# Patient Record
Sex: FEMALE | Race: WHITE | Employment: OTHER | URBAN - METROPOLITAN AREA
[De-identification: names, ages, dates, MRNs, and addresses within clinical notes are randomized per-mention and may not be internally consistent; named-entity substitution may affect disease eponyms.]

---

## 2017-09-18 ENCOUNTER — HOSPITAL ENCOUNTER (EMERGENCY)
Age: 82
Discharge: HOME | End: 2017-09-18
Attending: EMERGENCY MEDICINE
Payer: MEDICARE

## 2017-09-18 ENCOUNTER — APPOINTMENT (OUTPATIENT)
Dept: GENERAL RADIOLOGY | Age: 82
End: 2017-09-18
Payer: MEDICARE

## 2017-09-18 VITALS
HEART RATE: 51 BPM | DIASTOLIC BLOOD PRESSURE: 72 MMHG | SYSTOLIC BLOOD PRESSURE: 126 MMHG | HEIGHT: 61 IN | TEMPERATURE: 97.5 F | RESPIRATION RATE: 14 BRPM | BODY MASS INDEX: 33.79 KG/M2 | WEIGHT: 179 LBS

## 2017-09-18 DIAGNOSIS — S90.30XA CONTUSION OF FOOT, UNSPECIFIED LATERALITY, INITIAL ENCOUNTER: Primary | ICD-10-CM

## 2017-09-18 PROCEDURE — 99283 EMERGENCY DEPT VISIT LOW MDM: CPT

## 2017-09-18 PROCEDURE — 73630 X-RAY EXAM OF FOOT: CPT

## 2017-09-18 ASSESSMENT — PAIN DESCRIPTION - PAIN TYPE: TYPE: ACUTE PAIN

## 2017-09-18 ASSESSMENT — ENCOUNTER SYMPTOMS
EYE PAIN: 0
NAUSEA: 0
VOMITING: 0
SORE THROAT: 0
CHEST TIGHTNESS: 0
ABDOMINAL PAIN: 0
SHORTNESS OF BREATH: 0

## 2017-09-18 ASSESSMENT — PAIN DESCRIPTION - LOCATION: LOCATION: FOOT

## 2017-09-18 ASSESSMENT — PAIN DESCRIPTION - ORIENTATION: ORIENTATION: RIGHT

## 2017-09-18 ASSESSMENT — PAIN SCALES - GENERAL: PAINLEVEL_OUTOF10: 7

## 2018-01-09 ENCOUNTER — OFFICE VISIT (OUTPATIENT)
Dept: FAMILY MEDICINE CLINIC | Age: 83
End: 2018-01-09

## 2018-01-09 VITALS
HEIGHT: 61 IN | SYSTOLIC BLOOD PRESSURE: 112 MMHG | OXYGEN SATURATION: 97 % | HEART RATE: 47 BPM | BODY MASS INDEX: 31.93 KG/M2 | WEIGHT: 169.1 LBS | RESPIRATION RATE: 13 BRPM | TEMPERATURE: 98.2 F | DIASTOLIC BLOOD PRESSURE: 60 MMHG

## 2018-01-09 DIAGNOSIS — E78.00 PURE HYPERCHOLESTEROLEMIA: Primary | ICD-10-CM

## 2018-01-09 DIAGNOSIS — Z76.0 MEDICATION REFILL: ICD-10-CM

## 2018-01-09 DIAGNOSIS — F32.A ANXIETY AND DEPRESSION: ICD-10-CM

## 2018-01-09 DIAGNOSIS — E11.00 TYPE 2 DIABETES MELLITUS WITH HYPEROSMOLARITY WITHOUT COMA, WITHOUT LONG-TERM CURRENT USE OF INSULIN (HCC): ICD-10-CM

## 2018-01-09 DIAGNOSIS — I10 ESSENTIAL HYPERTENSION: ICD-10-CM

## 2018-01-09 DIAGNOSIS — F41.9 ANXIETY AND DEPRESSION: ICD-10-CM

## 2018-01-09 PROBLEM — M47.816 LUMBAR SPONDYLOSIS: Status: ACTIVE | Noted: 2017-10-30

## 2018-01-09 PROCEDURE — G8427 DOCREV CUR MEDS BY ELIG CLIN: HCPCS | Performed by: FAMILY MEDICINE

## 2018-01-09 PROCEDURE — 99202 OFFICE O/P NEW SF 15 MIN: CPT | Performed by: FAMILY MEDICINE

## 2018-01-09 PROCEDURE — G8484 FLU IMMUNIZE NO ADMIN: HCPCS | Performed by: FAMILY MEDICINE

## 2018-01-09 PROCEDURE — 1123F ACP DISCUSS/DSCN MKR DOCD: CPT | Performed by: FAMILY MEDICINE

## 2018-01-09 PROCEDURE — 1090F PRES/ABSN URINE INCON ASSESS: CPT | Performed by: FAMILY MEDICINE

## 2018-01-09 PROCEDURE — G8417 CALC BMI ABV UP PARAM F/U: HCPCS | Performed by: FAMILY MEDICINE

## 2018-01-09 PROCEDURE — G8400 PT W/DXA NO RESULTS DOC: HCPCS | Performed by: FAMILY MEDICINE

## 2018-01-09 PROCEDURE — 1036F TOBACCO NON-USER: CPT | Performed by: FAMILY MEDICINE

## 2018-01-09 PROCEDURE — 4040F PNEUMOC VAC/ADMIN/RCVD: CPT | Performed by: FAMILY MEDICINE

## 2018-01-09 PROCEDURE — G8599 NO ASA/ANTIPLAT THER USE RNG: HCPCS | Performed by: FAMILY MEDICINE

## 2018-01-09 RX ORDER — PREGABALIN 75 MG/1
75 CAPSULE ORAL 3 TIMES DAILY
COMMUNITY
Start: 2017-10-13 | End: 2020-01-01 | Stop reason: SDUPTHER

## 2018-01-09 RX ORDER — TRAZODONE HYDROCHLORIDE 100 MG/1
200 TABLET ORAL
COMMUNITY
Start: 2017-05-04 | End: 2018-05-15 | Stop reason: SDUPTHER

## 2018-01-09 RX ORDER — CARVEDILOL 6.25 MG/1
6.25 TABLET ORAL
COMMUNITY
Start: 2017-05-04 | End: 2018-05-15 | Stop reason: SDUPTHER

## 2018-01-09 RX ORDER — LATANOPROST 50 UG/ML
SOLUTION/ DROPS OPHTHALMIC
COMMUNITY
Start: 2017-09-28

## 2018-01-09 RX ORDER — TIZANIDINE 2 MG/1
2 TABLET ORAL
Status: ON HOLD | COMMUNITY
Start: 2017-05-18 | End: 2020-01-01 | Stop reason: HOSPADM

## 2018-01-09 RX ORDER — OMEPRAZOLE 20 MG/1
20 CAPSULE, DELAYED RELEASE ORAL
COMMUNITY
Start: 2017-11-15 | End: 2018-01-09 | Stop reason: SDUPTHER

## 2018-01-09 RX ORDER — TOPIRAMATE 25 MG/1
TABLET ORAL
COMMUNITY
Start: 2017-10-10 | End: 2018-10-11 | Stop reason: SDUPTHER

## 2018-01-09 RX ORDER — ALPRAZOLAM 0.5 MG/1
0.5 TABLET ORAL
COMMUNITY
Start: 2017-05-18 | End: 2020-01-01 | Stop reason: SDUPTHER

## 2018-01-09 RX ORDER — OMEPRAZOLE 20 MG/1
20 CAPSULE, DELAYED RELEASE ORAL DAILY
Qty: 30 CAPSULE | Refills: 1 | Status: SHIPPED | OUTPATIENT
Start: 2018-01-09 | End: 2018-01-20 | Stop reason: SDUPTHER

## 2018-01-09 RX ORDER — GLIMEPIRIDE 1 MG/1
TABLET ORAL
COMMUNITY
Start: 2017-08-07 | End: 2018-03-27

## 2018-01-09 RX ORDER — CLOTRIMAZOLE AND BETAMETHASONE DIPROPIONATE 10; .64 MG/G; MG/G
CREAM TOPICAL
COMMUNITY
Start: 2017-11-03 | End: 2019-04-16 | Stop reason: SDUPTHER

## 2018-01-09 RX ORDER — LOVASTATIN 10 MG/1
10 TABLET ORAL
COMMUNITY
Start: 2017-10-10 | End: 2018-10-22 | Stop reason: SDUPTHER

## 2018-01-09 ASSESSMENT — ENCOUNTER SYMPTOMS: ABDOMINAL PAIN: 0

## 2018-01-09 NOTE — PROGRESS NOTES
Subjective:      Patient ID: Elizabeth Echeverria is a [de-identified] y.o. female    Hypertension   This is a chronic problem. The problem is controlled. Pertinent negatives include no chest pain. Risk factors for coronary artery disease include obesity, diabetes mellitus, dyslipidemia, sedentary lifestyle, stress and post-menopausal state. Past treatments include beta blockers. The current treatment provides moderate improvement. Compliance problems include exercise and diet. Hyperlipidemia   Pertinent negatives include no chest pain. Here to establish. Does see neurology and pain management. Does live at home by herself with help by daughter whom is here with here today. Review of Systems   Constitutional: Negative for activity change. Cardiovascular: Negative for chest pain and leg swelling. Gastrointestinal: Negative for abdominal pain. Skin: Negative for rash. Neurological: Negative for dizziness. Reviewed allergy, medical, social, surgical, family and med list changes and updated   Files  Social History     Social History    Marital status:      Spouse name: N/A    Number of children: N/A    Years of education: N/A     Social History Main Topics    Smoking status: Never Smoker    Smokeless tobacco: Never Used    Alcohol use No    Drug use: No    Sexual activity: Not Asked     Other Topics Concern    None     Social History Narrative    None     Current Outpatient Prescriptions   Medication Sig Dispense Refill    pregabalin (LYRICA) 75 MG capsule Take 75 mg by mouth Take 75 mg by mouth. Alphonse Lassiter lovastatin (MEVACOR) 10 MG tablet Take 10 mg by mouth 1 po qd      traZODone (DESYREL) 100 MG tablet Take 200 mg by mouth 2 po qhs      sertraline (ZOLOFT) 50 MG tablet Take 50 mg by mouth 1 po qd      topiramate (TOPAMAX) 25 MG tablet 2 po qhs      glimepiride (AMARYL) 1 MG tablet Take 1 tablet by mouth only if over 120      carvedilol (COREG) 6.25 MG tablet Take 6.25 mg by mouth 1 po bid      carbidopa-levodopa (SINEMET)  MG per tablet Take 1 tablet by mouth 1 po bid per dr robb goldstein neurologist      metFORMIN (GLUCOPHAGE) 500 MG tablet Take 500 mg by mouth 1 po bid      latanoprost (XALATAN) 0.005 % ophthalmic solution Use 1 Drop in both eyes daily at bedtime.  omeprazole (PRILOSEC) 20 MG delayed release capsule Take 20 mg by mouth 1 po qd      clotrimazole-betamethasone (LOTRISONE) 1-0.05 % cream Use two times daily to affected area for 14 days then stop      glucose blood VI test strips (EXACTECH TEST) strip accu check mirtha plus test strips, test qd      ALPRAZolam (XANAX) 0.5 MG tablet Take 0.5 mg by mouth Take 0.5 mg by mouth qd prn .  tiZANidine (ZANAFLEX) 2 MG tablet Take 2 mg by mouth Take 2 mg by mouth bid prn       No current facility-administered medications for this visit. Family History   Problem Relation Age of Onset    High Blood Pressure Neg Hx      Past Medical History:   Diagnosis Date    Fibromyalgia     Hyperlipidemia     Parkinson's disease ([facility] [address]     Type II or unspecified type diabetes mellitus without mention of complication, not stated as uncontrolled    Controlled Substances Monitoring:        Objective:   /60 (Site: Right Arm, Position: Sitting, Cuff Size: Large Adult)   Pulse (!) 47   Temp 98.2 °F (36.8 °C) (Tympanic)   Resp 13   Ht 5' 1\" (1.549 m)   Wt 169 lb 1.6 oz (76.7 kg)   SpO2 97%   BMI 31.95 kg/m²     Physical Exam  Neck:no carotid bruits. No masses. No adenopathy. No thyroid asymmetry. Lungs:clear and equal breath sounds. No wheezes or rales. Heart:rate reg.--around 50. No murmur. No gallops   Pulses:Radials 2+ equal               Poster tib 1+ equal  Extremities:no edema in either leg  Gen: In no acute distress  Abdomen; B.S present. Soft  Non tender. No hepatosplenomegaly. No masses     Patient with appropriate affect.   Alert  Thought content appropriate  Good eye contact      Dorsalis pedis and

## 2018-01-20 RX ORDER — OMEPRAZOLE 20 MG/1
20 CAPSULE, DELAYED RELEASE ORAL DAILY
Qty: 30 CAPSULE | Refills: 1 | Status: SHIPPED | OUTPATIENT
Start: 2018-01-20 | End: 2018-11-12 | Stop reason: DRUGHIGH

## 2018-02-02 ENCOUNTER — OFFICE VISIT (OUTPATIENT)
Dept: FAMILY MEDICINE CLINIC | Age: 83
End: 2018-02-02
Payer: COMMERCIAL

## 2018-02-02 VITALS
OXYGEN SATURATION: 98 % | WEIGHT: 174.4 LBS | DIASTOLIC BLOOD PRESSURE: 64 MMHG | BODY MASS INDEX: 32.93 KG/M2 | TEMPERATURE: 98.2 F | SYSTOLIC BLOOD PRESSURE: 118 MMHG | RESPIRATION RATE: 20 BRPM | HEART RATE: 50 BPM | HEIGHT: 61 IN

## 2018-02-02 DIAGNOSIS — J01.40 ACUTE NON-RECURRENT PANSINUSITIS: Primary | ICD-10-CM

## 2018-02-02 DIAGNOSIS — R05.9 COUGH: ICD-10-CM

## 2018-02-02 PROCEDURE — 99213 OFFICE O/P EST LOW 20 MIN: CPT | Performed by: NURSE PRACTITIONER

## 2018-02-02 RX ORDER — DOXYCYCLINE 100 MG/1
100 CAPSULE ORAL 2 TIMES DAILY
Qty: 20 CAPSULE | Refills: 0 | Status: SHIPPED | OUTPATIENT
Start: 2018-02-02 | End: 2018-02-12

## 2018-02-02 RX ORDER — BENZONATATE 100 MG/1
100 CAPSULE ORAL 3 TIMES DAILY PRN
Qty: 21 CAPSULE | Refills: 0 | Status: SHIPPED | OUTPATIENT
Start: 2018-02-02 | End: 2018-02-09

## 2018-02-02 ASSESSMENT — ENCOUNTER SYMPTOMS
CHEST TIGHTNESS: 0
COUGH: 1
RHINORRHEA: 1
ABDOMINAL PAIN: 0
DIARRHEA: 0
SINUS COMPLAINT: 1
SWOLLEN GLANDS: 1
ABDOMINAL DISTENTION: 0
SHORTNESS OF BREATH: 0
TROUBLE SWALLOWING: 0
CONSTIPATION: 0
VOMITING: 0
HOARSE VOICE: 0
SINUS PRESSURE: 1
SORE THROAT: 1
NAUSEA: 0
SINUS PAIN: 1

## 2018-02-23 NOTE — TELEPHONE ENCOUNTER
Pt needs refills on glucose testing supplies of AccuChek Plus test strips (Pkg of 100 she thinks) and lancets. States she gets them free from [employer].   The script needs sent to the following fax #:  [phone]

## 2018-03-16 NOTE — TELEPHONE ENCOUNTER
Patient called to check on status. Informed M.A. To process to patients pharmacy of choice Med. Service Comp. NOT   Note to Pharmacy:   [address] [place] [address] [phone]     As noted on Rx. Marisa Carlo Marisa Carlo Marisa Carlo Marisa Carlo

## 2018-03-20 DIAGNOSIS — E11.00 TYPE 2 DIABETES MELLITUS WITH HYPEROSMOLARITY WITHOUT COMA, WITHOUT LONG-TERM CURRENT USE OF INSULIN (HCC): ICD-10-CM

## 2018-03-20 DIAGNOSIS — I10 ESSENTIAL HYPERTENSION: ICD-10-CM

## 2018-03-20 DIAGNOSIS — E78.00 PURE HYPERCHOLESTEROLEMIA: ICD-10-CM

## 2018-03-20 LAB
ALBUMIN SERPL-MCNC: 4.1 G/DL (ref 3.9–4.9)
ALP BLD-CCNC: 88 U/L (ref 40–130)
ALT SERPL-CCNC: 18 U/L (ref 0–33)
ANION GAP SERPL CALCULATED.3IONS-SCNC: 18 MEQ/L (ref 7–13)
AST SERPL-CCNC: 31 U/L (ref 0–35)
BILIRUB SERPL-MCNC: 0.6 MG/DL (ref 0–1.2)
BUN BLDV-MCNC: 7 MG/DL (ref 8–23)
CALCIUM SERPL-MCNC: 9.3 MG/DL (ref 8.6–10.2)
CHLORIDE BLD-SCNC: 104 MEQ/L (ref 98–107)
CHOLESTEROL, TOTAL: 150 MG/DL (ref 0–199)
CO2: 23 MEQ/L (ref 22–29)
CREAT SERPL-MCNC: 0.75 MG/DL (ref 0.5–0.9)
GFR AFRICAN AMERICAN: >60
GFR NON-AFRICAN AMERICAN: >60
GLOBULIN: 3 G/DL (ref 2.3–3.5)
GLUCOSE BLD-MCNC: 106 MG/DL (ref 74–109)
HBA1C MFR BLD: 5.6 % (ref 4.8–5.9)
HDLC SERPL-MCNC: 71 MG/DL (ref 40–59)
LDL CHOLESTEROL CALCULATED: 54 MG/DL (ref 0–129)
POTASSIUM SERPL-SCNC: 3.7 MEQ/L (ref 3.5–5.1)
SODIUM BLD-SCNC: 145 MEQ/L (ref 132–144)
TOTAL PROTEIN: 7.1 G/DL (ref 6.4–8.1)
TRIGL SERPL-MCNC: 127 MG/DL (ref 0–200)

## 2018-03-21 LAB
BASOPHILS ABSOLUTE: 0 K/UL (ref 0–0.2)
BASOPHILS RELATIVE PERCENT: 0.7 %
EOSINOPHILS ABSOLUTE: 0.1 K/UL (ref 0–0.7)
EOSINOPHILS RELATIVE PERCENT: 2.7 %
HCT VFR BLD CALC: 36.9 % (ref 37–47)
HEMOGLOBIN: 12.1 G/DL (ref 12–16)
LYMPHOCYTES ABSOLUTE: 1.2 K/UL (ref 1–4.8)
LYMPHOCYTES RELATIVE PERCENT: 26.4 %
MCH RBC QN AUTO: 30.7 PG (ref 27–31.3)
MCHC RBC AUTO-ENTMCNC: 32.8 % (ref 33–37)
MCV RBC AUTO: 93.5 FL (ref 82–100)
MONOCYTES ABSOLUTE: 0.3 K/UL (ref 0.2–0.8)
MONOCYTES RELATIVE PERCENT: 6.5 %
NEUTROPHILS ABSOLUTE: 3 K/UL (ref 1.4–6.5)
NEUTROPHILS RELATIVE PERCENT: 63.7 %
PDW BLD-RTO: 17.9 % (ref 11.5–14.5)
PLATELET # BLD: 69 K/UL (ref 130–400)
PLATELET SLIDE REVIEW: ABNORMAL
RBC # BLD: 3.95 M/UL (ref 4.2–5.4)
WBC # BLD: 4.7 K/UL (ref 4.8–10.8)

## 2018-03-23 ENCOUNTER — TELEPHONE (OUTPATIENT)
Dept: FAMILY MEDICINE CLINIC | Age: 83
End: 2018-03-23

## 2018-03-27 ENCOUNTER — OFFICE VISIT (OUTPATIENT)
Dept: FAMILY MEDICINE CLINIC | Age: 83
End: 2018-03-27
Payer: COMMERCIAL

## 2018-03-27 VITALS
SYSTOLIC BLOOD PRESSURE: 136 MMHG | HEART RATE: 57 BPM | TEMPERATURE: 98.3 F | DIASTOLIC BLOOD PRESSURE: 60 MMHG | BODY MASS INDEX: 30.21 KG/M2 | RESPIRATION RATE: 14 BRPM | WEIGHT: 160 LBS | OXYGEN SATURATION: 97 % | HEIGHT: 61 IN

## 2018-03-27 DIAGNOSIS — E78.00 PURE HYPERCHOLESTEROLEMIA: Primary | ICD-10-CM

## 2018-03-27 DIAGNOSIS — E11.00 TYPE 2 DIABETES MELLITUS WITH HYPEROSMOLARITY WITHOUT COMA, WITHOUT LONG-TERM CURRENT USE OF INSULIN (HCC): ICD-10-CM

## 2018-03-27 DIAGNOSIS — F32.A ANXIETY AND DEPRESSION: ICD-10-CM

## 2018-03-27 DIAGNOSIS — R05.3 CHRONIC COUGH: ICD-10-CM

## 2018-03-27 DIAGNOSIS — D69.6 THROMBOCYTOPENIA (HCC): ICD-10-CM

## 2018-03-27 DIAGNOSIS — R35.0 FREQUENT URINATION: ICD-10-CM

## 2018-03-27 DIAGNOSIS — I10 ESSENTIAL HYPERTENSION: ICD-10-CM

## 2018-03-27 DIAGNOSIS — F41.9 ANXIETY AND DEPRESSION: ICD-10-CM

## 2018-03-27 LAB
BILIRUBIN, POC: NORMAL
BLOOD URINE, POC: NORMAL
CLARITY, POC: CLEAR
COLOR, POC: YELLOW
CREATININE URINE: 133.6 MG/DL
GLUCOSE URINE, POC: NORMAL
KETONES, POC: NORMAL
LEUKOCYTE EST, POC: NORMAL
MICROALBUMIN UR-MCNC: <1.2 MG/DL
MICROALBUMIN/CREAT UR-RTO: NORMAL MG/G (ref 0–30)
NITRITE, POC: NORMAL
PH, POC: 8.5
PROTEIN, POC: 0.15
SPECIFIC GRAVITY, POC: 1.01
UROBILINOGEN, POC: 17

## 2018-03-27 PROCEDURE — 1123F ACP DISCUSS/DSCN MKR DOCD: CPT | Performed by: FAMILY MEDICINE

## 2018-03-27 PROCEDURE — 4040F PNEUMOC VAC/ADMIN/RCVD: CPT | Performed by: FAMILY MEDICINE

## 2018-03-27 PROCEDURE — G8417 CALC BMI ABV UP PARAM F/U: HCPCS | Performed by: FAMILY MEDICINE

## 2018-03-27 PROCEDURE — 1036F TOBACCO NON-USER: CPT | Performed by: FAMILY MEDICINE

## 2018-03-27 PROCEDURE — G8427 DOCREV CUR MEDS BY ELIG CLIN: HCPCS | Performed by: FAMILY MEDICINE

## 2018-03-27 PROCEDURE — G8400 PT W/DXA NO RESULTS DOC: HCPCS | Performed by: FAMILY MEDICINE

## 2018-03-27 PROCEDURE — 99214 OFFICE O/P EST MOD 30 MIN: CPT | Performed by: FAMILY MEDICINE

## 2018-03-27 PROCEDURE — G8599 NO ASA/ANTIPLAT THER USE RNG: HCPCS | Performed by: FAMILY MEDICINE

## 2018-03-27 PROCEDURE — 1090F PRES/ABSN URINE INCON ASSESS: CPT | Performed by: FAMILY MEDICINE

## 2018-03-27 PROCEDURE — G8482 FLU IMMUNIZE ORDER/ADMIN: HCPCS | Performed by: FAMILY MEDICINE

## 2018-03-27 PROCEDURE — 81003 URINALYSIS AUTO W/O SCOPE: CPT | Performed by: FAMILY MEDICINE

## 2018-03-27 RX ORDER — BENZONATATE 100 MG/1
100 CAPSULE ORAL 3 TIMES DAILY PRN
Qty: 21 CAPSULE | Refills: 0 | Status: SHIPPED | OUTPATIENT
Start: 2018-03-27 | End: 2018-04-11 | Stop reason: SDUPTHER

## 2018-03-27 RX ORDER — ALBUTEROL SULFATE 90 UG/1
2 AEROSOL, METERED RESPIRATORY (INHALATION) EVERY 6 HOURS PRN
Qty: 1 INHALER | Refills: 0 | Status: SHIPPED | OUTPATIENT
Start: 2018-03-27 | End: 2019-01-26

## 2018-03-27 RX ORDER — METFORMIN HYDROCHLORIDE 500 MG/1
500 TABLET, EXTENDED RELEASE ORAL
Qty: 30 TABLET | Refills: 3 | Status: SHIPPED | OUTPATIENT
Start: 2018-03-27 | End: 2018-07-10 | Stop reason: SDUPTHER

## 2018-03-27 RX ORDER — BENZONATATE 100 MG/1
100 CAPSULE ORAL 3 TIMES DAILY PRN
Qty: 21 CAPSULE | Refills: 0 | Status: SHIPPED | OUTPATIENT
Start: 2018-03-27 | End: 2018-03-27 | Stop reason: SDUPTHER

## 2018-03-27 RX ORDER — CEFUROXIME AXETIL 250 MG/1
250 TABLET ORAL 2 TIMES DAILY
Qty: 20 TABLET | Refills: 0 | Status: SHIPPED | OUTPATIENT
Start: 2018-03-27 | End: 2018-03-27 | Stop reason: SDUPTHER

## 2018-03-27 RX ORDER — ALBUTEROL SULFATE 90 UG/1
2 AEROSOL, METERED RESPIRATORY (INHALATION) EVERY 6 HOURS PRN
Qty: 1 INHALER | Refills: 0 | Status: SHIPPED | OUTPATIENT
Start: 2018-03-27 | End: 2018-03-27 | Stop reason: SDUPTHER

## 2018-03-27 RX ORDER — CEFUROXIME AXETIL 250 MG/1
250 TABLET ORAL 2 TIMES DAILY
Qty: 20 TABLET | Refills: 0 | Status: SHIPPED | OUTPATIENT
Start: 2018-03-27 | End: 2019-01-01 | Stop reason: SDUPTHER

## 2018-03-27 ASSESSMENT — PATIENT HEALTH QUESTIONNAIRE - PHQ9
1. LITTLE INTEREST OR PLEASURE IN DOING THINGS: 0
2. FEELING DOWN, DEPRESSED OR HOPELESS: 0
SUM OF ALL RESPONSES TO PHQ9 QUESTIONS 1 & 2: 0
SUM OF ALL RESPONSES TO PHQ QUESTIONS 1-9: 0

## 2018-03-27 ASSESSMENT — ENCOUNTER SYMPTOMS
ABDOMINAL PAIN: 0
SHORTNESS OF BREATH: 0

## 2018-03-27 NOTE — PROGRESS NOTES
capsule 1    pregabalin (LYRICA) 75 MG capsule Take 75 mg by mouth Take 75 mg by mouth. [place] lovastatin (MEVACOR) 10 MG tablet Take 10 mg by mouth 1 po qd      traZODone (DESYREL) 100 MG tablet Take 200 mg by mouth 2 po qhs      sertraline (ZOLOFT) 50 MG tablet Take 50 mg by mouth 1 po qd      topiramate (TOPAMAX) 25 MG tablet 2 po qhs      carvedilol (COREG) 6.25 MG tablet Take 6.25 mg by mouth 1 po bid      carbidopa-levodopa (SINEMET)  MG per tablet Take 1 tablet by mouth 1 po bid per dr robb goldstein neurologist      latanoprost (XALATAN) 0.005 % ophthalmic solution Use 1 Drop in both eyes daily at bedtime.  clotrimazole-betamethasone (LOTRISONE) 1-0.05 % cream Use two times daily to affected area for 14 days then stop      ALPRAZolam (XANAX) 0.5 MG tablet Take 0.5 mg by mouth Take 0.5 mg by mouth qd prn .  tiZANidine (ZANAFLEX) 2 MG tablet Take 2 mg by mouth Take 2 mg by mouth bid prn       No current facility-administered medications for this visit. Family History   Problem Relation Age of Onset    High Blood Pressure Neg Hx      Past Medical History:   Diagnosis Date    Fibromyalgia     Hyperlipidemia     Parkinson's disease ([facility] [address]     Type II or unspecified type diabetes mellitus without mention of complication, not stated as uncontrolled        Objective:   Physical Exam   Neck:no carotid bruits. No masses. No adenopathy. No thyroid asymmetry. Lungs:clear and equal breath sounds. No wheezes or rales. Heart:rate reg. 1-2 syst murmur across precordium . No gallops   Pulses:Radials 2+ equal               Poster tib 1+ equal  Extremities:no edema in either leg  Gen: In no acute distress  Abdomen; B.S present. Soft  Non tender. No hepatosplenomegaly. No masses     Patient with appropriate affect. Alert    Thought content appropriate  Good eye contact      Dorsalis pedis and posterior tibial pulses are symmetric. No fissures between the toes.   No open sores on the

## 2018-04-02 RX ORDER — LANCETS
EACH MISCELLANEOUS
Qty: 100 EACH | Refills: 0 | Status: SHIPPED | OUTPATIENT
Start: 2018-04-02 | End: 2018-10-02

## 2018-04-04 DIAGNOSIS — E11.8 TYPE 2 DIABETES MELLITUS WITH COMPLICATION, UNSPECIFIED LONG TERM INSULIN USE STATUS: Primary | ICD-10-CM

## 2018-04-04 RX ORDER — LANCETS
1 EACH MISCELLANEOUS DAILY
Qty: 100 EACH | Refills: 3 | Status: SHIPPED | OUTPATIENT
Start: 2018-04-04 | End: 2019-01-01 | Stop reason: SDUPTHER

## 2018-04-12 RX ORDER — BENZONATATE 100 MG/1
CAPSULE ORAL
Qty: 21 CAPSULE | Refills: 0 | Status: SHIPPED | OUTPATIENT
Start: 2018-04-12 | End: 2018-06-25

## 2018-05-15 ENCOUNTER — TELEPHONE (OUTPATIENT)
Dept: FAMILY MEDICINE CLINIC | Age: 83
End: 2018-05-15

## 2018-05-15 RX ORDER — TRAZODONE HYDROCHLORIDE 100 MG/1
200 TABLET ORAL NIGHTLY
Qty: 60 TABLET | Refills: 1 | Status: SHIPPED | OUTPATIENT
Start: 2018-05-15 | End: 2018-07-17 | Stop reason: SDUPTHER

## 2018-05-15 RX ORDER — TRAZODONE HYDROCHLORIDE 100 MG/1
200 TABLET ORAL NIGHTLY
Qty: 60 TABLET | Refills: 1 | Status: SHIPPED | OUTPATIENT
Start: 2018-05-15 | End: 2018-05-15 | Stop reason: SDUPTHER

## 2018-05-15 RX ORDER — CARVEDILOL 6.25 MG/1
6.25 TABLET ORAL 2 TIMES DAILY WITH MEALS
Qty: 60 TABLET | Refills: 1 | Status: SHIPPED | OUTPATIENT
Start: 2018-05-15 | End: 2018-07-10 | Stop reason: SDUPTHER

## 2018-05-15 RX ORDER — CARVEDILOL 6.25 MG/1
6.25 TABLET ORAL 2 TIMES DAILY WITH MEALS
Qty: 60 TABLET | Refills: 1 | Status: SHIPPED | OUTPATIENT
Start: 2018-05-15 | End: 2018-05-15 | Stop reason: SDUPTHER

## 2018-06-05 ENCOUNTER — TELEPHONE (OUTPATIENT)
Dept: FAMILY MEDICINE CLINIC | Age: 83
End: 2018-06-05

## 2018-06-11 DIAGNOSIS — M79.7 FIBROMYALGIA: Primary | ICD-10-CM

## 2018-06-13 DIAGNOSIS — E11.00 TYPE 2 DIABETES MELLITUS WITH HYPEROSMOLARITY WITHOUT COMA, WITHOUT LONG-TERM CURRENT USE OF INSULIN (HCC): ICD-10-CM

## 2018-06-13 LAB — HBA1C MFR BLD: 6.2 % (ref 4.8–5.9)

## 2018-06-25 ENCOUNTER — OFFICE VISIT (OUTPATIENT)
Dept: FAMILY MEDICINE CLINIC | Age: 83
End: 2018-06-25
Payer: COMMERCIAL

## 2018-06-25 VITALS
HEART RATE: 50 BPM | BODY MASS INDEX: 31.42 KG/M2 | TEMPERATURE: 98.3 F | WEIGHT: 166.4 LBS | HEIGHT: 61 IN | RESPIRATION RATE: 14 BRPM | OXYGEN SATURATION: 98 % | SYSTOLIC BLOOD PRESSURE: 144 MMHG | DIASTOLIC BLOOD PRESSURE: 88 MMHG

## 2018-06-25 DIAGNOSIS — I10 ESSENTIAL HYPERTENSION: ICD-10-CM

## 2018-06-25 DIAGNOSIS — E11.8 TYPE 2 DIABETES MELLITUS WITH COMPLICATION, UNSPECIFIED LONG TERM INSULIN USE STATUS: Primary | ICD-10-CM

## 2018-06-25 DIAGNOSIS — E78.00 PURE HYPERCHOLESTEROLEMIA: ICD-10-CM

## 2018-06-25 DIAGNOSIS — R05.3 CHRONIC COUGH: ICD-10-CM

## 2018-06-25 PROCEDURE — G8599 NO ASA/ANTIPLAT THER USE RNG: HCPCS | Performed by: FAMILY MEDICINE

## 2018-06-25 PROCEDURE — G8417 CALC BMI ABV UP PARAM F/U: HCPCS | Performed by: FAMILY MEDICINE

## 2018-06-25 PROCEDURE — 4040F PNEUMOC VAC/ADMIN/RCVD: CPT | Performed by: FAMILY MEDICINE

## 2018-06-25 PROCEDURE — 1123F ACP DISCUSS/DSCN MKR DOCD: CPT | Performed by: FAMILY MEDICINE

## 2018-06-25 PROCEDURE — 99214 OFFICE O/P EST MOD 30 MIN: CPT | Performed by: FAMILY MEDICINE

## 2018-06-25 PROCEDURE — G8400 PT W/DXA NO RESULTS DOC: HCPCS | Performed by: FAMILY MEDICINE

## 2018-06-25 PROCEDURE — G8427 DOCREV CUR MEDS BY ELIG CLIN: HCPCS | Performed by: FAMILY MEDICINE

## 2018-06-25 PROCEDURE — 1036F TOBACCO NON-USER: CPT | Performed by: FAMILY MEDICINE

## 2018-06-25 PROCEDURE — 1090F PRES/ABSN URINE INCON ASSESS: CPT | Performed by: FAMILY MEDICINE

## 2018-06-25 RX ORDER — VALSARTAN 80 MG/1
80 TABLET ORAL DAILY
Qty: 30 TABLET | Refills: 2 | Status: SHIPPED | OUTPATIENT
Start: 2018-06-25 | End: 2018-10-02

## 2018-06-25 ASSESSMENT — ENCOUNTER SYMPTOMS
ABDOMINAL PAIN: 0
SHORTNESS OF BREATH: 0

## 2018-07-06 ENCOUNTER — TELEPHONE (OUTPATIENT)
Dept: FAMILY MEDICINE CLINIC | Age: 83
End: 2018-07-06

## 2018-07-06 DIAGNOSIS — G20 PARKINSON DISEASE (HCC): Primary | ICD-10-CM

## 2018-07-06 NOTE — TELEPHONE ENCOUNTER
Pt wants referral created to Neurology, Dr. Rodrigue Balderrama, for Parkinson's. Has appt 7/10/18.     Ph:  [phone]  Fax: [phone]    Notify with questions

## 2018-07-10 RX ORDER — METFORMIN HYDROCHLORIDE 500 MG/1
500 TABLET, EXTENDED RELEASE ORAL
Qty: 90 TABLET | Refills: 1 | Status: SHIPPED | OUTPATIENT
Start: 2018-07-10 | End: 2018-07-17 | Stop reason: SDUPTHER

## 2018-07-10 RX ORDER — CARVEDILOL 6.25 MG/1
6.25 TABLET ORAL 2 TIMES DAILY WITH MEALS
Qty: 180 TABLET | Refills: 1 | Status: SHIPPED | OUTPATIENT
Start: 2018-07-10 | End: 2018-12-12

## 2018-07-17 RX ORDER — METFORMIN HYDROCHLORIDE 500 MG/1
500 TABLET, EXTENDED RELEASE ORAL
Qty: 90 TABLET | Refills: 1 | Status: SHIPPED | OUTPATIENT
Start: 2018-07-17 | End: 2019-01-14 | Stop reason: SDUPTHER

## 2018-07-17 RX ORDER — TRAZODONE HYDROCHLORIDE 100 MG/1
200 TABLET ORAL NIGHTLY
Qty: 60 TABLET | Refills: 1 | Status: SHIPPED | OUTPATIENT
Start: 2018-07-17 | End: 2018-09-19 | Stop reason: SDUPTHER

## 2018-07-25 ENCOUNTER — TELEPHONE (OUTPATIENT)
Dept: FAMILY MEDICINE CLINIC | Age: 83
End: 2018-07-25

## 2018-07-25 NOTE — TELEPHONE ENCOUNTER
Pharmacy called Valsartan is among the ones on recall (brand), needs switched to something else please. Pharmacy [facility] in [facility].

## 2018-07-26 RX ORDER — IRBESARTAN 75 MG/1
75 TABLET ORAL DAILY
Qty: 30 TABLET | Refills: 3 | Status: SHIPPED | OUTPATIENT
Start: 2018-07-26 | End: 2018-11-16 | Stop reason: SDUPTHER

## 2018-09-19 RX ORDER — TRAZODONE HYDROCHLORIDE 100 MG/1
TABLET ORAL
Qty: 90 TABLET | Refills: 1 | Status: SHIPPED | OUTPATIENT
Start: 2018-09-19 | End: 2018-12-26 | Stop reason: SDUPTHER

## 2018-09-28 DIAGNOSIS — E11.8 TYPE 2 DIABETES MELLITUS WITH COMPLICATION (HCC): ICD-10-CM

## 2018-09-28 LAB — HBA1C MFR BLD: 6.6 % (ref 4.8–5.9)

## 2018-10-02 ENCOUNTER — OFFICE VISIT (OUTPATIENT)
Dept: FAMILY MEDICINE CLINIC | Age: 83
End: 2018-10-02
Payer: COMMERCIAL

## 2018-10-02 VITALS
HEART RATE: 53 BPM | TEMPERATURE: 99.3 F | BODY MASS INDEX: 32.81 KG/M2 | DIASTOLIC BLOOD PRESSURE: 70 MMHG | RESPIRATION RATE: 14 BRPM | SYSTOLIC BLOOD PRESSURE: 124 MMHG | OXYGEN SATURATION: 98 % | WEIGHT: 173.8 LBS | HEIGHT: 61 IN

## 2018-10-02 DIAGNOSIS — G20 PARKINSON DISEASE (HCC): ICD-10-CM

## 2018-10-02 DIAGNOSIS — I10 ESSENTIAL HYPERTENSION: ICD-10-CM

## 2018-10-02 DIAGNOSIS — E78.00 PURE HYPERCHOLESTEROLEMIA: ICD-10-CM

## 2018-10-02 DIAGNOSIS — R07.9 CHEST PAIN, UNSPECIFIED TYPE: ICD-10-CM

## 2018-10-02 DIAGNOSIS — R05.3 CHRONIC COUGH: ICD-10-CM

## 2018-10-02 DIAGNOSIS — E11.8 TYPE 2 DIABETES MELLITUS WITH COMPLICATION, WITHOUT LONG-TERM CURRENT USE OF INSULIN (HCC): Primary | ICD-10-CM

## 2018-10-02 PROCEDURE — 1090F PRES/ABSN URINE INCON ASSESS: CPT | Performed by: FAMILY MEDICINE

## 2018-10-02 PROCEDURE — G8599 NO ASA/ANTIPLAT THER USE RNG: HCPCS | Performed by: FAMILY MEDICINE

## 2018-10-02 PROCEDURE — 99214 OFFICE O/P EST MOD 30 MIN: CPT | Performed by: FAMILY MEDICINE

## 2018-10-02 PROCEDURE — 4040F PNEUMOC VAC/ADMIN/RCVD: CPT | Performed by: FAMILY MEDICINE

## 2018-10-02 PROCEDURE — 93000 ELECTROCARDIOGRAM COMPLETE: CPT | Performed by: FAMILY MEDICINE

## 2018-10-02 PROCEDURE — G8484 FLU IMMUNIZE NO ADMIN: HCPCS | Performed by: FAMILY MEDICINE

## 2018-10-02 PROCEDURE — 1123F ACP DISCUSS/DSCN MKR DOCD: CPT | Performed by: FAMILY MEDICINE

## 2018-10-02 PROCEDURE — G8417 CALC BMI ABV UP PARAM F/U: HCPCS | Performed by: FAMILY MEDICINE

## 2018-10-02 PROCEDURE — 1036F TOBACCO NON-USER: CPT | Performed by: FAMILY MEDICINE

## 2018-10-02 PROCEDURE — G8400 PT W/DXA NO RESULTS DOC: HCPCS | Performed by: FAMILY MEDICINE

## 2018-10-02 PROCEDURE — 1101F PT FALLS ASSESS-DOCD LE1/YR: CPT | Performed by: FAMILY MEDICINE

## 2018-10-02 PROCEDURE — G8427 DOCREV CUR MEDS BY ELIG CLIN: HCPCS | Performed by: FAMILY MEDICINE

## 2018-10-02 ASSESSMENT — ENCOUNTER SYMPTOMS
SHORTNESS OF BREATH: 0
ABDOMINAL PAIN: 0

## 2018-10-02 NOTE — PROGRESS NOTES
Subjective:      Patient ID: Tabitha Fuentes is a 82 y.o. female    Hypertension   The current episode started more than 1 year ago. The problem is controlled. Pertinent negatives include no chest pain or shortness of breath. Risk factors for coronary artery disease include sedentary lifestyle, obesity, dyslipidemia, diabetes mellitus, post-menopausal state and stress. Past treatments include angiotensin blockers. Diabetes   Pertinent negatives for hypoglycemia include no dizziness. Pertinent negatives for diabetes include no chest pain and no weakness. Hyperlipidemia   Pertinent negatives include no chest pain or shortness of breath. Here with family. Has had some intermittent sob episodes recently and some chest pain episodes recently as well. Does have chronic cough which has not changed recently. Family would also like letter stating that family would like to get access to her place of living [place] in case of emergency        Review of Systems   Constitutional: Negative for activity change, chills and unexpected weight change. Respiratory: Negative for shortness of breath. Cardiovascular: Negative for chest pain. Gastrointestinal: Negative for abdominal pain. Skin: Negative for rash. Neurological: Negative for dizziness and weakness. Reviewed allergy, medical, social, surgical, family and med list changes and updated   Files--reviewed blood work which was acceptable   Social History     Social History    Marital status:       Spouse name: N/A    Number of children: N/A    Years of education: N/A     Social History Main Topics    Smoking status: Never Smoker    Smokeless tobacco: Never Used    Alcohol use No    Drug use: No    Sexual activity: Not Asked     Other Topics Concern    None     Social History Narrative    None     Current Outpatient Prescriptions   Medication Sig Dispense Refill    traZODone (DESYREL) 100 MG tablet TAKE 2 TABLETS BY

## 2018-10-11 RX ORDER — TOPIRAMATE 25 MG/1
TABLET ORAL
Qty: 60 TABLET | Refills: 1 | Status: SHIPPED | OUTPATIENT
Start: 2018-10-11 | End: 2018-12-14 | Stop reason: SDUPTHER

## 2018-10-22 RX ORDER — LOVASTATIN 10 MG/1
10 TABLET ORAL NIGHTLY
Qty: 30 TABLET | Refills: 0 | Status: SHIPPED | OUTPATIENT
Start: 2018-10-22 | End: 2018-10-24 | Stop reason: SDUPTHER

## 2018-10-24 ENCOUNTER — TELEPHONE (OUTPATIENT)
Dept: FAMILY MEDICINE CLINIC | Age: 83
End: 2018-10-24

## 2018-10-24 RX ORDER — LOVASTATIN 10 MG/1
10 TABLET ORAL NIGHTLY
Qty: 30 TABLET | Refills: 0 | Status: SHIPPED | OUTPATIENT
Start: 2018-10-24 | End: 2018-11-16 | Stop reason: SDUPTHER

## 2018-11-08 ENCOUNTER — NURSE ONLY (OUTPATIENT)
Dept: FAMILY MEDICINE CLINIC | Age: 83
End: 2018-11-08
Payer: MEDICARE

## 2018-11-08 DIAGNOSIS — Z23 NEED FOR VACCINATION: Primary | ICD-10-CM

## 2018-11-08 PROCEDURE — 90688 IIV4 VACCINE SPLT 0.5 ML IM: CPT | Performed by: FAMILY MEDICINE

## 2018-11-08 PROCEDURE — G0008 ADMIN INFLUENZA VIRUS VAC: HCPCS | Performed by: FAMILY MEDICINE

## 2018-11-08 NOTE — PROGRESS NOTES
Vaccine Information Sheet, \"Influenza - Inactivated\"  given to [place], or parent/legal guardian of  [place] and verbalized understanding. Patient responses:    Have you ever had a reaction to a flu vaccine? No  Are you able to eat eggs without adverse effects? Yes  Do you have any current illness? No  Have you ever had Guillian Packwood Syndrome? No    Flu vaccine given per order. Please see immunization tab.

## 2018-11-12 ENCOUNTER — OFFICE VISIT (OUTPATIENT)
Dept: PULMONOLOGY | Age: 83
End: 2018-11-12
Payer: MEDICARE

## 2018-11-12 VITALS
TEMPERATURE: 99 F | HEIGHT: 61 IN | BODY MASS INDEX: 32.21 KG/M2 | HEART RATE: 49 BPM | OXYGEN SATURATION: 99 % | SYSTOLIC BLOOD PRESSURE: 110 MMHG | WEIGHT: 170.6 LBS | DIASTOLIC BLOOD PRESSURE: 62 MMHG

## 2018-11-12 DIAGNOSIS — K21.9 GASTROESOPHAGEAL REFLUX DISEASE WITHOUT ESOPHAGITIS: ICD-10-CM

## 2018-11-12 DIAGNOSIS — R09.81 NASAL CONGESTION: ICD-10-CM

## 2018-11-12 DIAGNOSIS — R05.3 CHRONIC COUGH: Primary | ICD-10-CM

## 2018-11-12 DIAGNOSIS — E66.09 CLASS 1 OBESITY DUE TO EXCESS CALORIES WITHOUT SERIOUS COMORBIDITY WITH BODY MASS INDEX (BMI) OF 32.0 TO 32.9 IN ADULT: ICD-10-CM

## 2018-11-12 PROCEDURE — G8427 DOCREV CUR MEDS BY ELIG CLIN: HCPCS | Performed by: INTERNAL MEDICINE

## 2018-11-12 PROCEDURE — G8400 PT W/DXA NO RESULTS DOC: HCPCS | Performed by: INTERNAL MEDICINE

## 2018-11-12 PROCEDURE — G8482 FLU IMMUNIZE ORDER/ADMIN: HCPCS | Performed by: INTERNAL MEDICINE

## 2018-11-12 PROCEDURE — 1123F ACP DISCUSS/DSCN MKR DOCD: CPT | Performed by: INTERNAL MEDICINE

## 2018-11-12 PROCEDURE — G8599 NO ASA/ANTIPLAT THER USE RNG: HCPCS | Performed by: INTERNAL MEDICINE

## 2018-11-12 PROCEDURE — 4040F PNEUMOC VAC/ADMIN/RCVD: CPT | Performed by: INTERNAL MEDICINE

## 2018-11-12 PROCEDURE — G8417 CALC BMI ABV UP PARAM F/U: HCPCS | Performed by: INTERNAL MEDICINE

## 2018-11-12 PROCEDURE — 1101F PT FALLS ASSESS-DOCD LE1/YR: CPT | Performed by: INTERNAL MEDICINE

## 2018-11-12 PROCEDURE — 1090F PRES/ABSN URINE INCON ASSESS: CPT | Performed by: INTERNAL MEDICINE

## 2018-11-12 PROCEDURE — 99204 OFFICE O/P NEW MOD 45 MIN: CPT | Performed by: INTERNAL MEDICINE

## 2018-11-12 PROCEDURE — 1036F TOBACCO NON-USER: CPT | Performed by: INTERNAL MEDICINE

## 2018-11-12 RX ORDER — OMEPRAZOLE 40 MG/1
40 CAPSULE, DELAYED RELEASE ORAL DAILY
Qty: 30 CAPSULE | Refills: 3 | Status: SHIPPED | OUTPATIENT
Start: 2018-11-12 | End: 2018-11-12 | Stop reason: SDUPTHER

## 2018-11-12 RX ORDER — BUDESONIDE AND FORMOTEROL FUMARATE DIHYDRATE 160; 4.5 UG/1; UG/1
2 AEROSOL RESPIRATORY (INHALATION) 2 TIMES DAILY
Qty: 1 INHALER | Refills: 3 | Status: SHIPPED | OUTPATIENT
Start: 2018-11-12 | End: 2019-04-01

## 2018-11-12 RX ORDER — BUDESONIDE AND FORMOTEROL FUMARATE DIHYDRATE 160; 4.5 UG/1; UG/1
2 AEROSOL RESPIRATORY (INHALATION) 2 TIMES DAILY
Qty: 1 INHALER | Refills: 3 | Status: SHIPPED | OUTPATIENT
Start: 2018-11-12 | End: 2018-11-12 | Stop reason: SDUPTHER

## 2018-11-12 RX ORDER — OMEPRAZOLE 40 MG/1
40 CAPSULE, DELAYED RELEASE ORAL DAILY
Qty: 30 CAPSULE | Refills: 3 | Status: SHIPPED | OUTPATIENT
Start: 2018-11-12 | End: 2019-02-25 | Stop reason: SDUPTHER

## 2018-11-12 NOTE — PROGRESS NOTES
Subjective:     Karis Israel is a 82 y.o. female whocomplains today of:     Chief Complaint   Patient presents with    New Patient     Reffered by Dr. Daniel Barrios for chronic cough       HPI  Patient presents for chronic cough for almost 1 year, worse before going to bed, occasionally she wakes up coughing at night (once a month), sometimes she hears wheezing, no clear trigger,   Heart burns once in a while , she is not taking Prilosec (did not take it for a while)   + nasal congestion and post nasal drip     No history of asthma     GM had asthma     Never smoked      Did office work mainly     Allergies:  Adhesive tape; Amoxicillin-pot clavulanate; Bactrim [sulfamethoxazole-trimethoprim]; Clarithromycin; and Sulfa antibiotics  Past Medical History:   Diagnosis Date    Fibromyalgia     Hyperlipidemia     Parkinson's disease ([facility] [address]     Type II or unspecified type diabetes mellitus without mention of complication, not stated as uncontrolled      Past Surgical History:   Procedure Laterality Date    CHOLECYSTECTOMY      EYE SURGERY      HERNIA REPAIR  2/20/2002    ventral hernia    HERNIA REPAIR      LIH repair    HYSTERECTOMY      JOINT REPLACEMENT Right     partial left, full right    LAMINECTOMY       Family History   Problem Relation Age of Onset    High Blood Pressure Neg Hx      Social History     Social History    Marital status:      Spouse name: N/A    Number of children: N/A    Years of education: N/A     Occupational History    Not on file.      Social History Main Topics    Smoking status: Never Smoker    Smokeless tobacco: Never Used    Alcohol use No    Drug use: No    Sexual activity: Not on file     Other Topics Concern    Not on file     Social History Narrative    No narrative on file         Review of Systems      ROS: 10 organs review of system is done including general, psychological, ENT, hematological, endocrine, respiratory, cardiovascular,

## 2018-11-16 RX ORDER — IRBESARTAN 75 MG/1
75 TABLET ORAL DAILY
Qty: 30 TABLET | Refills: 0 | Status: SHIPPED | OUTPATIENT
Start: 2018-11-16 | End: 2018-12-14 | Stop reason: SDUPTHER

## 2018-11-16 RX ORDER — LOVASTATIN 10 MG/1
10 TABLET ORAL NIGHTLY
Qty: 30 TABLET | Refills: 0 | Status: SHIPPED | OUTPATIENT
Start: 2018-11-16 | End: 2018-12-14 | Stop reason: SDUPTHER

## 2018-11-20 ENCOUNTER — OFFICE VISIT (OUTPATIENT)
Dept: FAMILY MEDICINE CLINIC | Age: 83
End: 2018-11-20
Payer: MEDICARE

## 2018-11-20 VITALS
BODY MASS INDEX: 32.23 KG/M2 | WEIGHT: 170.7 LBS | SYSTOLIC BLOOD PRESSURE: 138 MMHG | HEART RATE: 51 BPM | RESPIRATION RATE: 14 BRPM | TEMPERATURE: 98.6 F | OXYGEN SATURATION: 96 % | HEIGHT: 61 IN | DIASTOLIC BLOOD PRESSURE: 72 MMHG

## 2018-11-20 DIAGNOSIS — J02.9 ACUTE PHARYNGITIS, UNSPECIFIED ETIOLOGY: ICD-10-CM

## 2018-11-20 DIAGNOSIS — E11.8 TYPE 2 DIABETES MELLITUS WITH COMPLICATION, WITHOUT LONG-TERM CURRENT USE OF INSULIN (HCC): ICD-10-CM

## 2018-11-20 DIAGNOSIS — J01.90 ACUTE SINUSITIS, RECURRENCE NOT SPECIFIED, UNSPECIFIED LOCATION: Primary | ICD-10-CM

## 2018-11-20 DIAGNOSIS — I10 ESSENTIAL HYPERTENSION: ICD-10-CM

## 2018-11-20 PROCEDURE — 99213 OFFICE O/P EST LOW 20 MIN: CPT | Performed by: FAMILY MEDICINE

## 2018-11-20 RX ORDER — CEFUROXIME AXETIL 250 MG/1
250 TABLET ORAL 2 TIMES DAILY
Qty: 20 TABLET | Refills: 0 | Status: SHIPPED | OUTPATIENT
Start: 2018-11-20 | End: 2018-11-30

## 2018-11-20 ASSESSMENT — ENCOUNTER SYMPTOMS
ABDOMINAL PAIN: 0
SHORTNESS OF BREATH: 0

## 2018-11-20 NOTE — PROGRESS NOTES
Subjective:      Patient ID: Antonia Zeng is a 82 y.o. female    HPI  Here in follow up for chronic cough. Doing better since seen by pulmonary recently and started on symbicort   Has had sore throat develop few days ago which has lead to post nasal drip and discolored nasal drainage. Cough is dry. No fever. Review of Systems   Constitutional: Negative for chills and unexpected weight change. HENT: Negative for ear pain. Respiratory: Negative for shortness of breath. Cardiovascular: Negative for chest pain. Gastrointestinal: Negative for abdominal pain. Skin: Negative for rash. Neurological: Negative for dizziness. Reviewed allergy, medical, social, surgical, family and med list changes and updated   Files  Social History     Social History    Marital status:       Spouse name: N/A    Number of children: N/A    Years of education: N/A     Social History Main Topics    Smoking status: Never Smoker    Smokeless tobacco: Never Used    Alcohol use No    Drug use: No    Sexual activity: Not Asked     Other Topics Concern    None     Social History Narrative    None     Current Outpatient Prescriptions   Medication Sig Dispense Refill    irbesartan (AVAPRO) 75 MG tablet Take 1 tablet by mouth daily 30 tablet 0    sertraline (ZOLOFT) 50 MG tablet Take 1 tablet by mouth daily 1 po qd 30 tablet 0    lovastatin (MEVACOR) 10 MG tablet Take 1 tablet by mouth nightly 1 po qd 30 tablet 0    budesonide-formoterol (SYMBICORT) 160-4.5 MCG/ACT AERO Inhale 2 puffs into the lungs 2 times daily 1 Inhaler 3    omeprazole (PRILOSEC) 40 MG delayed release capsule Take 1 capsule by mouth daily 30 capsule 3    topiramate (TOPAMAX) 25 MG tablet 2 po qhs 60 tablet 1    traZODone (DESYREL) 100 MG tablet TAKE 2 TABLETS BY MOUTH EVERY DAY AT BEDTIME 90 tablet 1    metFORMIN (GLUCOPHAGE XR) 500 MG extended release tablet Take 1 tablet by mouth daily (with breakfast) 90 tablet 1    carvedilol

## 2018-12-03 ENCOUNTER — OFFICE VISIT (OUTPATIENT)
Dept: FAMILY MEDICINE CLINIC | Age: 83
End: 2018-12-03
Payer: MEDICARE

## 2018-12-03 VITALS
TEMPERATURE: 98.2 F | WEIGHT: 171.4 LBS | OXYGEN SATURATION: 98 % | HEART RATE: 65 BPM | SYSTOLIC BLOOD PRESSURE: 124 MMHG | DIASTOLIC BLOOD PRESSURE: 60 MMHG | HEIGHT: 62 IN | BODY MASS INDEX: 31.54 KG/M2

## 2018-12-03 DIAGNOSIS — J40 BRONCHITIS: ICD-10-CM

## 2018-12-03 DIAGNOSIS — J02.9 SORE THROAT: ICD-10-CM

## 2018-12-03 DIAGNOSIS — J01.90 ACUTE BACTERIAL SINUSITIS: Primary | ICD-10-CM

## 2018-12-03 DIAGNOSIS — B96.89 ACUTE BACTERIAL SINUSITIS: Primary | ICD-10-CM

## 2018-12-03 LAB — S PYO AG THROAT QL: NORMAL

## 2018-12-03 PROCEDURE — 99213 OFFICE O/P EST LOW 20 MIN: CPT | Performed by: NURSE PRACTITIONER

## 2018-12-03 PROCEDURE — 87880 STREP A ASSAY W/OPTIC: CPT | Performed by: NURSE PRACTITIONER

## 2018-12-03 RX ORDER — LEVOFLOXACIN 500 MG/1
500 TABLET, FILM COATED ORAL DAILY
Qty: 7 TABLET | Refills: 0 | Status: SHIPPED | OUTPATIENT
Start: 2018-12-03 | End: 2018-12-10

## 2018-12-03 RX ORDER — PREDNISONE 20 MG/1
40 TABLET ORAL DAILY
Qty: 8 TABLET | Refills: 0 | Status: SHIPPED | OUTPATIENT
Start: 2018-12-03 | End: 2018-12-07

## 2018-12-03 ASSESSMENT — ENCOUNTER SYMPTOMS
HOARSE VOICE: 1
COUGH: 1
SHORTNESS OF BREATH: 0
SORE THROAT: 1
SINUS PRESSURE: 1

## 2018-12-03 NOTE — PROGRESS NOTES
of Systems   HENT: Positive for ear pain, hoarse voice, sinus pressure and sore throat. Respiratory: Positive for cough. Negative for shortness of breath. Neurological: Positive for headaches. Objective:   /60   Pulse 65   Temp 98.2 °F (36.8 °C) (Tympanic)   Ht 5' 2\" (1.575 m)   Wt 171 lb 6.4 oz (77.7 kg)   SpO2 98%   BMI 31.35 kg/m²   Physical Exam   Constitutional: She appears well-developed and well-nourished. She appears ill. No distress. HENT:   Right Ear: A middle ear effusion is present. Left Ear: A middle ear effusion is present. Nose: Mucosal edema and rhinorrhea present. Right sinus exhibits maxillary sinus tenderness and frontal sinus tenderness. Left sinus exhibits maxillary sinus tenderness and frontal sinus tenderness. Mouth/Throat: Oropharynx is clear and moist.   Cardiovascular: Normal rate, regular rhythm and normal heart sounds. Pulmonary/Chest: Effort normal. She has wheezes. Lymphadenopathy:     She has cervical adenopathy. Skin: She is not diaphoretic. Results for POC orders placed in visit on 12/03/18   POCT rapid strep A   Result Value Ref Range    Strep A Ag None Detected None Detected         Assessment:       Diagnosis Orders   1. Acute bacterial sinusitis  levofloxacin (LEVAQUIN) 500 MG tablet   2. Sore throat  POCT rapid strep A    Throat culture   3.  Bronchitis  predniSONE (DELTASONE) 20 MG tablet           Plan:      Orders Placed This Encounter   Procedures    Throat culture     Standing Status:   Future     Standing Expiration Date:   12/3/2019    POCT rapid strep A     Orders Placed This Encounter   Medications    levofloxacin (LEVAQUIN) 500 MG tablet     Sig: Take 1 tablet by mouth daily for 7 days     Dispense:  7 tablet     Refill:  0    predniSONE (DELTASONE) 20 MG tablet     Sig: Take 2 tablets by mouth daily for 4 days     Dispense:  8 tablet     Refill:  0     Treated for bacterial sinusitis based on clinical symptoms and history (length of illness, \"double sickening\", fever, purulent discharge, tenderness on palpation). . Advised to take full course of antibiotics. Since patient is allergic to most first line treatments, treated with levaquin. Advised to return to office if any leg pain. Treated for bronchitis. Sent steroid course and advised to use her albuterol inhaler. If patient notes marked improvement with albuterol can take as often as every 4-6 hours. If no improvement should follow up with PCP. If worsening SOB, fever or respiratory distress, should go to ER. Patient verbalized understanding. Return if symptoms worsen or fail to improve.     Edward Quick, APRN - CNP

## 2018-12-03 NOTE — PATIENT INSTRUCTIONS

## 2018-12-05 LAB — THROAT CULTURE: NORMAL

## 2018-12-12 ENCOUNTER — OFFICE VISIT (OUTPATIENT)
Dept: CARDIOLOGY CLINIC | Age: 83
End: 2018-12-12
Payer: MEDICARE

## 2018-12-12 VITALS
DIASTOLIC BLOOD PRESSURE: 54 MMHG | HEART RATE: 54 BPM | RESPIRATION RATE: 14 BRPM | OXYGEN SATURATION: 96 % | SYSTOLIC BLOOD PRESSURE: 98 MMHG | HEIGHT: 61 IN | WEIGHT: 171.6 LBS | BODY MASS INDEX: 32.4 KG/M2

## 2018-12-12 DIAGNOSIS — R07.9 CHEST PAIN, UNSPECIFIED TYPE: ICD-10-CM

## 2018-12-12 DIAGNOSIS — R06.09 DOE (DYSPNEA ON EXERTION): ICD-10-CM

## 2018-12-12 DIAGNOSIS — I65.29 STENOSIS OF CAROTID ARTERY, UNSPECIFIED LATERALITY: ICD-10-CM

## 2018-12-12 DIAGNOSIS — I10 HYPERTENSION, UNSPECIFIED TYPE: Primary | ICD-10-CM

## 2018-12-12 PROCEDURE — 99204 OFFICE O/P NEW MOD 45 MIN: CPT | Performed by: INTERNAL MEDICINE

## 2018-12-12 ASSESSMENT — ENCOUNTER SYMPTOMS
SHORTNESS OF BREATH: 1
CHEST TIGHTNESS: 0
BLOOD IN STOOL: 0
STRIDOR: 0
NAUSEA: 0
GASTROINTESTINAL NEGATIVE: 1
COUGH: 0
EYES NEGATIVE: 1
WHEEZING: 0

## 2018-12-14 RX ORDER — LOVASTATIN 10 MG/1
10 TABLET ORAL NIGHTLY
Qty: 30 TABLET | Refills: 0 | Status: SHIPPED | OUTPATIENT
Start: 2018-12-14 | End: 2019-01-14 | Stop reason: SDUPTHER

## 2018-12-14 RX ORDER — TOPIRAMATE 25 MG/1
TABLET ORAL
Qty: 60 TABLET | Refills: 1 | Status: SHIPPED | OUTPATIENT
Start: 2018-12-14 | End: 2018-12-17 | Stop reason: SDUPTHER

## 2018-12-14 RX ORDER — IRBESARTAN 75 MG/1
75 TABLET ORAL DAILY
Qty: 30 TABLET | Refills: 0 | Status: SHIPPED | OUTPATIENT
Start: 2018-12-14 | End: 2019-01-28 | Stop reason: SDUPTHER

## 2018-12-17 RX ORDER — TOPIRAMATE 25 MG/1
TABLET ORAL
Qty: 60 TABLET | Refills: 1 | OUTPATIENT
Start: 2018-12-17

## 2018-12-17 RX ORDER — TOPIRAMATE 50 MG/1
TABLET, FILM COATED ORAL
Qty: 90 TABLET | Refills: 0 | Status: SHIPPED | OUTPATIENT
Start: 2018-12-17 | End: 2019-03-17 | Stop reason: SDUPTHER

## 2018-12-26 RX ORDER — TRAZODONE HYDROCHLORIDE 100 MG/1
TABLET ORAL
Qty: 90 TABLET | Refills: 1 | Status: SHIPPED | OUTPATIENT
Start: 2018-12-26 | End: 2019-03-26 | Stop reason: SDUPTHER

## 2019-01-01 ENCOUNTER — OFFICE VISIT (OUTPATIENT)
Dept: FAMILY MEDICINE CLINIC | Age: 84
End: 2019-01-01
Payer: MEDICARE

## 2019-01-01 ENCOUNTER — OFFICE VISIT (OUTPATIENT)
Dept: PULMONOLOGY | Age: 84
End: 2019-01-01
Payer: MEDICARE

## 2019-01-01 ENCOUNTER — HOSPITAL ENCOUNTER (OUTPATIENT)
Dept: CT IMAGING | Age: 84
Discharge: HOME | End: 2020-01-01
Payer: MEDICARE

## 2019-01-01 ENCOUNTER — TELEPHONE (OUTPATIENT)
Dept: FAMILY MEDICINE CLINIC | Age: 84
End: 2019-01-01

## 2019-01-01 ENCOUNTER — TELEPHONE (OUTPATIENT)
Dept: PULMONOLOGY | Age: 84
End: 2019-01-01

## 2019-01-01 VITALS
HEIGHT: 61 IN | TEMPERATURE: 98.6 F | DIASTOLIC BLOOD PRESSURE: 60 MMHG | OXYGEN SATURATION: 98 % | SYSTOLIC BLOOD PRESSURE: 110 MMHG | HEART RATE: 79 BPM | BODY MASS INDEX: 33.64 KG/M2 | WEIGHT: 178.2 LBS | RESPIRATION RATE: 15 BRPM

## 2019-01-01 VITALS
OXYGEN SATURATION: 97 % | HEART RATE: 96 BPM | RESPIRATION RATE: 20 BRPM | HEIGHT: 61 IN | BODY MASS INDEX: 32.93 KG/M2 | WEIGHT: 174.4 LBS | TEMPERATURE: 98.2 F | DIASTOLIC BLOOD PRESSURE: 68 MMHG | SYSTOLIC BLOOD PRESSURE: 126 MMHG

## 2019-01-01 VITALS
HEART RATE: 81 BPM | SYSTOLIC BLOOD PRESSURE: 126 MMHG | DIASTOLIC BLOOD PRESSURE: 60 MMHG | BODY MASS INDEX: 33.53 KG/M2 | TEMPERATURE: 98.9 F | WEIGHT: 177.6 LBS | OXYGEN SATURATION: 97 % | HEIGHT: 61 IN | RESPIRATION RATE: 15 BRPM

## 2019-01-01 VITALS
OXYGEN SATURATION: 96 % | TEMPERATURE: 97.7 F | SYSTOLIC BLOOD PRESSURE: 118 MMHG | DIASTOLIC BLOOD PRESSURE: 60 MMHG | BODY MASS INDEX: 32.2 KG/M2 | WEIGHT: 170.4 LBS | HEART RATE: 80 BPM

## 2019-01-01 DIAGNOSIS — E66.09 CLASS 1 OBESITY DUE TO EXCESS CALORIES WITHOUT SERIOUS COMORBIDITY WITH BODY MASS INDEX (BMI) OF 32.0 TO 32.9 IN ADULT: ICD-10-CM

## 2019-01-01 DIAGNOSIS — R05.3 CHRONIC COUGH: ICD-10-CM

## 2019-01-01 DIAGNOSIS — J45.991 COUGH VARIANT ASTHMA: ICD-10-CM

## 2019-01-01 DIAGNOSIS — J45.909 ACUTE BRONCHITIS WITH ASTHMA: Primary | ICD-10-CM

## 2019-01-01 DIAGNOSIS — I51.89 DIASTOLIC DYSFUNCTION: ICD-10-CM

## 2019-01-01 DIAGNOSIS — R05.9 COUGH: Primary | ICD-10-CM

## 2019-01-01 DIAGNOSIS — J20.9 ACUTE BRONCHITIS WITH ASTHMA: Primary | ICD-10-CM

## 2019-01-01 DIAGNOSIS — H61.21 IMPACTED CERUMEN OF RIGHT EAR: ICD-10-CM

## 2019-01-01 DIAGNOSIS — E11.9 TYPE 2 DIABETES MELLITUS WITHOUT COMPLICATION, WITHOUT LONG-TERM CURRENT USE OF INSULIN (HCC): ICD-10-CM

## 2019-01-01 DIAGNOSIS — J20.9 ACUTE BRONCHITIS, UNSPECIFIED ORGANISM: Primary | ICD-10-CM

## 2019-01-01 DIAGNOSIS — K21.9 GASTROESOPHAGEAL REFLUX DISEASE WITHOUT ESOPHAGITIS: ICD-10-CM

## 2019-01-01 DIAGNOSIS — J45.41 MODERATE PERSISTENT ASTHMA WITH EXACERBATION: ICD-10-CM

## 2019-01-01 LAB
BASOPHILS ABSOLUTE: 0 K/UL (ref 0–0.2)
BASOPHILS RELATIVE PERCENT: 0.7 %
EOSINOPHILS ABSOLUTE: 0.4 K/UL (ref 0–0.7)
EOSINOPHILS RELATIVE PERCENT: 5.5 %
HCT VFR BLD CALC: 32.2 % (ref 37–47)
HEMOGLOBIN: 10.2 G/DL (ref 12–16)
LYMPHOCYTES ABSOLUTE: 0.9 K/UL (ref 1–4.8)
LYMPHOCYTES RELATIVE PERCENT: 12.5 %
MCH RBC QN AUTO: 30.7 PG (ref 27–31.3)
MCHC RBC AUTO-ENTMCNC: 31.8 % (ref 33–37)
MCV RBC AUTO: 96.6 FL (ref 82–100)
MONOCYTES ABSOLUTE: 0.3 K/UL (ref 0.2–0.8)
MONOCYTES RELATIVE PERCENT: 4.2 %
NEUTROPHILS ABSOLUTE: 5.3 K/UL (ref 1.4–6.5)
NEUTROPHILS RELATIVE PERCENT: 77.1 %
PDW BLD-RTO: 15.7 % (ref 11.5–14.5)
PLATELET # BLD: 99 K/UL (ref 130–400)
PLATELET SLIDE REVIEW: ABNORMAL
RBC # BLD: 3.33 M/UL (ref 4.2–5.4)
WBC # BLD: 6.9 K/UL (ref 4.8–10.8)

## 2019-01-01 PROCEDURE — 71250 CT THORAX DX C-: CPT

## 2019-01-01 PROCEDURE — 99215 OFFICE O/P EST HI 40 MIN: CPT | Performed by: INTERNAL MEDICINE

## 2019-01-01 PROCEDURE — 99214 OFFICE O/P EST MOD 30 MIN: CPT | Performed by: INTERNAL MEDICINE

## 2019-01-01 PROCEDURE — 99213 OFFICE O/P EST LOW 20 MIN: CPT | Performed by: PHYSICIAN ASSISTANT

## 2019-01-01 RX ORDER — LANCETS
EACH MISCELLANEOUS
Qty: 100 EACH | Refills: 3 | Status: ON HOLD | OUTPATIENT
Start: 2019-01-01 | End: 2020-01-01 | Stop reason: HOSPADM

## 2019-01-01 RX ORDER — OMEPRAZOLE 40 MG/1
CAPSULE, DELAYED RELEASE ORAL
Qty: 30 CAPSULE | Refills: 3 | Status: SHIPPED | OUTPATIENT
Start: 2019-01-01 | End: 2020-01-01

## 2019-01-01 RX ORDER — PREDNISONE 20 MG/1
40 TABLET ORAL DAILY
Qty: 10 TABLET | Refills: 0 | Status: SHIPPED | OUTPATIENT
Start: 2019-01-01 | End: 2019-01-01

## 2019-01-01 RX ORDER — LOVASTATIN 10 MG/1
10 TABLET ORAL NIGHTLY
Qty: 90 TABLET | Refills: 0 | Status: SHIPPED | OUTPATIENT
Start: 2019-01-01 | End: 2019-01-01 | Stop reason: SDUPTHER

## 2019-01-01 RX ORDER — LOVASTATIN 10 MG/1
TABLET ORAL
Qty: 30 TABLET | Refills: 0 | Status: SHIPPED | OUTPATIENT
Start: 2019-01-01 | End: 2020-01-01 | Stop reason: SDUPTHER

## 2019-01-01 RX ORDER — IRBESARTAN 75 MG/1
TABLET ORAL
Qty: 30 TABLET | Refills: 1 | Status: SHIPPED | OUTPATIENT
Start: 2019-01-01 | End: 2019-01-01 | Stop reason: SDUPTHER

## 2019-01-01 RX ORDER — BUDESONIDE AND FORMOTEROL FUMARATE DIHYDRATE 160; 4.5 UG/1; UG/1
2 AEROSOL RESPIRATORY (INHALATION) 2 TIMES DAILY
Qty: 1 INHALER | Refills: 5 | Status: SHIPPED | OUTPATIENT
Start: 2019-01-01 | End: 2020-01-01

## 2019-01-01 RX ORDER — PROMETHAZINE HYDROCHLORIDE AND CODEINE PHOSPHATE 6.25; 1 MG/5ML; MG/5ML
5 SYRUP ORAL 4 TIMES DAILY PRN
Qty: 100 ML | Refills: 0 | Status: SHIPPED | OUTPATIENT
Start: 2019-01-01 | End: 2019-01-01

## 2019-01-01 RX ORDER — TOPIRAMATE 50 MG/1
TABLET, FILM COATED ORAL
Qty: 90 TABLET | Refills: 0 | Status: SHIPPED | OUTPATIENT
Start: 2019-01-01 | End: 2019-01-01 | Stop reason: SDUPTHER

## 2019-01-01 RX ORDER — ALBUTEROL SULFATE 90 UG/1
2 AEROSOL, METERED RESPIRATORY (INHALATION) EVERY 6 HOURS PRN
Qty: 1 INHALER | Refills: 0 | Status: SHIPPED | OUTPATIENT
Start: 2019-01-01 | End: 2020-01-01

## 2019-01-01 RX ORDER — LATANOPROST 50 UG/ML
SOLUTION/ DROPS OPHTHALMIC
Status: ON HOLD | COMMUNITY
Start: 2014-06-25 | End: 2020-01-01

## 2019-01-01 RX ORDER — IRBESARTAN 75 MG/1
TABLET ORAL
Qty: 30 TABLET | Refills: 0 | Status: SHIPPED | OUTPATIENT
Start: 2019-01-01 | End: 2019-01-01 | Stop reason: SDUPTHER

## 2019-01-01 RX ORDER — CEFUROXIME AXETIL 250 MG/1
250 TABLET ORAL 2 TIMES DAILY
Qty: 20 TABLET | Refills: 0 | Status: SHIPPED | OUTPATIENT
Start: 2019-01-01 | End: 2019-01-01

## 2019-01-01 RX ORDER — DEXTROMETHORPHAN HYDROBROMIDE AND PROMETHAZINE HYDROCHLORIDE 15; 6.25 MG/5ML; MG/5ML
5 SYRUP ORAL 4 TIMES DAILY PRN
Qty: 140 ML | Refills: 0 | Status: SHIPPED | OUTPATIENT
Start: 2019-01-01 | End: 2019-01-01

## 2019-01-01 RX ORDER — BENZONATATE 100 MG/1
100 CAPSULE ORAL 3 TIMES DAILY PRN
Qty: 21 CAPSULE | Refills: 0 | Status: CANCELLED | OUTPATIENT
Start: 2019-01-01 | End: 2019-01-01

## 2019-01-01 ASSESSMENT — ENCOUNTER SYMPTOMS
EYE ITCHING: 0
RECTAL PAIN: 0
EYE DISCHARGE: 0
SHORTNESS OF BREATH: 0
RHINORRHEA: 0
SORE THROAT: 0
BACK PAIN: 0
VOICE CHANGE: 0
SHORTNESS OF BREATH: 0
COLOR CHANGE: 0
VOMITING: 0
SINUS PAIN: 0
PHOTOPHOBIA: 0
VOMITING: 0
BACK PAIN: 1
CONSTIPATION: 0
ABDOMINAL DISTENTION: 0
CHEST TIGHTNESS: 0
TROUBLE SWALLOWING: 0
NAUSEA: 0
SORE THROAT: 0
ABDOMINAL PAIN: 0
DIARRHEA: 0
CONSTIPATION: 0
WHEEZING: 0
APNEA: 0
WHEEZING: 1
COUGH: 0
EYE REDNESS: 0
BLOOD IN STOOL: 0
FACIAL SWELLING: 0
EYE PAIN: 0
COUGH: 1
DIARRHEA: 0
NAUSEA: 0
SINUS PRESSURE: 0

## 2019-01-07 ENCOUNTER — HOSPITAL ENCOUNTER (OUTPATIENT)
Dept: NUCLEAR MEDICINE | Age: 84
Discharge: HOME | End: 2019-01-09
Payer: MEDICARE

## 2019-01-07 ENCOUNTER — HOSPITAL ENCOUNTER (OUTPATIENT)
Dept: ULTRASOUND IMAGING | Age: 84
Discharge: HOME | End: 2019-01-09
Payer: MEDICARE

## 2019-01-07 DIAGNOSIS — R06.09 DOE (DYSPNEA ON EXERTION): ICD-10-CM

## 2019-01-07 DIAGNOSIS — R07.9 CHEST PAIN, UNSPECIFIED TYPE: ICD-10-CM

## 2019-01-07 PROCEDURE — A9502 TC99M TETROFOSMIN: HCPCS | Performed by: INTERNAL MEDICINE

## 2019-01-07 PROCEDURE — 3430000000 HC RX DIAGNOSTIC RADIOPHARMACEUTICAL: Performed by: INTERNAL MEDICINE

## 2019-01-07 PROCEDURE — 93880 EXTRACRANIAL BILAT STUDY: CPT | Performed by: INTERNAL MEDICINE

## 2019-01-07 PROCEDURE — 93017 CV STRESS TEST TRACING ONLY: CPT

## 2019-01-07 PROCEDURE — 93880 EXTRACRANIAL BILAT STUDY: CPT

## 2019-01-07 PROCEDURE — 78452 HT MUSCLE IMAGE SPECT MULT: CPT

## 2019-01-07 PROCEDURE — 2580000003 HC RX 258: Performed by: INTERNAL MEDICINE

## 2019-01-07 PROCEDURE — 6360000002 HC RX W HCPCS: Performed by: INTERNAL MEDICINE

## 2019-01-07 RX ORDER — SODIUM CHLORIDE 0.9 % (FLUSH) 0.9 %
10 SYRINGE (ML) INJECTION PRN
Status: COMPLETED | OUTPATIENT
Start: 2019-01-07 | End: 2019-01-07

## 2019-01-07 RX ADMIN — REGADENOSON 0.4 MG: 0.08 INJECTION, SOLUTION INTRAVENOUS at 10:41

## 2019-01-07 RX ADMIN — TETROFOSMIN 28.8 MILLICURIE: 0.23 INJECTION, POWDER, LYOPHILIZED, FOR SOLUTION INTRAVENOUS at 10:41

## 2019-01-07 RX ADMIN — Medication 10 ML: at 10:41

## 2019-01-07 RX ADMIN — Medication 10 ML: at 10:42

## 2019-01-07 RX ADMIN — TETROFOSMIN 11.9 MILLICURIE: 0.23 INJECTION, POWDER, LYOPHILIZED, FOR SOLUTION INTRAVENOUS at 09:10

## 2019-01-07 RX ADMIN — Medication 10 ML: at 09:10

## 2019-01-08 PROCEDURE — 93018 CV STRESS TEST I&R ONLY: CPT | Performed by: INTERNAL MEDICINE

## 2019-01-09 ENCOUNTER — OFFICE VISIT (OUTPATIENT)
Dept: CARDIOLOGY CLINIC | Age: 84
End: 2019-01-09
Payer: MEDICARE

## 2019-01-09 VITALS
BODY MASS INDEX: 32.62 KG/M2 | RESPIRATION RATE: 20 BRPM | DIASTOLIC BLOOD PRESSURE: 76 MMHG | HEIGHT: 61 IN | HEART RATE: 69 BPM | OXYGEN SATURATION: 99 % | WEIGHT: 172.8 LBS | SYSTOLIC BLOOD PRESSURE: 114 MMHG

## 2019-01-09 DIAGNOSIS — I10 HYPERTENSION, UNSPECIFIED TYPE: Primary | ICD-10-CM

## 2019-01-09 DIAGNOSIS — R01.1 MURMUR, HEART: ICD-10-CM

## 2019-01-09 DIAGNOSIS — R07.89 OTHER CHEST PAIN: ICD-10-CM

## 2019-01-09 DIAGNOSIS — R07.9 CHEST PAIN, UNSPECIFIED TYPE: ICD-10-CM

## 2019-01-09 DIAGNOSIS — I65.23 BILATERAL CAROTID ARTERY STENOSIS: ICD-10-CM

## 2019-01-09 DIAGNOSIS — I51.7 LVH (LEFT VENTRICULAR HYPERTROPHY): ICD-10-CM

## 2019-01-09 DIAGNOSIS — R06.09 DOE (DYSPNEA ON EXERTION): ICD-10-CM

## 2019-01-09 DIAGNOSIS — R00.1 BRADYCARDIA: ICD-10-CM

## 2019-01-09 DIAGNOSIS — R94.31 ABNORMAL EKG: Primary | ICD-10-CM

## 2019-01-09 DIAGNOSIS — I10 ESSENTIAL HYPERTENSION: ICD-10-CM

## 2019-01-09 PROCEDURE — 99214 OFFICE O/P EST MOD 30 MIN: CPT | Performed by: INTERNAL MEDICINE

## 2019-01-09 ASSESSMENT — ENCOUNTER SYMPTOMS
EYES NEGATIVE: 1
GASTROINTESTINAL NEGATIVE: 1
CHEST TIGHTNESS: 1
SHORTNESS OF BREATH: 1
COUGH: 0
WHEEZING: 0
BLOOD IN STOOL: 0
STRIDOR: 0
NAUSEA: 0

## 2019-01-14 RX ORDER — LOVASTATIN 10 MG/1
10 TABLET ORAL NIGHTLY
Qty: 30 TABLET | Refills: 0 | Status: SHIPPED | OUTPATIENT
Start: 2019-01-14 | End: 2019-02-18 | Stop reason: SDUPTHER

## 2019-01-14 RX ORDER — METFORMIN HYDROCHLORIDE 500 MG/1
500 TABLET, EXTENDED RELEASE ORAL
Qty: 90 TABLET | Refills: 1 | Status: SHIPPED | OUTPATIENT
Start: 2019-01-14 | End: 2019-07-22 | Stop reason: SDUPTHER

## 2019-01-16 ENCOUNTER — HOSPITAL ENCOUNTER (OUTPATIENT)
Dept: PULMONOLOGY | Age: 84
Discharge: HOME | End: 2019-01-16
Payer: MEDICARE

## 2019-01-16 DIAGNOSIS — R05.3 CHRONIC COUGH: ICD-10-CM

## 2019-01-16 PROCEDURE — 94010 BREATHING CAPACITY TEST: CPT | Performed by: INTERNAL MEDICINE

## 2019-01-16 PROCEDURE — 94010 BREATHING CAPACITY TEST: CPT

## 2019-01-18 ENCOUNTER — HOSPITAL ENCOUNTER (OUTPATIENT)
Dept: NON INVASIVE DIAGNOSTICS | Age: 84
Discharge: HOME | End: 2019-01-18
Payer: MEDICARE

## 2019-01-18 DIAGNOSIS — I51.7 LVH (LEFT VENTRICULAR HYPERTROPHY): ICD-10-CM

## 2019-01-18 DIAGNOSIS — R06.09 DOE (DYSPNEA ON EXERTION): ICD-10-CM

## 2019-01-18 DIAGNOSIS — R01.1 MURMUR, HEART: ICD-10-CM

## 2019-01-18 DIAGNOSIS — R00.1 BRADYCARDIA: ICD-10-CM

## 2019-01-18 DIAGNOSIS — I10 ESSENTIAL HYPERTENSION: ICD-10-CM

## 2019-01-18 DIAGNOSIS — R94.31 ABNORMAL EKG: ICD-10-CM

## 2019-01-18 DIAGNOSIS — R07.89 OTHER CHEST PAIN: ICD-10-CM

## 2019-01-18 LAB
LV EF: 65 %
LVEF MODALITY: NORMAL

## 2019-01-18 PROCEDURE — 93306 TTE W/DOPPLER COMPLETE: CPT

## 2019-01-26 ENCOUNTER — APPOINTMENT (OUTPATIENT)
Dept: GENERAL RADIOLOGY | Age: 84
End: 2019-01-26
Payer: MEDICARE

## 2019-01-26 ENCOUNTER — HOSPITAL ENCOUNTER (EMERGENCY)
Age: 84
Discharge: HOME | End: 2019-01-27
Payer: MEDICARE

## 2019-01-26 DIAGNOSIS — K64.8 BLEEDING INTERNAL HEMORRHOIDS: Primary | ICD-10-CM

## 2019-01-26 DIAGNOSIS — J20.9 ACUTE BRONCHITIS, UNSPECIFIED ORGANISM: ICD-10-CM

## 2019-01-26 LAB
ALBUMIN SERPL-MCNC: 3.8 G/DL (ref 3.9–4.9)
ALP BLD-CCNC: 111 U/L (ref 40–130)
ALT SERPL-CCNC: 13 U/L (ref 0–33)
ANION GAP SERPL CALCULATED.3IONS-SCNC: 11 MEQ/L (ref 7–13)
APTT: 25.6 SEC (ref 21.6–35.4)
AST SERPL-CCNC: 24 U/L (ref 0–35)
BACTERIA: NEGATIVE /HPF
BILIRUB SERPL-MCNC: 0.4 MG/DL (ref 0–1.2)
BILIRUBIN URINE: NEGATIVE
BLOOD, URINE: ABNORMAL
BUN BLDV-MCNC: 9 MG/DL (ref 8–23)
CALCIUM SERPL-MCNC: 8.9 MG/DL (ref 8.6–10.2)
CHLORIDE BLD-SCNC: 105 MEQ/L (ref 98–107)
CLARITY: CLEAR
CO2: 25 MEQ/L (ref 22–29)
COLOR: YELLOW
CREAT SERPL-MCNC: 0.74 MG/DL (ref 0.5–0.9)
EPITHELIAL CELLS, UA: ABNORMAL /HPF (ref 0–5)
GFR AFRICAN AMERICAN: >60
GFR NON-AFRICAN AMERICAN: >60
GLOBULIN: 3.4 G/DL (ref 2.3–3.5)
GLUCOSE BLD-MCNC: 137 MG/DL (ref 74–109)
GLUCOSE URINE: 250 MG/DL
HYALINE CASTS: ABNORMAL /HPF (ref 0–5)
INR BLD: 1.2
KETONES, URINE: ABNORMAL MG/DL
LEUKOCYTE ESTERASE, URINE: NEGATIVE
NITRITE, URINE: NEGATIVE
PH UA: 6.5 (ref 5–9)
POTASSIUM SERPL-SCNC: 3.8 MEQ/L (ref 3.5–5.1)
PROTEIN UA: NEGATIVE MG/DL
PROTHROMBIN TIME: 11.7 SEC (ref 9–11.5)
RBC UA: ABNORMAL /HPF (ref 0–5)
SODIUM BLD-SCNC: 141 MEQ/L (ref 132–144)
SPECIFIC GRAVITY UA: 1.02 (ref 1–1.03)
TOTAL PROTEIN: 7.2 G/DL (ref 6.4–8.1)
URINE REFLEX TO CULTURE: YES
UROBILINOGEN, URINE: 0.2 E.U./DL
WBC UA: ABNORMAL /HPF (ref 0–5)

## 2019-01-26 PROCEDURE — 71046 X-RAY EXAM CHEST 2 VIEWS: CPT

## 2019-01-26 PROCEDURE — 85610 PROTHROMBIN TIME: CPT

## 2019-01-26 PROCEDURE — 99283 EMERGENCY DEPT VISIT LOW MDM: CPT

## 2019-01-26 PROCEDURE — 85730 THROMBOPLASTIN TIME PARTIAL: CPT

## 2019-01-26 PROCEDURE — 85025 COMPLETE CBC W/AUTO DIFF WBC: CPT

## 2019-01-26 PROCEDURE — 87086 URINE CULTURE/COLONY COUNT: CPT

## 2019-01-26 PROCEDURE — 80053 COMPREHEN METABOLIC PANEL: CPT

## 2019-01-26 PROCEDURE — 36415 COLL VENOUS BLD VENIPUNCTURE: CPT

## 2019-01-26 PROCEDURE — 81001 URINALYSIS AUTO W/SCOPE: CPT

## 2019-01-26 ASSESSMENT — ENCOUNTER SYMPTOMS
ANAL BLEEDING: 1
COUGH: 1
ALLERGIC/IMMUNOLOGIC NEGATIVE: 1
TROUBLE SWALLOWING: 0
ABDOMINAL PAIN: 0
EYE PAIN: 0
ABDOMINAL DISTENTION: 0
COLOR CHANGE: 0
APNEA: 0
SHORTNESS OF BREATH: 0

## 2019-01-27 VITALS
OXYGEN SATURATION: 98 % | SYSTOLIC BLOOD PRESSURE: 151 MMHG | WEIGHT: 170 LBS | BODY MASS INDEX: 32.1 KG/M2 | DIASTOLIC BLOOD PRESSURE: 121 MMHG | TEMPERATURE: 98.2 F | RESPIRATION RATE: 17 BRPM | HEART RATE: 62 BPM | HEIGHT: 61 IN

## 2019-01-27 LAB
BASOPHILS ABSOLUTE: 0 K/UL (ref 0–0.2)
BASOPHILS RELATIVE PERCENT: 0.7 %
EOSINOPHILS ABSOLUTE: 0.2 K/UL (ref 0–0.7)
EOSINOPHILS RELATIVE PERCENT: 3.9 %
HCT VFR BLD CALC: 30.5 % (ref 37–47)
HEMOGLOBIN: 10.4 G/DL (ref 12–16)
LYMPHOCYTES ABSOLUTE: 1 K/UL (ref 1–4.8)
LYMPHOCYTES RELATIVE PERCENT: 20.5 %
MCH RBC QN AUTO: 28.8 PG (ref 27–31.3)
MCHC RBC AUTO-ENTMCNC: 34 % (ref 33–37)
MCV RBC AUTO: 84.7 FL (ref 82–100)
MONOCYTES ABSOLUTE: 0.3 K/UL (ref 0.2–0.8)
MONOCYTES RELATIVE PERCENT: 6.3 %
NEUTROPHILS ABSOLUTE: 3.4 K/UL (ref 1.4–6.5)
NEUTROPHILS RELATIVE PERCENT: 68.6 %
PDW BLD-RTO: 18.5 % (ref 11.5–14.5)
PLATELET # BLD: 71 K/UL (ref 130–400)
PLATELET SLIDE REVIEW: ABNORMAL
RBC # BLD: 3.6 M/UL (ref 4.2–5.4)
SLIDE REVIEW: ABNORMAL
WBC # BLD: 5 K/UL (ref 4.8–10.8)

## 2019-01-27 PROCEDURE — 6370000000 HC RX 637 (ALT 250 FOR IP): Performed by: PHYSICIAN ASSISTANT

## 2019-01-27 RX ORDER — DOXYCYCLINE HYCLATE 100 MG/1
100 CAPSULE ORAL ONCE
Status: COMPLETED | OUTPATIENT
Start: 2019-01-27 | End: 2019-01-27

## 2019-01-27 RX ORDER — DOCUSATE SODIUM 100 MG/1
100 CAPSULE, LIQUID FILLED ORAL DAILY
Qty: 30 CAPSULE | Refills: 0 | Status: SHIPPED | OUTPATIENT
Start: 2019-01-27 | End: 2019-02-26

## 2019-01-27 RX ORDER — DEXTROMETHORPHAN HYDROBROMIDE AND PROMETHAZINE HYDROCHLORIDE 15; 6.25 MG/5ML; MG/5ML
5 SYRUP ORAL EVERY 4 HOURS PRN
Status: DISCONTINUED | OUTPATIENT
Start: 2019-01-27 | End: 2019-01-27 | Stop reason: HOSPADM

## 2019-01-27 RX ORDER — DOXYCYCLINE 100 MG/1
100 TABLET ORAL 2 TIMES DAILY
Qty: 20 TABLET | Refills: 0 | Status: SHIPPED | OUTPATIENT
Start: 2019-01-27 | End: 2019-02-06

## 2019-01-27 RX ORDER — DEXTROMETHORPHAN HYDROBROMIDE AND PROMETHAZINE HYDROCHLORIDE 15; 6.25 MG/5ML; MG/5ML
5 SYRUP ORAL 4 TIMES DAILY PRN
Qty: 140 ML | Refills: 0 | Status: SHIPPED | OUTPATIENT
Start: 2019-01-27 | End: 2019-01-01 | Stop reason: SDUPTHER

## 2019-01-27 RX ORDER — ALBUTEROL SULFATE 90 UG/1
2 AEROSOL, METERED RESPIRATORY (INHALATION) EVERY 6 HOURS PRN
Status: DISCONTINUED | OUTPATIENT
Start: 2019-01-27 | End: 2019-01-27 | Stop reason: HOSPADM

## 2019-01-27 RX ADMIN — DOXYCYCLINE HYCLATE 100 MG: 100 CAPSULE ORAL at 00:46

## 2019-01-28 ENCOUNTER — TELEPHONE (OUTPATIENT)
Dept: FAMILY MEDICINE CLINIC | Age: 84
End: 2019-01-28

## 2019-01-28 LAB — URINE CULTURE, ROUTINE: NORMAL

## 2019-01-28 RX ORDER — IRBESARTAN 75 MG/1
75 TABLET ORAL DAILY
Qty: 30 TABLET | Refills: 1 | Status: SHIPPED | OUTPATIENT
Start: 2019-01-28 | End: 2019-03-26 | Stop reason: SDUPTHER

## 2019-02-05 DIAGNOSIS — K64.9 HEMORRHOIDS, UNSPECIFIED HEMORRHOID TYPE: Primary | ICD-10-CM

## 2019-02-18 RX ORDER — LOVASTATIN 10 MG/1
10 TABLET ORAL NIGHTLY
Qty: 30 TABLET | Refills: 0 | Status: SHIPPED | OUTPATIENT
Start: 2019-02-18 | End: 2019-04-16 | Stop reason: SDUPTHER

## 2019-02-25 DIAGNOSIS — K21.9 GASTROESOPHAGEAL REFLUX DISEASE WITHOUT ESOPHAGITIS: ICD-10-CM

## 2019-02-25 DIAGNOSIS — R05.3 CHRONIC COUGH: ICD-10-CM

## 2019-02-26 DIAGNOSIS — E55.9 VITAMIN D DEFICIENCY: ICD-10-CM

## 2019-02-26 DIAGNOSIS — D69.6 THROMBOCYTOPENIA (HCC): ICD-10-CM

## 2019-02-26 DIAGNOSIS — M79.7 FIBROMYALGIA: ICD-10-CM

## 2019-02-26 DIAGNOSIS — G20 PARKINSON DISEASE (HCC): ICD-10-CM

## 2019-02-26 PROBLEM — D50.0 IRON DEFICIENCY ANEMIA DUE TO CHRONIC BLOOD LOSS: Status: ACTIVE | Noted: 2019-02-26

## 2019-02-26 LAB
ALBUMIN SERPL-MCNC: 3.9 G/DL (ref 3.5–4.6)
ALP BLD-CCNC: 80 U/L (ref 40–130)
ALT SERPL-CCNC: 15 U/L (ref 0–33)
ANION GAP SERPL CALCULATED.3IONS-SCNC: 15 MEQ/L (ref 9–15)
AST SERPL-CCNC: 28 U/L (ref 0–35)
BILIRUB SERPL-MCNC: 0.6 MG/DL (ref 0.2–0.7)
BUN BLDV-MCNC: 9 MG/DL (ref 8–23)
CALCIUM SERPL-MCNC: 8.7 MG/DL (ref 8.5–9.9)
CHLORIDE BLD-SCNC: 102 MEQ/L (ref 95–107)
CO2: 22 MEQ/L (ref 20–31)
CREAT SERPL-MCNC: 0.89 MG/DL (ref 0.5–0.9)
FERRITIN: 22.6 NG/ML (ref 13–150)
GFR AFRICAN AMERICAN: >60
GFR NON-AFRICAN AMERICAN: >60
GLOBULIN: 3.3 G/DL (ref 2.3–3.5)
GLUCOSE BLD-MCNC: 219 MG/DL (ref 70–99)
IRON SATURATION: 16 % (ref 11–46)
IRON: 65 UG/DL (ref 37–145)
POTASSIUM SERPL-SCNC: 3.9 MEQ/L (ref 3.4–4.9)
SODIUM BLD-SCNC: 139 MEQ/L (ref 135–144)
TOTAL IRON BINDING CAPACITY: 412 UG/DL (ref 178–450)
TOTAL PROTEIN: 7.2 G/DL (ref 6.3–8)

## 2019-03-01 RX ORDER — OMEPRAZOLE 40 MG/1
CAPSULE, DELAYED RELEASE ORAL
Qty: 30 CAPSULE | Refills: 3 | Status: SHIPPED | OUTPATIENT
Start: 2019-03-01 | End: 2019-07-14 | Stop reason: SDUPTHER

## 2019-03-05 ENCOUNTER — HOSPITAL ENCOUNTER (OUTPATIENT)
Dept: INFUSION THERAPY | Age: 84
Setting detail: INFUSION SERIES
Discharge: HOME | End: 2019-03-05
Payer: MEDICARE

## 2019-03-05 VITALS
RESPIRATION RATE: 18 BRPM | SYSTOLIC BLOOD PRESSURE: 139 MMHG | TEMPERATURE: 97.9 F | DIASTOLIC BLOOD PRESSURE: 57 MMHG | HEART RATE: 72 BPM

## 2019-03-05 DIAGNOSIS — D50.0 IRON DEFICIENCY ANEMIA DUE TO CHRONIC BLOOD LOSS: Primary | ICD-10-CM

## 2019-03-05 PROCEDURE — 2580000003 HC RX 258: Performed by: INTERNAL MEDICINE

## 2019-03-05 PROCEDURE — 96365 THER/PROPH/DIAG IV INF INIT: CPT

## 2019-03-05 PROCEDURE — 6360000002 HC RX W HCPCS: Performed by: INTERNAL MEDICINE

## 2019-03-05 RX ORDER — SODIUM CHLORIDE 0.9 % (FLUSH) 0.9 %
10 SYRINGE (ML) INJECTION PRN
Status: CANCELLED | OUTPATIENT
Start: 2019-03-05

## 2019-03-05 RX ORDER — 0.9 % SODIUM CHLORIDE 0.9 %
10 VIAL (ML) INJECTION ONCE
Status: CANCELLED | OUTPATIENT
Start: 2019-03-05 | End: 2019-03-05

## 2019-03-05 RX ORDER — SODIUM CHLORIDE 9 MG/ML
INJECTION, SOLUTION INTRAVENOUS ONCE
Status: CANCELLED | OUTPATIENT
Start: 2019-03-05 | End: 2019-03-05

## 2019-03-05 RX ORDER — METHYLPREDNISOLONE SODIUM SUCCINATE 125 MG/2ML
125 INJECTION, POWDER, LYOPHILIZED, FOR SOLUTION INTRAMUSCULAR; INTRAVENOUS ONCE
Status: CANCELLED | OUTPATIENT
Start: 2019-03-05 | End: 2019-03-05

## 2019-03-05 RX ORDER — EPINEPHRINE 1 MG/ML
0.3 INJECTION, SOLUTION, CONCENTRATE INTRAVENOUS PRN
Status: CANCELLED | OUTPATIENT
Start: 2019-03-05

## 2019-03-05 RX ORDER — DIPHENHYDRAMINE HYDROCHLORIDE 50 MG/ML
50 INJECTION INTRAMUSCULAR; INTRAVENOUS ONCE
Status: CANCELLED | OUTPATIENT
Start: 2019-03-05 | End: 2019-03-05

## 2019-03-05 RX ORDER — HEPARIN SODIUM (PORCINE) LOCK FLUSH IV SOLN 100 UNIT/ML 100 UNIT/ML
500 SOLUTION INTRAVENOUS PRN
Status: CANCELLED | OUTPATIENT
Start: 2019-03-05

## 2019-03-05 RX ORDER — SODIUM CHLORIDE 9 MG/ML
INJECTION, SOLUTION INTRAVENOUS CONTINUOUS
Status: CANCELLED | OUTPATIENT
Start: 2019-03-05

## 2019-03-05 RX ORDER — SODIUM CHLORIDE 0.9 % (FLUSH) 0.9 %
5 SYRINGE (ML) INJECTION PRN
Status: CANCELLED | OUTPATIENT
Start: 2019-03-05

## 2019-03-05 RX ADMIN — IRON SUCROSE 200 MG: 20 INJECTION, SOLUTION INTRAVENOUS at 13:18

## 2019-03-05 NOTE — PROGRESS NOTES
Infusion complete and tolerated well  Left unit with daughter via wheelchair  All equipment used in the care for this patient has been cleaned.

## 2019-03-18 RX ORDER — TOPIRAMATE 50 MG/1
TABLET, FILM COATED ORAL
Qty: 90 TABLET | Refills: 0 | Status: SHIPPED | OUTPATIENT
Start: 2019-03-18 | End: 2019-06-11 | Stop reason: SDUPTHER

## 2019-03-26 RX ORDER — TRAZODONE HYDROCHLORIDE 100 MG/1
TABLET ORAL
Qty: 180 TABLET | Refills: 0 | Status: SHIPPED | OUTPATIENT
Start: 2019-03-26 | End: 2019-08-28 | Stop reason: SDUPTHER

## 2019-03-26 RX ORDER — IRBESARTAN 75 MG/1
TABLET ORAL
Qty: 30 TABLET | Refills: 1 | Status: SHIPPED | OUTPATIENT
Start: 2019-03-26 | End: 2019-06-03 | Stop reason: SDUPTHER

## 2019-04-01 ENCOUNTER — OFFICE VISIT (OUTPATIENT)
Dept: PULMONOLOGY | Age: 84
End: 2019-04-01
Payer: MEDICARE

## 2019-04-01 VITALS
SYSTOLIC BLOOD PRESSURE: 120 MMHG | WEIGHT: 172.8 LBS | BODY MASS INDEX: 32.62 KG/M2 | HEART RATE: 67 BPM | OXYGEN SATURATION: 98 % | TEMPERATURE: 99.4 F | HEIGHT: 61 IN | DIASTOLIC BLOOD PRESSURE: 62 MMHG

## 2019-04-01 DIAGNOSIS — J45.991 COUGH VARIANT ASTHMA: Primary | ICD-10-CM

## 2019-04-01 DIAGNOSIS — R05.3 CHRONIC COUGH: ICD-10-CM

## 2019-04-01 DIAGNOSIS — E66.09 CLASS 1 OBESITY DUE TO EXCESS CALORIES WITHOUT SERIOUS COMORBIDITY WITH BODY MASS INDEX (BMI) OF 32.0 TO 32.9 IN ADULT: ICD-10-CM

## 2019-04-01 PROCEDURE — 99213 OFFICE O/P EST LOW 20 MIN: CPT | Performed by: INTERNAL MEDICINE

## 2019-04-01 RX ORDER — BUDESONIDE AND FORMOTEROL FUMARATE DIHYDRATE 160; 4.5 UG/1; UG/1
2 AEROSOL RESPIRATORY (INHALATION) 2 TIMES DAILY
Qty: 1 INHALER | Refills: 5 | Status: SHIPPED | OUTPATIENT
Start: 2019-04-01 | End: 2019-01-01 | Stop reason: SDUPTHER

## 2019-04-01 RX ORDER — TRAMADOL HYDROCHLORIDE 50 MG/1
50 TABLET ORAL EVERY 6 HOURS PRN
COMMUNITY
End: 2020-01-01 | Stop reason: CLARIF

## 2019-04-01 NOTE — PROGRESS NOTES
week: Not on file     Minutes per session: Not on file    Stress: Not on file   Relationships    Social connections:     Talks on phone: Not on file     Gets together: Not on file     Attends Confucianism service: Not on file     Active member of club or organization: Not on file     Attends meetings of clubs or organizations: Not on file     Relationship status: Not on file    Intimate partner violence:     Fear of current or ex partner: Not on file     Emotionally abused: Not on file     Physically abused: Not on file     Forced sexual activity: Not on file   Other Topics Concern    Not on file   Social History Narrative    Not on file         Review of Systems      ROS: 10 organs review of system is done including general, psychological, ENT, hematological, endocrine, respiratory, cardiovascular, gastrointestinal,musculoskeletal, neurological,  allergy and Immunology is done and is otherwise negative. Current Outpatient Medications   Medication Sig Dispense Refill    traMADol (ULTRAM) 50 MG tablet Take 50 mg by mouth every 6 hours as needed for Pain.  irbesartan (AVAPRO) 75 MG tablet TAKE 1 TABLET BY MOUTH ONCE DAILY 30 tablet 1    traZODone (DESYREL) 100 MG tablet TAKE 2 TABLETS BY MOUTH ONCE DAILY AT BEDTIME 180 tablet 0    topiramate (TOPAMAX) 50 MG tablet TAKE 1 TABLET BY MOUTH ONCE DAILY 90 tablet 0    sertraline (ZOLOFT) 50 MG tablet TAKE 1 TABLET BY MOUTH ONCE DAILY 90 tablet 0    omeprazole (PRILOSEC) 40 MG delayed release capsule TAKE 1 CAPSULE BY MOUTH ONCE DAILY 30 capsule 3    lovastatin (MEVACOR) 10 MG tablet Take 1 tablet by mouth nightly 1 po qd 30 tablet 0    metFORMIN (GLUCOPHAGE XR) 500 MG extended release tablet Take 1 tablet by mouth daily (with breakfast) 90 tablet 1    aspirin 81 MG tablet Take 1 tablet by mouth daily With Food 30 tablet 3    blood glucose test strips (ACCU-CHEK ACTIVE STRIPS) strip Checks 1 time daily.  NIDDM--ICD-10 E11.9 100 each 0    budesonide-formoterol (SYMBICORT) 160-4.5 MCG/ACT AERO Inhale 2 puffs into the lungs 2 times daily 1 Inhaler 3    Cinnamon 500 MG TABS Take by mouth 3 times daily      ACCU-CHEK SOFTCLIX LANCETS MISC 1 each by Does not apply route daily 100 each 3    SOFT TOUCH LANCETS MISC Checks 1 time daily. NIDDM--ICD-10 E11.9 100 each 0    pregabalin (LYRICA) 75 MG capsule Take 75 mg by mouth 3 times daily. Take 75 mg by mouth. Maddy Rai carbidopa-levodopa (SINEMET)  MG per tablet Take 1 tablet by mouth 1 po bid per dr robb goldstein neurologist      latanoprost (XALATAN) 0.005 % ophthalmic solution Use 1 Drop in both eyes daily at bedtime.  clotrimazole-betamethasone (LOTRISONE) 1-0.05 % cream Use two times daily to affected area for 14 days then stop      ALPRAZolam (XANAX) 0.5 MG tablet Take 0.5 mg by mouth Take 0.5 mg by mouth qd prn .  tiZANidine (ZANAFLEX) 2 MG tablet Take 2 mg by mouth Take 2 mg by mouth bid prn       No current facility-administered medications for this visit. Objective:     Vitals:    04/01/19 1447   BP: 120/62   Site: Left Upper Arm   Pulse: 67   Temp: 99.4 °F (37.4 °C)   TempSrc: Tympanic   SpO2: 98%   Weight: 172 lb 12.8 oz (78.4 kg)   Height: 5' 1\" (1.549 m)         Physical Exam   Constitutional: She is oriented to person, place, and time. She appears well-developed and well-nourished. No distress. HENT:   Head: Normocephalic and atraumatic. Eyes: Pupils are equal, round, and reactive to light. Conjunctivae are normal.   Neck: Normal range of motion. Neck supple. Cardiovascular: Normal rate and regular rhythm. Exam reveals no gallop and no friction rub. No murmur heard. Pulmonary/Chest: Effort normal and breath sounds normal. No respiratory distress. She has no wheezes. She has no rales. She exhibits no tenderness. Abdominal: Soft. She exhibits no distension. There is no tenderness. There is no rebound. Musculoskeletal: She exhibits no edema or tenderness. Lymphadenopathy:     She has no cervical adenopathy. Neurological: She is alert and oriented to person, place, and time. Skin: Skin is warm and dry. She is not diaphoretic. No erythema. Psychiatric: She has a normal mood and affect. Judgment normal.     Imaging studies reviewed by me   CXR negative   Lab results reviewed in chart  PFT  Spirometry is normal   ECHO: Diastolic dysfunction     Assessment and Plan       Diagnosis Orders   1. Cough variant asthma  budesonide-formoterol (SYMBICORT) 160-4.5 MCG/ACT AERO   2. Chronic cough  budesonide-formoterol (SYMBICORT) 160-4.5 MCG/ACT AERO   3. Class 1 obesity due to excess calories without serious comorbidity with body mass index (BMI) of 32.0 to 32.9 in adult       · Cough variant asthma, resolved with ICS/LABA cont same   · Lose weight       No orders of the defined types were placed in this encounter. Orders Placed This Encounter   Medications    budesonide-formoterol (SYMBICORT) 160-4.5 MCG/ACT AERO     Sig: Inhale 2 puffs into the lungs 2 times daily     Dispense:  1 Inhaler     Refill:  5          Return in about 1 year (around 4/1/2020).       Rajan Robles MD

## 2019-04-16 RX ORDER — LOVASTATIN 10 MG/1
10 TABLET ORAL NIGHTLY
Qty: 30 TABLET | Refills: 0 | Status: SHIPPED | OUTPATIENT
Start: 2019-04-16 | End: 2019-05-14 | Stop reason: SDUPTHER

## 2019-04-16 RX ORDER — CLOTRIMAZOLE AND BETAMETHASONE DIPROPIONATE 10; .64 MG/G; MG/G
CREAM TOPICAL
Qty: 30 G | Refills: 0 | Status: SHIPPED | OUTPATIENT
Start: 2019-04-16 | End: 2020-01-01 | Stop reason: SDUPTHER

## 2019-04-17 DIAGNOSIS — E11.8 TYPE 2 DIABETES MELLITUS WITH COMPLICATION, WITHOUT LONG-TERM CURRENT USE OF INSULIN (HCC): ICD-10-CM

## 2019-04-17 DIAGNOSIS — I10 ESSENTIAL HYPERTENSION: ICD-10-CM

## 2019-04-17 LAB
ALBUMIN SERPL-MCNC: 3.8 G/DL (ref 3.5–4.6)
ALP BLD-CCNC: 98 U/L (ref 40–130)
ALT SERPL-CCNC: 8 U/L (ref 0–33)
ANION GAP SERPL CALCULATED.3IONS-SCNC: 17 MEQ/L (ref 9–15)
AST SERPL-CCNC: 27 U/L (ref 0–35)
BASOPHILS ABSOLUTE: 0 K/UL (ref 0–0.2)
BASOPHILS RELATIVE PERCENT: 0.8 %
BILIRUB SERPL-MCNC: 0.6 MG/DL (ref 0.2–0.7)
BUN BLDV-MCNC: 9 MG/DL (ref 8–23)
CALCIUM SERPL-MCNC: 9.2 MG/DL (ref 8.5–9.9)
CHLORIDE BLD-SCNC: 107 MEQ/L (ref 95–107)
CHOLESTEROL, TOTAL: 118 MG/DL (ref 0–199)
CO2: 21 MEQ/L (ref 20–31)
CREAT SERPL-MCNC: 0.75 MG/DL (ref 0.5–0.9)
EOSINOPHILS ABSOLUTE: 0.1 K/UL (ref 0–0.7)
EOSINOPHILS RELATIVE PERCENT: 2.6 %
GFR AFRICAN AMERICAN: >60
GFR NON-AFRICAN AMERICAN: >60
GLOBULIN: 3.3 G/DL (ref 2.3–3.5)
GLUCOSE BLD-MCNC: 155 MG/DL (ref 70–99)
HBA1C MFR BLD: 6.7 % (ref 4.8–5.9)
HCT VFR BLD CALC: 28.8 % (ref 37–47)
HDLC SERPL-MCNC: 66 MG/DL (ref 40–59)
HEMOGLOBIN: 9 G/DL (ref 12–16)
LDL CHOLESTEROL CALCULATED: 39 MG/DL (ref 0–129)
LYMPHOCYTES ABSOLUTE: 0.9 K/UL (ref 1–4.8)
LYMPHOCYTES RELATIVE PERCENT: 22.3 %
MCH RBC QN AUTO: 25.9 PG (ref 27–31.3)
MCHC RBC AUTO-ENTMCNC: 31.1 % (ref 33–37)
MCV RBC AUTO: 83.3 FL (ref 82–100)
MONOCYTES ABSOLUTE: 0.2 K/UL (ref 0.2–0.8)
MONOCYTES RELATIVE PERCENT: 5.3 %
NEUTROPHILS ABSOLUTE: 2.9 K/UL (ref 1.4–6.5)
NEUTROPHILS RELATIVE PERCENT: 69 %
PDW BLD-RTO: 20 % (ref 11.5–14.5)
PLATELET # BLD: 77 K/UL (ref 130–400)
PLATELET SLIDE REVIEW: ABNORMAL
POTASSIUM SERPL-SCNC: 3.8 MEQ/L (ref 3.4–4.9)
RBC # BLD: 3.45 M/UL (ref 4.2–5.4)
SLIDE REVIEW: ABNORMAL
SODIUM BLD-SCNC: 145 MEQ/L (ref 135–144)
TOTAL PROTEIN: 7.1 G/DL (ref 6.3–8)
TRIGL SERPL-MCNC: 64 MG/DL (ref 0–150)
WBC # BLD: 4.3 K/UL (ref 4.8–10.8)

## 2019-04-23 ENCOUNTER — OFFICE VISIT (OUTPATIENT)
Dept: FAMILY MEDICINE CLINIC | Age: 84
End: 2019-04-23
Payer: MEDICARE

## 2019-04-23 VITALS
WEIGHT: 173.8 LBS | BODY MASS INDEX: 32.81 KG/M2 | OXYGEN SATURATION: 98 % | RESPIRATION RATE: 12 BRPM | SYSTOLIC BLOOD PRESSURE: 124 MMHG | HEIGHT: 61 IN | HEART RATE: 66 BPM | DIASTOLIC BLOOD PRESSURE: 58 MMHG | TEMPERATURE: 98.8 F

## 2019-04-23 DIAGNOSIS — F32.A ANXIETY AND DEPRESSION: ICD-10-CM

## 2019-04-23 DIAGNOSIS — F41.9 ANXIETY AND DEPRESSION: ICD-10-CM

## 2019-04-23 DIAGNOSIS — E11.8 TYPE 2 DIABETES MELLITUS WITH COMPLICATION, WITHOUT LONG-TERM CURRENT USE OF INSULIN (HCC): Primary | ICD-10-CM

## 2019-04-23 DIAGNOSIS — E78.00 PURE HYPERCHOLESTEROLEMIA: ICD-10-CM

## 2019-04-23 DIAGNOSIS — I10 ESSENTIAL HYPERTENSION: ICD-10-CM

## 2019-04-23 DIAGNOSIS — D50.9 IRON DEFICIENCY ANEMIA, UNSPECIFIED IRON DEFICIENCY ANEMIA TYPE: ICD-10-CM

## 2019-04-23 PROCEDURE — 99214 OFFICE O/P EST MOD 30 MIN: CPT | Performed by: FAMILY MEDICINE

## 2019-04-23 RX ORDER — ASPIRIN 81 MG/1
81 TABLET ORAL
COMMUNITY
End: 2019-04-23

## 2019-04-23 ASSESSMENT — PATIENT HEALTH QUESTIONNAIRE - PHQ9
SUM OF ALL RESPONSES TO PHQ9 QUESTIONS 1 & 2: 0
1. LITTLE INTEREST OR PLEASURE IN DOING THINGS: 0
SUM OF ALL RESPONSES TO PHQ QUESTIONS 1-9: 0
2. FEELING DOWN, DEPRESSED OR HOPELESS: 0
SUM OF ALL RESPONSES TO PHQ QUESTIONS 1-9: 0

## 2019-04-23 ASSESSMENT — ENCOUNTER SYMPTOMS
SHORTNESS OF BREATH: 0
ABDOMINAL PAIN: 0

## 2019-04-23 NOTE — PROGRESS NOTES
None    Transportation needs:     Medical: None     Non-medical: None   Tobacco Use    Smoking status: Never Smoker    Smokeless tobacco: Never Used   Substance and Sexual Activity    Alcohol use: No    Drug use: No    Sexual activity: None   Lifestyle    Physical activity:     Days per week: None     Minutes per session: None    Stress: None   Relationships    Social connections:     Talks on phone: None     Gets together: None     Attends Temple service: None     Active member of club or organization: None     Attends meetings of clubs or organizations: None     Relationship status: None    Intimate partner violence:     Fear of current or ex partner: None     Emotionally abused: None     Physically abused: None     Forced sexual activity: None   Other Topics Concern    None   Social History Narrative    None     Current Outpatient Medications   Medication Sig Dispense Refill    clotrimazole-betamethasone (LOTRISONE) 1-0.05 % cream Use two times daily to affected area for 14 days then stop 30 g 0    lovastatin (MEVACOR) 10 MG tablet Take 1 tablet by mouth nightly 1 po qd 30 tablet 0    traMADol (ULTRAM) 50 MG tablet Take 50 mg by mouth every 6 hours as needed for Pain.       budesonide-formoterol (SYMBICORT) 160-4.5 MCG/ACT AERO Inhale 2 puffs into the lungs 2 times daily 1 Inhaler 5    irbesartan (AVAPRO) 75 MG tablet TAKE 1 TABLET BY MOUTH ONCE DAILY 30 tablet 1    traZODone (DESYREL) 100 MG tablet TAKE 2 TABLETS BY MOUTH ONCE DAILY AT BEDTIME 180 tablet 0    topiramate (TOPAMAX) 50 MG tablet TAKE 1 TABLET BY MOUTH ONCE DAILY 90 tablet 0    sertraline (ZOLOFT) 50 MG tablet TAKE 1 TABLET BY MOUTH ONCE DAILY 90 tablet 0    omeprazole (PRILOSEC) 40 MG delayed release capsule TAKE 1 CAPSULE BY MOUTH ONCE DAILY 30 capsule 3    metFORMIN (GLUCOPHAGE XR) 500 MG extended release tablet Take 1 tablet by mouth daily (with breakfast) 90 tablet 1    aspirin 81 MG tablet Take 1 tablet by mouth daily With Food 30 tablet 3    blood glucose test strips (ACCU-CHEK ACTIVE STRIPS) strip Checks 1 time daily. NIDDM--ICD-10 E11.9 100 each 0    Cinnamon 500 MG TABS Take by mouth 3 times daily      ACCU-CHEK SOFTCLIX LANCETS MISC 1 each by Does not apply route daily 100 each 3    SOFT TOUCH LANCETS MISC Checks 1 time daily. NIDDM--ICD-10 E11.9 100 each 0    pregabalin (LYRICA) 75 MG capsule Take 75 mg by mouth 3 times daily. Take 75 mg by mouth. [facility] carbidopa-levodopa (SINEMET)  MG per tablet Take 1 tablet by mouth 1 po bid per dr robb goldstein neurologist      latanoprost (XALATAN) 0.005 % ophthalmic solution Use 1 Drop in both eyes daily at bedtime.  ALPRAZolam (XANAX) 0.5 MG tablet Take 0.5 mg by mouth Take 0.5 mg by mouth qd prn .  tiZANidine (ZANAFLEX) 2 MG tablet Take 2 mg by mouth Take 2 mg by mouth bid prn       No current facility-administered medications for this visit. Family History   Problem Relation Age of Onset    High Blood Pressure Neg Hx      Past Medical History:   Diagnosis Date    Asthma     Fibromyalgia     Hemorrhoid     Hyperlipidemia     Hypertension     Parkinson's disease ([facility] [address]     Type II or unspecified type diabetes mellitus without mention of complication, not stated as uncontrolled      Objective:   BP (!) 124/58   Pulse 66   Temp 98.8 °F (37.1 °C)   Resp 12   Ht 5' 1\" (1.549 m)   Wt 173 lb 12.8 oz (78.8 kg)   SpO2 98%   BMI 32.84 kg/m²     Physical Exam  Neck:no carotid bruits. No masses. No adenopathy. No thyroid asymmetry. Lungs:clear and equal breath sounds. No wheezes or rales. Heart:rate reg. 1/6 syst murmur across precordium   No gallops   Pulses:Radials 2+ equal               Poster tib 1+ equal  Extremities:no edema in either leg  Gen: In no acute distress  Abdomen; B.S present. Soft  Non tender. No hepatosplenomegaly. No masses     Patient with appropriate affect.   Alert   Thought content appropriate  Good eye contact    Dorsalis pedis and posterior tibial pulses are symmetric. No fissures between the toes. No open sores on the feet. Tactile sensation intact   Assessment:       Diagnosis Orders   1. Type 2 diabetes mellitus with complication, without long-term current use of insulin ([address]     2. Essential hypertension     3. Pure hypercholesterolemia     4. Anxiety and depression     5. Iron deficiency anemia, unspecified iron deficiency anemia type           Plan:      Orders Placed This Encounter   Procedures    [facility] ([facility]) - Marco Martinez MD, Maida Galeazzi     Referral Priority:   Routine     Referral Type:   Eval and Treat     Referral Reason:   Specialty Services Required     Referred to Provider:   Fazal Gonzalez MD     Requested Specialty:   Hematology and Oncology     Number of Visits Requested:   1     Decrease trazadone to 100 mg nightly as she has been sleeping well.     Continue current medications   Non fasting blood work in 3 months and f/u after done

## 2019-04-26 ENCOUNTER — TELEPHONE (OUTPATIENT)
Dept: FAMILY MEDICINE CLINIC | Age: 84
End: 2019-04-26

## 2019-04-26 DIAGNOSIS — D50.9 IRON DEFICIENCY ANEMIA, UNSPECIFIED IRON DEFICIENCY ANEMIA TYPE: Primary | ICD-10-CM

## 2019-05-14 RX ORDER — LOVASTATIN 10 MG/1
10 TABLET ORAL NIGHTLY
Qty: 30 TABLET | Refills: 0 | Status: SHIPPED | OUTPATIENT
Start: 2019-05-14 | End: 2019-06-11 | Stop reason: SDUPTHER

## 2019-06-03 NOTE — TELEPHONE ENCOUNTER
[place] is requesting medication refill.      Rx requested:  Requested Prescriptions     Pending Prescriptions Disp Refills    irbesartan (AVAPRO) 75 MG tablet 30 tablet 3     Sig: TAKE 1 TABLET BY MOUTH ONCE DAILY       Last Office Visit:   4/23/2019        Next Visit Date:  Future Appointments   Date Time Provider Arsh Bourne   7/16/2019  1:30 PM SCHEDULE, LAB [facility]   7/23/2019  2:30 PM Joanie Bone MD [address] [place] [address] 7

## 2019-06-04 RX ORDER — IRBESARTAN 75 MG/1
TABLET ORAL
Qty: 30 TABLET | Refills: 0 | Status: SHIPPED | OUTPATIENT
Start: 2019-06-04 | End: 2019-07-08 | Stop reason: SDUPTHER

## 2019-06-07 ENCOUNTER — TELEPHONE (OUTPATIENT)
Dept: FAMILY MEDICINE CLINIC | Age: 84
End: 2019-06-07

## 2019-06-10 DIAGNOSIS — D50.0 IRON DEFICIENCY ANEMIA DUE TO CHRONIC BLOOD LOSS: Primary | ICD-10-CM

## 2019-06-11 RX ORDER — TOPIRAMATE 50 MG/1
TABLET, FILM COATED ORAL
Qty: 90 TABLET | Refills: 0 | Status: SHIPPED | OUTPATIENT
Start: 2019-06-11 | End: 2019-01-01 | Stop reason: SDUPTHER

## 2019-06-11 RX ORDER — LOVASTATIN 10 MG/1
10 TABLET ORAL NIGHTLY
Qty: 30 TABLET | Refills: 0 | Status: SHIPPED | OUTPATIENT
Start: 2019-06-11 | End: 2019-07-22 | Stop reason: SDUPTHER

## 2019-06-11 NOTE — TELEPHONE ENCOUNTER
PT requesting medication refill.      Rx requested:  Requested Prescriptions     Pending Prescriptions Disp Refills    topiramate (TOPAMAX) 50 MG tablet 90 tablet 0     Sig: TAKE 1 TABLET BY MOUTH ONCE DAILY    sertraline (ZOLOFT) 50 MG tablet 90 tablet 0    lovastatin (MEVACOR) 10 MG tablet 30 tablet 0     Sig: Take 1 tablet by mouth nightly 1 po qd       Last Office Visit:   4/23/2019      Next Visit Date:  Future Appointments   Date Time Provider Arsh Bourne   7/16/2019  1:30 PM SCHEDULE, LAB [facility]   7/23/2019  2:30 PM [place]  [facility] 7

## 2019-06-19 ENCOUNTER — TELEPHONE (OUTPATIENT)
Dept: FAMILY MEDICINE CLINIC | Age: 84
End: 2019-06-19

## 2019-06-19 DIAGNOSIS — H40.9 GLAUCOMA, UNSPECIFIED GLAUCOMA TYPE, UNSPECIFIED LATERALITY: Primary | ICD-10-CM

## 2019-06-19 NOTE — TELEPHONE ENCOUNTER
Patient needs a referral to go to the [facility] at [facility] for her glaucoma. They won't be able to schedule an appointment with her until they get this referral. Please advise, thank you. Their fax number is [phone]    *If possible, patient would like to receive a call when the order has been faxed so she can schedule the appointment as soon as possible, instead of waiting the three days out.  Thank you*

## 2019-07-08 ENCOUNTER — TELEPHONE (OUTPATIENT)
Dept: PULMONOLOGY | Age: 84
End: 2019-07-08

## 2019-07-08 RX ORDER — IRBESARTAN 75 MG/1
TABLET ORAL
Qty: 30 TABLET | Refills: 1 | Status: SHIPPED | OUTPATIENT
Start: 2019-07-08 | End: 2019-01-01 | Stop reason: SDUPTHER

## 2019-07-12 DIAGNOSIS — K21.9 GASTROESOPHAGEAL REFLUX DISEASE WITHOUT ESOPHAGITIS: ICD-10-CM

## 2019-07-12 DIAGNOSIS — R05.3 CHRONIC COUGH: ICD-10-CM

## 2019-07-14 DIAGNOSIS — R05.3 CHRONIC COUGH: ICD-10-CM

## 2019-07-14 DIAGNOSIS — K21.9 GASTROESOPHAGEAL REFLUX DISEASE WITHOUT ESOPHAGITIS: ICD-10-CM

## 2019-07-16 DIAGNOSIS — D50.9 IRON DEFICIENCY ANEMIA, UNSPECIFIED IRON DEFICIENCY ANEMIA TYPE: ICD-10-CM

## 2019-07-16 DIAGNOSIS — E11.8 TYPE 2 DIABETES MELLITUS WITH COMPLICATION, WITHOUT LONG-TERM CURRENT USE OF INSULIN (HCC): ICD-10-CM

## 2019-07-16 DIAGNOSIS — E11.9 CONTROLLED TYPE 2 DIABETES MELLITUS WITHOUT COMPLICATION, WITHOUT LONG-TERM CURRENT USE OF INSULIN (HCC): Primary | ICD-10-CM

## 2019-07-16 DIAGNOSIS — E11.9 CONTROLLED TYPE 2 DIABETES MELLITUS WITHOUT COMPLICATION, WITHOUT LONG-TERM CURRENT USE OF INSULIN (HCC): ICD-10-CM

## 2019-07-16 RX ORDER — OMEPRAZOLE 40 MG/1
CAPSULE, DELAYED RELEASE ORAL
Qty: 30 CAPSULE | Refills: 3 | Status: SHIPPED | OUTPATIENT
Start: 2019-07-16 | End: 2019-08-05 | Stop reason: SDUPTHER

## 2019-07-17 LAB
CREATININE URINE: 129.6 MG/DL
MICROALBUMIN UR-MCNC: <1.2 MG/DL
MICROALBUMIN/CREAT UR-RTO: NORMAL MG/G (ref 0–30)

## 2019-07-22 RX ORDER — LOVASTATIN 10 MG/1
10 TABLET ORAL NIGHTLY
Qty: 30 TABLET | Refills: 0 | Status: SHIPPED | OUTPATIENT
Start: 2019-07-22 | End: 2019-08-18 | Stop reason: SDUPTHER

## 2019-07-22 RX ORDER — METFORMIN HYDROCHLORIDE 500 MG/1
500 TABLET, EXTENDED RELEASE ORAL
Qty: 90 TABLET | Refills: 0 | Status: SHIPPED | OUTPATIENT
Start: 2019-07-22 | End: 2019-01-01 | Stop reason: SDUPTHER

## 2019-08-05 RX ORDER — OMEPRAZOLE 40 MG/1
CAPSULE, DELAYED RELEASE ORAL
Qty: 30 CAPSULE | Refills: 3 | Status: SHIPPED | OUTPATIENT
Start: 2019-08-05 | End: 2019-01-01 | Stop reason: SDUPTHER

## 2019-08-18 RX ORDER — LOVASTATIN 10 MG/1
10 TABLET ORAL NIGHTLY
Qty: 30 TABLET | Refills: 0 | Status: SHIPPED | OUTPATIENT
Start: 2019-08-18 | End: 2019-01-01 | Stop reason: SDUPTHER

## 2019-08-28 RX ORDER — TRAZODONE HYDROCHLORIDE 100 MG/1
TABLET ORAL
Qty: 60 TABLET | Refills: 0 | Status: SHIPPED | OUTPATIENT
Start: 2019-08-28 | End: 2019-01-01 | Stop reason: SDUPTHER

## 2020-01-01 ENCOUNTER — TELEPHONE (OUTPATIENT)
Dept: GASTROENTEROLOGY | Age: 85
End: 2020-01-01

## 2020-01-01 ENCOUNTER — HOSPITAL ENCOUNTER (INPATIENT)
Age: 85
LOS: 2 days | Discharge: NURSING, REHAB OR LONG-TERM CARE | DRG: 683 | End: 2020-07-25
Attending: INTERNAL MEDICINE | Admitting: INTERNAL MEDICINE
Payer: MEDICARE

## 2020-01-01 ENCOUNTER — PATIENT MESSAGE (OUTPATIENT)
Dept: PULMONOLOGY | Age: 85
End: 2020-01-01

## 2020-01-01 ENCOUNTER — OFFICE VISIT (OUTPATIENT)
Dept: GASTROENTEROLOGY | Age: 85
End: 2020-01-01
Payer: MEDICARE

## 2020-01-01 ENCOUNTER — APPOINTMENT (OUTPATIENT)
Dept: GENERAL RADIOLOGY | Age: 85
DRG: 092 | End: 2020-01-01
Attending: PHYSICAL MEDICINE & REHABILITATION
Payer: MEDICARE

## 2020-01-01 ENCOUNTER — TELEPHONE (OUTPATIENT)
Dept: INTERVENTIONAL RADIOLOGY/VASCULAR | Age: 85
End: 2020-01-01

## 2020-01-01 ENCOUNTER — APPOINTMENT (OUTPATIENT)
Dept: CT IMAGING | Age: 85
DRG: 433 | End: 2020-01-01
Payer: MEDICARE

## 2020-01-01 ENCOUNTER — HOSPITAL ENCOUNTER (OUTPATIENT)
Dept: ULTRASOUND IMAGING | Age: 85
Discharge: HOME | End: 2020-06-12
Payer: MEDICARE

## 2020-01-01 ENCOUNTER — PATIENT MESSAGE (OUTPATIENT)
Dept: FAMILY MEDICINE CLINIC | Age: 85
End: 2020-01-01

## 2020-01-01 ENCOUNTER — OFFICE VISIT (OUTPATIENT)
Dept: GERIATRIC MEDICINE | Age: 85
End: 2020-01-01
Payer: MEDICARE

## 2020-01-01 ENCOUNTER — OFFICE VISIT (OUTPATIENT)
Dept: FAMILY MEDICINE CLINIC | Age: 85
End: 2020-01-01
Payer: MEDICARE

## 2020-01-01 ENCOUNTER — VIRTUAL VISIT (OUTPATIENT)
Dept: FAMILY MEDICINE CLINIC | Age: 85
End: 2020-01-01
Payer: MEDICARE

## 2020-01-01 ENCOUNTER — HOSPITAL ENCOUNTER (INPATIENT)
Age: 85
LOS: 14 days | Discharge: HOME HEALTH | DRG: 092 | End: 2020-08-08
Attending: PHYSICAL MEDICINE & REHABILITATION | Admitting: PHYSICAL MEDICINE & REHABILITATION
Payer: MEDICARE

## 2020-01-01 ENCOUNTER — HOSPITAL ENCOUNTER (INPATIENT)
Age: 85
LOS: 3 days | Discharge: NURSING, REHAB OR LONG-TERM CARE | DRG: 433 | End: 2020-03-13
Attending: INTERNAL MEDICINE | Admitting: INTERNAL MEDICINE
Payer: MEDICARE

## 2020-01-01 ENCOUNTER — TELEPHONE (OUTPATIENT)
Dept: FAMILY MEDICINE CLINIC | Age: 85
End: 2020-01-01

## 2020-01-01 ENCOUNTER — APPOINTMENT (OUTPATIENT)
Dept: ULTRASOUND IMAGING | Age: 85
DRG: 683 | End: 2020-01-01
Payer: MEDICARE

## 2020-01-01 ENCOUNTER — APPOINTMENT (OUTPATIENT)
Dept: INTERVENTIONAL RADIOLOGY/VASCULAR | Age: 85
DRG: 433 | End: 2020-01-01
Payer: MEDICARE

## 2020-01-01 ENCOUNTER — HOSPITAL ENCOUNTER (INPATIENT)
Age: 85
LOS: 3 days | Discharge: HOSPICE | DRG: 433 | End: 2020-08-26
Attending: EMERGENCY MEDICINE | Admitting: INTERNAL MEDICINE
Payer: MEDICARE

## 2020-01-01 ENCOUNTER — VIRTUAL VISIT (OUTPATIENT)
Dept: GASTROENTEROLOGY | Age: 85
End: 2020-01-01
Payer: MEDICARE

## 2020-01-01 ENCOUNTER — APPOINTMENT (OUTPATIENT)
Dept: ULTRASOUND IMAGING | Age: 85
DRG: 433 | End: 2020-01-01
Payer: MEDICARE

## 2020-01-01 ENCOUNTER — HOSPITAL ENCOUNTER (OUTPATIENT)
Dept: ULTRASOUND IMAGING | Age: 85
Discharge: HOME | End: 2020-08-21
Payer: MEDICARE

## 2020-01-01 ENCOUNTER — APPOINTMENT (OUTPATIENT)
Dept: GENERAL RADIOLOGY | Age: 85
DRG: 433 | End: 2020-01-01
Payer: MEDICARE

## 2020-01-01 ENCOUNTER — HOSPITAL ENCOUNTER (OUTPATIENT)
Age: 85
Setting detail: OUTPATIENT SURGERY
Discharge: HOME | End: 2020-02-21
Attending: SPECIALIST | Admitting: SPECIALIST
Payer: MEDICARE

## 2020-01-01 ENCOUNTER — OFFICE VISIT (OUTPATIENT)
Dept: PULMONOLOGY | Age: 85
End: 2020-01-01
Payer: MEDICARE

## 2020-01-01 ENCOUNTER — HOSPITAL ENCOUNTER (INPATIENT)
Dept: INTERVENTIONAL RADIOLOGY/VASCULAR | Age: 85
Discharge: HOME | DRG: 092 | End: 2020-07-29
Attending: PHYSICAL MEDICINE & REHABILITATION
Payer: MEDICARE

## 2020-01-01 ENCOUNTER — APPOINTMENT (OUTPATIENT)
Dept: MRI IMAGING | Age: 85
DRG: 092 | End: 2020-01-01
Attending: PHYSICAL MEDICINE & REHABILITATION
Payer: MEDICARE

## 2020-01-01 ENCOUNTER — ANESTHESIA (OUTPATIENT)
Dept: OPERATING ROOM | Age: 85
End: 2020-01-01
Payer: MEDICARE

## 2020-01-01 ENCOUNTER — APPOINTMENT (OUTPATIENT)
Dept: CT IMAGING | Age: 85
DRG: 683 | End: 2020-01-01
Payer: MEDICARE

## 2020-01-01 ENCOUNTER — APPOINTMENT (OUTPATIENT)
Dept: GENERAL RADIOLOGY | Age: 85
DRG: 683 | End: 2020-01-01
Payer: MEDICARE

## 2020-01-01 ENCOUNTER — HOSPITAL ENCOUNTER (OUTPATIENT)
Dept: ULTRASOUND IMAGING | Age: 85
Discharge: HOME | End: 2020-03-01
Payer: MEDICARE

## 2020-01-01 ENCOUNTER — HOSPITAL ENCOUNTER (OUTPATIENT)
Dept: ULTRASOUND IMAGING | Age: 85
Discharge: HOME | End: 2020-07-10
Payer: MEDICARE

## 2020-01-01 ENCOUNTER — ANESTHESIA EVENT (OUTPATIENT)
Dept: OPERATING ROOM | Age: 85
End: 2020-01-01
Payer: MEDICARE

## 2020-01-01 ENCOUNTER — APPOINTMENT (OUTPATIENT)
Dept: INTERVENTIONAL RADIOLOGY/VASCULAR | Age: 85
DRG: 092 | End: 2020-01-01
Attending: PHYSICAL MEDICINE & REHABILITATION
Payer: MEDICARE

## 2020-01-01 VITALS
OXYGEN SATURATION: 95 % | DIASTOLIC BLOOD PRESSURE: 61 MMHG | HEART RATE: 60 BPM | RESPIRATION RATE: 18 BRPM | WEIGHT: 174.4 LBS | BODY MASS INDEX: 32.95 KG/M2 | TEMPERATURE: 97.8 F | SYSTOLIC BLOOD PRESSURE: 141 MMHG

## 2020-01-01 VITALS
RESPIRATION RATE: 14 BRPM | DIASTOLIC BLOOD PRESSURE: 61 MMHG | SYSTOLIC BLOOD PRESSURE: 127 MMHG | OXYGEN SATURATION: 95 % | HEART RATE: 60 BPM

## 2020-01-01 VITALS
OXYGEN SATURATION: 97 % | WEIGHT: 165 LBS | HEIGHT: 61 IN | BODY MASS INDEX: 31.15 KG/M2 | HEART RATE: 74 BPM | RESPIRATION RATE: 22 BRPM

## 2020-01-01 VITALS
SYSTOLIC BLOOD PRESSURE: 110 MMHG | RESPIRATION RATE: 14 BRPM | TEMPERATURE: 98.2 F | BODY MASS INDEX: 28.13 KG/M2 | OXYGEN SATURATION: 96 % | HEART RATE: 64 BPM | WEIGHT: 149 LBS | HEIGHT: 61 IN | DIASTOLIC BLOOD PRESSURE: 70 MMHG

## 2020-01-01 VITALS
RESPIRATION RATE: 16 BRPM | BODY MASS INDEX: 35.87 KG/M2 | HEIGHT: 61 IN | DIASTOLIC BLOOD PRESSURE: 80 MMHG | TEMPERATURE: 98.3 F | SYSTOLIC BLOOD PRESSURE: 130 MMHG | OXYGEN SATURATION: 97 % | HEART RATE: 76 BPM | WEIGHT: 190 LBS

## 2020-01-01 VITALS
RESPIRATION RATE: 16 BRPM | SYSTOLIC BLOOD PRESSURE: 158 MMHG | DIASTOLIC BLOOD PRESSURE: 63 MMHG | TEMPERATURE: 97.9 F | BODY MASS INDEX: 34.36 KG/M2 | HEART RATE: 72 BPM | WEIGHT: 182 LBS | HEIGHT: 61 IN | OXYGEN SATURATION: 100 %

## 2020-01-01 VITALS
DIASTOLIC BLOOD PRESSURE: 60 MMHG | SYSTOLIC BLOOD PRESSURE: 120 MMHG | HEART RATE: 66 BPM | OXYGEN SATURATION: 100 % | RESPIRATION RATE: 16 BRPM

## 2020-01-01 VITALS
TEMPERATURE: 98.1 F | OXYGEN SATURATION: 100 % | RESPIRATION RATE: 18 BRPM | BODY MASS INDEX: 25.49 KG/M2 | SYSTOLIC BLOOD PRESSURE: 124 MMHG | HEIGHT: 61 IN | WEIGHT: 135 LBS | HEART RATE: 77 BPM | DIASTOLIC BLOOD PRESSURE: 71 MMHG

## 2020-01-01 VITALS
RESPIRATION RATE: 16 BRPM | WEIGHT: 190 LBS | BODY MASS INDEX: 35.87 KG/M2 | SYSTOLIC BLOOD PRESSURE: 120 MMHG | TEMPERATURE: 98.4 F | DIASTOLIC BLOOD PRESSURE: 70 MMHG | HEIGHT: 61 IN | HEART RATE: 65 BPM | OXYGEN SATURATION: 97 %

## 2020-01-01 VITALS
SYSTOLIC BLOOD PRESSURE: 120 MMHG | RESPIRATION RATE: 14 BRPM | OXYGEN SATURATION: 96 % | DIASTOLIC BLOOD PRESSURE: 58 MMHG | HEART RATE: 71 BPM

## 2020-01-01 VITALS
OXYGEN SATURATION: 98 % | HEIGHT: 61 IN | DIASTOLIC BLOOD PRESSURE: 58 MMHG | HEART RATE: 91 BPM | SYSTOLIC BLOOD PRESSURE: 116 MMHG | BODY MASS INDEX: 34.85 KG/M2 | WEIGHT: 184.6 LBS | TEMPERATURE: 97.7 F

## 2020-01-01 VITALS
HEART RATE: 77 BPM | SYSTOLIC BLOOD PRESSURE: 92 MMHG | RESPIRATION RATE: 16 BRPM | WEIGHT: 145.94 LBS | OXYGEN SATURATION: 98 % | DIASTOLIC BLOOD PRESSURE: 52 MMHG | BODY MASS INDEX: 27.55 KG/M2 | TEMPERATURE: 98 F | HEIGHT: 61 IN

## 2020-01-01 VITALS
HEART RATE: 69 BPM | OXYGEN SATURATION: 97 % | HEIGHT: 61 IN | RESPIRATION RATE: 12 BRPM | WEIGHT: 179 LBS | BODY MASS INDEX: 33.79 KG/M2

## 2020-01-01 VITALS
BODY MASS INDEX: 36.44 KG/M2 | HEART RATE: 65 BPM | TEMPERATURE: 99 F | OXYGEN SATURATION: 97 % | RESPIRATION RATE: 16 BRPM | SYSTOLIC BLOOD PRESSURE: 142 MMHG | HEIGHT: 61 IN | DIASTOLIC BLOOD PRESSURE: 50 MMHG | WEIGHT: 193 LBS

## 2020-01-01 VITALS — DIASTOLIC BLOOD PRESSURE: 70 MMHG | OXYGEN SATURATION: 91 % | SYSTOLIC BLOOD PRESSURE: 157 MMHG

## 2020-01-01 VITALS
BODY MASS INDEX: 34.66 KG/M2 | DIASTOLIC BLOOD PRESSURE: 82 MMHG | HEIGHT: 61 IN | HEART RATE: 63 BPM | OXYGEN SATURATION: 95 % | RESPIRATION RATE: 15 BRPM | SYSTOLIC BLOOD PRESSURE: 136 MMHG | TEMPERATURE: 98.5 F | WEIGHT: 183.6 LBS

## 2020-01-01 VITALS
BODY MASS INDEX: 31.53 KG/M2 | TEMPERATURE: 98.1 F | SYSTOLIC BLOOD PRESSURE: 119 MMHG | DIASTOLIC BLOOD PRESSURE: 56 MMHG | RESPIRATION RATE: 18 BRPM | HEIGHT: 61 IN | HEART RATE: 66 BPM | OXYGEN SATURATION: 100 % | WEIGHT: 167 LBS

## 2020-01-01 DIAGNOSIS — K74.60 CIRRHOSIS OF LIVER WITH ASCITES, UNSPECIFIED HEPATIC CIRRHOSIS TYPE (HCC): ICD-10-CM

## 2020-01-01 DIAGNOSIS — E78.5 DYSLIPIDEMIA: ICD-10-CM

## 2020-01-01 DIAGNOSIS — R60.0 EDEMA, LOWER EXTREMITY: ICD-10-CM

## 2020-01-01 DIAGNOSIS — E11.9 TYPE 2 DIABETES MELLITUS WITHOUT COMPLICATION, WITHOUT LONG-TERM CURRENT USE OF INSULIN (HCC): ICD-10-CM

## 2020-01-01 DIAGNOSIS — D64.9 ANEMIA, UNSPECIFIED TYPE: ICD-10-CM

## 2020-01-01 DIAGNOSIS — N39.0 URINARY TRACT INFECTION WITHOUT HEMATURIA, SITE UNSPECIFIED: ICD-10-CM

## 2020-01-01 DIAGNOSIS — K74.60 CIRRHOSIS OF LIVER WITHOUT ASCITES, UNSPECIFIED HEPATIC CIRRHOSIS TYPE (HCC): ICD-10-CM

## 2020-01-01 DIAGNOSIS — R18.8 CIRRHOSIS OF LIVER WITH ASCITES, UNSPECIFIED HEPATIC CIRRHOSIS TYPE (HCC): ICD-10-CM

## 2020-01-01 DIAGNOSIS — D50.9 IRON DEFICIENCY ANEMIA, UNSPECIFIED IRON DEFICIENCY ANEMIA TYPE: ICD-10-CM

## 2020-01-01 LAB
ABO/RH: NORMAL
ABO/RH: NORMAL
ACANTHOCYTES: ABNORMAL
ALBUMIN FLUID: 0.4 G/DL
ALBUMIN FLUID: 0.6 G/DL
ALBUMIN SERPL-MCNC: 2.6 G/DL (ref 3.5–4.6)
ALBUMIN SERPL-MCNC: 2.6 G/DL (ref 3.5–4.6)
ALBUMIN SERPL-MCNC: 2.7 G/DL (ref 3.5–4.6)
ALBUMIN SERPL-MCNC: 2.8 G/DL (ref 3.5–4.6)
ALBUMIN SERPL-MCNC: 2.9 G/DL (ref 3.5–4.6)
ALBUMIN SERPL-MCNC: 3 G/DL (ref 3.5–4.6)
ALBUMIN SERPL-MCNC: 3.2 G/DL (ref 3.5–4.6)
ALBUMIN SERPL-MCNC: 3.4 G/DL (ref 3.5–4.6)
ALBUMIN SERPL-MCNC: 3.4 G/DL (ref 3.5–4.6)
ALP BLD-CCNC: 53 U/L (ref 40–130)
ALP BLD-CCNC: 54 U/L (ref 40–130)
ALP BLD-CCNC: 54 U/L (ref 40–130)
ALP BLD-CCNC: 56 U/L (ref 40–130)
ALP BLD-CCNC: 61 U/L (ref 40–130)
ALP BLD-CCNC: 62 U/L (ref 40–130)
ALP BLD-CCNC: 63 U/L (ref 40–130)
ALP BLD-CCNC: 64 U/L (ref 40–130)
ALP BLD-CCNC: 65 U/L (ref 40–130)
ALP BLD-CCNC: 66 U/L (ref 40–130)
ALP BLD-CCNC: 66 U/L (ref 40–130)
ALP BLD-CCNC: 71 U/L (ref 40–130)
ALP BLD-CCNC: 77 U/L (ref 40–130)
ALP BLD-CCNC: 82 U/L (ref 40–130)
ALT SERPL-CCNC: 13 U/L (ref 0–33)
ALT SERPL-CCNC: 14 U/L (ref 0–33)
ALT SERPL-CCNC: 6 U/L (ref 0–33)
ALT SERPL-CCNC: 6 U/L (ref 0–33)
ALT SERPL-CCNC: 8 U/L (ref 0–33)
ALT SERPL-CCNC: 8 U/L (ref 0–33)
ALT SERPL-CCNC: <5 U/L (ref 0–33)
AMMONIA: 47 UMOL/L (ref 11–51)
AMMONIA: 64 UMOL/L (ref 11–51)
AMMONIA: 75 UMOL/L (ref 11–51)
AMMONIA: 75 UMOL/L (ref 11–51)
AMPHETAMINE SCREEN, URINE: NORMAL
AMYLASE FLUID: 10 U/L
ANION GAP SERPL CALCULATED.3IONS-SCNC: 10 MEQ/L (ref 9–15)
ANION GAP SERPL CALCULATED.3IONS-SCNC: 11 MEQ/L (ref 9–15)
ANION GAP SERPL CALCULATED.3IONS-SCNC: 12 MEQ/L (ref 9–15)
ANION GAP SERPL CALCULATED.3IONS-SCNC: 13 MEQ/L (ref 9–15)
ANION GAP SERPL CALCULATED.3IONS-SCNC: 14 MEQ/L (ref 9–15)
ANION GAP SERPL CALCULATED.3IONS-SCNC: 14 MEQ/L (ref 9–15)
ANION GAP SERPL CALCULATED.3IONS-SCNC: 16 MEQ/L (ref 9–15)
ANION GAP SERPL CALCULATED.3IONS-SCNC: 5 MEQ/L (ref 9–15)
ANION GAP SERPL CALCULATED.3IONS-SCNC: 7 MEQ/L (ref 9–15)
ANION GAP SERPL CALCULATED.3IONS-SCNC: 9 MEQ/L (ref 9–15)
ANISOCYTOSIS: ABNORMAL
ANTIBODY IDENTIFICATION: NORMAL
ANTIBODY SCREEN: NORMAL
ANTIBODY SCREEN: NORMAL
APPEARANCE FLUID: CLEAR
APPEARANCE FLUID: NORMAL
APTT: 36.2 SEC (ref 24.4–36.8)
APTT: 38.5 SEC (ref 24.4–36.8)
AST SERPL-CCNC: 19 U/L (ref 0–35)
AST SERPL-CCNC: 19 U/L (ref 0–35)
AST SERPL-CCNC: 20 U/L (ref 0–35)
AST SERPL-CCNC: 21 U/L (ref 0–35)
AST SERPL-CCNC: 22 U/L (ref 0–35)
AST SERPL-CCNC: 26 U/L (ref 0–35)
AST SERPL-CCNC: 26 U/L (ref 0–35)
AST SERPL-CCNC: 27 U/L (ref 0–35)
AST SERPL-CCNC: 28 U/L (ref 0–35)
AST SERPL-CCNC: 28 U/L (ref 0–35)
AST SERPL-CCNC: 31 U/L (ref 0–35)
AST SERPL-CCNC: 36 U/L (ref 0–35)
BACTERIA: NEGATIVE /HPF
BARBITURATE SCREEN URINE: NORMAL
BASE EXCESS ARTERIAL: -3 (ref -3–3)
BASE EXCESS ARTERIAL: -4 (ref -3–3)
BASOPHILS ABSOLUTE: 0 K/UL (ref 0–0.2)
BASOPHILS ABSOLUTE: 0.1 K/UL (ref 0–0.2)
BASOPHILS RELATIVE PERCENT: 0.3 %
BASOPHILS RELATIVE PERCENT: 0.5 %
BASOPHILS RELATIVE PERCENT: 0.5 %
BASOPHILS RELATIVE PERCENT: 0.6 %
BASOPHILS RELATIVE PERCENT: 0.6 %
BASOPHILS RELATIVE PERCENT: 0.7 %
BASOPHILS RELATIVE PERCENT: 0.8 %
BASOPHILS RELATIVE PERCENT: 0.9 %
BASOPHILS RELATIVE PERCENT: 1 %
BASOPHILS RELATIVE PERCENT: 2 %
BENZODIAZEPINE SCREEN, URINE: NORMAL
BILIRUB SERPL-MCNC: 0.5 MG/DL (ref 0.2–0.7)
BILIRUB SERPL-MCNC: 0.6 MG/DL (ref 0.2–0.7)
BILIRUB SERPL-MCNC: 0.6 MG/DL (ref 0.2–0.7)
BILIRUB SERPL-MCNC: 0.7 MG/DL (ref 0.2–0.7)
BILIRUB SERPL-MCNC: 0.8 MG/DL (ref 0.2–0.7)
BILIRUB SERPL-MCNC: 1 MG/DL (ref 0.2–0.7)
BILIRUBIN DIRECT: 0.4 MG/DL (ref 0–0.4)
BILIRUBIN URINE: NEGATIVE
BILIRUBIN, INDIRECT: 0.6 MG/DL (ref 0–0.6)
BLOOD BANK DISPENSE STATUS: NORMAL
BLOOD BANK PRODUCT CODE: NORMAL
BLOOD CULTURE, ROUTINE: NORMAL
BLOOD CULTURE, ROUTINE: NORMAL
BLOOD, URINE: NEGATIVE
BODY FLUID CULTURE, STERILE: NORMAL
BODY FLUID CULTURE, STERILE: NORMAL
BPU ID: NORMAL
BUN BLDV-MCNC: 10 MG/DL (ref 8–23)
BUN BLDV-MCNC: 11 MG/DL (ref 8–23)
BUN BLDV-MCNC: 13 MG/DL (ref 8–23)
BUN BLDV-MCNC: 14 MG/DL (ref 8–23)
BUN BLDV-MCNC: 14 MG/DL (ref 8–23)
BUN BLDV-MCNC: 20 MG/DL (ref 8–23)
BUN BLDV-MCNC: 20 MG/DL (ref 8–23)
BUN BLDV-MCNC: 21 MG/DL (ref 8–23)
BUN BLDV-MCNC: 22 MG/DL (ref 8–23)
BUN BLDV-MCNC: 30 MG/DL (ref 8–23)
BUN BLDV-MCNC: 30 MG/DL (ref 8–23)
BUN BLDV-MCNC: 32 MG/DL (ref 8–23)
BUN BLDV-MCNC: 32 MG/DL (ref 8–23)
BUN BLDV-MCNC: 34 MG/DL (ref 8–23)
BUN BLDV-MCNC: 35 MG/DL (ref 8–23)
BUN BLDV-MCNC: 37 MG/DL (ref 8–23)
BUN BLDV-MCNC: 37 MG/DL (ref 8–23)
BUN BLDV-MCNC: 38 MG/DL (ref 8–23)
BUN BLDV-MCNC: 8 MG/DL (ref 8–23)
BUN BLDV-MCNC: 8 MG/DL (ref 8–23)
BUN BLDV-MCNC: 9 MG/DL (ref 8–23)
C-REACTIVE PROTEIN: 21.7 MG/L (ref 0–5)
CALCIUM IONIZED: 1.08 MMOL/L (ref 1.12–1.32)
CALCIUM IONIZED: 1.17 MMOL/L (ref 1.12–1.32)
CALCIUM SERPL-MCNC: 7.5 MG/DL (ref 8.5–9.9)
CALCIUM SERPL-MCNC: 7.7 MG/DL (ref 8.5–9.9)
CALCIUM SERPL-MCNC: 7.8 MG/DL (ref 8.5–9.9)
CALCIUM SERPL-MCNC: 7.9 MG/DL (ref 8.5–9.9)
CALCIUM SERPL-MCNC: 8 MG/DL (ref 8.5–9.9)
CALCIUM SERPL-MCNC: 8.1 MG/DL (ref 8.5–9.9)
CALCIUM SERPL-MCNC: 8.2 MG/DL (ref 8.5–9.9)
CALCIUM SERPL-MCNC: 8.3 MG/DL (ref 8.5–9.9)
CALCIUM SERPL-MCNC: 8.4 MG/DL (ref 8.5–9.9)
CALCIUM SERPL-MCNC: 8.4 MG/DL (ref 8.5–9.9)
CALCIUM SERPL-MCNC: 8.6 MG/DL (ref 8.5–9.9)
CALCIUM SERPL-MCNC: 8.6 MG/DL (ref 8.5–9.9)
CALCIUM SERPL-MCNC: 8.8 MG/DL (ref 8.5–9.9)
CALCIUM SERPL-MCNC: 8.8 MG/DL (ref 8.5–9.9)
CANNABINOID SCREEN URINE: NORMAL
CELL COUNT FLUID TYPE: NORMAL
CELL COUNT FLUID TYPE: NORMAL
CHLORIDE BLD-SCNC: 100 MEQ/L (ref 95–107)
CHLORIDE BLD-SCNC: 101 MEQ/L (ref 95–107)
CHLORIDE BLD-SCNC: 103 MEQ/L (ref 95–107)
CHLORIDE BLD-SCNC: 105 MEQ/L (ref 95–107)
CHLORIDE BLD-SCNC: 106 MEQ/L (ref 95–107)
CHLORIDE BLD-SCNC: 107 MEQ/L (ref 95–107)
CHLORIDE BLD-SCNC: 107 MEQ/L (ref 95–107)
CHLORIDE BLD-SCNC: 108 MEQ/L (ref 95–107)
CHLORIDE BLD-SCNC: 109 MEQ/L (ref 95–107)
CHLORIDE BLD-SCNC: 110 MEQ/L (ref 95–107)
CHLORIDE BLD-SCNC: 111 MEQ/L (ref 95–107)
CHLORIDE BLD-SCNC: 94 MEQ/L (ref 95–107)
CHLORIDE BLD-SCNC: 95 MEQ/L (ref 95–107)
CHLORIDE BLD-SCNC: 96 MEQ/L (ref 95–107)
CHLORIDE BLD-SCNC: 96 MEQ/L (ref 95–107)
CHLORIDE BLD-SCNC: 97 MEQ/L (ref 95–107)
CHLORIDE BLD-SCNC: 99 MEQ/L (ref 95–107)
CHLORIDE BLD-SCNC: 99 MEQ/L (ref 95–107)
CHOLESTEROL, TOTAL: 97 MG/DL (ref 0–199)
CLARITY: CLEAR
CLOT EVALUATION: NORMAL
CLOT EVALUATION: NORMAL
CO2: 15 MEQ/L (ref 20–31)
CO2: 17 MEQ/L (ref 20–31)
CO2: 17 MEQ/L (ref 20–31)
CO2: 19 MEQ/L (ref 20–31)
CO2: 20 MEQ/L (ref 20–31)
CO2: 21 MEQ/L (ref 20–31)
CO2: 23 MEQ/L (ref 20–31)
CO2: 24 MEQ/L (ref 20–31)
CO2: 24 MEQ/L (ref 20–31)
CO2: 25 MEQ/L (ref 20–31)
CO2: 25 MEQ/L (ref 20–31)
CO2: 26 MEQ/L (ref 20–31)
COCAINE METABOLITE SCREEN URINE: NORMAL
COLOR FLUID: NORMAL
COLOR FLUID: NORMAL
COLOR: ABNORMAL
COLOR: ABNORMAL
COLOR: YELLOW
COLOR: YELLOW
CREAT SERPL-MCNC: 0.71 MG/DL (ref 0.5–0.9)
CREAT SERPL-MCNC: 0.72 MG/DL (ref 0.5–0.9)
CREAT SERPL-MCNC: 0.78 MG/DL (ref 0.5–0.9)
CREAT SERPL-MCNC: 0.8 MG/DL (ref 0.5–0.9)
CREAT SERPL-MCNC: 0.8 MG/DL (ref 0.5–0.9)
CREAT SERPL-MCNC: 0.81 MG/DL (ref 0.5–0.9)
CREAT SERPL-MCNC: 0.81 MG/DL (ref 0.5–0.9)
CREAT SERPL-MCNC: 0.83 MG/DL (ref 0.5–0.9)
CREAT SERPL-MCNC: 0.83 MG/DL (ref 0.5–0.9)
CREAT SERPL-MCNC: 0.87 MG/DL (ref 0.5–0.9)
CREAT SERPL-MCNC: 0.87 MG/DL (ref 0.5–0.9)
CREAT SERPL-MCNC: 1.01 MG/DL (ref 0.5–0.9)
CREAT SERPL-MCNC: 1.02 MG/DL (ref 0.5–0.9)
CREAT SERPL-MCNC: 1.15 MG/DL (ref 0.5–0.9)
CREAT SERPL-MCNC: 1.16 MG/DL (ref 0.5–0.9)
CREAT SERPL-MCNC: 1.18 MG/DL (ref 0.5–0.9)
CREAT SERPL-MCNC: 1.4 MG/DL (ref 0.5–0.9)
CREAT SERPL-MCNC: 1.51 MG/DL (ref 0.5–0.9)
CREAT SERPL-MCNC: 1.56 MG/DL (ref 0.5–0.9)
CREAT SERPL-MCNC: 1.57 MG/DL (ref 0.5–0.9)
CREAT SERPL-MCNC: 1.61 MG/DL (ref 0.5–0.9)
CREAT SERPL-MCNC: 1.62 MG/DL (ref 0.5–0.9)
CREAT SERPL-MCNC: 1.66 MG/DL (ref 0.5–0.9)
CREAT SERPL-MCNC: 1.7 MG/DL (ref 0.5–0.9)
CREAT SERPL-MCNC: 1.9 MG/DL (ref 0.5–0.9)
CREATININE URINE: 211 MG/DL
CREATININE URINE: 76.4 MG/DL
CULTURE, BLOOD 2: NORMAL
CULTURE, BLOOD 2: NORMAL
DESCRIPTION BLOOD BANK: NORMAL
EKG ATRIAL RATE: 60 BPM
EKG ATRIAL RATE: 64 BPM
EKG ATRIAL RATE: 78 BPM
EKG P AXIS: 26 DEGREES
EKG P AXIS: 50 DEGREES
EKG P AXIS: 53 DEGREES
EKG P-R INTERVAL: 170 MS
EKG P-R INTERVAL: 182 MS
EKG P-R INTERVAL: 192 MS
EKG Q-T INTERVAL: 408 MS
EKG Q-T INTERVAL: 454 MS
EKG Q-T INTERVAL: 500 MS
EKG QRS DURATION: 84 MS
EKG QRS DURATION: 88 MS
EKG QRS DURATION: 88 MS
EKG QTC CALCULATION (BAZETT): 465 MS
EKG QTC CALCULATION (BAZETT): 468 MS
EKG QTC CALCULATION (BAZETT): 500 MS
EKG R AXIS: 27 DEGREES
EKG R AXIS: 36 DEGREES
EKG R AXIS: 45 DEGREES
EKG T AXIS: -13 DEGREES
EKG T AXIS: -14 DEGREES
EKG T AXIS: -41 DEGREES
EKG VENTRICULAR RATE: 60 BPM
EKG VENTRICULAR RATE: 64 BPM
EKG VENTRICULAR RATE: 78 BPM
EOSINOPHILS ABSOLUTE: 0 K/UL (ref 0–0.7)
EOSINOPHILS ABSOLUTE: 0.1 K/UL (ref 0–0.7)
EOSINOPHILS ABSOLUTE: 0.2 K/UL (ref 0–0.7)
EOSINOPHILS ABSOLUTE: 0.3 K/UL (ref 0–0.7)
EOSINOPHILS ABSOLUTE: 0.3 K/UL (ref 0–0.7)
EOSINOPHILS ABSOLUTE: 0.4 K/UL (ref 0–0.7)
EOSINOPHILS ABSOLUTE: 0.4 K/UL (ref 0–0.7)
EOSINOPHILS RELATIVE PERCENT: 0.6 %
EOSINOPHILS RELATIVE PERCENT: 1.6 %
EOSINOPHILS RELATIVE PERCENT: 2.2 %
EOSINOPHILS RELATIVE PERCENT: 2.5 %
EOSINOPHILS RELATIVE PERCENT: 2.7 %
EOSINOPHILS RELATIVE PERCENT: 3 %
EOSINOPHILS RELATIVE PERCENT: 3.3 %
EOSINOPHILS RELATIVE PERCENT: 3.4 %
EOSINOPHILS RELATIVE PERCENT: 3.5 %
EOSINOPHILS RELATIVE PERCENT: 3.9 %
EOSINOPHILS RELATIVE PERCENT: 4 %
EOSINOPHILS RELATIVE PERCENT: 4.2 %
EOSINOPHILS RELATIVE PERCENT: 5 %
EOSINOPHILS RELATIVE PERCENT: 9 %
EPITHELIAL CELLS, UA: ABNORMAL /HPF (ref 0–5)
ETHANOL PERCENT: NORMAL G/DL
ETHANOL PERCENT: NORMAL G/DL
ETHANOL: <10 MG/DL (ref 0–0.08)
ETHANOL: <10 MG/DL (ref 0–0.08)
FERRITIN: 104.7 NG/ML (ref 13–150)
FERRITIN: 227.2 NG/ML (ref 13–150)
FLUID PATH CONSULT: YES
FLUID TYPE: NORMAL
FLUID TYPE: NORMAL
FOLATE: 10.2 NG/ML (ref 7.3–26.1)
FOLATE: 4.9 NG/ML (ref 7.3–26.1)
GFR AFRICAN AMERICAN: 30.5
GFR AFRICAN AMERICAN: 34.7
GFR AFRICAN AMERICAN: 35.6
GFR AFRICAN AMERICAN: 36.7
GFR AFRICAN AMERICAN: 36.9
GFR AFRICAN AMERICAN: 38
GFR AFRICAN AMERICAN: 38.3
GFR AFRICAN AMERICAN: 39.8
GFR AFRICAN AMERICAN: 40
GFR AFRICAN AMERICAN: 43.4
GFR AFRICAN AMERICAN: 52.9
GFR AFRICAN AMERICAN: 53.9
GFR AFRICAN AMERICAN: 54.4
GFR AFRICAN AMERICAN: >60
GFR NON-AFRICAN AMERICAN: 25.2
GFR NON-AFRICAN AMERICAN: 28.7
GFR NON-AFRICAN AMERICAN: 29.5
GFR NON-AFRICAN AMERICAN: 30.3
GFR NON-AFRICAN AMERICAN: 30.5
GFR NON-AFRICAN AMERICAN: 31.4
GFR NON-AFRICAN AMERICAN: 31.7
GFR NON-AFRICAN AMERICAN: 32.9
GFR NON-AFRICAN AMERICAN: 33
GFR NON-AFRICAN AMERICAN: 35.9
GFR NON-AFRICAN AMERICAN: 43.7
GFR NON-AFRICAN AMERICAN: 44.6
GFR NON-AFRICAN AMERICAN: 45
GFR NON-AFRICAN AMERICAN: 51.7
GFR NON-AFRICAN AMERICAN: 52.3
GFR NON-AFRICAN AMERICAN: >60
GLOBULIN: 2.1 G/DL (ref 2.3–3.5)
GLOBULIN: 2.3 G/DL (ref 2.3–3.5)
GLOBULIN: 2.4 G/DL (ref 2.3–3.5)
GLOBULIN: 2.4 G/DL (ref 2.3–3.5)
GLOBULIN: 2.5 G/DL (ref 2.3–3.5)
GLOBULIN: 2.6 G/DL (ref 2.3–3.5)
GLOBULIN: 2.6 G/DL (ref 2.3–3.5)
GLOBULIN: 2.7 G/DL (ref 2.3–3.5)
GLOBULIN: 2.8 G/DL (ref 2.3–3.5)
GLOBULIN: 2.9 G/DL (ref 2.3–3.5)
GLOBULIN: 3 G/DL (ref 2.3–3.5)
GLUCOSE BLD-MCNC: 103 MG/DL (ref 70–99)
GLUCOSE BLD-MCNC: 104 MG/DL (ref 70–99)
GLUCOSE BLD-MCNC: 106 MG/DL (ref 70–99)
GLUCOSE BLD-MCNC: 107 MG/DL (ref 70–99)
GLUCOSE BLD-MCNC: 114 MG/DL (ref 60–115)
GLUCOSE BLD-MCNC: 124 MG/DL (ref 70–99)
GLUCOSE BLD-MCNC: 125 MG/DL (ref 70–99)
GLUCOSE BLD-MCNC: 137 MG/DL (ref 70–99)
GLUCOSE BLD-MCNC: 138 MG/DL (ref 70–99)
GLUCOSE BLD-MCNC: 139 MG/DL (ref 70–99)
GLUCOSE BLD-MCNC: 140 MG/DL (ref 70–99)
GLUCOSE BLD-MCNC: 144 MG/DL (ref 70–99)
GLUCOSE BLD-MCNC: 145 MG/DL (ref 60–115)
GLUCOSE BLD-MCNC: 146 MG/DL (ref 60–115)
GLUCOSE BLD-MCNC: 148 MG/DL (ref 70–99)
GLUCOSE BLD-MCNC: 149 MG/DL (ref 60–115)
GLUCOSE BLD-MCNC: 150 MG/DL (ref 70–99)
GLUCOSE BLD-MCNC: 151 MG/DL (ref 70–99)
GLUCOSE BLD-MCNC: 157 MG/DL (ref 70–99)
GLUCOSE BLD-MCNC: 162 MG/DL (ref 60–115)
GLUCOSE BLD-MCNC: 165 MG/DL (ref 60–115)
GLUCOSE BLD-MCNC: 168 MG/DL (ref 70–99)
GLUCOSE BLD-MCNC: 177 MG/DL (ref 60–115)
GLUCOSE BLD-MCNC: 179 MG/DL (ref 60–115)
GLUCOSE BLD-MCNC: 185 MG/DL (ref 60–115)
GLUCOSE BLD-MCNC: 187 MG/DL (ref 60–115)
GLUCOSE BLD-MCNC: 199 MG/DL (ref 60–115)
GLUCOSE BLD-MCNC: 81 MG/DL (ref 70–99)
GLUCOSE BLD-MCNC: 89 MG/DL (ref 70–99)
GLUCOSE BLD-MCNC: 90 MG/DL (ref 70–99)
GLUCOSE BLD-MCNC: 91 MG/DL (ref 70–99)
GLUCOSE BLD-MCNC: 92 MG/DL (ref 70–99)
GLUCOSE BLD-MCNC: 92 MG/DL (ref 70–99)
GLUCOSE BLD-MCNC: 95 MG/DL (ref 70–99)
GLUCOSE BLD-MCNC: 96 MG/DL (ref 70–99)
GLUCOSE BLD-MCNC: 97 MG/DL (ref 70–99)
GLUCOSE URINE: NEGATIVE MG/DL
GLUCOSE, FLUID: 119 MG/DL
GRAM STAIN RESULT: NORMAL
GRAM STAIN RESULT: NORMAL
HBA1C MFR BLD: 4.5 % (ref 4.8–5.9)
HBA1C MFR BLD: 4.9 % (ref 4.8–5.9)
HCO3 ARTERIAL: 19.9 MMOL/L (ref 21–29)
HCO3 ARTERIAL: 20.6 MMOL/L (ref 21–29)
HCT VFR BLD CALC: 21.9 % (ref 37–47)
HCT VFR BLD CALC: 22 % (ref 37–47)
HCT VFR BLD CALC: 22 % (ref 37–47)
HCT VFR BLD CALC: 22.8 % (ref 37–47)
HCT VFR BLD CALC: 23 % (ref 37–47)
HCT VFR BLD CALC: 23.7 % (ref 37–47)
HCT VFR BLD CALC: 24.2 % (ref 37–47)
HCT VFR BLD CALC: 24.4 % (ref 37–47)
HCT VFR BLD CALC: 25.1 % (ref 37–47)
HCT VFR BLD CALC: 25.2 % (ref 37–47)
HCT VFR BLD CALC: 25.4 % (ref 37–47)
HCT VFR BLD CALC: 25.4 % (ref 37–47)
HCT VFR BLD CALC: 25.8 % (ref 37–47)
HCT VFR BLD CALC: 26 % (ref 37–47)
HCT VFR BLD CALC: 26.2 % (ref 37–47)
HCT VFR BLD CALC: 27.2 % (ref 37–47)
HCT VFR BLD CALC: 27.4 % (ref 37–47)
HCT VFR BLD CALC: 27.8 % (ref 37–47)
HCT VFR BLD CALC: 28.5 % (ref 37–47)
HDLC SERPL-MCNC: 57 MG/DL (ref 40–59)
HEMOGLOBIN: 6.9 G/DL (ref 12–16)
HEMOGLOBIN: 6.9 G/DL (ref 12–16)
HEMOGLOBIN: 7.1 G/DL (ref 12–16)
HEMOGLOBIN: 7.3 G/DL (ref 12–16)
HEMOGLOBIN: 7.5 G/DL (ref 12–16)
HEMOGLOBIN: 7.5 GM/DL (ref 12–16)
HEMOGLOBIN: 7.7 G/DL (ref 12–16)
HEMOGLOBIN: 7.9 G/DL (ref 12–16)
HEMOGLOBIN: 8 G/DL (ref 12–16)
HEMOGLOBIN: 8 G/DL (ref 12–16)
HEMOGLOBIN: 8.1 G/DL (ref 12–16)
HEMOGLOBIN: 8.2 G/DL (ref 12–16)
HEMOGLOBIN: 8.4 G/DL (ref 12–16)
HEMOGLOBIN: 8.4 G/DL (ref 12–16)
HEMOGLOBIN: 8.5 G/DL (ref 12–16)
HEMOGLOBIN: 8.6 G/DL (ref 12–16)
HEMOGLOBIN: 8.7 G/DL (ref 12–16)
HEMOGLOBIN: 8.7 G/DL (ref 12–16)
HEMOGLOBIN: 8.8 G/DL (ref 12–16)
HEMOGLOBIN: 9.5 G/DL (ref 12–16)
HEPATITIS B SURFACE ANTIGEN INTERPRETATION: NORMAL
HEPATITIS C ANTIBODY INTERPRETATION: NORMAL
HYALINE CASTS: ABNORMAL /HPF (ref 0–5)
HYPOCHROMIA: ABNORMAL
INR BLD: 1.3
INR BLD: 1.3
INR BLD: 1.4
INR BLD: 1.5
INR BLD: 1.5
IRON SATURATION: 15 % (ref 11–46)
IRON SATURATION: 27 % (ref 11–46)
IRON: 36 UG/DL (ref 37–145)
IRON: 54 UG/DL (ref 37–145)
KETONES, URINE: ABNORMAL MG/DL
KETONES, URINE: NEGATIVE MG/DL
LACTATE: 0.89 MMOL/L (ref 0.4–2)
LACTATE: 1.22 MMOL/L (ref 0.4–2)
LACTIC ACID: 1.3 MMOL/L (ref 0.5–2.2)
LACTIC ACID: 1.9 MMOL/L (ref 0.5–2.2)
LD, FLUID: 60 U/L
LD, FLUID: 77 U/L
LDL CHOLESTEROL CALCULATED: 22 MG/DL (ref 0–129)
LEUKOCYTE ESTERASE, URINE: ABNORMAL
LEUKOCYTE ESTERASE, URINE: NEGATIVE
LIPASE: 22 U/L (ref 12–95)
LIPASE: 60 U/L (ref 12–95)
LV EF: 65 %
LVEF MODALITY: NORMAL
LYMPHOCYTES ABSOLUTE: 0.4 K/UL (ref 1–4.8)
LYMPHOCYTES ABSOLUTE: 0.5 K/UL (ref 1–4.8)
LYMPHOCYTES ABSOLUTE: 0.5 K/UL (ref 1–4.8)
LYMPHOCYTES ABSOLUTE: 0.6 K/UL (ref 1–4.8)
LYMPHOCYTES ABSOLUTE: 0.7 K/UL (ref 1–4.8)
LYMPHOCYTES ABSOLUTE: 0.8 K/UL (ref 1–4.8)
LYMPHOCYTES ABSOLUTE: 0.8 K/UL (ref 1–4.8)
LYMPHOCYTES ABSOLUTE: 0.9 K/UL (ref 1–4.8)
LYMPHOCYTES ABSOLUTE: 1 K/UL (ref 1–4.8)
LYMPHOCYTES ABSOLUTE: 1.2 K/UL (ref 1–4.8)
LYMPHOCYTES ABSOLUTE: 1.4 K/UL (ref 1–4.8)
LYMPHOCYTES ABSOLUTE: 1.9 K/UL (ref 1–4.8)
LYMPHOCYTES RELATIVE PERCENT: 11.4 %
LYMPHOCYTES RELATIVE PERCENT: 13.3 %
LYMPHOCYTES RELATIVE PERCENT: 14.3 %
LYMPHOCYTES RELATIVE PERCENT: 14.6 %
LYMPHOCYTES RELATIVE PERCENT: 14.9 %
LYMPHOCYTES RELATIVE PERCENT: 15 %
LYMPHOCYTES RELATIVE PERCENT: 16.6 %
LYMPHOCYTES RELATIVE PERCENT: 17.2 %
LYMPHOCYTES RELATIVE PERCENT: 18 %
LYMPHOCYTES RELATIVE PERCENT: 19 %
LYMPHOCYTES RELATIVE PERCENT: 24 %
LYMPHOCYTES RELATIVE PERCENT: 25.9 %
LYMPHOCYTES RELATIVE PERCENT: 9 %
LYMPHOCYTES RELATIVE PERCENT: 9.9 %
LYMPHOCYTES, BODY FLUID: 34 %
LYMPHOCYTES, BODY FLUID: 50 %
Lab: NORMAL
MACROCYTES: ABNORMAL
MACROCYTES: ABNORMAL
MAGNESIUM: 1.7 MG/DL (ref 1.7–2.4)
MAGNESIUM: 1.9 MG/DL (ref 1.7–2.4)
MAGNESIUM: 2 MG/DL (ref 1.7–2.4)
MAGNESIUM: 2.2 MG/DL (ref 1.7–2.4)
MAGNESIUM: 2.3 MG/DL (ref 1.7–2.4)
MAGNESIUM: 2.3 MG/DL (ref 1.7–2.4)
MAGNESIUM: 2.4 MG/DL (ref 1.7–2.4)
MAGNESIUM: 2.4 MG/DL (ref 1.7–2.4)
MCH RBC QN AUTO: 31.3 PG (ref 27–31.3)
MCH RBC QN AUTO: 31.7 PG (ref 27–31.3)
MCH RBC QN AUTO: 31.8 PG (ref 27–31.3)
MCH RBC QN AUTO: 31.9 PG (ref 27–31.3)
MCH RBC QN AUTO: 32 PG (ref 27–31.3)
MCH RBC QN AUTO: 32.1 PG (ref 27–31.3)
MCH RBC QN AUTO: 32.1 PG (ref 27–31.3)
MCH RBC QN AUTO: 32.2 PG (ref 27–31.3)
MCH RBC QN AUTO: 32.5 PG (ref 27–31.3)
MCH RBC QN AUTO: 32.8 PG (ref 27–31.3)
MCH RBC QN AUTO: 33.1 PG (ref 27–31.3)
MCH RBC QN AUTO: 33.2 PG (ref 27–31.3)
MCH RBC QN AUTO: 33.3 PG (ref 27–31.3)
MCH RBC QN AUTO: 33.4 PG (ref 27–31.3)
MCH RBC QN AUTO: 33.5 PG (ref 27–31.3)
MCHC RBC AUTO-ENTMCNC: 30.1 % (ref 33–37)
MCHC RBC AUTO-ENTMCNC: 30.1 % (ref 33–37)
MCHC RBC AUTO-ENTMCNC: 31.6 % (ref 33–37)
MCHC RBC AUTO-ENTMCNC: 31.6 % (ref 33–37)
MCHC RBC AUTO-ENTMCNC: 31.7 % (ref 33–37)
MCHC RBC AUTO-ENTMCNC: 31.8 % (ref 33–37)
MCHC RBC AUTO-ENTMCNC: 31.9 % (ref 33–37)
MCHC RBC AUTO-ENTMCNC: 31.9 % (ref 33–37)
MCHC RBC AUTO-ENTMCNC: 32.1 % (ref 33–37)
MCHC RBC AUTO-ENTMCNC: 32.2 % (ref 33–37)
MCHC RBC AUTO-ENTMCNC: 32.4 % (ref 33–37)
MCHC RBC AUTO-ENTMCNC: 32.6 % (ref 33–37)
MCHC RBC AUTO-ENTMCNC: 33.2 % (ref 33–37)
MCHC RBC AUTO-ENTMCNC: 33.4 % (ref 33–37)
MCHC RBC AUTO-ENTMCNC: 33.5 % (ref 33–37)
MCV RBC AUTO: 100.6 FL (ref 82–100)
MCV RBC AUTO: 101.1 FL (ref 82–100)
MCV RBC AUTO: 101.2 FL (ref 82–100)
MCV RBC AUTO: 101.6 FL (ref 82–100)
MCV RBC AUTO: 102.2 FL (ref 82–100)
MCV RBC AUTO: 102.9 FL (ref 82–100)
MCV RBC AUTO: 104.2 FL (ref 82–100)
MCV RBC AUTO: 104.3 FL (ref 82–100)
MCV RBC AUTO: 104.7 FL (ref 82–100)
MCV RBC AUTO: 105.3 FL (ref 82–100)
MCV RBC AUTO: 105.4 FL (ref 82–100)
MCV RBC AUTO: 108.9 FL (ref 82–100)
MCV RBC AUTO: 95.3 FL (ref 82–100)
MCV RBC AUTO: 95.8 FL (ref 82–100)
MCV RBC AUTO: 95.9 FL (ref 82–100)
MCV RBC AUTO: 97.4 FL (ref 82–100)
MCV RBC AUTO: 98.9 FL (ref 82–100)
MESOTHELIAL FLUID: PRESENT %
METHADONE SCREEN, URINE: NORMAL
MONOCYTE, FLUID: 14 %
MONOCYTE, FLUID: 29 %
MONOCYTES ABSOLUTE: 0.1 K/UL (ref 0.2–0.8)
MONOCYTES ABSOLUTE: 0.2 K/UL (ref 0.2–0.8)
MONOCYTES ABSOLUTE: 0.3 K/UL (ref 0.2–0.8)
MONOCYTES ABSOLUTE: 0.4 K/UL (ref 0.2–0.8)
MONOCYTES ABSOLUTE: 0.4 K/UL (ref 0.2–0.8)
MONOCYTES ABSOLUTE: 0.5 K/UL (ref 0.2–0.8)
MONOCYTES ABSOLUTE: 0.5 K/UL (ref 0.2–0.8)
MONOCYTES ABSOLUTE: 0.6 K/UL (ref 0.2–0.8)
MONOCYTES RELATIVE PERCENT: 1.9 %
MONOCYTES RELATIVE PERCENT: 2.9 %
MONOCYTES RELATIVE PERCENT: 3.8 %
MONOCYTES RELATIVE PERCENT: 3.9 %
MONOCYTES RELATIVE PERCENT: 4.4 %
MONOCYTES RELATIVE PERCENT: 4.8 %
MONOCYTES RELATIVE PERCENT: 5.1 %
MONOCYTES RELATIVE PERCENT: 5.5 %
MONOCYTES RELATIVE PERCENT: 5.5 %
MONOCYTES RELATIVE PERCENT: 5.7 %
MONOCYTES RELATIVE PERCENT: 5.8 %
MONOCYTES RELATIVE PERCENT: 5.8 %
MONOCYTES RELATIVE PERCENT: 6.4 %
MONOCYTES RELATIVE PERCENT: 6.8 %
NEUTROPHIL, FLUID: 21 %
NEUTROPHIL, FLUID: 52 %
NEUTROPHILS ABSOLUTE: 1.9 K/UL (ref 1.4–6.5)
NEUTROPHILS ABSOLUTE: 2 K/UL (ref 1.4–6.5)
NEUTROPHILS ABSOLUTE: 2.4 K/UL (ref 1.4–6.5)
NEUTROPHILS ABSOLUTE: 2.5 K/UL (ref 1.4–6.5)
NEUTROPHILS ABSOLUTE: 2.6 K/UL (ref 1.4–6.5)
NEUTROPHILS ABSOLUTE: 2.8 K/UL (ref 1.4–6.5)
NEUTROPHILS ABSOLUTE: 3.2 K/UL (ref 1.4–6.5)
NEUTROPHILS ABSOLUTE: 3.5 K/UL (ref 1.4–6.5)
NEUTROPHILS ABSOLUTE: 4.2 K/UL (ref 1.4–6.5)
NEUTROPHILS ABSOLUTE: 5.9 K/UL (ref 1.4–6.5)
NEUTROPHILS ABSOLUTE: 6 K/UL (ref 1.4–6.5)
NEUTROPHILS ABSOLUTE: 7.1 K/UL (ref 1.4–6.5)
NEUTROPHILS ABSOLUTE: 8.3 K/UL (ref 1.4–6.5)
NEUTROPHILS ABSOLUTE: 8.4 K/UL (ref 1.4–6.5)
NEUTROPHILS RELATIVE PERCENT: 63.8 %
NEUTROPHILS RELATIVE PERCENT: 71 %
NEUTROPHILS RELATIVE PERCENT: 73 %
NEUTROPHILS RELATIVE PERCENT: 73 %
NEUTROPHILS RELATIVE PERCENT: 74 %
NEUTROPHILS RELATIVE PERCENT: 74.2 %
NEUTROPHILS RELATIVE PERCENT: 74.5 %
NEUTROPHILS RELATIVE PERCENT: 75.1 %
NEUTROPHILS RELATIVE PERCENT: 75.3 %
NEUTROPHILS RELATIVE PERCENT: 75.4 %
NEUTROPHILS RELATIVE PERCENT: 77.2 %
NEUTROPHILS RELATIVE PERCENT: 77.2 %
NEUTROPHILS RELATIVE PERCENT: 80.8 %
NEUTROPHILS RELATIVE PERCENT: 85 %
NITRITE, URINE: NEGATIVE
NUCLEATED CELLS FLUID: 65 /CUMM
NUCLEATED CELLS FLUID: 80 /CUMM
NUMBER OF CELLS COUNTED FLUID: 100
NUMBER OF CELLS COUNTED FLUID: 100
O2 SAT, ARTERIAL: 98 % (ref 93–100)
O2 SAT, ARTERIAL: 98 % (ref 93–100)
OPIATE SCREEN URINE: NORMAL
OSMOLALITY URINE: 406 MOSM/KG (ref 300–900)
OVALOCYTES: ABNORMAL
OVALOCYTES: ABNORMAL
OXYCODONE URINE: NORMAL
PATH CONSULT FLUID: NORMAL
PCO2 ARTERIAL: 30 MM HG (ref 35–45)
PCO2 ARTERIAL: 30 MM HG (ref 35–45)
PDW BLD-RTO: 16.4 % (ref 11.5–14.5)
PDW BLD-RTO: 16.4 % (ref 11.5–14.5)
PDW BLD-RTO: 16.8 % (ref 11.5–14.5)
PDW BLD-RTO: 17 % (ref 11.5–14.5)
PDW BLD-RTO: 17.2 % (ref 11.5–14.5)
PDW BLD-RTO: 17.2 % (ref 11.5–14.5)
PDW BLD-RTO: 18.2 % (ref 11.5–14.5)
PDW BLD-RTO: 18.3 % (ref 11.5–14.5)
PDW BLD-RTO: 18.5 % (ref 11.5–14.5)
PDW BLD-RTO: 18.6 % (ref 11.5–14.5)
PDW BLD-RTO: 19 % (ref 11.5–14.5)
PDW BLD-RTO: 19.8 % (ref 11.5–14.5)
PDW BLD-RTO: 19.9 % (ref 11.5–14.5)
PDW BLD-RTO: 20.1 % (ref 11.5–14.5)
PDW BLD-RTO: 20.2 % (ref 11.5–14.5)
PDW BLD-RTO: 25 % (ref 11.5–14.5)
PDW BLD-RTO: 26.4 % (ref 11.5–14.5)
PERFORMED ON: ABNORMAL
PERFORMED ON: NORMAL
PERFORMED ON: NORMAL
PH ARTERIAL: 7.44 (ref 7.35–7.45)
PH ARTERIAL: 7.45 (ref 7.35–7.45)
PH UA: 5 (ref 5–9)
PH UA: 5.5 (ref 5–9)
PH UA: 6.5 (ref 5–9)
PH UA: 7 (ref 5–9)
PHENCYCLIDINE SCREEN URINE: NORMAL
PLATELET # BLD: 107 K/UL (ref 130–400)
PLATELET # BLD: 109 K/UL (ref 130–400)
PLATELET # BLD: 120 K/UL (ref 130–400)
PLATELET # BLD: 122 K/UL (ref 130–400)
PLATELET # BLD: 135 K/UL (ref 130–400)
PLATELET # BLD: 64 K/UL (ref 130–400)
PLATELET # BLD: 65 K/UL (ref 130–400)
PLATELET # BLD: 71 K/UL (ref 130–400)
PLATELET # BLD: 72 K/UL (ref 130–400)
PLATELET # BLD: 72 K/UL (ref 130–400)
PLATELET # BLD: 73 K/UL (ref 130–400)
PLATELET # BLD: 74 K/UL (ref 130–400)
PLATELET # BLD: 78 K/UL (ref 130–400)
PLATELET # BLD: 79 K/UL (ref 130–400)
PLATELET # BLD: 85 K/UL (ref 130–400)
PLATELET # BLD: 85 K/UL (ref 130–400)
PLATELET # BLD: 90 K/UL (ref 130–400)
PLATELET SLIDE REVIEW: ABNORMAL
PO2 ARTERIAL: 105 MM HG (ref 75–108)
PO2 ARTERIAL: 91 MM HG (ref 75–108)
POC CHLORIDE: 98 MEQ/L (ref 99–110)
POC CREATININE WHOLE BLOOD: 0.8
POC CREATININE: 0.8 MG/DL (ref 0.6–1.2)
POC CREATININE: 1.5 MG/DL (ref 0.6–1.2)
POC FIO2: 21
POC HEMATOCRIT: 22 % (ref 36–48)
POC POTASSIUM: 4.1 MEQ/L (ref 3.5–5.1)
POC SAMPLE TYPE: ABNORMAL
POC SAMPLE TYPE: ABNORMAL
POC SAMPLE TYPE: NORMAL
POC SODIUM: 127 MEQ/L (ref 136–145)
POIKILOCYTES: ABNORMAL
POLYCHROMASIA: ABNORMAL
POTASSIUM REFLEX MAGNESIUM: 3.2 MEQ/L (ref 3.4–4.9)
POTASSIUM REFLEX MAGNESIUM: 3.5 MEQ/L (ref 3.4–4.9)
POTASSIUM REFLEX MAGNESIUM: 3.7 MEQ/L (ref 3.4–4.9)
POTASSIUM REFLEX MAGNESIUM: 4 MEQ/L (ref 3.4–4.9)
POTASSIUM REFLEX MAGNESIUM: 4.2 MEQ/L (ref 3.4–4.9)
POTASSIUM REFLEX MAGNESIUM: 4.5 MEQ/L (ref 3.4–4.9)
POTASSIUM SERPL-SCNC: 2.9 MEQ/L (ref 3.4–4.9)
POTASSIUM SERPL-SCNC: 3.1 MEQ/L (ref 3.4–4.9)
POTASSIUM SERPL-SCNC: 3.2 MEQ/L (ref 3.4–4.9)
POTASSIUM SERPL-SCNC: 3.3 MEQ/L (ref 3.4–4.9)
POTASSIUM SERPL-SCNC: 3.4 MEQ/L (ref 3.4–4.9)
POTASSIUM SERPL-SCNC: 3.5 MEQ/L (ref 3.4–4.9)
POTASSIUM SERPL-SCNC: 3.6 MEQ/L (ref 3.4–4.9)
POTASSIUM SERPL-SCNC: 3.9 MEQ/L (ref 3.4–4.9)
POTASSIUM SERPL-SCNC: 4.1 MEQ/L (ref 3.4–4.9)
POTASSIUM SERPL-SCNC: 4.3 MEQ/L (ref 3.4–4.9)
POTASSIUM SERPL-SCNC: 4.3 MEQ/L (ref 3.4–4.9)
POTASSIUM SERPL-SCNC: 4.4 MEQ/L (ref 3.4–4.9)
POTASSIUM SERPL-SCNC: 4.5 MEQ/L (ref 3.4–4.9)
POTASSIUM SERPL-SCNC: 4.6 MEQ/L (ref 3.4–4.9)
POTASSIUM SERPL-SCNC: 4.6 MEQ/L (ref 3.4–4.9)
POTASSIUM SERPL-SCNC: 4.7 MEQ/L (ref 3.4–4.9)
POTASSIUM SERPL-SCNC: 4.9 MEQ/L (ref 3.4–4.9)
PRO-BNP: 370 PG/ML
PROPOXYPHENE SCREEN: NORMAL
PROTEIN FLUID: 0.9 G/DL
PROTEIN FLUID: 1.4 G/DL
PROTEIN UA: NEGATIVE MG/DL
PROTHROMBIN TIME: 16.1 SEC (ref 12.3–14.9)
PROTHROMBIN TIME: 16.8 SEC (ref 12.3–14.9)
PROTHROMBIN TIME: 17.3 SEC (ref 12.3–14.9)
PROTHROMBIN TIME: 17.8 SEC (ref 12.3–14.9)
PROTHROMBIN TIME: 18.2 SEC (ref 12.3–14.9)
RBC # BLD: 2.14 M/UL (ref 4.2–5.4)
RBC # BLD: 2.18 M/UL (ref 4.2–5.4)
RBC # BLD: 2.19 M/UL (ref 4.2–5.4)
RBC # BLD: 2.27 M/UL (ref 4.2–5.4)
RBC # BLD: 2.31 M/UL (ref 4.2–5.4)
RBC # BLD: 2.34 M/UL (ref 4.2–5.4)
RBC # BLD: 2.39 M/UL (ref 4.2–5.4)
RBC # BLD: 2.41 M/UL (ref 4.2–5.4)
RBC # BLD: 2.48 M/UL (ref 4.2–5.4)
RBC # BLD: 2.49 M/UL (ref 4.2–5.4)
RBC # BLD: 2.64 M/UL (ref 4.2–5.4)
RBC # BLD: 2.65 M/UL (ref 4.2–5.4)
RBC # BLD: 2.71 M/UL (ref 4.2–5.4)
RBC # BLD: 2.72 M/UL (ref 4.2–5.4)
RBC # BLD: 2.74 M/UL (ref 4.2–5.4)
RBC # BLD: 2.79 M/UL (ref 4.2–5.4)
RBC # BLD: 2.97 M/UL (ref 4.2–5.4)
RBC FLUID: 774 /CUMM
RBC FLUID: 99 /CUMM
RBC UA: ABNORMAL /HPF (ref 0–5)
SEDIMENTATION RATE, ERYTHROCYTE: 35 MM (ref 0–30)
SLIDE REVIEW: ABNORMAL
SODIUM BLD-SCNC: 122 MEQ/L (ref 135–144)
SODIUM BLD-SCNC: 123 MEQ/L (ref 135–144)
SODIUM BLD-SCNC: 123 MEQ/L (ref 135–144)
SODIUM BLD-SCNC: 125 MEQ/L (ref 135–144)
SODIUM BLD-SCNC: 125 MEQ/L (ref 135–144)
SODIUM BLD-SCNC: 126 MEQ/L (ref 135–144)
SODIUM BLD-SCNC: 128 MEQ/L (ref 135–144)
SODIUM BLD-SCNC: 129 MEQ/L (ref 135–144)
SODIUM BLD-SCNC: 129 MEQ/L (ref 135–144)
SODIUM BLD-SCNC: 132 MEQ/L (ref 135–144)
SODIUM BLD-SCNC: 134 MEQ/L (ref 135–144)
SODIUM BLD-SCNC: 135 MEQ/L (ref 135–144)
SODIUM BLD-SCNC: 138 MEQ/L (ref 135–144)
SODIUM BLD-SCNC: 139 MEQ/L (ref 135–144)
SODIUM BLD-SCNC: 140 MEQ/L (ref 135–144)
SODIUM BLD-SCNC: 141 MEQ/L (ref 135–144)
SODIUM BLD-SCNC: 141 MEQ/L (ref 135–144)
SODIUM BLD-SCNC: 142 MEQ/L (ref 135–144)
SODIUM BLD-SCNC: 142 MEQ/L (ref 135–144)
SODIUM BLD-SCNC: 144 MEQ/L (ref 135–144)
SODIUM BLD-SCNC: 145 MEQ/L (ref 135–144)
SODIUM URINE: 50 MEQ/L
SODIUM URINE: <20 MEQ/L
SPECIFIC GRAVITY UA: 1.01 (ref 1–1.03)
SPECIFIC GRAVITY UA: 1.01 (ref 1–1.03)
SPECIFIC GRAVITY UA: 1.02 (ref 1–1.03)
SPECIFIC GRAVITY UA: 1.02 (ref 1–1.03)
SPECIMEN STATUS: NORMAL
T4 FREE: 0.91 NG/DL (ref 0.84–1.68)
TCO2 ARTERIAL: 21 (ref 22–29)
TCO2 ARTERIAL: 22 (ref 22–29)
TEAR DROP CELLS: ABNORMAL
THYROID PEROXIDASE (TPO) ABS: <10 IU/ML (ref 0–35)
TOTAL IRON BINDING CAPACITY: 197 UG/DL (ref 178–450)
TOTAL IRON BINDING CAPACITY: 239 UG/DL (ref 178–450)
TOTAL PROTEIN: 5 G/DL (ref 6.3–8)
TOTAL PROTEIN: 5.1 G/DL (ref 6.3–8)
TOTAL PROTEIN: 5.1 G/DL (ref 6.3–8)
TOTAL PROTEIN: 5.2 G/DL (ref 6.3–8)
TOTAL PROTEIN: 5.3 G/DL (ref 6.3–8)
TOTAL PROTEIN: 5.3 G/DL (ref 6.3–8)
TOTAL PROTEIN: 5.4 G/DL (ref 6.3–8)
TOTAL PROTEIN: 5.5 G/DL (ref 6.3–8)
TOTAL PROTEIN: 5.6 G/DL (ref 6.3–8)
TOTAL PROTEIN: 5.7 G/DL (ref 6.3–8)
TOTAL PROTEIN: 5.8 G/DL (ref 6.3–8)
TOTAL PROTEIN: 5.8 G/DL (ref 6.3–8)
TOTAL PROTEIN: 5.9 G/DL (ref 6.3–8)
TOTAL PROTEIN: 6.6 G/DL (ref 6.3–8)
TOXIC GRANULATION: ABNORMAL
TOXIC GRANULATION: ABNORMAL
TRIGL SERPL-MCNC: 88 MG/DL (ref 0–150)
TROPONIN: 0.02 NG/ML (ref 0–0.01)
TROPONIN: 0.03 NG/ML (ref 0–0.01)
TROPONIN: 0.03 NG/ML (ref 0–0.01)
TROPONIN: 0.04 NG/ML (ref 0–0.01)
TSH SERPL DL<=0.05 MIU/L-ACNC: 4.77 UIU/ML (ref 0.44–3.86)
TSH SERPL DL<=0.05 MIU/L-ACNC: 9.18 UIU/ML (ref 0.44–3.86)
UREA NITROGEN, UR: 437 MG/DL
URINE CULTURE, ROUTINE: NORMAL
URINE REFLEX TO CULTURE: NORMAL
URINE REFLEX TO CULTURE: NORMAL
URINE REFLEX TO CULTURE: YES
UROBILINOGEN, URINE: 0.2 E.U./DL
UROBILINOGEN, URINE: 1 E.U./DL
VACUOLATED NEUTROPHILS: PRESENT
VITAMIN B-12: 225 PG/ML (ref 232–1245)
VITAMIN B-12: 352 PG/ML (ref 232–1245)
VITAMIN B1, PLASMA: 4 NMOL/L (ref 4–15)
WBC # BLD: 10.3 K/UL (ref 4.8–10.8)
WBC # BLD: 11.2 K/UL (ref 4.8–10.8)
WBC # BLD: 2.8 K/UL (ref 4.8–10.8)
WBC # BLD: 3 K/UL (ref 4.8–10.8)
WBC # BLD: 3.1 K/UL (ref 4.8–10.8)
WBC # BLD: 3.3 K/UL (ref 4.8–10.8)
WBC # BLD: 3.4 K/UL (ref 4.8–10.8)
WBC # BLD: 3.7 K/UL (ref 4.8–10.8)
WBC # BLD: 3.7 K/UL (ref 4.8–10.8)
WBC # BLD: 4.1 K/UL (ref 4.8–10.8)
WBC # BLD: 4.5 K/UL (ref 4.8–10.8)
WBC # BLD: 4.6 K/UL (ref 4.8–10.8)
WBC # BLD: 4.7 K/UL (ref 4.8–10.8)
WBC # BLD: 5.5 K/UL (ref 4.8–10.8)
WBC # BLD: 7 K/UL (ref 4.8–10.8)
WBC # BLD: 7.7 K/UL (ref 4.8–10.8)
WBC # BLD: 9.4 K/UL (ref 4.8–10.8)
WBC UA: ABNORMAL /HPF (ref 0–5)

## 2020-01-01 PROCEDURE — 1123F ACP DISCUSS/DSCN MKR DOCD: CPT | Performed by: NURSE PRACTITIONER

## 2020-01-01 PROCEDURE — 6370000000 HC RX 637 (ALT 250 FOR IP): Performed by: INTERNAL MEDICINE

## 2020-01-01 PROCEDURE — 99442 PR PHYS/QHP TELEPHONE EVALUATION 11-20 MIN: CPT | Performed by: SPECIALIST

## 2020-01-01 PROCEDURE — 94761 N-INVAS EAR/PLS OXIMETRY MLT: CPT

## 2020-01-01 PROCEDURE — 84484 ASSAY OF TROPONIN QUANT: CPT

## 2020-01-01 PROCEDURE — 97112 NEUROMUSCULAR REEDUCATION: CPT

## 2020-01-01 PROCEDURE — 97116 GAIT TRAINING THERAPY: CPT

## 2020-01-01 PROCEDURE — 99232 SBSQ HOSP IP/OBS MODERATE 35: CPT | Performed by: RADIOLOGY

## 2020-01-01 PROCEDURE — 2580000003 HC RX 258: Performed by: INTERNAL MEDICINE

## 2020-01-01 PROCEDURE — 83735 ASSAY OF MAGNESIUM: CPT

## 2020-01-01 PROCEDURE — 97129 THER IVNTJ 1ST 15 MIN: CPT

## 2020-01-01 PROCEDURE — 99285 EMERGENCY DEPT VISIT HI MDM: CPT

## 2020-01-01 PROCEDURE — 36415 COLL VENOUS BLD VENIPUNCTURE: CPT

## 2020-01-01 PROCEDURE — 93005 ELECTROCARDIOGRAM TRACING: CPT | Performed by: INTERNAL MEDICINE

## 2020-01-01 PROCEDURE — P9047 ALBUMIN (HUMAN), 25%, 50ML: HCPCS | Performed by: NURSE PRACTITIONER

## 2020-01-01 PROCEDURE — 85025 COMPLETE CBC W/AUTO DIFF WBC: CPT

## 2020-01-01 PROCEDURE — 1090F PRES/ABSN URINE INCON ASSESS: CPT | Performed by: FAMILY MEDICINE

## 2020-01-01 PROCEDURE — 82803 BLOOD GASES ANY COMBINATION: CPT

## 2020-01-01 PROCEDURE — 6370000000 HC RX 637 (ALT 250 FOR IP): Performed by: ANESTHESIOLOGY

## 2020-01-01 PROCEDURE — 6370000000 HC RX 637 (ALT 250 FOR IP): Performed by: PSYCHIATRY & NEUROLOGY

## 2020-01-01 PROCEDURE — 1036F TOBACCO NON-USER: CPT | Performed by: INTERNAL MEDICINE

## 2020-01-01 PROCEDURE — 6370000000 HC RX 637 (ALT 250 FOR IP): Performed by: PHYSICAL MEDICINE & REHABILITATION

## 2020-01-01 PROCEDURE — G8400 PT W/DXA NO RESULTS DOC: HCPCS | Performed by: FAMILY MEDICINE

## 2020-01-01 PROCEDURE — 2500000003 HC RX 250 WO HCPCS: Performed by: INTERNAL MEDICINE

## 2020-01-01 PROCEDURE — 80053 COMPREHEN METABOLIC PANEL: CPT

## 2020-01-01 PROCEDURE — 85610 PROTHROMBIN TIME: CPT

## 2020-01-01 PROCEDURE — 97535 SELF CARE MNGMENT TRAINING: CPT

## 2020-01-01 PROCEDURE — 2580000003 HC RX 258: Performed by: PSYCHIATRY & NEUROLOGY

## 2020-01-01 PROCEDURE — 6360000002 HC RX W HCPCS: Performed by: INTERNAL MEDICINE

## 2020-01-01 PROCEDURE — 93010 ELECTROCARDIOGRAM REPORT: CPT | Performed by: INTERNAL MEDICINE

## 2020-01-01 PROCEDURE — 6360000002 HC RX W HCPCS: Performed by: PSYCHIATRY & NEUROLOGY

## 2020-01-01 PROCEDURE — 94640 AIRWAY INHALATION TREATMENT: CPT

## 2020-01-01 PROCEDURE — 99232 SBSQ HOSP IP/OBS MODERATE 35: CPT | Performed by: PHYSICAL MEDICINE & REHABILITATION

## 2020-01-01 PROCEDURE — P9047 ALBUMIN (HUMAN), 25%, 50ML: HCPCS | Performed by: INTERNAL MEDICINE

## 2020-01-01 PROCEDURE — G8417 CALC BMI ABV UP PARAM F/U: HCPCS | Performed by: SPECIALIST

## 2020-01-01 PROCEDURE — 0W9G3ZZ DRAINAGE OF PERITONEAL CAVITY, PERCUTANEOUS APPROACH: ICD-10-PCS | Performed by: RADIOLOGY

## 2020-01-01 PROCEDURE — 99239 HOSP IP/OBS DSCHRG MGMT >30: CPT | Performed by: PHYSICAL MEDICINE & REHABILITATION

## 2020-01-01 PROCEDURE — 0HBRXZZ EXCISION OF TOE NAIL, EXTERNAL APPROACH: ICD-10-PCS | Performed by: PODIATRIST

## 2020-01-01 PROCEDURE — 82570 ASSAY OF URINE CREATININE: CPT

## 2020-01-01 PROCEDURE — 99222 1ST HOSP IP/OBS MODERATE 55: CPT | Performed by: RADIOLOGY

## 2020-01-01 PROCEDURE — 71250 CT THORAX DX C-: CPT

## 2020-01-01 PROCEDURE — 97110 THERAPEUTIC EXERCISES: CPT

## 2020-01-01 PROCEDURE — 93005 ELECTROCARDIOGRAM TRACING: CPT | Performed by: STUDENT IN AN ORGANIZED HEALTH CARE EDUCATION/TRAINING PROGRAM

## 2020-01-01 PROCEDURE — G8427 DOCREV CUR MEDS BY ELIG CLIN: HCPCS | Performed by: NURSE PRACTITIONER

## 2020-01-01 PROCEDURE — 99213 OFFICE O/P EST LOW 20 MIN: CPT | Performed by: SPECIALIST

## 2020-01-01 PROCEDURE — 74177 CT ABD & PELVIS W/CONTRAST: CPT

## 2020-01-01 PROCEDURE — 99223 1ST HOSP IP/OBS HIGH 75: CPT | Performed by: PHYSICAL MEDICINE & REHABILITATION

## 2020-01-01 PROCEDURE — 82330 ASSAY OF CALCIUM: CPT

## 2020-01-01 PROCEDURE — 97530 THERAPEUTIC ACTIVITIES: CPT

## 2020-01-01 PROCEDURE — 97130 THER IVNTJ EA ADDL 15 MIN: CPT

## 2020-01-01 PROCEDURE — 72148 MRI LUMBAR SPINE W/O DYE: CPT

## 2020-01-01 PROCEDURE — 81003 URINALYSIS AUTO W/O SCOPE: CPT

## 2020-01-01 PROCEDURE — 4040F PNEUMOC VAC/ADMIN/RCVD: CPT | Performed by: NURSE PRACTITIONER

## 2020-01-01 PROCEDURE — 82140 ASSAY OF AMMONIA: CPT

## 2020-01-01 PROCEDURE — P9047 ALBUMIN (HUMAN), 25%, 50ML: HCPCS | Performed by: RADIOLOGY

## 2020-01-01 PROCEDURE — 84157 ASSAY OF PROTEIN OTHER: CPT

## 2020-01-01 PROCEDURE — 2500000003 HC RX 250 WO HCPCS: Performed by: NURSE ANESTHETIST, CERTIFIED REGISTERED

## 2020-01-01 PROCEDURE — 85018 HEMOGLOBIN: CPT

## 2020-01-01 PROCEDURE — 99232 SBSQ HOSP IP/OBS MODERATE 35: CPT | Performed by: INTERNAL MEDICINE

## 2020-01-01 PROCEDURE — 96361 HYDRATE IV INFUSION ADD-ON: CPT

## 2020-01-01 PROCEDURE — G0480 DRUG TEST DEF 1-7 CLASSES: HCPCS

## 2020-01-01 PROCEDURE — 86900 BLOOD TYPING SEROLOGIC ABO: CPT

## 2020-01-01 PROCEDURE — G8427 DOCREV CUR MEDS BY ELIG CLIN: HCPCS | Performed by: FAMILY MEDICINE

## 2020-01-01 PROCEDURE — 99222 1ST HOSP IP/OBS MODERATE 55: CPT | Performed by: INTERNAL MEDICINE

## 2020-01-01 PROCEDURE — 2500000003 HC RX 250 WO HCPCS: Performed by: RADIOLOGY

## 2020-01-01 PROCEDURE — 49083 ABD PARACENTESIS W/IMAGING: CPT | Performed by: RADIOLOGY

## 2020-01-01 PROCEDURE — 96374 THER/PROPH/DIAG INJ IV PUSH: CPT

## 2020-01-01 PROCEDURE — 1036F TOBACCO NON-USER: CPT | Performed by: SPECIALIST

## 2020-01-01 PROCEDURE — G8417 CALC BMI ABV UP PARAM F/U: HCPCS | Performed by: INTERNAL MEDICINE

## 2020-01-01 PROCEDURE — 85652 RBC SED RATE AUTOMATED: CPT

## 2020-01-01 PROCEDURE — 89051 BODY FLUID CELL COUNT: CPT

## 2020-01-01 PROCEDURE — 84300 ASSAY OF URINE SODIUM: CPT

## 2020-01-01 PROCEDURE — 76705 ECHO EXAM OF ABDOMEN: CPT

## 2020-01-01 PROCEDURE — 82746 ASSAY OF FOLIC ACID SERUM: CPT

## 2020-01-01 PROCEDURE — 83605 ASSAY OF LACTIC ACID: CPT

## 2020-01-01 PROCEDURE — 4040F PNEUMOC VAC/ADMIN/RCVD: CPT | Performed by: FAMILY MEDICINE

## 2020-01-01 PROCEDURE — 85014 HEMATOCRIT: CPT

## 2020-01-01 PROCEDURE — C9488 CONIVAPTAN HCL: HCPCS | Performed by: INTERNAL MEDICINE

## 2020-01-01 PROCEDURE — 82607 VITAMIN B-12: CPT

## 2020-01-01 PROCEDURE — 83540 ASSAY OF IRON: CPT

## 2020-01-01 PROCEDURE — 2500000003 HC RX 250 WO HCPCS: Performed by: PHYSICAL MEDICINE & REHABILITATION

## 2020-01-01 PROCEDURE — 1180000000 HC REHAB R&B

## 2020-01-01 PROCEDURE — 88341 IMHCHEM/IMCYTCHM EA ADD ANTB: CPT

## 2020-01-01 PROCEDURE — 7100000010 HC PHASE II RECOVERY - FIRST 15 MIN: Performed by: SPECIALIST

## 2020-01-01 PROCEDURE — G8482 FLU IMMUNIZE ORDER/ADMIN: HCPCS | Performed by: SPECIALIST

## 2020-01-01 PROCEDURE — G8482 FLU IMMUNIZE ORDER/ADMIN: HCPCS | Performed by: NURSE PRACTITIONER

## 2020-01-01 PROCEDURE — G8400 PT W/DXA NO RESULTS DOC: HCPCS | Performed by: INTERNAL MEDICINE

## 2020-01-01 PROCEDURE — G8427 DOCREV CUR MEDS BY ELIG CLIN: HCPCS | Performed by: SPECIALIST

## 2020-01-01 PROCEDURE — 1036F TOBACCO NON-USER: CPT | Performed by: FAMILY MEDICINE

## 2020-01-01 PROCEDURE — 1123F ACP DISCUSS/DSCN MKR DOCD: CPT | Performed by: SPECIALIST

## 2020-01-01 PROCEDURE — 72125 CT NECK SPINE W/O DYE: CPT

## 2020-01-01 PROCEDURE — 1123F ACP DISCUSS/DSCN MKR DOCD: CPT | Performed by: FAMILY MEDICINE

## 2020-01-01 PROCEDURE — 1210000000 HC MED SURG R&B

## 2020-01-01 PROCEDURE — 6360000002 HC RX W HCPCS: Performed by: RADIOLOGY

## 2020-01-01 PROCEDURE — 97166 OT EVAL MOD COMPLEX 45 MIN: CPT

## 2020-01-01 PROCEDURE — 99233 SBSQ HOSP IP/OBS HIGH 50: CPT | Performed by: PHYSICAL MEDICINE & REHABILITATION

## 2020-01-01 PROCEDURE — 82565 ASSAY OF CREATININE: CPT

## 2020-01-01 PROCEDURE — 49083 ABD PARACENTESIS W/IMAGING: CPT

## 2020-01-01 PROCEDURE — 80307 DRUG TEST PRSMV CHEM ANLYZR: CPT

## 2020-01-01 PROCEDURE — 72128 CT CHEST SPINE W/O DYE: CPT

## 2020-01-01 PROCEDURE — 51702 INSERT TEMP BLADDER CATH: CPT

## 2020-01-01 PROCEDURE — 88112 CYTOPATH CELL ENHANCE TECH: CPT

## 2020-01-01 PROCEDURE — G8417 CALC BMI ABV UP PARAM F/U: HCPCS | Performed by: NURSE PRACTITIONER

## 2020-01-01 PROCEDURE — 36430 TRANSFUSION BLD/BLD COMPNT: CPT

## 2020-01-01 PROCEDURE — 87205 SMEAR GRAM STAIN: CPT

## 2020-01-01 PROCEDURE — 84132 ASSAY OF SERUM POTASSIUM: CPT

## 2020-01-01 PROCEDURE — 99231 SBSQ HOSP IP/OBS SF/LOW 25: CPT | Performed by: NURSE PRACTITIONER

## 2020-01-01 PROCEDURE — 49418 INSERT TUN IP CATH PERC: CPT | Performed by: RADIOLOGY

## 2020-01-01 PROCEDURE — 1111F DSCHRG MED/CURRENT MED MERGE: CPT | Performed by: FAMILY MEDICINE

## 2020-01-01 PROCEDURE — 3700000001 HC ADD 15 MINUTES (ANESTHESIA): Performed by: SPECIALIST

## 2020-01-01 PROCEDURE — 6370000000 HC RX 637 (ALT 250 FOR IP): Performed by: STUDENT IN AN ORGANIZED HEALTH CARE EDUCATION/TRAINING PROGRAM

## 2020-01-01 PROCEDURE — 32557 INSERT CATH PLEURA W/ IMAGE: CPT | Performed by: RADIOLOGY

## 2020-01-01 PROCEDURE — 90653 IIV ADJUVANT VACCINE IM: CPT | Performed by: NURSE PRACTITIONER

## 2020-01-01 PROCEDURE — G8417 CALC BMI ABV UP PARAM F/U: HCPCS | Performed by: FAMILY MEDICINE

## 2020-01-01 PROCEDURE — 2709999900 IR US GUIDED PARACENTESIS

## 2020-01-01 PROCEDURE — 99232 SBSQ HOSP IP/OBS MODERATE 35: CPT | Performed by: NURSE PRACTITIONER

## 2020-01-01 PROCEDURE — 87040 BLOOD CULTURE FOR BACTERIA: CPT

## 2020-01-01 PROCEDURE — 85027 COMPLETE CBC AUTOMATED: CPT

## 2020-01-01 PROCEDURE — 99232 SBSQ HOSP IP/OBS MODERATE 35: CPT | Performed by: PHYSICIAN ASSISTANT

## 2020-01-01 PROCEDURE — 83935 ASSAY OF URINE OSMOLALITY: CPT

## 2020-01-01 PROCEDURE — 92523 SPEECH SOUND LANG COMPREHEN: CPT

## 2020-01-01 PROCEDURE — 97162 PT EVAL MOD COMPLEX 30 MIN: CPT

## 2020-01-01 PROCEDURE — 80048 BASIC METABOLIC PNL TOTAL CA: CPT

## 2020-01-01 PROCEDURE — 88305 TISSUE EXAM BY PATHOLOGIST: CPT

## 2020-01-01 PROCEDURE — 2580000003 HC RX 258: Performed by: PHYSICIAN ASSISTANT

## 2020-01-01 PROCEDURE — 88342 IMHCHEM/IMCYTCHM 1ST ANTB: CPT

## 2020-01-01 PROCEDURE — 36600 WITHDRAWAL OF ARTERIAL BLOOD: CPT

## 2020-01-01 PROCEDURE — C1729 CATH, DRAINAGE: HCPCS

## 2020-01-01 PROCEDURE — 99214 OFFICE O/P EST MOD 30 MIN: CPT | Performed by: FAMILY MEDICINE

## 2020-01-01 PROCEDURE — 1090F PRES/ABSN URINE INCON ASSESS: CPT | Performed by: INTERNAL MEDICINE

## 2020-01-01 PROCEDURE — 6370000000 HC RX 637 (ALT 250 FOR IP): Performed by: NURSE PRACTITIONER

## 2020-01-01 PROCEDURE — 70450 CT HEAD/BRAIN W/O DYE: CPT

## 2020-01-01 PROCEDURE — 93306 TTE W/DOPPLER COMPLETE: CPT

## 2020-01-01 PROCEDURE — 93975 VASCULAR STUDY: CPT

## 2020-01-01 PROCEDURE — 83615 LACTATE (LD) (LDH) ENZYME: CPT

## 2020-01-01 PROCEDURE — 73630 X-RAY EXAM OF FOOT: CPT

## 2020-01-01 PROCEDURE — 82042 OTHER SOURCE ALBUMIN QUAN EA: CPT

## 2020-01-01 PROCEDURE — 2060000000 HC ICU INTERMEDIATE R&B

## 2020-01-01 PROCEDURE — 6360000002 HC RX W HCPCS: Performed by: NURSE PRACTITIONER

## 2020-01-01 PROCEDURE — 87070 CULTURE OTHR SPECIMN AEROBIC: CPT

## 2020-01-01 PROCEDURE — 6370000000 HC RX 637 (ALT 250 FOR IP): Performed by: SPECIALIST

## 2020-01-01 PROCEDURE — 99214 OFFICE O/P EST MOD 30 MIN: CPT | Performed by: INTERNAL MEDICINE

## 2020-01-01 PROCEDURE — 6360000004 HC RX CONTRAST MEDICATION: Performed by: STUDENT IN AN ORGANIZED HEALTH CARE EDUCATION/TRAINING PROGRAM

## 2020-01-01 PROCEDURE — 86376 MICROSOMAL ANTIBODY EACH: CPT

## 2020-01-01 PROCEDURE — 94664 DEMO&/EVAL PT USE INHALER: CPT

## 2020-01-01 PROCEDURE — 4040F PNEUMOC VAC/ADMIN/RCVD: CPT | Performed by: SPECIALIST

## 2020-01-01 PROCEDURE — 93005 ELECTROCARDIOGRAM TRACING: CPT | Performed by: EMERGENCY MEDICINE

## 2020-01-01 PROCEDURE — 84443 ASSAY THYROID STIM HORMONE: CPT

## 2020-01-01 PROCEDURE — 71045 X-RAY EXAM CHEST 1 VIEW: CPT

## 2020-01-01 PROCEDURE — 84295 ASSAY OF SERUM SODIUM: CPT

## 2020-01-01 PROCEDURE — 6370000000 HC RX 637 (ALT 250 FOR IP): Performed by: RADIOLOGY

## 2020-01-01 PROCEDURE — 84540 ASSAY OF URINE/UREA-N: CPT

## 2020-01-01 PROCEDURE — 2580000003 HC RX 258: Performed by: NURSE PRACTITIONER

## 2020-01-01 PROCEDURE — 85730 THROMBOPLASTIN TIME PARTIAL: CPT

## 2020-01-01 PROCEDURE — 72131 CT LUMBAR SPINE W/O DYE: CPT

## 2020-01-01 PROCEDURE — 83880 ASSAY OF NATRIURETIC PEPTIDE: CPT

## 2020-01-01 PROCEDURE — G8482 FLU IMMUNIZE ORDER/ADMIN: HCPCS | Performed by: FAMILY MEDICINE

## 2020-01-01 PROCEDURE — P9016 RBC LEUKOCYTES REDUCED: HCPCS

## 2020-01-01 PROCEDURE — 2709999900 HC NON-CHARGEABLE SUPPLY: Performed by: SPECIALIST

## 2020-01-01 PROCEDURE — P9612 CATHETERIZE FOR URINE SPEC: HCPCS

## 2020-01-01 PROCEDURE — 99203 OFFICE O/P NEW LOW 30 MIN: CPT | Performed by: SPECIALIST

## 2020-01-01 PROCEDURE — APPNB60 APP NON BILLABLE TIME 46-60 MINS: Performed by: NURSE PRACTITIONER

## 2020-01-01 PROCEDURE — 86850 RBC ANTIBODY SCREEN: CPT

## 2020-01-01 PROCEDURE — 86922 COMPATIBILITY TEST ANTIGLOB: CPT

## 2020-01-01 PROCEDURE — 73502 X-RAY EXAM HIP UNI 2-3 VIEWS: CPT

## 2020-01-01 PROCEDURE — 2720000010 HC SURG SUPPLY STERILE: Performed by: SPECIALIST

## 2020-01-01 PROCEDURE — 99213 OFFICE O/P EST LOW 20 MIN: CPT | Performed by: FAMILY MEDICINE

## 2020-01-01 PROCEDURE — 2500000003 HC RX 250 WO HCPCS: Performed by: STUDENT IN AN ORGANIZED HEALTH CARE EDUCATION/TRAINING PROGRAM

## 2020-01-01 PROCEDURE — 99231 SBSQ HOSP IP/OBS SF/LOW 25: CPT | Performed by: SPECIALIST

## 2020-01-01 PROCEDURE — 96125 COGNITIVE TEST BY HC PRO: CPT

## 2020-01-01 PROCEDURE — 74176 CT ABD & PELVIS W/O CONTRAST: CPT

## 2020-01-01 PROCEDURE — G0008 ADMIN INFLUENZA VIRUS VAC: HCPCS | Performed by: NURSE PRACTITIONER

## 2020-01-01 PROCEDURE — 92610 EVALUATE SWALLOWING FUNCTION: CPT

## 2020-01-01 PROCEDURE — 99222 1ST HOSP IP/OBS MODERATE 55: CPT | Performed by: PHYSICAL MEDICINE & REHABILITATION

## 2020-01-01 PROCEDURE — 82435 ASSAY OF BLOOD CHLORIDE: CPT

## 2020-01-01 PROCEDURE — 86901 BLOOD TYPING SEROLOGIC RH(D): CPT

## 2020-01-01 PROCEDURE — G8484 FLU IMMUNIZE NO ADMIN: HCPCS | Performed by: INTERNAL MEDICINE

## 2020-01-01 PROCEDURE — 99222 1ST HOSP IP/OBS MODERATE 55: CPT | Performed by: PSYCHIATRY & NEUROLOGY

## 2020-01-01 PROCEDURE — 3609017100 HC EGD: Performed by: SPECIALIST

## 2020-01-01 PROCEDURE — 83690 ASSAY OF LIPASE: CPT

## 2020-01-01 PROCEDURE — 93970 EXTREMITY STUDY: CPT

## 2020-01-01 PROCEDURE — 99222 1ST HOSP IP/OBS MODERATE 55: CPT | Performed by: SPECIALIST

## 2020-01-01 PROCEDURE — 99214 OFFICE O/P EST MOD 30 MIN: CPT | Performed by: NURSE PRACTITIONER

## 2020-01-01 PROCEDURE — 82945 GLUCOSE OTHER FLUID: CPT

## 2020-01-01 PROCEDURE — G0438 PPPS, INITIAL VISIT: HCPCS | Performed by: NURSE PRACTITIONER

## 2020-01-01 PROCEDURE — 96360 HYDRATION IV INFUSION INIT: CPT

## 2020-01-01 PROCEDURE — G8400 PT W/DXA NO RESULTS DOC: HCPCS | Performed by: NURSE PRACTITIONER

## 2020-01-01 PROCEDURE — 2580000003 HC RX 258: Performed by: STUDENT IN AN ORGANIZED HEALTH CARE EDUCATION/TRAINING PROGRAM

## 2020-01-01 PROCEDURE — 97165 OT EVAL LOW COMPLEX 30 MIN: CPT

## 2020-01-01 PROCEDURE — 4040F PNEUMOC VAC/ADMIN/RCVD: CPT | Performed by: INTERNAL MEDICINE

## 2020-01-01 PROCEDURE — 1090F PRES/ABSN URINE INCON ASSESS: CPT | Performed by: SPECIALIST

## 2020-01-01 PROCEDURE — 96375 TX/PRO/DX INJ NEW DRUG ADDON: CPT

## 2020-01-01 PROCEDURE — 6360000002 HC RX W HCPCS: Performed by: STUDENT IN AN ORGANIZED HEALTH CARE EDUCATION/TRAINING PROGRAM

## 2020-01-01 PROCEDURE — 6360000002 HC RX W HCPCS: Performed by: PHYSICAL MEDICINE & REHABILITATION

## 2020-01-01 PROCEDURE — 96365 THER/PROPH/DIAG IV INF INIT: CPT

## 2020-01-01 PROCEDURE — 6830039000 HC L3 TRAUMA ALERT

## 2020-01-01 PROCEDURE — 84439 ASSAY OF FREE THYROXINE: CPT

## 2020-01-01 PROCEDURE — 1123F ACP DISCUSS/DSCN MKR DOCD: CPT | Performed by: INTERNAL MEDICINE

## 2020-01-01 PROCEDURE — 76775 US EXAM ABDO BACK WALL LIM: CPT

## 2020-01-01 PROCEDURE — 86140 C-REACTIVE PROTEIN: CPT

## 2020-01-01 PROCEDURE — 99223 1ST HOSP IP/OBS HIGH 75: CPT | Performed by: INTERNAL MEDICINE

## 2020-01-01 PROCEDURE — G8400 PT W/DXA NO RESULTS DOC: HCPCS | Performed by: SPECIALIST

## 2020-01-01 PROCEDURE — 1036F TOBACCO NON-USER: CPT | Performed by: NURSE PRACTITIONER

## 2020-01-01 PROCEDURE — 2580000003 HC RX 258

## 2020-01-01 PROCEDURE — 6360000002 HC RX W HCPCS: Performed by: PHYSICIAN ASSISTANT

## 2020-01-01 PROCEDURE — 82728 ASSAY OF FERRITIN: CPT

## 2020-01-01 PROCEDURE — 0W9G30Z DRAINAGE OF PERITONEAL CAVITY WITH DRAINAGE DEVICE, PERCUTANEOUS APPROACH: ICD-10-PCS | Performed by: RADIOLOGY

## 2020-01-01 PROCEDURE — 7100000011 HC PHASE II RECOVERY - ADDTL 15 MIN: Performed by: SPECIALIST

## 2020-01-01 PROCEDURE — 86870 RBC ANTIBODY IDENTIFICATION: CPT

## 2020-01-01 PROCEDURE — 82150 ASSAY OF AMYLASE: CPT

## 2020-01-01 PROCEDURE — 83550 IRON BINDING TEST: CPT

## 2020-01-01 PROCEDURE — 99231 SBSQ HOSP IP/OBS SF/LOW 25: CPT | Performed by: PHYSICAL MEDICINE & REHABILITATION

## 2020-01-01 PROCEDURE — 99306 1ST NF CARE HIGH MDM 50: CPT | Performed by: FAMILY MEDICINE

## 2020-01-01 PROCEDURE — 43244 EGD VARICES LIGATION: CPT | Performed by: SPECIALIST

## 2020-01-01 PROCEDURE — 2580000003 HC RX 258: Performed by: EMERGENCY MEDICINE

## 2020-01-01 PROCEDURE — 3700000000 HC ANESTHESIA ATTENDED CARE: Performed by: SPECIALIST

## 2020-01-01 PROCEDURE — G8427 DOCREV CUR MEDS BY ELIG CLIN: HCPCS | Performed by: INTERNAL MEDICINE

## 2020-01-01 PROCEDURE — 86905 BLOOD TYPING RBC ANTIGENS: CPT

## 2020-01-01 PROCEDURE — 1090F PRES/ABSN URINE INCON ASSESS: CPT | Performed by: NURSE PRACTITIONER

## 2020-01-01 PROCEDURE — 0W9G3ZZ DRAINAGE OF PERITONEAL CAVITY, PERCUTANEOUS APPROACH: ICD-10-PCS | Performed by: INTERNAL MEDICINE

## 2020-01-01 PROCEDURE — 6360000002 HC RX W HCPCS: Performed by: NURSE ANESTHETIST, CERTIFIED REGISTERED

## 2020-01-01 RX ORDER — LACTULOSE 10 G/15ML
10 SOLUTION ORAL 3 TIMES DAILY
Qty: 946 ML | Refills: 3 | Status: ON HOLD | OUTPATIENT
Start: 2020-01-01 | End: 2020-01-01 | Stop reason: HOSPADM

## 2020-01-01 RX ORDER — SPIRONOLACTONE 25 MG/1
50 TABLET ORAL DAILY
Status: DISCONTINUED | OUTPATIENT
Start: 2020-01-01 | End: 2020-01-01

## 2020-01-01 RX ORDER — SODIUM CHLORIDE, SODIUM LACTATE, POTASSIUM CHLORIDE, CALCIUM CHLORIDE 600; 310; 30; 20 MG/100ML; MG/100ML; MG/100ML; MG/100ML
INJECTION, SOLUTION INTRAVENOUS CONTINUOUS
Status: DISCONTINUED | OUTPATIENT
Start: 2020-01-01 | End: 2020-01-01 | Stop reason: HOSPADM

## 2020-01-01 RX ORDER — POLYETHYLENE GLYCOL 3350 17 G/17G
17 POWDER, FOR SOLUTION ORAL DAILY PRN
Status: DISCONTINUED | OUTPATIENT
Start: 2020-01-01 | End: 2020-01-01 | Stop reason: HOSPADM

## 2020-01-01 RX ORDER — HEPARIN SODIUM 5000 [USP'U]/ML
5000 INJECTION, SOLUTION INTRAVENOUS; SUBCUTANEOUS EVERY 8 HOURS SCHEDULED
Status: DISCONTINUED | OUTPATIENT
Start: 2020-01-01 | End: 2020-01-01

## 2020-01-01 RX ORDER — IBUPROFEN 200 MG
200 TABLET ORAL EVERY 6 HOURS PRN
Status: ON HOLD | COMMUNITY
End: 2020-01-01 | Stop reason: HOSPADM

## 2020-01-01 RX ORDER — FUROSEMIDE 20 MG/1
20 TABLET ORAL DAILY
Qty: 5 TABLET | Refills: 0 | Status: SHIPPED | OUTPATIENT
Start: 2020-01-01 | End: 2020-01-01 | Stop reason: SDUPTHER

## 2020-01-01 RX ORDER — METAXALONE 800 MG/1
800 TABLET ORAL 3 TIMES DAILY PRN
Status: DISCONTINUED | OUTPATIENT
Start: 2020-01-01 | End: 2020-01-01 | Stop reason: HOSPADM

## 2020-01-01 RX ORDER — TRAZODONE HYDROCHLORIDE 100 MG/1
TABLET ORAL
Qty: 60 TABLET | Refills: 0 | Status: SHIPPED | OUTPATIENT
Start: 2020-01-01 | End: 2020-01-01 | Stop reason: SDUPTHER

## 2020-01-01 RX ORDER — BUDESONIDE AND FORMOTEROL FUMARATE DIHYDRATE 160; 4.5 UG/1; UG/1
2 AEROSOL RESPIRATORY (INHALATION) 2 TIMES DAILY
Status: DISCONTINUED | OUTPATIENT
Start: 2020-01-01 | End: 2020-01-01 | Stop reason: HOSPADM

## 2020-01-01 RX ORDER — 0.9 % SODIUM CHLORIDE 0.9 %
250 INTRAVENOUS SOLUTION INTRAVENOUS ONCE
Status: COMPLETED | OUTPATIENT
Start: 2020-01-01 | End: 2020-01-01

## 2020-01-01 RX ORDER — PROMETHAZINE HYDROCHLORIDE 12.5 MG/1
12.5 TABLET ORAL EVERY 6 HOURS PRN
Status: CANCELLED | OUTPATIENT
Start: 2020-01-01

## 2020-01-01 RX ORDER — TRAZODONE HYDROCHLORIDE 100 MG/1
100 TABLET ORAL NIGHTLY
Qty: 60 TABLET | Refills: 0 | Status: SHIPPED | OUTPATIENT
Start: 2020-01-01 | End: 2020-01-01 | Stop reason: CLARIF

## 2020-01-01 RX ORDER — SODIUM CHLORIDE 9 MG/ML
INJECTION, SOLUTION INTRAVENOUS
Status: COMPLETED
Start: 2020-01-01 | End: 2020-01-01

## 2020-01-01 RX ORDER — LIDOCAINE HYDROCHLORIDE 20 MG/ML
INJECTION, SOLUTION INFILTRATION; PERINEURAL
Status: COMPLETED | OUTPATIENT
Start: 2020-01-01 | End: 2020-01-01

## 2020-01-01 RX ORDER — SPIRONOLACTONE 25 MG/1
100 TABLET ORAL DAILY
Status: DISCONTINUED | OUTPATIENT
Start: 2020-01-01 | End: 2020-01-01 | Stop reason: HOSPADM

## 2020-01-01 RX ORDER — IBUPROFEN 400 MG/1
400 TABLET ORAL ONCE
Status: COMPLETED | OUTPATIENT
Start: 2020-01-01 | End: 2020-01-01

## 2020-01-01 RX ORDER — PANTOPRAZOLE SODIUM 40 MG/1
40 TABLET, DELAYED RELEASE ORAL
Status: CANCELLED | OUTPATIENT
Start: 2020-01-01

## 2020-01-01 RX ORDER — CLINDAMYCIN PHOSPHATE 600 MG/50ML
600 INJECTION INTRAVENOUS ONCE
Status: COMPLETED | OUTPATIENT
Start: 2020-01-01 | End: 2020-01-01

## 2020-01-01 RX ORDER — PANTOPRAZOLE SODIUM 20 MG/1
20 TABLET, DELAYED RELEASE ORAL
Qty: 30 TABLET | Refills: 1 | Status: SHIPPED | OUTPATIENT
Start: 2020-01-01 | End: 2020-01-01

## 2020-01-01 RX ORDER — SODIUM CHLORIDE 0.9 % (FLUSH) 0.9 %
10 SYRINGE (ML) INJECTION PRN
Status: DISCONTINUED | OUTPATIENT
Start: 2020-01-01 | End: 2020-01-01 | Stop reason: HOSPADM

## 2020-01-01 RX ORDER — DEXTROSE MONOHYDRATE 50 MG/ML
100 INJECTION, SOLUTION INTRAVENOUS PRN
Status: DISCONTINUED | OUTPATIENT
Start: 2020-01-01 | End: 2020-01-01 | Stop reason: HOSPADM

## 2020-01-01 RX ORDER — TIZANIDINE 4 MG/1
TABLET ORAL
COMMUNITY
Start: 2020-01-01

## 2020-01-01 RX ORDER — SPIRONOLACTONE 25 MG/1
50 TABLET ORAL DAILY
Status: DISCONTINUED | OUTPATIENT
Start: 2020-01-01 | End: 2020-01-01 | Stop reason: HOSPADM

## 2020-01-01 RX ORDER — PREGABALIN 75 MG/1
75 CAPSULE ORAL 3 TIMES DAILY
Qty: 30 CAPSULE | Refills: 0 | Status: ON HOLD | OUTPATIENT
Start: 2020-01-01 | End: 2020-01-01 | Stop reason: HOSPADM

## 2020-01-01 RX ORDER — ONDANSETRON 2 MG/ML
4 INJECTION INTRAMUSCULAR; INTRAVENOUS EVERY 6 HOURS PRN
Status: DISCONTINUED | OUTPATIENT
Start: 2020-01-01 | End: 2020-01-01 | Stop reason: HOSPADM

## 2020-01-01 RX ORDER — DEXTROSE MONOHYDRATE 25 G/50ML
12.5 INJECTION, SOLUTION INTRAVENOUS PRN
Status: DISCONTINUED | OUTPATIENT
Start: 2020-01-01 | End: 2020-01-01 | Stop reason: HOSPADM

## 2020-01-01 RX ORDER — LIDOCAINE HYDROCHLORIDE 10 MG/ML
1 INJECTION, SOLUTION EPIDURAL; INFILTRATION; INTRACAUDAL; PERINEURAL
Status: DISCONTINUED | OUTPATIENT
Start: 2020-01-01 | End: 2020-01-01 | Stop reason: HOSPADM

## 2020-01-01 RX ORDER — SODIUM CHLORIDE 0.9 % (FLUSH) 0.9 %
10 SYRINGE (ML) INJECTION EVERY 12 HOURS SCHEDULED
Status: CANCELLED | OUTPATIENT
Start: 2020-01-01

## 2020-01-01 RX ORDER — PROMETHAZINE HYDROCHLORIDE 12.5 MG/1
12.5 TABLET ORAL EVERY 6 HOURS PRN
Status: DISCONTINUED | OUTPATIENT
Start: 2020-01-01 | End: 2020-01-01 | Stop reason: HOSPADM

## 2020-01-01 RX ORDER — DOXYCYCLINE HYCLATE 100 MG
100 TABLET ORAL 2 TIMES DAILY
Qty: 20 TABLET | Refills: 0 | Status: SHIPPED | OUTPATIENT
Start: 2020-01-01 | End: 2020-01-01

## 2020-01-01 RX ORDER — SODIUM CHLORIDE 0.9 % (FLUSH) 0.9 %
10 SYRINGE (ML) INJECTION EVERY 12 HOURS SCHEDULED
Status: DISCONTINUED | OUTPATIENT
Start: 2020-01-01 | End: 2020-01-01 | Stop reason: HOSPADM

## 2020-01-01 RX ORDER — IRBESARTAN 75 MG/1
75 TABLET ORAL DAILY
Qty: 30 TABLET | Refills: 0 | Status: SHIPPED | OUTPATIENT
Start: 2020-01-01 | End: 2020-01-01 | Stop reason: SDUPTHER

## 2020-01-01 RX ORDER — 0.9 % SODIUM CHLORIDE 0.9 %
500 INTRAVENOUS SOLUTION INTRAVENOUS ONCE
Status: COMPLETED | OUTPATIENT
Start: 2020-01-01 | End: 2020-01-01

## 2020-01-01 RX ORDER — ONDANSETRON 2 MG/ML
4 INJECTION INTRAMUSCULAR; INTRAVENOUS EVERY 6 HOURS PRN
Status: CANCELLED | OUTPATIENT
Start: 2020-01-01

## 2020-01-01 RX ORDER — TOPIRAMATE 25 MG/1
50 TABLET ORAL 2 TIMES DAILY
Status: DISCONTINUED | OUTPATIENT
Start: 2020-01-01 | End: 2020-01-01 | Stop reason: HOSPADM

## 2020-01-01 RX ORDER — PROPOFOL 10 MG/ML
INJECTION, EMULSION INTRAVENOUS PRN
Status: DISCONTINUED | OUTPATIENT
Start: 2020-01-01 | End: 2020-01-01 | Stop reason: SDUPTHER

## 2020-01-01 RX ORDER — ANALGESIC BALM 1.74; 4.06 G/29G; G/29G
OINTMENT TOPICAL 3 TIMES DAILY PRN
Status: DISCONTINUED | OUTPATIENT
Start: 2020-01-01 | End: 2020-01-01 | Stop reason: HOSPADM

## 2020-01-01 RX ORDER — ALBUMIN (HUMAN) 12.5 G/50ML
25 SOLUTION INTRAVENOUS
Status: COMPLETED | OUTPATIENT
Start: 2020-01-01 | End: 2020-01-01

## 2020-01-01 RX ORDER — ATORVASTATIN CALCIUM 10 MG/1
10 TABLET, FILM COATED ORAL DAILY
Status: CANCELLED | OUTPATIENT
Start: 2020-01-01

## 2020-01-01 RX ORDER — METAXALONE 800 MG/1
400 TABLET ORAL 3 TIMES DAILY PRN
Qty: 30 TABLET | Refills: 0 | Status: SHIPPED | OUTPATIENT
Start: 2020-01-01 | End: 2020-01-01

## 2020-01-01 RX ORDER — LEVOFLOXACIN 500 MG/1
500 TABLET, FILM COATED ORAL DAILY
Qty: 10 TABLET | Refills: 0 | Status: ON HOLD | OUTPATIENT
Start: 2020-01-01 | End: 2020-01-01 | Stop reason: HOSPADM

## 2020-01-01 RX ORDER — SODIUM CHLORIDE 0.9 % (FLUSH) 0.9 %
10 SYRINGE (ML) INJECTION PRN
Status: CANCELLED | OUTPATIENT
Start: 2020-01-01

## 2020-01-01 RX ORDER — TOPIRAMATE 25 MG/1
50 TABLET ORAL 2 TIMES DAILY
COMMUNITY

## 2020-01-01 RX ORDER — 0.9 % SODIUM CHLORIDE 0.9 %
1000 INTRAVENOUS SOLUTION INTRAVENOUS ONCE
Status: COMPLETED | OUTPATIENT
Start: 2020-01-01 | End: 2020-01-01

## 2020-01-01 RX ORDER — MORPHINE SULFATE 2 MG/ML
0.5 INJECTION, SOLUTION INTRAMUSCULAR; INTRAVENOUS ONCE
Status: COMPLETED | OUTPATIENT
Start: 2020-01-01 | End: 2020-01-01

## 2020-01-01 RX ORDER — ALBUTEROL SULFATE 2.5 MG/3ML
2.5 SOLUTION RESPIRATORY (INHALATION) EVERY 6 HOURS PRN
Status: DISCONTINUED | OUTPATIENT
Start: 2020-01-01 | End: 2020-01-01 | Stop reason: HOSPADM

## 2020-01-01 RX ORDER — FUROSEMIDE 40 MG/1
40 TABLET ORAL DAILY
Status: DISCONTINUED | OUTPATIENT
Start: 2020-01-01 | End: 2020-01-01 | Stop reason: HOSPADM

## 2020-01-01 RX ORDER — MORPHINE SULFATE 2 MG/ML
4 INJECTION, SOLUTION INTRAMUSCULAR; INTRAVENOUS
Status: DISCONTINUED | OUTPATIENT
Start: 2020-01-01 | End: 2020-01-01

## 2020-01-01 RX ORDER — ALBUMIN (HUMAN) 12.5 G/50ML
25 SOLUTION INTRAVENOUS EVERY 8 HOURS
Status: COMPLETED | OUTPATIENT
Start: 2020-01-01 | End: 2020-01-01

## 2020-01-01 RX ORDER — FUROSEMIDE 40 MG/1
40 TABLET ORAL DAILY
Qty: 60 TABLET | Refills: 3 | Status: ON HOLD | OUTPATIENT
Start: 2020-01-01 | End: 2020-01-01 | Stop reason: HOSPADM

## 2020-01-01 RX ORDER — PREGABALIN 75 MG/1
75 CAPSULE ORAL 3 TIMES DAILY
Status: ON HOLD | COMMUNITY
End: 2020-01-01 | Stop reason: HOSPADM

## 2020-01-01 RX ORDER — PANTOPRAZOLE SODIUM 40 MG/1
40 TABLET, DELAYED RELEASE ORAL
Status: DISCONTINUED | OUTPATIENT
Start: 2020-01-01 | End: 2020-01-01 | Stop reason: HOSPADM

## 2020-01-01 RX ORDER — ACETAMINOPHEN 325 MG/1
650 TABLET ORAL EVERY 6 HOURS PRN
Status: DISCONTINUED | OUTPATIENT
Start: 2020-01-01 | End: 2020-01-01

## 2020-01-01 RX ORDER — ANALGESIC BALM 1.74; 4.06 G/29G; G/29G
OINTMENT TOPICAL 3 TIMES DAILY
Status: DISCONTINUED | OUTPATIENT
Start: 2020-01-01 | End: 2020-01-01

## 2020-01-01 RX ORDER — TOPIRAMATE 25 MG/1
50 TABLET ORAL DAILY
Status: DISCONTINUED | OUTPATIENT
Start: 2020-01-01 | End: 2020-01-01 | Stop reason: HOSPADM

## 2020-01-01 RX ORDER — METFORMIN HYDROCHLORIDE 500 MG/1
500 TABLET, EXTENDED RELEASE ORAL
Qty: 30 TABLET | Refills: 0 | Status: SHIPPED | OUTPATIENT
Start: 2020-01-01 | End: 2020-01-01 | Stop reason: SDUPTHER

## 2020-01-01 RX ORDER — ALBUTEROL SULFATE 2.5 MG/3ML
2.5 SOLUTION RESPIRATORY (INHALATION) EVERY 6 HOURS PRN
Status: CANCELLED | OUTPATIENT
Start: 2020-01-01

## 2020-01-01 RX ORDER — TRAMADOL HYDROCHLORIDE 50 MG/1
50 TABLET ORAL EVERY 6 HOURS PRN
COMMUNITY

## 2020-01-01 RX ORDER — CYANOCOBALAMIN 1000 UG/ML
1000 INJECTION INTRAMUSCULAR; SUBCUTANEOUS ONCE
Status: COMPLETED | OUTPATIENT
Start: 2020-01-01 | End: 2020-01-01

## 2020-01-01 RX ORDER — PREGABALIN 75 MG/1
75 CAPSULE ORAL 2 TIMES DAILY
Status: DISCONTINUED | OUTPATIENT
Start: 2020-01-01 | End: 2020-01-01

## 2020-01-01 RX ORDER — FUROSEMIDE 10 MG/ML
20 INJECTION INTRAMUSCULAR; INTRAVENOUS ONCE
Status: COMPLETED | OUTPATIENT
Start: 2020-01-01 | End: 2020-01-01

## 2020-01-01 RX ORDER — IRBESARTAN 75 MG/1
75 TABLET ORAL DAILY
Qty: 30 TABLET | Refills: 3 | Status: SHIPPED | OUTPATIENT
Start: 2020-01-01 | End: 2020-01-01

## 2020-01-01 RX ORDER — DOPAMINE HYDROCHLORIDE 160 MG/100ML
5 INJECTION, SOLUTION INTRAVENOUS CONTINUOUS
Status: DISCONTINUED | OUTPATIENT
Start: 2020-01-01 | End: 2020-01-01

## 2020-01-01 RX ORDER — SODIUM CHLORIDE, SODIUM LACTATE, POTASSIUM CHLORIDE, CALCIUM CHLORIDE 600; 310; 30; 20 MG/100ML; MG/100ML; MG/100ML; MG/100ML
INJECTION, SOLUTION INTRAVENOUS
Status: ON HOLD | COMMUNITY
Start: 2020-01-01 | End: 2020-01-01 | Stop reason: HOSPADM

## 2020-01-01 RX ORDER — TOPIRAMATE 50 MG/1
50 TABLET, FILM COATED ORAL DAILY
Qty: 90 TABLET | OUTPATIENT
Start: 2020-01-01

## 2020-01-01 RX ORDER — MIDODRINE HYDROCHLORIDE 5 MG/1
5 TABLET ORAL
Status: DISCONTINUED | OUTPATIENT
Start: 2020-01-01 | End: 2020-01-01

## 2020-01-01 RX ORDER — TRAZODONE HYDROCHLORIDE 150 MG/1
150 TABLET ORAL NIGHTLY
Status: DISCONTINUED | OUTPATIENT
Start: 2020-01-01 | End: 2020-01-01 | Stop reason: HOSPADM

## 2020-01-01 RX ORDER — LIDOCAINE 50 MG/G
3 PATCH TOPICAL DAILY
Qty: 90 PATCH | Refills: 5 | Status: SHIPPED | OUTPATIENT
Start: 2020-01-01 | End: 2020-01-01

## 2020-01-01 RX ORDER — OXYCODONE HYDROCHLORIDE 5 MG/1
5 TABLET ORAL EVERY 4 HOURS PRN
Status: DISCONTINUED | OUTPATIENT
Start: 2020-01-01 | End: 2020-01-01 | Stop reason: HOSPADM

## 2020-01-01 RX ORDER — ALBUMIN (HUMAN) 12.5 G/50ML
SOLUTION INTRAVENOUS CONTINUOUS PRN
Status: COMPLETED | OUTPATIENT
Start: 2020-01-01 | End: 2020-01-01

## 2020-01-01 RX ORDER — LEVOTHYROXINE SODIUM 0.03 MG/1
25 TABLET ORAL DAILY
Status: DISCONTINUED | OUTPATIENT
Start: 2020-01-01 | End: 2020-01-01

## 2020-01-01 RX ORDER — ACETAMINOPHEN 500 MG
1000 TABLET ORAL ONCE
Status: DISCONTINUED | OUTPATIENT
Start: 2020-01-01 | End: 2020-01-01

## 2020-01-01 RX ORDER — LOVASTATIN 10 MG/1
10 TABLET ORAL NIGHTLY
Qty: 30 TABLET | Refills: 0 | Status: SHIPPED | OUTPATIENT
Start: 2020-01-01 | End: 2020-01-01 | Stop reason: SDUPTHER

## 2020-01-01 RX ORDER — LACTULOSE 10 G/15ML
20 SOLUTION ORAL DAILY
Qty: 30 ML | Refills: 1 | Status: ON HOLD | OUTPATIENT
Start: 2020-01-01 | End: 2020-01-01 | Stop reason: HOSPADM

## 2020-01-01 RX ORDER — TOPIRAMATE 50 MG/1
50 TABLET, FILM COATED ORAL DAILY
Status: CANCELLED | OUTPATIENT
Start: 2020-01-01

## 2020-01-01 RX ORDER — ACETAMINOPHEN 500 MG
500 TABLET ORAL EVERY 6 HOURS PRN
Status: DISCONTINUED | OUTPATIENT
Start: 2020-01-01 | End: 2020-01-01 | Stop reason: HOSPADM

## 2020-01-01 RX ORDER — FOLIC ACID 1 MG/1
5 TABLET ORAL DAILY
Status: DISCONTINUED | OUTPATIENT
Start: 2020-01-01 | End: 2020-01-01 | Stop reason: HOSPADM

## 2020-01-01 RX ORDER — ALBUMIN (HUMAN) 12.5 G/50ML
25 SOLUTION INTRAVENOUS ONCE
Status: COMPLETED | OUTPATIENT
Start: 2020-01-01 | End: 2020-01-01

## 2020-01-01 RX ORDER — PANTOPRAZOLE SODIUM 40 MG/1
40 TABLET, DELAYED RELEASE ORAL
Status: DISCONTINUED | OUTPATIENT
Start: 2020-01-01 | End: 2020-01-01

## 2020-01-01 RX ORDER — LEVOTHYROXINE SODIUM 0.03 MG/1
25 TABLET ORAL DAILY
Qty: 30 TABLET | Refills: 3 | Status: ON HOLD | OUTPATIENT
Start: 2020-01-01 | End: 2020-01-01 | Stop reason: HOSPADM

## 2020-01-01 RX ORDER — IRBESARTAN 75 MG/1
TABLET ORAL
Qty: 90 TABLET | OUTPATIENT
Start: 2020-01-01

## 2020-01-01 RX ORDER — LACTULOSE 10 G/15ML
30 SOLUTION ORAL 3 TIMES DAILY
Status: DISCONTINUED | OUTPATIENT
Start: 2020-01-01 | End: 2020-01-01

## 2020-01-01 RX ORDER — PROMETHAZINE HYDROCHLORIDE 12.5 MG/1
12.5 TABLET ORAL EVERY 6 HOURS PRN
Status: DISCONTINUED | OUTPATIENT
Start: 2020-01-01 | End: 2020-01-01

## 2020-01-01 RX ORDER — TRAZODONE HYDROCHLORIDE 50 MG/1
150 TABLET ORAL NIGHTLY
Status: DISCONTINUED | OUTPATIENT
Start: 2020-01-01 | End: 2020-01-01 | Stop reason: HOSPADM

## 2020-01-01 RX ORDER — POTASSIUM CHLORIDE 1.5 G/1.77G
40 POWDER, FOR SOLUTION ORAL PRN
Status: DISCONTINUED | OUTPATIENT
Start: 2020-01-01 | End: 2020-01-01 | Stop reason: HOSPADM

## 2020-01-01 RX ORDER — LACTULOSE 10 G/15ML
10 SOLUTION ORAL 3 TIMES DAILY
Status: DISCONTINUED | OUTPATIENT
Start: 2020-01-01 | End: 2020-01-01

## 2020-01-01 RX ORDER — SUCRALFATE 1 G/1
TABLET ORAL
Qty: 360 TABLET | Refills: 1 | Status: SHIPPED | OUTPATIENT
Start: 2020-01-01

## 2020-01-01 RX ORDER — ATORVASTATIN CALCIUM 10 MG/1
10 TABLET, FILM COATED ORAL DAILY
Status: DISCONTINUED | OUTPATIENT
Start: 2020-01-01 | End: 2020-01-01 | Stop reason: HOSPADM

## 2020-01-01 RX ORDER — ONDANSETRON 4 MG/1
4 TABLET, FILM COATED ORAL EVERY 8 HOURS PRN
Status: DISCONTINUED | OUTPATIENT
Start: 2020-01-01 | End: 2020-01-01 | Stop reason: HOSPADM

## 2020-01-01 RX ORDER — LANOLIN ALCOHOL/MO/W.PET/CERES
100 CREAM (GRAM) TOPICAL 2 TIMES DAILY
Qty: 30 TABLET | Refills: 3 | Status: ON HOLD | OUTPATIENT
Start: 2020-01-01 | End: 2020-01-01 | Stop reason: HOSPADM

## 2020-01-01 RX ORDER — IRBESARTAN 75 MG/1
TABLET ORAL
Qty: 90 TABLET | Refills: 0 | Status: ON HOLD | OUTPATIENT
Start: 2020-01-01 | End: 2020-01-01 | Stop reason: HOSPADM

## 2020-01-01 RX ORDER — 0.9 % SODIUM CHLORIDE 0.9 %
20 INTRAVENOUS SOLUTION INTRAVENOUS ONCE
Status: COMPLETED | OUTPATIENT
Start: 2020-01-01 | End: 2020-01-01

## 2020-01-01 RX ORDER — ACETAMINOPHEN 650 MG/1
650 SUPPOSITORY RECTAL EVERY 6 HOURS PRN
Status: CANCELLED | OUTPATIENT
Start: 2020-01-01

## 2020-01-01 RX ORDER — POTASSIUM CHLORIDE 20 MEQ/1
40 TABLET, EXTENDED RELEASE ORAL PRN
Status: DISCONTINUED | OUTPATIENT
Start: 2020-01-01 | End: 2020-01-01 | Stop reason: HOSPADM

## 2020-01-01 RX ORDER — OMEPRAZOLE 40 MG/1
CAPSULE, DELAYED RELEASE ORAL
Qty: 90 CAPSULE | Refills: 0 | Status: ON HOLD | OUTPATIENT
Start: 2020-01-01 | End: 2020-01-01 | Stop reason: HOSPADM

## 2020-01-01 RX ORDER — FUROSEMIDE 40 MG/1
40 TABLET ORAL DAILY
Status: CANCELLED | OUTPATIENT
Start: 2020-01-01

## 2020-01-01 RX ORDER — SODIUM CHLORIDE 0.9 % (FLUSH) 0.9 %
10 SYRINGE (ML) INJECTION PRN
Status: DISCONTINUED | OUTPATIENT
Start: 2020-01-01 | End: 2020-01-01

## 2020-01-01 RX ORDER — FOLIC ACID 1 MG/1
1 TABLET ORAL DAILY
Status: DISCONTINUED | OUTPATIENT
Start: 2020-01-01 | End: 2020-01-01

## 2020-01-01 RX ORDER — LIDOCAINE 4 G/G
3 PATCH TOPICAL DAILY PRN
Status: DISCONTINUED | OUTPATIENT
Start: 2020-01-01 | End: 2020-01-01 | Stop reason: HOSPADM

## 2020-01-01 RX ORDER — PANTOPRAZOLE SODIUM 20 MG/1
20 TABLET, DELAYED RELEASE ORAL
Status: DISCONTINUED | OUTPATIENT
Start: 2020-01-01 | End: 2020-01-01 | Stop reason: HOSPADM

## 2020-01-01 RX ORDER — TOPIRAMATE 25 MG/1
50 TABLET ORAL DAILY
Status: DISCONTINUED | OUTPATIENT
Start: 2020-01-01 | End: 2020-01-01

## 2020-01-01 RX ORDER — SPIRONOLACTONE 25 MG/1
50 TABLET ORAL DAILY
Status: CANCELLED | OUTPATIENT
Start: 2020-01-01

## 2020-01-01 RX ORDER — ACETAMINOPHEN 650 MG/1
650 SUPPOSITORY RECTAL EVERY 6 HOURS PRN
Status: DISCONTINUED | OUTPATIENT
Start: 2020-01-01 | End: 2020-01-01 | Stop reason: HOSPADM

## 2020-01-01 RX ORDER — SPIRONOLACTONE 100 MG/1
100 TABLET, FILM COATED ORAL DAILY
Qty: 30 TABLET | Refills: 0 | Status: ON HOLD | OUTPATIENT
Start: 2020-01-01 | End: 2020-01-01 | Stop reason: HOSPADM

## 2020-01-01 RX ORDER — TRAMADOL HYDROCHLORIDE 50 MG/1
25 TABLET ORAL EVERY 6 HOURS PRN
Status: DISCONTINUED | OUTPATIENT
Start: 2020-01-01 | End: 2020-01-01 | Stop reason: HOSPADM

## 2020-01-01 RX ORDER — TRAMADOL HYDROCHLORIDE 50 MG/1
50 TABLET ORAL 3 TIMES DAILY PRN
Qty: 21 TABLET | Refills: 0 | Status: SHIPPED | OUTPATIENT
Start: 2020-01-01 | End: 2020-01-01

## 2020-01-01 RX ORDER — BUDESONIDE AND FORMOTEROL FUMARATE DIHYDRATE 160; 4.5 UG/1; UG/1
2 AEROSOL RESPIRATORY (INHALATION) 2 TIMES DAILY
Status: CANCELLED | OUTPATIENT
Start: 2020-01-01

## 2020-01-01 RX ORDER — SODIUM CHLORIDE 0.9 % (FLUSH) 0.9 %
10 SYRINGE (ML) INJECTION EVERY 12 HOURS SCHEDULED
Status: DISCONTINUED | OUTPATIENT
Start: 2020-01-01 | End: 2020-01-01 | Stop reason: ALTCHOICE

## 2020-01-01 RX ORDER — MULTIVITAMIN WITH IRON
250 TABLET ORAL DAILY
COMMUNITY
End: 2020-01-01 | Stop reason: ALTCHOICE

## 2020-01-01 RX ORDER — TRAZODONE HYDROCHLORIDE 100 MG/1
100 TABLET ORAL 2 TIMES DAILY
Qty: 60 TABLET | Refills: 0 | Status: SHIPPED | OUTPATIENT
Start: 2020-01-01 | End: 2020-01-01 | Stop reason: SDUPTHER

## 2020-01-01 RX ORDER — TRAZODONE HYDROCHLORIDE 100 MG/1
TABLET ORAL
Qty: 60 TABLET | Refills: 3 | Status: SHIPPED | OUTPATIENT
Start: 2020-01-01 | End: 2020-01-01

## 2020-01-01 RX ORDER — SPIRONOLACTONE 50 MG/1
50 TABLET, FILM COATED ORAL DAILY
Qty: 30 TABLET | Refills: 0 | Status: ON HOLD | OUTPATIENT
Start: 2020-01-01 | End: 2020-01-01 | Stop reason: HOSPADM

## 2020-01-01 RX ORDER — 3% SODIUM CHLORIDE 3 G/100ML
50 INJECTION, SOLUTION INTRAVENOUS CONTINUOUS
Status: DISPENSED | OUTPATIENT
Start: 2020-01-01 | End: 2020-01-01

## 2020-01-01 RX ORDER — ALBUTEROL SULFATE 90 UG/1
2 AEROSOL, METERED RESPIRATORY (INHALATION) EVERY 6 HOURS PRN
COMMUNITY

## 2020-01-01 RX ORDER — TOPIRAMATE 50 MG/1
50 TABLET, FILM COATED ORAL DAILY
Qty: 30 TABLET | Refills: 3 | Status: SHIPPED | OUTPATIENT
Start: 2020-01-01 | End: 2020-01-01

## 2020-01-01 RX ORDER — THIAMINE HYDROCHLORIDE 100 MG/ML
500 INJECTION, SOLUTION INTRAMUSCULAR; INTRAVENOUS 3 TIMES DAILY
Status: DISCONTINUED | OUTPATIENT
Start: 2020-01-01 | End: 2020-01-01 | Stop reason: DRUGHIGH

## 2020-01-01 RX ORDER — FUROSEMIDE 20 MG/1
20 TABLET ORAL DAILY
Qty: 5 TABLET | Refills: 0 | Status: ON HOLD | OUTPATIENT
Start: 2020-01-01 | End: 2020-01-01 | Stop reason: HOSPADM

## 2020-01-01 RX ORDER — THIAMINE MONONITRATE (VIT B1) 100 MG
100 TABLET ORAL 2 TIMES DAILY
Status: DISCONTINUED | OUTPATIENT
Start: 2020-01-01 | End: 2020-01-01 | Stop reason: HOSPADM

## 2020-01-01 RX ORDER — ALPRAZOLAM 0.5 MG/1
0.5 TABLET ORAL NIGHTLY PRN
Qty: 30 TABLET | Refills: 0 | Status: SHIPPED | OUTPATIENT
Start: 2020-01-01 | End: 2020-01-01

## 2020-01-01 RX ORDER — LEVOTHYROXINE SODIUM 0.03 MG/1
25 TABLET ORAL DAILY
COMMUNITY
End: 2020-01-01

## 2020-01-01 RX ORDER — FUROSEMIDE 20 MG/1
20 TABLET ORAL DAILY
Qty: 5 TABLET | Refills: 0 | Status: ON HOLD | OUTPATIENT
Start: 2020-01-01 | End: 2020-01-01

## 2020-01-01 RX ORDER — LIDOCAINE 4 G/G
3 PATCH TOPICAL DAILY
Status: DISCONTINUED | OUTPATIENT
Start: 2020-01-01 | End: 2020-01-01

## 2020-01-01 RX ORDER — TOPIRAMATE 50 MG/1
50 TABLET, FILM COATED ORAL DAILY
Qty: 90 TABLET | Refills: 0 | Status: ON HOLD | OUTPATIENT
Start: 2020-01-01 | End: 2020-01-01 | Stop reason: HOSPADM

## 2020-01-01 RX ORDER — SODIUM CHLORIDE 9 MG/ML
INJECTION, SOLUTION INTRAVENOUS
Status: DISPENSED
Start: 2020-01-01 | End: 2020-01-01

## 2020-01-01 RX ORDER — FUROSEMIDE 40 MG/1
40 TABLET ORAL DAILY
Status: DISCONTINUED | OUTPATIENT
Start: 2020-01-01 | End: 2020-01-01

## 2020-01-01 RX ORDER — HYDROCODONE BITARTRATE AND ACETAMINOPHEN 5; 325 MG/1; MG/1
1 TABLET ORAL ONCE
Status: DISCONTINUED | OUTPATIENT
Start: 2020-01-01 | End: 2020-01-01

## 2020-01-01 RX ORDER — FOLIC ACID 1 MG/1
5 TABLET ORAL DAILY
Qty: 30 TABLET | Refills: 3 | Status: ON HOLD | OUTPATIENT
Start: 2020-01-01 | End: 2020-01-01 | Stop reason: HOSPADM

## 2020-01-01 RX ORDER — SPIRONOLACTONE 50 MG/1
50 TABLET, FILM COATED ORAL DAILY
Qty: 30 TABLET | Refills: 3 | Status: SHIPPED | OUTPATIENT
Start: 2020-01-01 | End: 2020-01-01

## 2020-01-01 RX ORDER — CLOTRIMAZOLE AND BETAMETHASONE DIPROPIONATE 10; .64 MG/G; MG/G
CREAM TOPICAL
Qty: 30 G | Refills: 0 | Status: ON HOLD | OUTPATIENT
Start: 2020-01-01 | End: 2020-01-01 | Stop reason: HOSPADM

## 2020-01-01 RX ORDER — POTASSIUM CHLORIDE 7.45 MG/ML
10 INJECTION INTRAVENOUS PRN
Status: DISCONTINUED | OUTPATIENT
Start: 2020-01-01 | End: 2020-01-01 | Stop reason: HOSPADM

## 2020-01-01 RX ORDER — PREGABALIN 25 MG/1
25 CAPSULE ORAL 2 TIMES DAILY
Qty: 28 CAPSULE | Refills: 0 | Status: SHIPPED | OUTPATIENT
Start: 2020-01-01 | End: 2020-01-01

## 2020-01-01 RX ORDER — SODIUM PHOSPHATE, DIBASIC AND SODIUM PHOSPHATE, MONOBASIC 7; 19 G/133ML; G/133ML
1 ENEMA RECTAL DAILY PRN
Status: DISCONTINUED | OUTPATIENT
Start: 2020-01-01 | End: 2020-01-01 | Stop reason: HOSPADM

## 2020-01-01 RX ORDER — POTASSIUM CHLORIDE 750 MG/1
40 TABLET, FILM COATED, EXTENDED RELEASE ORAL ONCE
Status: COMPLETED | OUTPATIENT
Start: 2020-01-01 | End: 2020-01-01

## 2020-01-01 RX ORDER — SUCRALFATE 1 G/1
1 TABLET ORAL 4 TIMES DAILY
Qty: 60 TABLET | Refills: 3 | Status: SHIPPED | OUTPATIENT
Start: 2020-01-01 | End: 2020-01-01

## 2020-01-01 RX ORDER — ACETAMINOPHEN 325 MG/1
650 TABLET ORAL EVERY 4 HOURS PRN
Status: DISCONTINUED | OUTPATIENT
Start: 2020-01-01 | End: 2020-01-01

## 2020-01-01 RX ORDER — TRAZODONE HYDROCHLORIDE 100 MG/1
200 TABLET ORAL NIGHTLY PRN
Status: DISCONTINUED | OUTPATIENT
Start: 2020-01-01 | End: 2020-01-01 | Stop reason: HOSPADM

## 2020-01-01 RX ORDER — TRAZODONE HYDROCHLORIDE 150 MG/1
150 TABLET ORAL NIGHTLY
Qty: 30 TABLET | Refills: 1 | Status: SHIPPED | OUTPATIENT
Start: 2020-01-01

## 2020-01-01 RX ORDER — LOVASTATIN 10 MG/1
10 TABLET ORAL NIGHTLY
Qty: 30 TABLET | Refills: 3 | Status: SHIPPED | OUTPATIENT
Start: 2020-01-01 | End: 2020-01-01

## 2020-01-01 RX ORDER — ATORVASTATIN CALCIUM 10 MG/1
10 TABLET, FILM COATED ORAL DAILY
Status: DISCONTINUED | OUTPATIENT
Start: 2020-01-01 | End: 2020-01-01

## 2020-01-01 RX ORDER — PREGABALIN 25 MG/1
25 CAPSULE ORAL 2 TIMES DAILY
Status: DISCONTINUED | OUTPATIENT
Start: 2020-01-01 | End: 2020-01-01 | Stop reason: HOSPADM

## 2020-01-01 RX ORDER — OMEPRAZOLE 10 MG/1
40 CAPSULE, DELAYED RELEASE ORAL DAILY
COMMUNITY

## 2020-01-01 RX ORDER — METFORMIN HYDROCHLORIDE 500 MG/1
500 TABLET, EXTENDED RELEASE ORAL
Qty: 30 TABLET | Refills: 3 | Status: SHIPPED | OUTPATIENT
Start: 2020-01-01 | End: 2020-01-01

## 2020-01-01 RX ORDER — FOLIC ACID 1 MG/1
1 TABLET ORAL DAILY
Status: DISCONTINUED | OUTPATIENT
Start: 2020-01-01 | End: 2020-01-01 | Stop reason: HOSPADM

## 2020-01-01 RX ORDER — TOPIRAMATE 50 MG/1
50 TABLET, FILM COATED ORAL DAILY
Qty: 30 TABLET | Refills: 0 | Status: SHIPPED | OUTPATIENT
Start: 2020-01-01 | End: 2020-01-01 | Stop reason: SDUPTHER

## 2020-01-01 RX ORDER — NICOTINE POLACRILEX 4 MG
15 LOZENGE BUCCAL PRN
Status: DISCONTINUED | OUTPATIENT
Start: 2020-01-01 | End: 2020-01-01 | Stop reason: HOSPADM

## 2020-01-01 RX ORDER — BUDESONIDE AND FORMOTEROL FUMARATE DIHYDRATE 160; 4.5 UG/1; UG/1
2 AEROSOL RESPIRATORY (INHALATION) 2 TIMES DAILY
Qty: 1 INHALER | Refills: 3 | Status: SHIPPED | OUTPATIENT
Start: 2020-01-01

## 2020-01-01 RX ORDER — ALPRAZOLAM 0.5 MG/1
0.5 TABLET ORAL NIGHTLY PRN
Qty: 5 TABLET | Refills: 0 | Status: SHIPPED | OUTPATIENT
Start: 2020-01-01 | End: 2020-01-01

## 2020-01-01 RX ORDER — TRAZODONE HYDROCHLORIDE 150 MG/1
150 TABLET ORAL NIGHTLY
Status: CANCELLED | OUTPATIENT
Start: 2020-01-01

## 2020-01-01 RX ORDER — LACTULOSE 10 G/15ML
20 SOLUTION ORAL DAILY
Status: DISCONTINUED | OUTPATIENT
Start: 2020-01-01 | End: 2020-01-01 | Stop reason: HOSPADM

## 2020-01-01 RX ORDER — ACETAMINOPHEN 325 MG/1
650 TABLET ORAL EVERY 6 HOURS PRN
Status: CANCELLED | OUTPATIENT
Start: 2020-01-01

## 2020-01-01 RX ORDER — LIDOCAINE HYDROCHLORIDE 10 MG/ML
INJECTION, SOLUTION INFILTRATION; PERINEURAL PRN
Status: DISCONTINUED | OUTPATIENT
Start: 2020-01-01 | End: 2020-01-01 | Stop reason: SDUPTHER

## 2020-01-01 RX ORDER — PREGABALIN 75 MG/1
75 CAPSULE ORAL 3 TIMES DAILY
Status: DISCONTINUED | OUTPATIENT
Start: 2020-01-01 | End: 2020-01-01

## 2020-01-01 RX ORDER — DIPHENHYDRAMINE HYDROCHLORIDE 50 MG/ML
12.5 INJECTION INTRAMUSCULAR; INTRAVENOUS ONCE
Status: COMPLETED | OUTPATIENT
Start: 2020-01-01 | End: 2020-01-01

## 2020-01-01 RX ORDER — LATANOPROST 50 UG/ML
1 SOLUTION/ DROPS OPHTHALMIC NIGHTLY
Status: DISCONTINUED | OUTPATIENT
Start: 2020-01-01 | End: 2020-01-01 | Stop reason: HOSPADM

## 2020-01-01 RX ORDER — SUCRALFATE 1 G/1
1 TABLET ORAL EVERY 6 HOURS SCHEDULED
Status: DISCONTINUED | OUTPATIENT
Start: 2020-01-01 | End: 2020-01-01

## 2020-01-01 RX ORDER — FOLIC ACID 1 MG/1
1 TABLET ORAL DAILY
Status: CANCELLED | OUTPATIENT
Start: 2020-01-01

## 2020-01-01 RX ORDER — ACETAMINOPHEN 325 MG/1
650 TABLET ORAL EVERY 6 HOURS PRN
Status: DISCONTINUED | OUTPATIENT
Start: 2020-01-01 | End: 2020-01-01 | Stop reason: HOSPADM

## 2020-01-01 RX ORDER — LOVASTATIN 10 MG/1
TABLET ORAL
Qty: 30 TABLET | Refills: 3 | Status: ON HOLD | OUTPATIENT
Start: 2020-01-01 | End: 2020-01-01 | Stop reason: HOSPADM

## 2020-01-01 RX ORDER — LEVOTHYROXINE SODIUM 0.03 MG/1
25 TABLET ORAL DAILY
Status: DISCONTINUED | OUTPATIENT
Start: 2020-01-01 | End: 2020-01-01 | Stop reason: HOSPADM

## 2020-01-01 RX ORDER — ACETAMINOPHEN 650 MG/1
650 SUPPOSITORY RECTAL EVERY 6 HOURS PRN
Status: DISCONTINUED | OUTPATIENT
Start: 2020-01-01 | End: 2020-01-01

## 2020-01-01 RX ORDER — LOVASTATIN 10 MG/1
10 TABLET ORAL NIGHTLY
Status: ON HOLD | COMMUNITY
End: 2020-01-01 | Stop reason: HOSPADM

## 2020-01-01 RX ADMIN — LACTULOSE 30 G: 20 SOLUTION ORAL at 15:43

## 2020-01-01 RX ADMIN — LEVOTHYROXINE SODIUM 25 MCG: 0.03 TABLET ORAL at 06:05

## 2020-01-01 RX ADMIN — Medication 100 MG: at 21:35

## 2020-01-01 RX ADMIN — ALBUMIN (HUMAN) 25 G: 0.25 INJECTION, SOLUTION INTRAVENOUS at 16:45

## 2020-01-01 RX ADMIN — Medication: at 13:49

## 2020-01-01 RX ADMIN — CARBIDOPA AND LEVODOPA 1 TABLET: 25; 100 TABLET ORAL at 08:49

## 2020-01-01 RX ADMIN — TRAZODONE HYDROCHLORIDE 150 MG: 50 TABLET ORAL at 22:10

## 2020-01-01 RX ADMIN — FUROSEMIDE 40 MG: 40 TABLET ORAL at 17:48

## 2020-01-01 RX ADMIN — CARBIDOPA AND LEVODOPA 1 TABLET: 25; 100 TABLET ORAL at 15:54

## 2020-01-01 RX ADMIN — LACTULOSE 20 G: 20 SOLUTION ORAL at 16:19

## 2020-01-01 RX ADMIN — FOLIC ACID 5 MG: 1 TABLET ORAL at 11:01

## 2020-01-01 RX ADMIN — TOPIRAMATE 50 MG: 25 TABLET, FILM COATED ORAL at 09:52

## 2020-01-01 RX ADMIN — Medication: at 15:43

## 2020-01-01 RX ADMIN — Medication 100 MG: at 21:15

## 2020-01-01 RX ADMIN — Medication: at 15:11

## 2020-01-01 RX ADMIN — FOLIC ACID 1 MG: 1 TABLET ORAL at 11:20

## 2020-01-01 RX ADMIN — Medication 10 ML: at 11:16

## 2020-01-01 RX ADMIN — FOLIC ACID 5 MG: 1 TABLET ORAL at 07:58

## 2020-01-01 RX ADMIN — CARBIDOPA AND LEVODOPA 1 TABLET: 25; 100 TABLET ORAL at 21:13

## 2020-01-01 RX ADMIN — LEVOTHYROXINE SODIUM 25 MCG: 0.03 TABLET ORAL at 06:53

## 2020-01-01 RX ADMIN — PANTOPRAZOLE SODIUM 40 MG: 40 TABLET, DELAYED RELEASE ORAL at 06:21

## 2020-01-01 RX ADMIN — PANTOPRAZOLE SODIUM 20 MG: 20 TABLET, DELAYED RELEASE ORAL at 07:02

## 2020-01-01 RX ADMIN — IBUPROFEN 400 MG: 400 TABLET, FILM COATED ORAL at 11:18

## 2020-01-01 RX ADMIN — PANTOPRAZOLE SODIUM 20 MG: 20 TABLET, DELAYED RELEASE ORAL at 06:38

## 2020-01-01 RX ADMIN — PANTOPRAZOLE SODIUM 40 MG: 40 TABLET, DELAYED RELEASE ORAL at 06:18

## 2020-01-01 RX ADMIN — Medication 1 EACH: at 12:31

## 2020-01-01 RX ADMIN — FOLIC ACID 5 MG: 1 TABLET ORAL at 08:41

## 2020-01-01 RX ADMIN — Medication 100 MG: at 09:33

## 2020-01-01 RX ADMIN — CARBIDOPA AND LEVODOPA 1 TABLET: 25; 100 TABLET ORAL at 14:05

## 2020-01-01 RX ADMIN — Medication 10 ML: at 11:34

## 2020-01-01 RX ADMIN — SPIRONOLACTONE 50 MG: 25 TABLET ORAL at 08:11

## 2020-01-01 RX ADMIN — Medication: at 21:15

## 2020-01-01 RX ADMIN — LEVOTHYROXINE SODIUM 25 MCG: 0.03 TABLET ORAL at 05:02

## 2020-01-01 RX ADMIN — PANTOPRAZOLE SODIUM 20 MG: 20 TABLET, DELAYED RELEASE ORAL at 06:28

## 2020-01-01 RX ADMIN — Medication 10 ML: at 20:38

## 2020-01-01 RX ADMIN — OXYCODONE 5 MG: 5 TABLET ORAL at 15:48

## 2020-01-01 RX ADMIN — LATANOPROST 1 DROP: 50 SOLUTION OPHTHALMIC at 00:15

## 2020-01-01 RX ADMIN — ATORVASTATIN CALCIUM 10 MG: 10 TABLET, FILM COATED ORAL at 08:14

## 2020-01-01 RX ADMIN — Medication: at 06:29

## 2020-01-01 RX ADMIN — CARBIDOPA AND LEVODOPA 1 TABLET: 25; 100 TABLET ORAL at 14:31

## 2020-01-01 RX ADMIN — LIDOCAINE HYDROCHLORIDE 5 ML: 20 INJECTION, SOLUTION INFILTRATION; PERINEURAL at 10:22

## 2020-01-01 RX ADMIN — Medication: at 22:58

## 2020-01-01 RX ADMIN — PANTOPRAZOLE SODIUM 40 MG: 40 TABLET, DELAYED RELEASE ORAL at 06:43

## 2020-01-01 RX ADMIN — TOPIRAMATE 50 MG: 25 TABLET, FILM COATED ORAL at 08:34

## 2020-01-01 RX ADMIN — SERTRALINE HYDROCHLORIDE 50 MG: 50 TABLET ORAL at 09:53

## 2020-01-01 RX ADMIN — Medication: at 20:31

## 2020-01-01 RX ADMIN — Medication 100 MG: at 20:27

## 2020-01-01 RX ADMIN — LIDOCAINE HYDROCHLORIDE 5 ML: 20 INJECTION, SOLUTION INFILTRATION; PERINEURAL at 11:52

## 2020-01-01 RX ADMIN — FUROSEMIDE 40 MG: 40 TABLET ORAL at 11:15

## 2020-01-01 RX ADMIN — PREGABALIN 25 MG: 25 CAPSULE ORAL at 21:35

## 2020-01-01 RX ADMIN — LEVOTHYROXINE SODIUM 25 MCG: 0.03 TABLET ORAL at 06:28

## 2020-01-01 RX ADMIN — THIAMINE HYDROCHLORIDE 500 MG: 100 INJECTION, SOLUTION INTRAMUSCULAR; INTRAVENOUS at 10:16

## 2020-01-01 RX ADMIN — CARBIDOPA AND LEVODOPA 1 TABLET: 25; 100 TABLET ORAL at 21:15

## 2020-01-01 RX ADMIN — SERTRALINE HYDROCHLORIDE 50 MG: 50 TABLET ORAL at 08:16

## 2020-01-01 RX ADMIN — DOPAMINE HYDROCHLORIDE 5 MCG/KG/MIN: 160 INJECTION, SOLUTION INTRAVENOUS at 11:42

## 2020-01-01 RX ADMIN — CARBIDOPA AND LEVODOPA 1 TABLET: 25; 100 TABLET ORAL at 09:33

## 2020-01-01 RX ADMIN — SODIUM CHLORIDE 500 ML: 9 INJECTION, SOLUTION INTRAVENOUS at 11:43

## 2020-01-01 RX ADMIN — THIAMINE HYDROCHLORIDE 500 MG: 100 INJECTION, SOLUTION INTRAMUSCULAR; INTRAVENOUS at 20:32

## 2020-01-01 RX ADMIN — CARBIDOPA AND LEVODOPA 1 TABLET: 25; 100 TABLET ORAL at 09:49

## 2020-01-01 RX ADMIN — Medication 100 MG: at 22:13

## 2020-01-01 RX ADMIN — PANTOPRAZOLE SODIUM 20 MG: 20 TABLET, DELAYED RELEASE ORAL at 05:03

## 2020-01-01 RX ADMIN — Medication 10 ML: at 20:57

## 2020-01-01 RX ADMIN — SODIUM CHLORIDE 250 ML: 9 INJECTION, SOLUTION INTRAVENOUS at 23:41

## 2020-01-01 RX ADMIN — TOPIRAMATE 50 MG: 25 TABLET, FILM COATED ORAL at 22:29

## 2020-01-01 RX ADMIN — BUDESONIDE AND FORMOTEROL FUMARATE DIHYDRATE 2 PUFF: 160; 4.5 AEROSOL RESPIRATORY (INHALATION) at 04:56

## 2020-01-01 RX ADMIN — FOLIC ACID 5 MG: 1 TABLET ORAL at 08:37

## 2020-01-01 RX ADMIN — SODIUM CHLORIDE 500 ML: 9 INJECTION, SOLUTION INTRAVENOUS at 21:03

## 2020-01-01 RX ADMIN — Medication: at 06:25

## 2020-01-01 RX ADMIN — Medication 10 ML: at 21:34

## 2020-01-01 RX ADMIN — SUCRALFATE 1 G: 1 TABLET ORAL at 17:58

## 2020-01-01 RX ADMIN — CARBIDOPA AND LEVODOPA 1 TABLET: 25; 100 TABLET ORAL at 08:34

## 2020-01-01 RX ADMIN — SODIUM CHLORIDE 20 ML: 9 INJECTION, SOLUTION INTRAVENOUS at 04:10

## 2020-01-01 RX ADMIN — Medication: at 06:32

## 2020-01-01 RX ADMIN — SPIRONOLACTONE 50 MG: 25 TABLET ORAL at 09:49

## 2020-01-01 RX ADMIN — BUDESONIDE AND FORMOTEROL FUMARATE DIHYDRATE 2 PUFF: 160; 4.5 AEROSOL RESPIRATORY (INHALATION) at 07:18

## 2020-01-01 RX ADMIN — RIFAXIMIN 550 MG: 550 TABLET ORAL at 22:28

## 2020-01-01 RX ADMIN — LEVOTHYROXINE SODIUM 25 MCG: 0.03 TABLET ORAL at 06:31

## 2020-01-01 RX ADMIN — FOLIC ACID 1 MG: 1 TABLET ORAL at 08:35

## 2020-01-01 RX ADMIN — LIDOCAINE HYDROCHLORIDE 3 ML: 20 INJECTION, SOLUTION INFILTRATION; PERINEURAL at 14:08

## 2020-01-01 RX ADMIN — THIAMINE HYDROCHLORIDE 500 MG: 100 INJECTION, SOLUTION INTRAMUSCULAR; INTRAVENOUS at 21:33

## 2020-01-01 RX ADMIN — PREGABALIN 25 MG: 25 CAPSULE ORAL at 22:13

## 2020-01-01 RX ADMIN — Medication: at 06:11

## 2020-01-01 RX ADMIN — TRAZODONE HYDROCHLORIDE 150 MG: 50 TABLET ORAL at 22:47

## 2020-01-01 RX ADMIN — DOXYCYCLINE 100 MG: 100 INJECTION, POWDER, LYOPHILIZED, FOR SOLUTION INTRAVENOUS at 10:49

## 2020-01-01 RX ADMIN — TRAZODONE HYDROCHLORIDE 150 MG: 50 TABLET ORAL at 22:55

## 2020-01-01 RX ADMIN — CEFTRIAXONE SODIUM 1 G: 1 INJECTION, POWDER, FOR SOLUTION INTRAMUSCULAR; INTRAVENOUS at 13:38

## 2020-01-01 RX ADMIN — Medication: at 20:19

## 2020-01-01 RX ADMIN — LEVOTHYROXINE SODIUM 25 MCG: 0.03 TABLET ORAL at 05:19

## 2020-01-01 RX ADMIN — PANTOPRAZOLE SODIUM 20 MG: 20 TABLET, DELAYED RELEASE ORAL at 06:25

## 2020-01-01 RX ADMIN — IOPAMIDOL 100 ML: 755 INJECTION, SOLUTION INTRAVENOUS at 20:42

## 2020-01-01 RX ADMIN — FUROSEMIDE 40 MG: 40 TABLET ORAL at 09:33

## 2020-01-01 RX ADMIN — CARBIDOPA AND LEVODOPA 1 TABLET: 25; 100 TABLET ORAL at 22:55

## 2020-01-01 RX ADMIN — CARBIDOPA AND LEVODOPA 1 TABLET: 25; 100 TABLET ORAL at 13:35

## 2020-01-01 RX ADMIN — Medication: at 14:31

## 2020-01-01 RX ADMIN — Medication 100 MG: at 22:47

## 2020-01-01 RX ADMIN — LACTULOSE 20 G: 20 SOLUTION ORAL at 15:54

## 2020-01-01 RX ADMIN — FOLIC ACID 1 MG: 1 TABLET ORAL at 08:36

## 2020-01-01 RX ADMIN — DOPAMINE HYDROCHLORIDE 5 MCG/KG/MIN: 160 INJECTION, SOLUTION INTRAVENOUS at 22:29

## 2020-01-01 RX ADMIN — FOLIC ACID 5 MG: 1 TABLET ORAL at 09:09

## 2020-01-01 RX ADMIN — SUCRALFATE 1 G: 1 TABLET ORAL at 23:07

## 2020-01-01 RX ADMIN — POTASSIUM CHLORIDE 40 MEQ: 750 TABLET, FILM COATED, EXTENDED RELEASE ORAL at 21:03

## 2020-01-01 RX ADMIN — CARBIDOPA AND LEVODOPA 1 TABLET: 25; 100 TABLET ORAL at 20:57

## 2020-01-01 RX ADMIN — CARBIDOPA AND LEVODOPA 1 TABLET: 25; 100 TABLET ORAL at 08:13

## 2020-01-01 RX ADMIN — CARBIDOPA AND LEVODOPA 1 TABLET: 25; 100 TABLET ORAL at 22:57

## 2020-01-01 RX ADMIN — TOPIRAMATE 50 MG: 25 TABLET, FILM COATED ORAL at 08:20

## 2020-01-01 RX ADMIN — OXYCODONE 5 MG: 5 TABLET ORAL at 10:34

## 2020-01-01 RX ADMIN — PREGABALIN 75 MG: 75 CAPSULE ORAL at 21:33

## 2020-01-01 RX ADMIN — SODIUM CHLORIDE 1000 ML: 9 INJECTION, SOLUTION INTRAVENOUS at 05:08

## 2020-01-01 RX ADMIN — DOXYCYCLINE 100 MG: 100 INJECTION, POWDER, LYOPHILIZED, FOR SOLUTION INTRAVENOUS at 23:26

## 2020-01-01 RX ADMIN — FOLIC ACID 1 MG: 1 TABLET ORAL at 16:32

## 2020-01-01 RX ADMIN — SPIRONOLACTONE 50 MG: 25 TABLET ORAL at 11:09

## 2020-01-01 RX ADMIN — CARBIDOPA AND LEVODOPA 1 TABLET: 25; 100 TABLET ORAL at 08:40

## 2020-01-01 RX ADMIN — ATORVASTATIN CALCIUM 10 MG: 10 TABLET, FILM COATED ORAL at 08:43

## 2020-01-01 RX ADMIN — CARBIDOPA AND LEVODOPA 1 TABLET: 25; 100 TABLET ORAL at 08:20

## 2020-01-01 RX ADMIN — CARBIDOPA AND LEVODOPA 1 TABLET: 25; 100 TABLET ORAL at 08:36

## 2020-01-01 RX ADMIN — CARBIDOPA AND LEVODOPA 1 TABLET: 25; 100 TABLET ORAL at 22:14

## 2020-01-01 RX ADMIN — TOPIRAMATE 50 MG: 25 TABLET, FILM COATED ORAL at 08:13

## 2020-01-01 RX ADMIN — LIDOCAINE HYDROCHLORIDE 50 MG: 10 INJECTION, SOLUTION INFILTRATION; PERINEURAL at 14:36

## 2020-01-01 RX ADMIN — LACTULOSE 30 G: 20 SOLUTION ORAL at 18:13

## 2020-01-01 RX ADMIN — Medication: at 15:56

## 2020-01-01 RX ADMIN — ONDANSETRON 4 MG: 2 INJECTION INTRAMUSCULAR; INTRAVENOUS at 12:32

## 2020-01-01 RX ADMIN — LACTULOSE 20 G: 20 SOLUTION ORAL at 08:42

## 2020-01-01 RX ADMIN — PREGABALIN 25 MG: 25 CAPSULE ORAL at 21:13

## 2020-01-01 RX ADMIN — ALBUMIN (HUMAN) 25 G: 0.25 INJECTION, SOLUTION INTRAVENOUS at 16:36

## 2020-01-01 RX ADMIN — FUROSEMIDE 20 MG: 10 INJECTION, SOLUTION INTRAVENOUS at 19:47

## 2020-01-01 RX ADMIN — Medication: at 06:07

## 2020-01-01 RX ADMIN — CARBIDOPA AND LEVODOPA 1 TABLET: 25; 100 TABLET ORAL at 12:23

## 2020-01-01 RX ADMIN — Medication 100 MG: at 11:02

## 2020-01-01 RX ADMIN — OXYCODONE 5 MG: 5 TABLET ORAL at 22:59

## 2020-01-01 RX ADMIN — TOPIRAMATE 50 MG: 25 TABLET, FILM COATED ORAL at 11:02

## 2020-01-01 RX ADMIN — PREGABALIN 25 MG: 25 CAPSULE ORAL at 09:56

## 2020-01-01 RX ADMIN — CLINDAMYCIN PHOSPHATE 600 MG: 600 INJECTION, SOLUTION INTRAVENOUS at 19:47

## 2020-01-01 RX ADMIN — OXYCODONE 5 MG: 5 TABLET ORAL at 09:03

## 2020-01-01 RX ADMIN — MIDODRINE HYDROCHLORIDE 5 MG: 5 TABLET ORAL at 08:16

## 2020-01-01 RX ADMIN — TRAZODONE HYDROCHLORIDE 150 MG: 50 TABLET ORAL at 21:13

## 2020-01-01 RX ADMIN — CARBIDOPA AND LEVODOPA 1 TABLET: 25; 100 TABLET ORAL at 21:32

## 2020-01-01 RX ADMIN — LEVOTHYROXINE SODIUM 25 MCG: 0.03 TABLET ORAL at 07:02

## 2020-01-01 RX ADMIN — CARBIDOPA AND LEVODOPA 1 TABLET: 25; 100 TABLET ORAL at 20:14

## 2020-01-01 RX ADMIN — TOPIRAMATE 50 MG: 25 TABLET, FILM COATED ORAL at 11:15

## 2020-01-01 RX ADMIN — PREGABALIN 25 MG: 25 CAPSULE ORAL at 07:58

## 2020-01-01 RX ADMIN — ACETAMINOPHEN 500 MG: 500 TABLET ORAL at 20:44

## 2020-01-01 RX ADMIN — Medication 100 MG: at 08:20

## 2020-01-01 RX ADMIN — MIDODRINE HYDROCHLORIDE 5 MG: 5 TABLET ORAL at 13:44

## 2020-01-01 RX ADMIN — CARBIDOPA AND LEVODOPA 1 TABLET: 25; 100 TABLET ORAL at 11:07

## 2020-01-01 RX ADMIN — Medication 10 ML: at 11:22

## 2020-01-01 RX ADMIN — TRAZODONE HYDROCHLORIDE 150 MG: 50 TABLET ORAL at 20:18

## 2020-01-01 RX ADMIN — TOPIRAMATE 50 MG: 25 TABLET, FILM COATED ORAL at 09:34

## 2020-01-01 RX ADMIN — RIFAXIMIN 550 MG: 550 TABLET ORAL at 21:34

## 2020-01-01 RX ADMIN — OXYCODONE 5 MG: 5 TABLET ORAL at 15:33

## 2020-01-01 RX ADMIN — CARBIDOPA AND LEVODOPA 1 TABLET: 25; 100 TABLET ORAL at 20:35

## 2020-01-01 RX ADMIN — Medication 10 ML: at 08:14

## 2020-01-01 RX ADMIN — MENTHOL AND METHYL SALICYLATE: 7.6; 29 OINTMENT TOPICAL at 08:37

## 2020-01-01 RX ADMIN — CARBIDOPA AND LEVODOPA 1 TABLET: 25; 100 TABLET ORAL at 11:03

## 2020-01-01 RX ADMIN — PANTOPRAZOLE SODIUM 40 MG: 40 TABLET, DELAYED RELEASE ORAL at 06:38

## 2020-01-01 RX ADMIN — CARBIDOPA AND LEVODOPA 1 TABLET: 25; 100 TABLET ORAL at 08:12

## 2020-01-01 RX ADMIN — PREGABALIN 25 MG: 25 CAPSULE ORAL at 11:01

## 2020-01-01 RX ADMIN — PREGABALIN 25 MG: 25 CAPSULE ORAL at 21:15

## 2020-01-01 RX ADMIN — CARBIDOPA AND LEVODOPA 2 TABLET: 25; 100 TABLET ORAL at 15:42

## 2020-01-01 RX ADMIN — TOPIRAMATE 50 MG: 25 TABLET, FILM COATED ORAL at 08:43

## 2020-01-01 RX ADMIN — LACTULOSE 10 G: 20 SOLUTION ORAL at 09:55

## 2020-01-01 RX ADMIN — SPIRONOLACTONE 50 MG: 25 TABLET ORAL at 08:36

## 2020-01-01 RX ADMIN — ALBUMIN (HUMAN) 25 G: 0.25 INJECTION, SOLUTION INTRAVENOUS at 16:33

## 2020-01-01 RX ADMIN — LEVOTHYROXINE SODIUM 25 MCG: 0.03 TABLET ORAL at 06:38

## 2020-01-01 RX ADMIN — TRAZODONE HYDROCHLORIDE 150 MG: 50 TABLET ORAL at 22:14

## 2020-01-01 RX ADMIN — MIDODRINE HYDROCHLORIDE 5 MG: 5 TABLET ORAL at 10:30

## 2020-01-01 RX ADMIN — SERTRALINE HYDROCHLORIDE 50 MG: 50 TABLET ORAL at 08:13

## 2020-01-01 RX ADMIN — ATORVASTATIN CALCIUM 10 MG: 10 TABLET, FILM COATED ORAL at 13:03

## 2020-01-01 RX ADMIN — OXYCODONE 5 MG: 5 TABLET ORAL at 20:39

## 2020-01-01 RX ADMIN — OXYCODONE 5 MG: 5 TABLET ORAL at 10:11

## 2020-01-01 RX ADMIN — LIDOCAINE HYDROCHLORIDE 6 ML: 20 INJECTION, SOLUTION INFILTRATION; PERINEURAL at 13:51

## 2020-01-01 RX ADMIN — THIAMINE HYDROCHLORIDE 500 MG: 100 INJECTION, SOLUTION INTRAMUSCULAR; INTRAVENOUS at 16:16

## 2020-01-01 RX ADMIN — LIDOCAINE HYDROCHLORIDE 15 ML: 20 INJECTION, SOLUTION INFILTRATION; PERINEURAL at 12:10

## 2020-01-01 RX ADMIN — BUDESONIDE AND FORMOTEROL FUMARATE DIHYDRATE 2 PUFF: 160; 4.5 AEROSOL RESPIRATORY (INHALATION) at 07:59

## 2020-01-01 RX ADMIN — LACTULOSE 20 G: 20 SOLUTION ORAL at 11:56

## 2020-01-01 RX ADMIN — PREGABALIN 25 MG: 25 CAPSULE ORAL at 22:47

## 2020-01-01 RX ADMIN — Medication 100 MG: at 08:12

## 2020-01-01 RX ADMIN — OXYCODONE 5 MG: 5 TABLET ORAL at 11:57

## 2020-01-01 RX ADMIN — FOLIC ACID 5 MG: 1 TABLET ORAL at 11:02

## 2020-01-01 RX ADMIN — FOLIC ACID 5 MG: 1 TABLET ORAL at 09:34

## 2020-01-01 RX ADMIN — ALBUMIN (HUMAN) 25 G: 5 SOLUTION INTRAVENOUS at 14:27

## 2020-01-01 RX ADMIN — CARBIDOPA AND LEVODOPA 1 TABLET: 25; 100 TABLET ORAL at 20:31

## 2020-01-01 RX ADMIN — FUROSEMIDE 40 MG: 40 TABLET ORAL at 11:56

## 2020-01-01 RX ADMIN — ATORVASTATIN CALCIUM 10 MG: 10 TABLET, FILM COATED ORAL at 08:35

## 2020-01-01 RX ADMIN — MIDODRINE HYDROCHLORIDE 5 MG: 5 TABLET ORAL at 09:54

## 2020-01-01 RX ADMIN — FUROSEMIDE 40 MG: 40 TABLET ORAL at 08:35

## 2020-01-01 RX ADMIN — TRAZODONE HYDROCHLORIDE 200 MG: 100 TABLET ORAL at 00:23

## 2020-01-01 RX ADMIN — BUDESONIDE AND FORMOTEROL FUMARATE DIHYDRATE 2 PUFF: 160; 4.5 AEROSOL RESPIRATORY (INHALATION) at 05:01

## 2020-01-01 RX ADMIN — PREGABALIN 25 MG: 25 CAPSULE ORAL at 08:49

## 2020-01-01 RX ADMIN — LEVOTHYROXINE SODIUM 25 MCG: 0.03 TABLET ORAL at 06:29

## 2020-01-01 RX ADMIN — CARBIDOPA AND LEVODOPA 1 TABLET: 25; 100 TABLET ORAL at 13:29

## 2020-01-01 RX ADMIN — SERTRALINE HYDROCHLORIDE 50 MG: 50 TABLET ORAL at 10:30

## 2020-01-01 RX ADMIN — CARBIDOPA AND LEVODOPA 1 TABLET: 25; 100 TABLET ORAL at 14:28

## 2020-01-01 RX ADMIN — CARBIDOPA AND LEVODOPA 1 TABLET: 25; 100 TABLET ORAL at 08:16

## 2020-01-01 RX ADMIN — CARBIDOPA AND LEVODOPA 1 TABLET: 25; 100 TABLET ORAL at 09:56

## 2020-01-01 RX ADMIN — PREGABALIN 25 MG: 25 CAPSULE ORAL at 09:33

## 2020-01-01 RX ADMIN — FUROSEMIDE 40 MG: 40 TABLET ORAL at 08:30

## 2020-01-01 RX ADMIN — CARBIDOPA AND LEVODOPA 1 TABLET: 25; 100 TABLET ORAL at 08:43

## 2020-01-01 RX ADMIN — CARBIDOPA AND LEVODOPA 2 TABLET: 25; 100 TABLET ORAL at 10:30

## 2020-01-01 RX ADMIN — PREGABALIN 25 MG: 25 CAPSULE ORAL at 08:20

## 2020-01-01 RX ADMIN — FOLIC ACID 5 MG: 1 TABLET ORAL at 08:30

## 2020-01-01 RX ADMIN — TRAZODONE HYDROCHLORIDE 150 MG: 50 TABLET ORAL at 22:57

## 2020-01-01 RX ADMIN — SPIRONOLACTONE 50 MG: 25 TABLET ORAL at 08:41

## 2020-01-01 RX ADMIN — Medication 10 ML: at 09:55

## 2020-01-01 RX ADMIN — TRAMADOL HYDROCHLORIDE 25 MG: 50 TABLET, FILM COATED ORAL at 11:22

## 2020-01-01 RX ADMIN — LIDOCAINE HYDROCHLORIDE 8 ML: 20 INJECTION, SOLUTION INFILTRATION; PERINEURAL at 12:14

## 2020-01-01 RX ADMIN — Medication 10 ML: at 11:15

## 2020-01-01 RX ADMIN — SPIRONOLACTONE 50 MG: 25 TABLET ORAL at 09:33

## 2020-01-01 RX ADMIN — MORPHINE SULFATE 0.5 MG: 2 INJECTION, SOLUTION INTRAMUSCULAR; INTRAVENOUS at 23:58

## 2020-01-01 RX ADMIN — SUCRALFATE 1 G: 1 TABLET ORAL at 12:23

## 2020-01-01 RX ADMIN — PREGABALIN 25 MG: 25 CAPSULE ORAL at 20:18

## 2020-01-01 RX ADMIN — SODIUM CHLORIDE 500 ML: 9 INJECTION, SOLUTION INTRAVENOUS at 01:38

## 2020-01-01 RX ADMIN — TRAMADOL HYDROCHLORIDE 25 MG: 50 TABLET, FILM COATED ORAL at 21:26

## 2020-01-01 RX ADMIN — SODIUM CHLORIDE 1000 ML: 9 INJECTION, SOLUTION INTRAVENOUS at 03:36

## 2020-01-01 RX ADMIN — CARBIDOPA AND LEVODOPA 1 TABLET: 25; 100 TABLET ORAL at 08:37

## 2020-01-01 RX ADMIN — CONIVAPTAN HYDROCHLORIDE 20 MG: 20 INJECTION, SOLUTION INTRAVENOUS at 12:31

## 2020-01-01 RX ADMIN — Medication 100 MG: at 08:37

## 2020-01-01 RX ADMIN — LIDOCAINE HYDROCHLORIDE 6 ML: 20 INJECTION, SOLUTION INFILTRATION; PERINEURAL at 09:55

## 2020-01-01 RX ADMIN — PANTOPRAZOLE SODIUM 20 MG: 20 TABLET, DELAYED RELEASE ORAL at 06:31

## 2020-01-01 RX ADMIN — FUROSEMIDE 40 MG: 40 TABLET ORAL at 11:08

## 2020-01-01 RX ADMIN — TOPIRAMATE 50 MG: 25 TABLET, FILM COATED ORAL at 21:40

## 2020-01-01 RX ADMIN — DIPHENHYDRAMINE HYDROCHLORIDE 12.5 MG: 50 INJECTION, SOLUTION INTRAMUSCULAR; INTRAVENOUS at 04:10

## 2020-01-01 RX ADMIN — LACTULOSE 30 G: 20 SOLUTION ORAL at 22:28

## 2020-01-01 RX ADMIN — PREGABALIN 25 MG: 25 CAPSULE ORAL at 08:11

## 2020-01-01 RX ADMIN — PROPOFOL 150 MG: 10 INJECTION, EMULSION INTRAVENOUS at 14:36

## 2020-01-01 RX ADMIN — FOLIC ACID 5 MG: 1 TABLET ORAL at 08:11

## 2020-01-01 RX ADMIN — SPIRONOLACTONE 100 MG: 25 TABLET ORAL at 11:14

## 2020-01-01 RX ADMIN — LACTULOSE 10 G: 20 SOLUTION ORAL at 10:30

## 2020-01-01 RX ADMIN — FUROSEMIDE 40 MG: 40 TABLET ORAL at 11:18

## 2020-01-01 RX ADMIN — BUDESONIDE AND FORMOTEROL FUMARATE DIHYDRATE 2 PUFF: 160; 4.5 AEROSOL RESPIRATORY (INHALATION) at 07:44

## 2020-01-01 RX ADMIN — TRAZODONE HYDROCHLORIDE 150 MG: 50 TABLET ORAL at 20:31

## 2020-01-01 RX ADMIN — LACTULOSE 10 G: 20 SOLUTION ORAL at 14:31

## 2020-01-01 RX ADMIN — MIDODRINE HYDROCHLORIDE 5 MG: 5 TABLET ORAL at 12:23

## 2020-01-01 RX ADMIN — FUROSEMIDE 40 MG: 40 TABLET ORAL at 08:43

## 2020-01-01 RX ADMIN — CARBIDOPA AND LEVODOPA 1 TABLET: 25; 100 TABLET ORAL at 15:50

## 2020-01-01 RX ADMIN — Medication 1000 ML: at 03:36

## 2020-01-01 RX ADMIN — CARBIDOPA AND LEVODOPA 1 TABLET: 25; 100 TABLET ORAL at 14:07

## 2020-01-01 RX ADMIN — CYANOCOBALAMIN 1000 MCG: 1000 INJECTION, SOLUTION INTRAMUSCULAR at 11:57

## 2020-01-01 RX ADMIN — CARBIDOPA AND LEVODOPA 2 TABLET: 25; 100 TABLET ORAL at 09:53

## 2020-01-01 RX ADMIN — TRAZODONE HYDROCHLORIDE 150 MG: 50 TABLET ORAL at 21:43

## 2020-01-01 RX ADMIN — CARBIDOPA AND LEVODOPA 1 TABLET: 25; 100 TABLET ORAL at 22:10

## 2020-01-01 RX ADMIN — CARBIDOPA AND LEVODOPA 1 TABLET: 25; 100 TABLET ORAL at 07:58

## 2020-01-01 RX ADMIN — ONDANSETRON 4 MG: 2 INJECTION INTRAMUSCULAR; INTRAVENOUS at 20:42

## 2020-01-01 RX ADMIN — TOPIRAMATE 50 MG: 25 TABLET, FILM COATED ORAL at 08:12

## 2020-01-01 RX ADMIN — SERTRALINE HYDROCHLORIDE 50 MG: 50 TABLET ORAL at 08:43

## 2020-01-01 RX ADMIN — CARBIDOPA AND LEVODOPA 1 TABLET: 25; 100 TABLET ORAL at 15:33

## 2020-01-01 RX ADMIN — BUDESONIDE AND FORMOTEROL FUMARATE DIHYDRATE 2 PUFF: 160; 4.5 AEROSOL RESPIRATORY (INHALATION) at 19:27

## 2020-01-01 RX ADMIN — TRAZODONE HYDROCHLORIDE 150 MG: 50 TABLET ORAL at 21:32

## 2020-01-01 RX ADMIN — CARBIDOPA AND LEVODOPA 1 TABLET: 25; 100 TABLET ORAL at 11:18

## 2020-01-01 RX ADMIN — BUDESONIDE AND FORMOTEROL FUMARATE DIHYDRATE 2 PUFF: 160; 4.5 AEROSOL RESPIRATORY (INHALATION) at 07:17

## 2020-01-01 RX ADMIN — CARBIDOPA AND LEVODOPA 2 TABLET: 25; 100 TABLET ORAL at 22:28

## 2020-01-01 RX ADMIN — CARBIDOPA AND LEVODOPA 1 TABLET: 25; 100 TABLET ORAL at 20:44

## 2020-01-01 RX ADMIN — CARBIDOPA AND LEVODOPA 1 TABLET: 25; 100 TABLET ORAL at 16:33

## 2020-01-01 RX ADMIN — FOLIC ACID 1 MG: 1 TABLET ORAL at 09:49

## 2020-01-01 RX ADMIN — FUROSEMIDE 40 MG: 40 TABLET ORAL at 13:31

## 2020-01-01 RX ADMIN — Medication: at 07:03

## 2020-01-01 RX ADMIN — Medication 100 MG: at 22:57

## 2020-01-01 RX ADMIN — CARBIDOPA AND LEVODOPA 1 TABLET: 25; 100 TABLET ORAL at 00:16

## 2020-01-01 RX ADMIN — CARBIDOPA AND LEVODOPA 1 TABLET: 25; 100 TABLET ORAL at 22:47

## 2020-01-01 RX ADMIN — Medication 10 ML: at 08:20

## 2020-01-01 RX ADMIN — SPIRONOLACTONE 50 MG: 25 TABLET ORAL at 11:07

## 2020-01-01 RX ADMIN — Medication 10 ML: at 21:15

## 2020-01-01 RX ADMIN — OXYCODONE 5 MG: 5 TABLET ORAL at 07:58

## 2020-01-01 RX ADMIN — SUCRALFATE 1 G: 1 TABLET ORAL at 06:53

## 2020-01-01 RX ADMIN — DOPAMINE HYDROCHLORIDE 5 MCG/KG/MIN: 160 INJECTION, SOLUTION INTRAVENOUS at 01:18

## 2020-01-01 RX ADMIN — TRAZODONE HYDROCHLORIDE 150 MG: 50 TABLET ORAL at 21:15

## 2020-01-01 RX ADMIN — LEVOTHYROXINE SODIUM 25 MCG: 0.03 TABLET ORAL at 06:10

## 2020-01-01 RX ADMIN — FUROSEMIDE 40 MG: 40 TABLET ORAL at 09:14

## 2020-01-01 RX ADMIN — OXYCODONE 5 MG: 5 TABLET ORAL at 10:13

## 2020-01-01 RX ADMIN — FUROSEMIDE 40 MG: 40 TABLET ORAL at 11:09

## 2020-01-01 RX ADMIN — THIAMINE HYDROCHLORIDE 500 MG: 100 INJECTION, SOLUTION INTRAMUSCULAR; INTRAVENOUS at 15:53

## 2020-01-01 RX ADMIN — SODIUM CHLORIDE, POTASSIUM CHLORIDE, SODIUM LACTATE AND CALCIUM CHLORIDE: 600; 310; 30; 20 INJECTION, SOLUTION INTRAVENOUS at 14:21

## 2020-01-01 RX ADMIN — SERTRALINE HYDROCHLORIDE 50 MG: 50 TABLET ORAL at 11:15

## 2020-01-01 RX ADMIN — FOLIC ACID 5 MG: 1 TABLET ORAL at 09:55

## 2020-01-01 RX ADMIN — CARBIDOPA AND LEVODOPA 1 TABLET: 25; 100 TABLET ORAL at 15:40

## 2020-01-01 RX ADMIN — FUROSEMIDE 40 MG: 40 TABLET ORAL at 08:41

## 2020-01-01 RX ADMIN — Medication 10 ML: at 20:45

## 2020-01-01 RX ADMIN — Medication 10 ML: at 20:15

## 2020-01-01 RX ADMIN — LACTULOSE 20 G: 20 SOLUTION ORAL at 16:43

## 2020-01-01 RX ADMIN — THIAMINE HYDROCHLORIDE 500 MG: 100 INJECTION, SOLUTION INTRAMUSCULAR; INTRAVENOUS at 09:30

## 2020-01-01 RX ADMIN — IBUPROFEN 400 MG: 400 TABLET, FILM COATED ORAL at 13:03

## 2020-01-01 RX ADMIN — LEVOTHYROXINE SODIUM 25 MCG: 0.03 TABLET ORAL at 06:21

## 2020-01-01 RX ADMIN — TRAZODONE HYDROCHLORIDE 150 MG: 50 TABLET ORAL at 20:35

## 2020-01-01 RX ADMIN — LACTULOSE 20 G: 20 SOLUTION ORAL at 18:35

## 2020-01-01 RX ADMIN — MIDODRINE HYDROCHLORIDE 5 MG: 5 TABLET ORAL at 17:58

## 2020-01-01 RX ADMIN — FOLIC ACID 5 MG: 1 TABLET ORAL at 08:20

## 2020-01-01 RX ADMIN — PREGABALIN 25 MG: 25 CAPSULE ORAL at 18:58

## 2020-01-01 RX ADMIN — SPIRONOLACTONE 50 MG: 25 TABLET ORAL at 11:56

## 2020-01-01 RX ADMIN — MIDODRINE HYDROCHLORIDE 5 MG: 5 TABLET ORAL at 18:01

## 2020-01-01 RX ADMIN — CARBIDOPA AND LEVODOPA 1 TABLET: 25; 100 TABLET ORAL at 20:18

## 2020-01-01 RX ADMIN — PREGABALIN 25 MG: 25 CAPSULE ORAL at 20:27

## 2020-01-01 RX ADMIN — FUROSEMIDE 40 MG: 40 TABLET ORAL at 08:14

## 2020-01-01 RX ADMIN — PREGABALIN 75 MG: 75 CAPSULE ORAL at 14:31

## 2020-01-01 RX ADMIN — PREGABALIN 25 MG: 25 CAPSULE ORAL at 08:37

## 2020-01-01 RX ADMIN — CARBIDOPA AND LEVODOPA 1 TABLET: 25; 100 TABLET ORAL at 15:10

## 2020-01-01 RX ADMIN — CONIVAPTAN HYDROCHLORIDE 20 MG: 20 INJECTION, SOLUTION INTRAVENOUS at 10:31

## 2020-01-01 RX ADMIN — LACTULOSE 30 G: 20 SOLUTION ORAL at 21:26

## 2020-01-01 RX ADMIN — FUROSEMIDE 40 MG: 40 TABLET ORAL at 08:11

## 2020-01-01 RX ADMIN — LEVOTHYROXINE SODIUM 25 MCG: 0.03 TABLET ORAL at 06:24

## 2020-01-01 RX ADMIN — PANTOPRAZOLE SODIUM 20 MG: 20 TABLET, DELAYED RELEASE ORAL at 06:29

## 2020-01-01 RX ADMIN — LATANOPROST 1 DROP: 50 SOLUTION OPHTHALMIC at 20:57

## 2020-01-01 RX ADMIN — TRAZODONE HYDROCHLORIDE 150 MG: 50 TABLET ORAL at 20:27

## 2020-01-01 RX ADMIN — Medication 100 MG: at 08:49

## 2020-01-01 RX ADMIN — DOPAMINE HYDROCHLORIDE 5 MCG/KG/MIN: 160 INJECTION, SOLUTION INTRAVENOUS at 06:35

## 2020-01-01 RX ADMIN — Medication: at 15:55

## 2020-01-01 RX ADMIN — CARBIDOPA AND LEVODOPA 1 TABLET: 25; 100 TABLET ORAL at 15:08

## 2020-01-01 RX ADMIN — TOPIRAMATE 50 MG: 25 TABLET, FILM COATED ORAL at 10:30

## 2020-01-01 RX ADMIN — CARBIDOPA AND LEVODOPA 1 TABLET: 25; 100 TABLET ORAL at 15:52

## 2020-01-01 RX ADMIN — Medication: at 22:14

## 2020-01-01 RX ADMIN — ALBUMIN (HUMAN) 25 G: 0.25 INJECTION, SOLUTION INTRAVENOUS at 10:58

## 2020-01-01 RX ADMIN — CARBIDOPA AND LEVODOPA 1 TABLET: 25; 100 TABLET ORAL at 08:30

## 2020-01-01 RX ADMIN — BUDESONIDE AND FORMOTEROL FUMARATE DIHYDRATE 2 PUFF: 160; 4.5 AEROSOL RESPIRATORY (INHALATION) at 17:10

## 2020-01-01 RX ADMIN — LACTULOSE 10 G: 20 SOLUTION ORAL at 12:24

## 2020-01-01 RX ADMIN — OXYCODONE 5 MG: 5 TABLET ORAL at 21:35

## 2020-01-01 RX ADMIN — Medication 100 MG: at 07:57

## 2020-01-01 RX ADMIN — PANTOPRAZOLE SODIUM 40 MG: 40 TABLET, DELAYED RELEASE ORAL at 05:55

## 2020-01-01 RX ADMIN — THIAMINE HYDROCHLORIDE 500 MG: 100 INJECTION, SOLUTION INTRAMUSCULAR; INTRAVENOUS at 01:37

## 2020-01-01 RX ADMIN — CARBIDOPA AND LEVODOPA 1 TABLET: 25; 100 TABLET ORAL at 21:34

## 2020-01-01 RX ADMIN — DOXYCYCLINE 100 MG: 100 INJECTION, POWDER, LYOPHILIZED, FOR SOLUTION INTRAVENOUS at 10:34

## 2020-01-01 RX ADMIN — FOLIC ACID 5 MG: 1 TABLET ORAL at 15:52

## 2020-01-01 RX ADMIN — ALBUMIN (HUMAN) 25 G: 0.25 INJECTION, SOLUTION INTRAVENOUS at 14:31

## 2020-01-01 RX ADMIN — CARBIDOPA AND LEVODOPA 2 TABLET: 25; 100 TABLET ORAL at 15:08

## 2020-01-01 RX ADMIN — SPIRONOLACTONE 50 MG: 25 TABLET ORAL at 13:29

## 2020-01-01 RX ADMIN — LATANOPROST 1 DROP: 50 SOLUTION OPHTHALMIC at 20:38

## 2020-01-01 RX ADMIN — TRAZODONE HYDROCHLORIDE 200 MG: 100 TABLET ORAL at 00:16

## 2020-01-01 RX ADMIN — TRAZODONE HYDROCHLORIDE 200 MG: 100 TABLET ORAL at 23:26

## 2020-01-01 RX ADMIN — ACETAMINOPHEN 500 MG: 500 TABLET ORAL at 00:15

## 2020-01-01 RX ADMIN — LIDOCAINE HYDROCHLORIDE 8 ML: 20 INJECTION, SOLUTION INFILTRATION; PERINEURAL at 13:36

## 2020-01-01 RX ADMIN — ALBUMIN (HUMAN) 25 G: 0.25 INJECTION, SOLUTION INTRAVENOUS at 15:53

## 2020-01-01 RX ADMIN — SPIRONOLACTONE 100 MG: 25 TABLET ORAL at 08:43

## 2020-01-01 RX ADMIN — CARBIDOPA AND LEVODOPA 1 TABLET: 25; 100 TABLET ORAL at 20:38

## 2020-01-01 RX ADMIN — ALBUMIN (HUMAN) 25 G: 0.25 INJECTION, SOLUTION INTRAVENOUS at 18:49

## 2020-01-01 RX ADMIN — SUCRALFATE 1 G: 1 TABLET ORAL at 05:19

## 2020-01-01 RX ADMIN — TOPIRAMATE 50 MG: 25 TABLET, FILM COATED ORAL at 13:03

## 2020-01-01 RX ADMIN — PANTOPRAZOLE SODIUM 40 MG: 40 TABLET, DELAYED RELEASE ORAL at 05:51

## 2020-01-01 RX ADMIN — CARBIDOPA AND LEVODOPA 1 TABLET: 25; 100 TABLET ORAL at 20:27

## 2020-01-01 RX ADMIN — Medication 100 MG: at 20:18

## 2020-01-01 RX ADMIN — SPIRONOLACTONE 50 MG: 25 TABLET ORAL at 11:18

## 2020-01-01 RX ADMIN — SPIRONOLACTONE 50 MG: 25 TABLET ORAL at 17:48

## 2020-01-01 RX ADMIN — CARBIDOPA AND LEVODOPA 2 TABLET: 25; 100 TABLET ORAL at 21:33

## 2020-01-01 RX ADMIN — RIFAXIMIN 550 MG: 550 TABLET ORAL at 09:52

## 2020-01-01 RX ADMIN — CARBIDOPA AND LEVODOPA 1 TABLET: 25; 100 TABLET ORAL at 21:43

## 2020-01-01 RX ADMIN — CARBIDOPA AND LEVODOPA 1 TABLET: 25; 100 TABLET ORAL at 10:58

## 2020-01-01 RX ADMIN — Medication 10 ML: at 10:30

## 2020-01-01 RX ADMIN — BUDESONIDE AND FORMOTEROL FUMARATE DIHYDRATE 2 PUFF: 160; 4.5 AEROSOL RESPIRATORY (INHALATION) at 20:23

## 2020-01-01 RX ADMIN — ALBUMIN (HUMAN) 25 G: 0.25 INJECTION, SOLUTION INTRAVENOUS at 11:20

## 2020-01-01 RX ADMIN — SPIRONOLACTONE 50 MG: 25 TABLET ORAL at 08:30

## 2020-01-01 RX ADMIN — Medication 10 ML: at 08:43

## 2020-01-01 RX ADMIN — SPIRONOLACTONE 100 MG: 25 TABLET ORAL at 08:13

## 2020-01-01 RX ADMIN — FOLIC ACID 5 MG: 1 TABLET ORAL at 08:49

## 2020-01-01 RX ADMIN — ATORVASTATIN CALCIUM 10 MG: 10 TABLET, FILM COATED ORAL at 11:15

## 2020-01-01 RX ADMIN — CARBIDOPA AND LEVODOPA 1 TABLET: 25; 100 TABLET ORAL at 15:49

## 2020-01-01 RX ADMIN — LACTULOSE 10 G: 20 SOLUTION ORAL at 08:15

## 2020-01-01 RX ADMIN — Medication 100 MG: at 08:41

## 2020-01-01 RX ADMIN — TRAZODONE HYDROCHLORIDE 150 MG: 50 TABLET ORAL at 21:34

## 2020-01-01 RX ADMIN — Medication 1 ML: at 22:48

## 2020-01-01 RX ADMIN — TOPIRAMATE 50 MG: 25 TABLET, FILM COATED ORAL at 08:41

## 2020-01-01 RX ADMIN — PANTOPRAZOLE SODIUM 20 MG: 20 TABLET, DELAYED RELEASE ORAL at 06:05

## 2020-01-01 RX ADMIN — Medication 10 ML: at 10:59

## 2020-01-01 RX ADMIN — Medication 100 MG: at 22:54

## 2020-01-01 RX ADMIN — CARBIDOPA AND LEVODOPA 1 TABLET: 25; 100 TABLET ORAL at 09:10

## 2020-01-01 RX ADMIN — TOPIRAMATE 50 MG: 25 TABLET, FILM COATED ORAL at 09:10

## 2020-01-01 RX ADMIN — Medication 100 MG: at 09:56

## 2020-01-01 RX ADMIN — SPIRONOLACTONE 50 MG: 25 TABLET ORAL at 08:34

## 2020-01-01 RX ADMIN — LACTULOSE 30 G: 20 SOLUTION ORAL at 09:54

## 2020-01-01 RX ADMIN — PREGABALIN 25 MG: 25 CAPSULE ORAL at 08:41

## 2020-01-01 RX ADMIN — TOPIRAMATE 50 MG: 25 TABLET, FILM COATED ORAL at 08:35

## 2020-01-01 RX ADMIN — PREGABALIN 25 MG: 25 CAPSULE ORAL at 22:54

## 2020-01-01 RX ADMIN — TOPIRAMATE 50 MG: 25 TABLET, FILM COATED ORAL at 08:30

## 2020-01-01 RX ADMIN — PANTOPRAZOLE SODIUM 20 MG: 20 TABLET, DELAYED RELEASE ORAL at 06:10

## 2020-01-01 RX ADMIN — SODIUM CHLORIDE 1000 ML: 9 INJECTION, SOLUTION INTRAVENOUS at 17:47

## 2020-01-01 RX ADMIN — SPIRONOLACTONE 50 MG: 25 TABLET ORAL at 15:50

## 2020-01-01 RX ADMIN — CARBIDOPA AND LEVODOPA 1 TABLET: 25; 100 TABLET ORAL at 13:47

## 2020-01-01 RX ADMIN — CARBIDOPA AND LEVODOPA 1 TABLET: 25; 100 TABLET ORAL at 11:14

## 2020-01-01 RX ADMIN — OXYCODONE 5 MG: 5 TABLET ORAL at 08:58

## 2020-01-01 RX ADMIN — ANALGESIC BALM: 1.74; 4.06 OINTMENT TOPICAL at 13:35

## 2020-01-01 RX ADMIN — FUROSEMIDE 40 MG: 40 TABLET ORAL at 09:50

## 2020-01-01 SDOH — HEALTH STABILITY: MENTAL HEALTH
STRESS IS WHEN SOMEONE FEELS TENSE, NERVOUS, ANXIOUS, OR CAN'T SLEEP AT NIGHT BECAUSE THEIR MIND IS TROUBLED. HOW STRESSED ARE YOU?: ONLY A LITTLE

## 2020-01-01 SDOH — SOCIAL STABILITY: SOCIAL NETWORK
DO YOU BELONG TO ANY CLUBS OR ORGANIZATIONS SUCH AS CHURCH GROUPS UNIONS, FRATERNAL OR ATHLETIC GROUPS, OR SCHOOL GROUPS?: NO

## 2020-01-01 SDOH — ECONOMIC STABILITY: FOOD INSECURITY: WITHIN THE PAST 12 MONTHS, YOU WORRIED THAT YOUR FOOD WOULD RUN OUT BEFORE YOU GOT MONEY TO BUY MORE.: NEVER TRUE

## 2020-01-01 SDOH — SOCIAL STABILITY: SOCIAL NETWORK
IN A TYPICAL WEEK, HOW MANY TIMES DO YOU TALK ON THE PHONE WITH FAMILY, FRIENDS, OR NEIGHBORS?: MORE THAN THREE TIMES A WEEK

## 2020-01-01 SDOH — SOCIAL STABILITY: SOCIAL NETWORK: HOW OFTEN DO YOU ATTENT MEETINGS OF THE CLUB OR ORGANIZATION YOU BELONG TO?: NEVER

## 2020-01-01 SDOH — ECONOMIC STABILITY: TRANSPORTATION INSECURITY
IN THE PAST 12 MONTHS, HAS THE LACK OF TRANSPORTATION KEPT YOU FROM MEDICAL APPOINTMENTS OR FROM GETTING MEDICATIONS?: YES

## 2020-01-01 SDOH — ECONOMIC STABILITY: FOOD INSECURITY: WITHIN THE PAST 12 MONTHS, THE FOOD YOU BOUGHT JUST DIDN'T LAST AND YOU DIDN'T HAVE MONEY TO GET MORE.: NEVER TRUE

## 2020-01-01 SDOH — ECONOMIC STABILITY: INCOME INSECURITY: HOW HARD IS IT FOR YOU TO PAY FOR THE VERY BASICS LIKE FOOD, HOUSING, MEDICAL CARE, AND HEATING?: SOMEWHAT HARD

## 2020-01-01 SDOH — SOCIAL STABILITY: SOCIAL NETWORK: HOW OFTEN DO YOU ATTEND CHURCH OR RELIGIOUS SERVICES?: 1 TO 4 TIMES PER YEAR

## 2020-01-01 SDOH — SOCIAL STABILITY: SOCIAL INSECURITY: WITHIN THE LAST YEAR, HAVE YOU BEEN AFRAID OF YOUR PARTNER OR EX-PARTNER?: NO

## 2020-01-01 SDOH — HEALTH STABILITY: PHYSICAL HEALTH: ON AVERAGE, HOW MANY DAYS PER WEEK DO YOU ENGAGE IN MODERATE TO STRENUOUS EXERCISE (LIKE A BRISK WALK)?: 0 DAYS

## 2020-01-01 SDOH — SOCIAL STABILITY: SOCIAL INSECURITY
WITHIN THE LAST YEAR, HAVE YOU BEEN KICKED, HIT, SLAPPED, OR OTHERWISE PHYSICALLY HURT BY YOUR PARTNER OR EX-PARTNER?: NO

## 2020-01-01 SDOH — SOCIAL STABILITY: SOCIAL NETWORK: ARE YOU MARRIED, WIDOWED, DIVORCED, SEPARATED, NEVER MARRIED, OR LIVING WITH A PARTNER?: WIDOWED

## 2020-01-01 SDOH — SOCIAL STABILITY: SOCIAL INSECURITY
WITHIN THE LAST YEAR, HAVE TO BEEN RAPED OR FORCED TO HAVE ANY KIND OF SEXUAL ACTIVITY BY YOUR PARTNER OR EX-PARTNER?: NO

## 2020-01-01 SDOH — HEALTH STABILITY: PHYSICAL HEALTH: ON AVERAGE, HOW MANY MINUTES DO YOU ENGAGE IN EXERCISE AT THIS LEVEL?: 0 MIN

## 2020-01-01 SDOH — SOCIAL STABILITY: SOCIAL INSECURITY: WITHIN THE LAST YEAR, HAVE YOU BEEN HUMILIATED OR EMOTIONALLY ABUSED IN OTHER WAYS BY YOUR PARTNER OR EX-PARTNER?: NO

## 2020-01-01 SDOH — SOCIAL STABILITY: SOCIAL NETWORK: HOW OFTEN DO YOU GET TOGETHER WITH FRIENDS OR RELATIVES?: MORE THAN THREE TIMES A WEEK

## 2020-01-01 SDOH — ECONOMIC STABILITY: TRANSPORTATION INSECURITY
IN THE PAST 12 MONTHS, HAS LACK OF TRANSPORTATION KEPT YOU FROM MEETINGS, WORK, OR FROM GETTING THINGS NEEDED FOR DAILY LIVING?: YES

## 2020-01-01 ASSESSMENT — ENCOUNTER SYMPTOMS
NAUSEA: 0
DIARRHEA: 0
DIARRHEA: 0
BACK PAIN: 0
FACIAL SWELLING: 0
PHOTOPHOBIA: 0
SHORTNESS OF BREATH: 0
RESPIRATORY NEGATIVE: 1
CONSTIPATION: 0
COUGH: 0
NAUSEA: 0
CHOKING: 0
ABDOMINAL PAIN: 0
RESPIRATORY NEGATIVE: 1
CONSTIPATION: 0
ABDOMINAL DISTENTION: 1
CONSTIPATION: 0
VISUAL CHANGE: 0
COUGH: 0
RESPIRATORY NEGATIVE: 1
VOMITING: 0
SHORTNESS OF BREATH: 0
BLOOD IN STOOL: 0
WHEEZING: 0
SHORTNESS OF BREATH: 1
EYE REDNESS: 0
VOMITING: 0
WHEEZING: 0
BACK PAIN: 0
DIARRHEA: 0
PHOTOPHOBIA: 0
RECTAL PAIN: 0
PHOTOPHOBIA: 0
GASTROINTESTINAL NEGATIVE: 1
SHORTNESS OF BREATH: 0
NAUSEA: 0
EYE REDNESS: 0
ABDOMINAL PAIN: 0
ABDOMINAL PAIN: 1
VOMITING: 0
COUGH: 0
ABDOMINAL DISTENTION: 1
COUGH: 0
CONSTIPATION: 1
EYES NEGATIVE: 1
WHEEZING: 0
SHORTNESS OF BREATH: 1
DIARRHEA: 0
NAUSEA: 0
NAUSEA: 0
EYES NEGATIVE: 1
CHEST TIGHTNESS: 0
ABDOMINAL PAIN: 0
STRIDOR: 0
ANAL BLEEDING: 0
ABDOMINAL PAIN: 0
NAUSEA: 0
TROUBLE SWALLOWING: 0
DIARRHEA: 0
VOMITING: 0
WHEEZING: 0
EYE PAIN: 0
ALLERGIC/IMMUNOLOGIC NEGATIVE: 1
GASTROINTESTINAL NEGATIVE: 1
RESPIRATORY NEGATIVE: 1
WHEEZING: 0
COUGH: 0
EYES NEGATIVE: 1
DIARRHEA: 0
ABDOMINAL PAIN: 0
RECTAL PAIN: 0
ABDOMINAL PAIN: 0
BACK PAIN: 0
DIARRHEA: 0
BACK PAIN: 0
COUGH: 0
BLOOD IN STOOL: 0
BACK PAIN: 1
NAUSEA: 0
VOMITING: 0
RECTAL PAIN: 0
COLOR CHANGE: 1
ABDOMINAL DISTENTION: 1
BACK PAIN: 1
VOMITING: 0
COLOR CHANGE: 0
BACK PAIN: 0
ANAL BLEEDING: 0
CHEST TIGHTNESS: 0
SHORTNESS OF BREATH: 0
DIARRHEA: 0
SORE THROAT: 0
GASTROINTESTINAL NEGATIVE: 1
ABDOMINAL PAIN: 0
NAUSEA: 0
SHORTNESS OF BREATH: 0
PHOTOPHOBIA: 0
BLOOD IN STOOL: 0
NAUSEA: 0
GASTROINTESTINAL NEGATIVE: 1
VOMITING: 0
NAUSEA: 0
RECTAL PAIN: 0
NAUSEA: 0
SHORTNESS OF BREATH: 0
EYE REDNESS: 0
SHORTNESS OF BREATH: 0
PHOTOPHOBIA: 0
ABDOMINAL DISTENTION: 1
BACK PAIN: 0
DIARRHEA: 0
GASTROINTESTINAL NEGATIVE: 1
EYES NEGATIVE: 1
ABDOMINAL PAIN: 0
BLOOD IN STOOL: 0
COUGH: 0
ABDOMINAL PAIN: 0
CONSTIPATION: 0
ANAL BLEEDING: 0
ABDOMINAL DISTENTION: 0
EYE DISCHARGE: 0
BLOOD IN STOOL: 0
COUGH: 1
COUGH: 0
EYES NEGATIVE: 1
CONSTIPATION: 0
RESPIRATORY NEGATIVE: 1
NAUSEA: 0
PHOTOPHOBIA: 0
VOMITING: 0
SHORTNESS OF BREATH: 0
ABDOMINAL PAIN: 0
BLOOD IN STOOL: 0
DIARRHEA: 0
VOMITING: 0
DIARRHEA: 0
BACK PAIN: 1
RHINORRHEA: 0
WHEEZING: 0
ABDOMINAL PAIN: 0
ABDOMINAL DISTENTION: 1
SHORTNESS OF BREATH: 0
CONSTIPATION: 0
SHORTNESS OF BREATH: 0
ABDOMINAL DISTENTION: 1
CONSTIPATION: 0
ABDOMINAL PAIN: 0
VOMITING: 0
VOMITING: 0
BACK PAIN: 1
SHORTNESS OF BREATH: 0
ABDOMINAL DISTENTION: 1
TROUBLE SWALLOWING: 0
ABDOMINAL DISTENTION: 1
VOMITING: 0
BACK PAIN: 0
NAUSEA: 0
EYES NEGATIVE: 1
EYE PAIN: 0
WHEEZING: 0
ABDOMINAL PAIN: 1
DIARRHEA: 0
SHORTNESS OF BREATH: 0
RHINORRHEA: 0
PHOTOPHOBIA: 0
SHORTNESS OF BREATH: 0
WHEEZING: 0
ABDOMINAL DISTENTION: 0
RESPIRATORY NEGATIVE: 1
EYES NEGATIVE: 1
DIARRHEA: 0
BACK PAIN: 1
NAUSEA: 0
DIARRHEA: 0
ABDOMINAL PAIN: 0
RESPIRATORY NEGATIVE: 1
ABDOMINAL DISTENTION: 1
ABDOMINAL PAIN: 0
EYES NEGATIVE: 1
COUGH: 1
SHORTNESS OF BREATH: 0
COUGH: 0
WHEEZING: 0
NAUSEA: 0
CONSTIPATION: 0
SORE THROAT: 0
COLOR CHANGE: 1
CHOKING: 0
ABDOMINAL PAIN: 0
SHORTNESS OF BREATH: 0
EYES NEGATIVE: 1
CONSTIPATION: 0
BLOOD IN STOOL: 0
CONSTIPATION: 0
SHORTNESS OF BREATH: 0
SHORTNESS OF BREATH: 1
COLOR CHANGE: 0
EYES NEGATIVE: 1
DIARRHEA: 0
RESPIRATORY NEGATIVE: 1
ANAL BLEEDING: 0
DIARRHEA: 0
EYES NEGATIVE: 1
ALLERGIC/IMMUNOLOGIC NEGATIVE: 1
SHORTNESS OF BREATH: 1
COUGH: 0
EYES NEGATIVE: 1
CONSTIPATION: 0
BACK PAIN: 0
ANAL BLEEDING: 0
NAUSEA: 0
WHEEZING: 0
ABDOMINAL PAIN: 0
BACK PAIN: 1
VOMITING: 0
VOMITING: 0
GASTROINTESTINAL NEGATIVE: 1
COLOR CHANGE: 0
VOMITING: 0
CONSTIPATION: 1
EYES NEGATIVE: 1
NAUSEA: 0
GASTROINTESTINAL NEGATIVE: 1
SINUS PAIN: 0
RESPIRATORY NEGATIVE: 1
ABDOMINAL DISTENTION: 0
WHEEZING: 0
COLOR CHANGE: 1

## 2020-01-01 ASSESSMENT — PAIN DESCRIPTION - PAIN TYPE
TYPE: ACUTE PAIN
TYPE: CHRONIC PAIN
TYPE: ACUTE PAIN
TYPE: CHRONIC PAIN
TYPE: ACUTE PAIN
TYPE: CHRONIC PAIN
TYPE: ACUTE PAIN

## 2020-01-01 ASSESSMENT — PAIN DESCRIPTION - LOCATION
LOCATION: HEAD
LOCATION: BACK
LOCATION: LEG
LOCATION: WRIST
LOCATION: BACK;TOE (COMMENT WHICH ONE)
LOCATION: HIP
LOCATION: HEAD
LOCATION: BACK
LOCATION: BACK
LOCATION: HEAD
LOCATION: BACK
LOCATION: BACK
LOCATION: HEAD
LOCATION: BACK
LOCATION: HEAD;BUTTOCKS

## 2020-01-01 ASSESSMENT — PAIN DESCRIPTION - ORIENTATION
ORIENTATION: MID
ORIENTATION: MID
ORIENTATION: OTHER (COMMENT)
ORIENTATION: UPPER
ORIENTATION: RIGHT
ORIENTATION: MID
ORIENTATION: LEFT
ORIENTATION: MID;LOWER;UPPER
ORIENTATION: LEFT
ORIENTATION: LEFT;UPPER
ORIENTATION: LEFT;RIGHT

## 2020-01-01 ASSESSMENT — PAIN SCALES - GENERAL
PAINLEVEL_OUTOF10: 0
PAINLEVEL_OUTOF10: 6
PAINLEVEL_OUTOF10: 0
PAINLEVEL_OUTOF10: 8
PAINLEVEL_OUTOF10: 7
PAINLEVEL_OUTOF10: 6
PAINLEVEL_OUTOF10: 7
PAINLEVEL_OUTOF10: 0
PAINLEVEL_OUTOF10: 2
PAINLEVEL_OUTOF10: 0
PAINLEVEL_OUTOF10: 7
PAINLEVEL_OUTOF10: 0
PAINLEVEL_OUTOF10: 7
PAINLEVEL_OUTOF10: 6
PAINLEVEL_OUTOF10: 7
PAINLEVEL_OUTOF10: 7
PAINLEVEL_OUTOF10: 0
PAINLEVEL_OUTOF10: 7
PAINLEVEL_OUTOF10: 7
PAINLEVEL_OUTOF10: 0
PAINLEVEL_OUTOF10: 0
PAINLEVEL_OUTOF10: 1
PAINLEVEL_OUTOF10: 5
PAINLEVEL_OUTOF10: 7
PAINLEVEL_OUTOF10: 0
PAINLEVEL_OUTOF10: 7
PAINLEVEL_OUTOF10: 7
PAINLEVEL_OUTOF10: 0
PAINLEVEL_OUTOF10: 0
PAINLEVEL_OUTOF10: 10
PAINLEVEL_OUTOF10: 7
PAINLEVEL_OUTOF10: 2
PAINLEVEL_OUTOF10: 7
PAINLEVEL_OUTOF10: 9
PAINLEVEL_OUTOF10: 7
PAINLEVEL_OUTOF10: 7
PAINLEVEL_OUTOF10: 5
PAINLEVEL_OUTOF10: 7
PAINLEVEL_OUTOF10: 0
PAINLEVEL_OUTOF10: 7
PAINLEVEL_OUTOF10: 7
PAINLEVEL_OUTOF10: 0
PAINLEVEL_OUTOF10: 10
PAINLEVEL_OUTOF10: 3
PAINLEVEL_OUTOF10: 0
PAINLEVEL_OUTOF10: 6
PAINLEVEL_OUTOF10: 0
PAINLEVEL_OUTOF10: 7
PAINLEVEL_OUTOF10: 8
PAINLEVEL_OUTOF10: 0
PAINLEVEL_OUTOF10: 7
PAINLEVEL_OUTOF10: 7
PAINLEVEL_OUTOF10: 0
PAINLEVEL_OUTOF10: 8
PAINLEVEL_OUTOF10: 0
PAINLEVEL_OUTOF10: 4
PAINLEVEL_OUTOF10: 7
PAINLEVEL_OUTOF10: 0

## 2020-01-01 ASSESSMENT — PAIN DESCRIPTION - DESCRIPTORS
DESCRIPTORS: SORE
DESCRIPTORS: HEADACHE
DESCRIPTORS: ACHING
DESCRIPTORS: HEADACHE
DESCRIPTORS: HEADACHE
DESCRIPTORS: ACHING
DESCRIPTORS: SORE
DESCRIPTORS: SORE;ACHING
DESCRIPTORS: OTHER (COMMENT)
DESCRIPTORS: THROBBING

## 2020-01-01 ASSESSMENT — PAIN DESCRIPTION - ONSET
ONSET: GRADUAL
ONSET: ON-GOING
ONSET: ON-GOING

## 2020-01-01 ASSESSMENT — PAIN DESCRIPTION - PROGRESSION
CLINICAL_PROGRESSION: GRADUALLY WORSENING
CLINICAL_PROGRESSION: GRADUALLY IMPROVING
CLINICAL_PROGRESSION: NOT CHANGED
CLINICAL_PROGRESSION: GRADUALLY WORSENING
CLINICAL_PROGRESSION: NOT CHANGED
CLINICAL_PROGRESSION: GRADUALLY WORSENING

## 2020-01-01 ASSESSMENT — PAIN DESCRIPTION - FREQUENCY
FREQUENCY: CONTINUOUS
FREQUENCY: INTERMITTENT
FREQUENCY: CONTINUOUS
FREQUENCY: INTERMITTENT
FREQUENCY: INTERMITTENT
FREQUENCY: CONTINUOUS
FREQUENCY: INTERMITTENT
FREQUENCY: INTERMITTENT

## 2020-01-01 ASSESSMENT — PULMONARY FUNCTION TESTS
PIF_VALUE: 0
PIF_VALUE: 1
PIF_VALUE: 0
PIF_VALUE: 1
PIF_VALUE: 0
PIF_VALUE: 1
PIF_VALUE: 0

## 2020-01-01 ASSESSMENT — LIFESTYLE VARIABLES: HOW OFTEN DO YOU HAVE A DRINK CONTAINING ALCOHOL: 0

## 2020-01-01 ASSESSMENT — PAIN - FUNCTIONAL ASSESSMENT
PAIN_FUNCTIONAL_ASSESSMENT: 0-10
PAIN_FUNCTIONAL_ASSESSMENT: PREVENTS OR INTERFERES WITH MANY ACTIVE NOT PASSIVE ACTIVITIES

## 2020-01-01 ASSESSMENT — PAIN DESCRIPTION - DIRECTION
RADIATING_TOWARDS: HEADACHE
RADIATING_TOWARDS: HEADACHE

## 2020-01-01 ASSESSMENT — PATIENT HEALTH QUESTIONNAIRE - PHQ9
SUM OF ALL RESPONSES TO PHQ QUESTIONS 1-9: 0
SUM OF ALL RESPONSES TO PHQ QUESTIONS 1-9: 0

## 2020-01-14 NOTE — TELEPHONE ENCOUNTER
[place]'s daughter calls asking that the meds ordered yesterday be sent to [facility]. I see them in her med list but I cannot tell where they went.

## 2020-01-28 NOTE — PROGRESS NOTES
Subjective:     Home Raphael is a 82 y.o. female who complains today of:     Chief Complaint   Patient presents with    Follow-up     Cough    Results     CT       HPI  Patient presents for cough    Patient had partial response to Symbicort, she later had anemia with drop of hemoglobin from 10 to 7  and worsening shortness of breath received blood transfusion cough has been worse since then, no fever or chills, she does report dyspnea on exertion improved after blood transfusion, she has worsening lower extremity edema, she denies reflux and she takes PPI, she does report daytime sleepiness and tiredness, positive snoring, no choking while asleep, no skin rash, no nasal congestion or postnasal drip. Allergies:  Adhesive tape; Amoxicillin-pot clavulanate; Bactrim [sulfamethoxazole-trimethoprim]; Clarithromycin; Other; and Sulfa antibiotics  Past Medical History:   Diagnosis Date    Asthma     Fibromyalgia     Hemorrhoid     Hyperlipidemia     Hypertension     [facility]'s disease ([facility] [address]     Type II or unspecified type diabetes mellitus without mention of complication, not stated as uncontrolled      Past Surgical History:   Procedure Laterality Date    CHOLECYSTECTOMY      EYE SURGERY      HEMORRHOID SURGERY      HERNIA REPAIR  2/20/2002    ventral hernia    HERNIA REPAIR      LIH repair    HYSTERECTOMY      JOINT REPLACEMENT Right     partial left, full right    LAMINECTOMY       Family History   Problem Relation Age of Onset    High Blood Pressure Neg Hx      Social History     Socioeconomic History    Marital status:       Spouse name: Not on file    Number of children: Not on file    Years of education: Not on file    Highest education level: Not on file   Occupational History    Not on file   Social Needs    Financial resource strain: Not on file    Food insecurity:     Worry: Not on file     Inability: Not on file    Transportation needs:     Medical: Not on file Non-medical: Not on file   Tobacco Use    Smoking status: Never Smoker    Smokeless tobacco: Never Used   Substance and Sexual Activity    Alcohol use: No    Drug use: No    Sexual activity: Not on file   Lifestyle    Physical activity:     Days per week: Not on file     Minutes per session: Not on file    Stress: Not on file   Relationships    Social connections:     Talks on phone: Not on file     Gets together: Not on file     Attends Yazidi service: Not on file     Active member of club or organization: Not on file     Attends meetings of clubs or organizations: Not on file     Relationship status: Not on file    Intimate partner violence:     Fear of current or ex partner: Not on file     Emotionally abused: Not on file     Physically abused: Not on file     Forced sexual activity: Not on file   Other Topics Concern    Not on file   Social History Narrative    Not on file         Review of Systems      ROS: 10 organs review of system is done including general, psychological, ENT, hematological, endocrine, respiratory, cardiovascular, gastrointestinal,musculoskeletal, neurological,  allergy and Immunology is done and is otherwise negative.     Current Outpatient Medications   Medication Sig Dispense Refill    furosemide (LASIX) 20 MG tablet Take 1 tablet by mouth daily for 5 days 5 tablet 0    irbesartan (AVAPRO) 75 MG tablet Take 1 tablet by mouth daily Take 1 table by mouth once daily    NO FUTHER REFILLS UNTIL BLOOD WORK AND FOLLOW UP WHEN DONE 30 tablet 0    metFORMIN (GLUCOPHAGE-XR) 500 MG extended release tablet Take 1 tablet by mouth daily (with breakfast) NO FURTHER REFILLS UNTIL BLOOD WORK AND FOLLOW UP IS DONE 30 tablet 0    lovastatin (MEVACOR) 10 MG tablet Take 1 tablet by mouth nightly No further refill until follow up done 30 tablet 0    topiramate (TOPAMAX) 50 MG tablet Take 1 tablet by mouth daily 30 tablet 0    traZODone (DESYREL) 100 MG tablet Take 2 tablets by mouth once

## 2020-01-29 PROBLEM — K64.9 HEMORRHOIDS: Status: ACTIVE | Noted: 2020-01-01

## 2020-01-29 NOTE — PROGRESS NOTES
palpitations. Gastrointestinal: Negative for abdominal pain, diarrhea and nausea. Genitourinary: Negative for hematuria. Neurological: Negative for dizziness, syncope and headaches. Psychiatric/Behavioral: Positive for sleep disturbance. Negative for self-injury and suicidal ideas. The patient is nervous/anxious. Objective:   BP (!) 116/58 (Site: Right Upper Arm, Position: Sitting, Cuff Size: Medium Adult)   Pulse 91   Temp 97.7 °F (36.5 °C) (Temporal)   Ht 5' 1\" (1.549 m)   Wt 184 lb 9.6 oz (83.7 kg)   SpO2 98%   Breastfeeding No   BMI 34.88 kg/m²     Physical Exam  Constitutional:       Appearance: She is well-developed. HENT:      Head: Normocephalic. Right Ear: Tympanic membrane, ear canal and external ear normal.      Left Ear: Tympanic membrane, ear canal and external ear normal.      Nose: Nose normal.      Mouth/Throat:      Mouth: Mucous membranes are moist.      Pharynx: Oropharynx is clear. Uvula midline. Eyes:      General:         Right eye: No discharge. Left eye: No discharge. Conjunctiva/sclera: Conjunctivae normal.   Neck:      Musculoskeletal: Normal range of motion. Cardiovascular:      Rate and Rhythm: Normal rate and regular rhythm. Heart sounds: Normal heart sounds. Pulmonary:      Effort: Pulmonary effort is normal. No respiratory distress. Breath sounds: Normal breath sounds. No wheezing, rhonchi or rales. Lymphadenopathy:      Cervical: No cervical adenopathy. Skin:     General: Skin is warm and dry. Neurological:      Mental Status: She is alert and oriented to person, place, and time. Psychiatric:         Mood and Affect: Mood normal.         Behavior: Behavior normal.         Assessment:          Diagnosis Orders   1. Type 2 diabetes mellitus without complication, without long-term current use of insulin ([facility])  Hemoglobin A1C    metFORMIN (GLUCOPHAGE-XR) 500 MG extended release tablet   2.  Iron deficiency anemia, MG tablet     Sig: Take 1 tablet by mouth nightly     Dispense:  30 tablet     Refill:  3    topiramate (TOPAMAX) 50 MG tablet     Sig: Take 1 tablet by mouth daily     Dispense:  30 tablet     Refill:  3    traZODone (DESYREL) 100 MG tablet     Sig: Take 2 tablets by mouth once daily at bedtime. Dispense:  60 tablet     Refill:  3       Return in about 3 months (around 4/29/2020), or if symptoms worsen or fail to improve. Insomnia/anxiety- Discussed okay for small refill to be used nightly prn, but if she is using often will need to further evaluate if she needs something that is better suited for more regular basis. HTN/ DM2- Conditions chronic and stable. Continue current treatment plan. Will update with results of blood work and any changes if needed. Anemia- Check CBC for stability. Keep f/u with hem. Jocelyn Salinas- Keep f/u with Dr. Kezia Fitzgerald. Leg swelling- okay for lasix. Encouraged keeping legs elevated. F/u if not improved after lasix. Reviewed Echo from 1 year ago and EF 65%, lungs CTA, and heart sounds normal. Likely related to infusions. Reviewed with the patient: current clinicalstatus, medications, activities and diet. Side effects, adverse effects of the medication prescribedtoday, as well as treatment plan/ rationale and result expectations have been discussedwith the patient who expresses understanding and desires to proceed. Close follow upto evaluate treatment results and for coordination of care. I have reviewedthe patient's medical history in detail and updated the computerized patient record.     Lisa Spears, APRN - CNP

## 2020-01-29 NOTE — PROGRESS NOTES
Vaccine Information Sheet, \"Influenza - Inactivated\"  given to [place], or parent/legal guardian of  [place] and verbalized understanding. Patient responses:    Have you ever had a reaction to a flu vaccine? No  Are you able to eat eggs without adverse effects? Yes  Do you have any current illness? No  Have you ever had Guillian Gilber[place] Syndrome? No    Flu vaccine given per order. Please see immunization tab.

## 2020-02-04 NOTE — TELEPHONE ENCOUNTER
From: [place]  To: Aki Ruiz MD  Sent: 2/4/2020 1:36 PM EST  Subject: Prescription Question    My Mother started the lasix on Jan 30th. Last pill was Feb 3rd. She still had a little swelling but lasix greatly helped. Swelling in right ankle has increased today. Can she have refill of lasix?   Thank you,  Horace Salinas's daughter  [phone]

## 2020-02-13 NOTE — TELEPHONE ENCOUNTER
The patient is scheduled in the OR with Dr. Mateo Meng on 2/21/20. Her daughter wanted you to know she does not have a Gallbladder. Does she still need to schedule a Gallbladder Ultra Sound that was ordered. Please advise and a 6 week follow up needs to be scheduled.    Thank you

## 2020-02-13 NOTE — PROGRESS NOTES
by mouth. [facility] carbidopa-levodopa (SINEMET)  MG per tablet Take 1 tablet by mouth 1 po bid per dr robb goldstein neurologist      latanoprost (XALATAN) 0.005 % ophthalmic solution Use 1 Drop in both eyes daily at bedtime.  tiZANidine (ZANAFLEX) 2 MG tablet Take 2 mg by mouth Take 2 mg by mouth bid prn      furosemide (LASIX) 20 MG tablet Take 1 tablet by mouth daily for 5 days 5 tablet 0     No current facility-administered medications on file prior to visit. Family History   Problem Relation Age of Onset    High Blood Pressure Neg Hx       Social History     Socioeconomic History    Marital status:      Spouse name: None    Number of children: None    Years of education: None    Highest education level: None   Occupational History    None   Social Needs    Financial resource strain: None    Food insecurity:     Worry: None     Inability: None    Transportation needs:     Medical: None     Non-medical: None   Tobacco Use    Smoking status: Never Smoker    Smokeless tobacco: Never Used   Substance and Sexual Activity    Alcohol use: No    Drug use: No    Sexual activity: None   Lifestyle    Physical activity:     Days per week: None     Minutes per session: None    Stress: None   Relationships    Social connections:     Talks on phone: None     Gets together: None     Attends Amish service: None     Active member of club or organization: None     Attends meetings of clubs or organizations: None     Relationship status: None    Intimate partner violence:     Fear of current or ex partner: None     Emotionally abused: None     Physically abused: None     Forced sexual activity: None   Other Topics Concern    None   Social History Narrative    None       Pulse 69, resp. rate 12, height 5' 1\" (1.549 m), weight 179 lb (81.2 kg), SpO2 97 %, not currently breastfeeding. Physical Exam  Constitutional:       Appearance: She is well-developed.    HENT:      Head: Normocephalic and

## 2020-02-21 NOTE — ANESTHESIA PRE PROCEDURE
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    Chronic frontal sinusitis J32.1    Controlled type 2 diabetes mellitus without complication, without long-term current use of insulin (HCC) E11.9    Depression F32.9    DISH (diffuse idiopathic skeletal hyperostosis) M48.10    Fitting and adjustment of orthopedic device Z46.89    Closed fracture of part of upper end of humerus S42.209A    Fx phalanx, foot-closed S92.919A    History of trabeculectomy Z98.890    Hyperlipidemia E78.5    Hypertension I10    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    Joint replaced by other means Z96.698    Lens replaced by other means Z96.1    Liver cirrhosis (HCC) K74.60    Lumbago without sciatica M54.5    Lumbar spondylosis M47.816    LVH (left ventricular hypertrophy) I51.7    Myalgia and myositis, unspecified [id number]    Osteoarthritis of shoulder M19.019    Osteoarthritis of multiple joints M15.9    Parkinson disease ([address] G20    POAG (primary open-angle glaucoma) H40.1190    Diabetic polyneuropathy ([facility] [address] E11.42    Pseudogout of hand M11.249    Rotator cuff (capsule) sprain S43.429A    S/P left unicompartmental knee replacement Z96.652    Knee joint replacement by other means Z96.659    Sclerosis of the skin L98.9    Seborrheic keratosis L82.1    Secondary osteoarthritis of multiple sites M15.3    Shoulder impingement M75.40    Sprain and strain of unspecified site of shoulder and upper arm [id number]    Synovitis of hand M65.9    Thrombocytopenia (HCC) D69.6    Vitamin D deficiency E55.9    Chest pain R07.9    RANDOLPH (dyspnea on exertion) R06.09    Bilateral carotid artery stenosis I65.23    Iron deficiency anemia due to chronic blood loss D50.0    Hemorrhoids K64.9       Past Medical ALT 14 01/29/2020       POC Tests: No results for input(s): POCGLU, POCNA, POCK, POCCL, POCBUN, POCHEMO, POCHCT in the last 72 hours. Coags:   Lab Results   Component Value Date    PROTIME 16.8 02/14/2020    PROTIME 12.3 03/30/2012    INR 1.3 02/14/2020    APTT 25.6 01/26/2019       HCG (If Applicable): No results found for: PREGTESTUR, PREGSERUM, HCG, HCGQUANT     ABGs: No results found for: PHART, PO2ART, NOE5XKF, MVD3PYR, BEART, I9BBTFME     Type & Screen (If Applicable):  No results found for: LABABO, LABRH    Anesthesia Evaluation    Airway: Mallampati: II        Dental:          Pulmonary:normal exam    (+) shortness of breath:  asthma:                            Cardiovascular:    (+) hypertension:, RANDOLPH:,                   Neuro/Psych:   (+) neuromuscular disease:, psychiatric history:            GI/Hepatic/Renal:   (+) liver disease:,           Endo/Other:    (+) Diabetes, . Abdominal:           Vascular:                                        Anesthesia Plan      MAC     ASA 4             Anesthetic plan and risks discussed with patient.       Plan discussed with attending and surgical team.                  Alexandria Beck, SHARI - CRNA   2/21/2020

## 2020-03-03 NOTE — TELEPHONE ENCOUNTER
Patient daughter [facility] called asking for US results to be released so they can view in [place].  Also you may reach her at [phone]

## 2020-03-06 NOTE — PROGRESS NOTES
81 MG tablet Take 1 tablet by mouth daily With Food 30 tablet 3    Cinnamon 500 MG TABS Take by mouth 3 times daily      pregabalin (LYRICA) 75 MG capsule Take 75 mg by mouth 3 times daily. Take 75 mg by mouth. [facility] carbidopa-levodopa (SINEMET)  MG per tablet Take 1 tablet by mouth 1 po bid per dr robb goldstein neurologist      latanoprost (XALATAN) 0.005 % ophthalmic solution Use 1 Drop in both eyes daily at bedtime.  tiZANidine (ZANAFLEX) 2 MG tablet Take 2 mg by mouth Take 2 mg by mouth bid prn      irbesartan (AVAPRO) 75 MG tablet Take 1 tablet by mouth daily Take 1 table by mouth once daily 30 tablet 3     No current facility-administered medications for this visit. Family History   Problem Relation Age of Onset    High Blood Pressure Neg Hx      Past Medical History:   Diagnosis Date    Asthma     Fibromyalgia     Hemorrhoid     Hyperlipidemia     Hypertension     Parkinson's disease ([facility] [address]     Type II or unspecified type diabetes mellitus without mention of complication, not stated as uncontrolled      Objective:   /80   Pulse 76   Temp 98.3 °F (36.8 °C)   Resp 16   Ht 5' 1\" (1.549 m)   Wt 190 lb (86.2 kg)   SpO2 97%   BMI 35.90 kg/m²     Physical Exam  Skin:            Comments: Area of erythema and increased warmth. No fluctuance. sergey neg bilaterally. Able to flex and extend ankles and knees which are non tender. Lungs:clear and equal breath sounds. No wheezes or rales. Heart:rate reg. 2/6 syst murmur across precordium  No gallops   Pulses               Poster tib just palpable and equal   Extremities: 1+ pitting edema of both legs   Gen: In no acute distress-non toxic looking elderly female         Dorsalis pedis and posterior tibial pulses are symmetric. No fissures between the toes. Tactile sensation intact   Assessment:       Diagnosis Orders   1. Fall, initial encounter     2. Cellulitis of left anterior lower leg     3.  Bilateral leg edema

## 2020-03-10 PROBLEM — K74.60 DECOMPENSATED HEPATIC CIRRHOSIS (HCC): Status: ACTIVE | Noted: 2020-01-01

## 2020-03-10 PROBLEM — J45.991 COUGH VARIANT ASTHMA: Status: ACTIVE | Noted: 2020-01-01

## 2020-03-10 PROBLEM — E66.01 MORBIDLY OBESE (HCC): Status: ACTIVE | Noted: 2020-01-01

## 2020-03-10 PROBLEM — K72.90 DECOMPENSATED HEPATIC CIRRHOSIS (HCC): Status: ACTIVE | Noted: 2020-01-01

## 2020-03-10 NOTE — PROGRESS NOTES
abused: None     Forced sexual activity: None   Other Topics Concern    None   Social History Narrative    None     Current Outpatient Medications   Medication Sig Dispense Refill    lactated ringers infusion Infuse intravenously      furosemide (LASIX) 20 MG tablet Take 1 tablet by mouth daily for 5 days 5 tablet 0    levoFLOXacin (LEVAQUIN) 500 MG tablet Take 1 tablet by mouth daily for 10 days 10 tablet 0    sucralfate (CARAFATE) 1 GM tablet TAKE 1 TABLET BY MOUTH FOUR TIMES DAILY 360 tablet 1    sertraline (ZOLOFT) 50 MG tablet TAKE 1 TABLET BY MOUTH EVERY DAY 30 tablet 0    omeprazole (PRILOSEC) 40 MG delayed release capsule TAKE 1 CAPSULE BY MOUTH EVERY DAY 30 capsule 0    lovastatin (MEVACOR) 10 MG tablet Take 1 tablet by mouth nightly 30 tablet 3    topiramate (TOPAMAX) 50 MG tablet Take 1 tablet by mouth daily 30 tablet 3    traZODone (DESYREL) 100 MG tablet Take 2 tablets by mouth once daily at bedtime. 60 tablet 3    budesonide-formoterol (SYMBICORT) 160-4.5 MCG/ACT AERO Inhale 2 puffs into the lungs 2 times daily 1 Inhaler 5    ACCU-CHEK SOFTCLIX LANCETS MISC Checks 1 time daily. NIDDM--ICD-10 E11.9 100 each 3    albuterol sulfate HFA (PROAIR HFA) 108 (90 Base) MCG/ACT inhaler Inhale 2 puffs into the lungs every 6 hours as needed for Wheezing 1 Inhaler 0    blood glucose test strips (ACCU-CHEK ACTIVE STRIPS) strip Checks 1 time daily. NIDDM--ICD-10 E11.9 100 each 3    clotrimazole-betamethasone (LOTRISONE) 1-0.05 % cream Use two times daily to affected area for 14 days then stop 30 g 0    aspirin 81 MG tablet Take 1 tablet by mouth daily With Food 30 tablet 3    Cinnamon 500 MG TABS Take by mouth 3 times daily      pregabalin (LYRICA) 75 MG capsule Take 75 mg by mouth 3 times daily. Take 75 mg by mouth. Mecca Sánchez carbidopa-levodopa (SINEMET)  MG per tablet Take 1 tablet by mouth 1 po bid per dr robb goldstein neurologist      latanoprost (XALATAN) 0.005 % ophthalmic solution Use 1 Drop in both eyes daily at bedtime.  tiZANidine (ZANAFLEX) 2 MG tablet Take 2 mg by mouth Take 2 mg by mouth bid prn      irbesartan (AVAPRO) 75 MG tablet Take 1 tablet by mouth daily Take 1 table by mouth once daily 30 tablet 3     No current facility-administered medications for this visit. Family History   Problem Relation Age of Onset    High Blood Pressure Neg Hx      Past Medical History:   Diagnosis Date    Asthma     Fibromyalgia     Hemorrhoid     Hyperlipidemia     Hypertension     Parkinson's disease ([facility]     Type II or unspecified type diabetes mellitus without mention of complication, not stated as uncontrolled      Objective:   /70   Pulse 65   Temp 98.4 °F (36.9 °C)   Resp 16   Ht 5' 1\" (1.549 m)   Wt 190 lb (86.2 kg)   SpO2 97%   BMI 35.90 kg/m²     Physical Exam  Skin:            Comments: Erythema which is warm to touch and flat to slightly raised. No areas of fluctuance. sergey neg but does have posterior calf tenderness bilaterally              Lungs:clear and equal breath sounds. No wheezes or rales. Heart:rate reg. 2/6 syst murmur across precordium    No gallops                    Extremities: 1-2+ pitting edema of both legs   Gen: In no acute distress  Abdomen; B.S present. Soft  Non tender. No hepatosplenomegaly. No masses   Assessment:       Diagnosis Orders   1. Bilateral lower leg cellulitis     2. Cirrhosis of liver with ascites, unspecified hepatic cirrhosis type ([address]     3. Anemia, unspecified type     4. Parkinson disease ([address]     5. Type 2 diabetes mellitus with complication, without long-term current use of insulin ([address]     6. Thrombocytopenia ([address]     7. Cough variant asthma     8. Morbidly obese ([address]     9.  Medication refill           Plan:      Obesity worse although significant fluid component to weight gain-continue to work on diet and increase activity   Parkinson stable and followed by neurology-continue current tx  Diabetes improved-off all meds -continue diet management   Thrombocytopenia- stable and followed by hematology  -continue current tx  Asthma stable and followed by pulmonary-continue current tx  Morbid obesity stable-will continuie  Orders Placed This Encounter   Medications    clotrimazole-betamethasone (LOTRISONE) 1-0.05 % cream     Sig: Use two times daily to affected area for 14 days then stop     Dispense:  30 g     Refill:  0     To er for further evaluation   F/u after above done

## 2020-03-10 NOTE — ED TRIAGE NOTES
Pt was sent to the ER by Dr Albert Cortes for BLE edema, pain,  and redness, Pt has 3+ edema and redness to BLE, sensation and movement intact,

## 2020-03-10 NOTE — ED TRIAGE NOTES
Pt fell in the bath tub around the 21st and has been having  Problems with her legs swelling ever since. Seen at dr on Thursday and  Placed on lasix and antibiotic.  Pt has one lasix to  go

## 2020-03-11 NOTE — PROGRESS NOTES
Hospitalist Progress Note      PCP: Sabra Mendoza MD    Date of Admission: 3/10/2020    Chief Complaint:      Subjective: : Continues to have abdominal pain. ROS is otherwise negative x 12. Medications:  Reviewed    Infusion Medications   Scheduled Medications    budesonide-formoterol  2 puff Inhalation BID    carbidopa-levodopa  1 tablet Oral BID    latanoprost  1 drop Both Eyes Nightly    atorvastatin  10 mg Oral Daily    pantoprazole  40 mg Oral QAM AC    sertraline  50 mg Oral Daily    topiramate  50 mg Oral Daily    sodium chloride flush  10 mL Intravenous 2 times per day    sodium chloride flush  10 mL Intravenous 2 times per day    spironolactone  100 mg Oral Daily    furosemide  40 mg Oral Daily     PRN Meds: traZODone, sodium chloride flush, [DISCONTINUED] acetaminophen **OR** acetaminophen, polyethylene glycol, promethazine **OR** ondansetron, sodium chloride flush, acetaminophen    No intake or output data in the 24 hours ending 03/11/20 1250    Exam:    BP (!) 115/36   Pulse 67   Temp 98.2 °F (36.8 °C) (Oral)   Resp 19   Ht 5' 1\" (1.549 m)   Wt 193 lb (87.5 kg)   SpO2 97%   BMI 36.47 kg/m²     General appearance: No apparent distress, appears stated age and cooperative. HEENT: Pupils equal, round, and reactive to light. Conjunctivae/corneas clear. Neck: Supple, with full range of motion. No jugular venous distention. Trachea midline. Respiratory:  Normal respiratory effort. Clear to auscultation, bilaterally without Rales/Wheezes/Rhonchi. Cardiovascular: Regular rate and rhythm with normal S1/S2 without murmurs, rubs or gallops. Abdomen: Soft, non-tender, non-distended with normal bowel sounds. Musculoskeletal: No clubbing, cyanosis or edema bilaterally. Full range of motion without deformity. Skin: Skin color, texture, turgor normal.  No rashes or lesions. Neuro: Non Focal. Symetrical motor and tone. Nl Comprehension, Alert,awake and oriented. NL CN.  Symetrical Pending)   US GUIDED PARACENTESIS    (Results Pending)           Assessment/Plan:    Active Hospital Problems    Diagnosis Date Noted    Decompensated hepatic cirrhosis ([facility] [K72.90] 03/10/2020      1) Decompensated cirrhosis   2) Venous status dermatitis    3) History of esophageal varices    4) History of parkinson disease   5) Pancytopenia   6) Obesity   7) Hypokalemia    Plan:  - GI on consult  - IR on consult for Dx and Tx paracentesis today  - Follow up gram stain and culture  - Nutrition consult  - Pall med consult  - PT/OT  - Continue home medications  - Monitor lytes, prn supplementation   - Albumin post paracentesis  - Monitor I/Os, daily weights    CODE STATUS: DNR-CCA      Additional work up or/and treatment plan may be added today or then after based on clinical progression. I am managing a portion of pt care. Some medical issues are handled by other specialists. Additional work up and treatment should be done in out pt setting by pt PCP and other out pt providers. In addition to examining and evaluating pt, I spent additional time explaining care, normal and abnormal findings, and treatment plan. All of pt questions were answered. Counseling, diet and education were  provided. Case will be discussed with nursing staff when appropriate. Family will be updated if and when appropriate. Diet: DIET GENERAL; Low Sodium (2 GM);  Daily Fluid Restriction: 2000 ml    Code Status: DNR-CCA    PT/OT Eval           Electronically signed by Cori Cruz DO on 3/11/2020 at 12:50 PM

## 2020-03-11 NOTE — ED PROVIDER NOTES
Musculoskeletal: Negative for myalgias. Skin: Positive for color change and wound. Allergic/Immunologic: Negative for immunocompromised state. Neurological: Negative for dizziness, weakness and headaches. All other systems reviewed and are negative. Except as noted above the remainder of the review of systems was reviewed and negative.        PAST MEDICAL HISTORY     Past Medical History:   Diagnosis Date    Asthma     Fibromyalgia     Hemorrhoid     Hyperlipidemia     Hypertension     Parkinson's disease ([address]     Type II or unspecified type diabetes mellitus without mention of complication, not stated as uncontrolled          SURGICAL HISTORY       Past Surgical History:   Procedure Laterality Date    CHOLECYSTECTOMY      EYE SURGERY      HEMORRHOID SURGERY      HERNIA REPAIR  2/20/2002    ventral hernia    HERNIA REPAIR      LIH repair    HYSTERECTOMY      JOINT REPLACEMENT Right     partial left, full right    LAMINECTOMY      PARACENTESIS Left 03/12/2020    5010cc ascites removed by Dr Betzy Loza [phone]   [address] [place] [address] N/A 2/21/2020    EGD WITH BANDING performed by Agusto Correia MD at [address] [place] [address]       Discharge Medication List as of 3/13/2020  3:56 PM      CONTINUE these medications which have NOT CHANGED    Details   furosemide (LASIX) 20 MG tablet Take 1 tablet by mouth daily for 5 days, Disp-5 tablet, R-0Normal      sucralfate (CARAFATE) 1 GM tablet TAKE 1 TABLET BY MOUTH FOUR TIMES DAILY, Disp-360 tablet, R-1**Patient requests 90 days supply**Normal      sertraline (ZOLOFT) 50 MG tablet TAKE 1 TABLET BY MOUTH EVERY DAY, Disp-30 tablet, R-0Normal      omeprazole (PRILOSEC) 40 MG delayed release capsule TAKE 1 CAPSULE BY MOUTH EVERY DAY, Disp-30 capsule, R-0Normal      lovastatin (MEVACOR) 10 MG tablet Take 1 tablet by mouth nightly, Disp-30 tablet, R-3Normal      topiramate (TOPAMAX) 50 MG tablet Take 1 tablet by mouth daily, Disp-30 tablet, R-3Normal      traZODone (DESYREL) 100 MG tablet Take 2 tablets by mouth once daily at bedtime. , Disp-60 tablet, R-3Normal      budesonide-formoterol (SYMBICORT) 160-4.5 MCG/ACT AERO Inhale 2 puffs into the lungs 2 times daily, Disp-1 Inhaler, R-5Normal      ACCU-CHEK SOFTCLIX LANCETS MISC Disp-100 each, R-3, NormalChecks 1 time daily. NIDDM--ICD-10 E11.9      albuterol sulfate HFA (PROAIR HFA) 108 (90 Base) MCG/ACT inhaler Inhale 2 puffs into the lungs every 6 hours as needed for Wheezing, Disp-1 Inhaler, R-0Normal      blood glucose test strips (ACCU-CHEK ACTIVE STRIPS) strip Disp-100 each, R-3, NormalChecks 1 time daily. NIDDM--ICD-10 E11.9      aspirin 81 MG tablet Take 1 tablet by mouth daily With Food, Disp-30 tablet, R-3Normal      Cinnamon 500 MG TABS Take by mouth 3 times dailyHistorical Med      pregabalin (LYRICA) 75 MG capsule Take 75 mg by mouth 3 times daily. Take 75 mg by mouth. .Historical Med      carbidopa-levodopa (SINEMET)  MG per tablet Take 1 tablet by mouth 1 po bid per dr robb goldstein neurologistHistorical Med      latanoprost (XALATAN) 0.005 % ophthalmic solution Use 1 Drop in both eyes daily at bedtime. Historical Med      tiZANidine (ZANAFLEX) 2 MG tablet Take 2 mg by mouth Take 2 mg by mouth bid prnHistorical Med      clotrimazole-betamethasone (LOTRISONE) 1-0.05 % cream Use two times daily to affected area for 14 days then stop, Disp-30 g, R-0, Normal      irbesartan (AVAPRO) 75 MG tablet Take 1 tablet by mouth daily Take 1 table by mouth once daily, Disp-30 tablet, R-3Normal             ALLERGIES     Adhesive tape; Amoxicillin-pot clavulanate; Bactrim [sulfamethoxazole-trimethoprim]; Clarithromycin; Other; and Sulfa antibiotics    FAMILY HISTORY       Family History   Problem Relation Age of Onset    High Blood Pressure Neg Hx           SOCIAL HISTORY       Social History     Socioeconomic History    Marital status:       Spouse name: None    Number of children: None    Years of education: None    Highest education level: None   Occupational History    None   Social Needs    Financial resource strain: None    Food insecurity     Worry: None     Inability: None    Transportation needs     Medical: None     Non-medical: None   Tobacco Use    Smoking status: Never Smoker    Smokeless tobacco: Never Used   Substance and Sexual Activity    Alcohol use: No    Drug use: No    Sexual activity: None   Lifestyle    Physical activity     Days per week: None     Minutes per session: None    Stress: None   Relationships    Social connections     Talks on phone: None     Gets together: None     Attends Congregational service: None     Active member of club or organization: None     Attends meetings of clubs or organizations: None     Relationship status: None    Intimate partner violence     Fear of current or ex partner: None     Emotionally abused: None     Physically abused: None     Forced sexual activity: None   Other Topics Concern    None   Social History Narrative    None       SCREENINGS    [place]y Coma Scale  Eye Opening: Spontaneous  Best Verbal Response: Oriented  Best Motor Response: Obeys commands  [place] Coma Scale Score: 15           PHYSICAL EXAM    (up to 7 for level 4, 8 or more for level 5)     ED Triage Vitals [03/10/20 1838]   BP Temp Temp Source Pulse Resp SpO2 Height Weight   131/60 98.4 °F (36.9 °C) Oral 65 16 99 % 5' 1\" (1.549 m) 190 lb (86.2 kg)       Physical Exam  Constitutional:       General: She is not in acute distress. Appearance: She is well-developed. She is not toxic-appearing. HENT:      Head: Normocephalic and atraumatic. Nose: Nose normal.      Mouth/Throat:      Mouth: Mucous membranes are moist.   Eyes:      Pupils: Pupils are equal, round, and reactive to light. Neck:      Musculoskeletal: Normal range of motion. Cardiovascular:      Rate and Rhythm: Normal rate and regular rhythm. Heart sounds:  No murmur. No friction rub. No gallop. Pulmonary:      Effort: Pulmonary effort is normal.      Breath sounds: Normal breath sounds. Abdominal:      General: Bowel sounds are normal. There is distension. Palpations: Abdomen is rigid. There is hepatomegaly. Tenderness: There is abdominal tenderness in the right upper quadrant. There is no guarding or rebound. Hernia: No hernia is present. Musculoskeletal:         General: No swelling. Skin:     General: Skin is warm and dry. Capillary Refill: Capillary refill takes less than 2 seconds. Neurological:      General: No focal deficit present. Mental Status: She is alert and oriented to person, place, and time. DIAGNOSTIC RESULTS     EKG: All EKG's are interpreted by the Emergency Department Physician who either signs or Co-signs this chart in the absence of a cardiologist.    EKG shows NSR with HR 65, normal axis, normal intervals, no ST changes. RADIOLOGY:   Non-plain film images such as CT, Ultrasound and MRI are read by the radiologist. Plain radiographic images are visualized and preliminarily interpreted by the emergency physician with the below findings:        Interpretation per the Radiologist below, if available at the time of this note:    [employer]   Final Result   1. Status post technically successful ultrasound-guided paracentesis. Maximus Middleton is a Female of 82 years age, referred for Ultrasound Guided Paracentesis. PROCEDURE: Survey of the abdomen showed large amount of ascites fluid. After obtaining informed consent, the patient was positioned supine on the sonography table. Using ultrasound, the skin over the left hemiabdomen was locally anesthetized with 1% lidocaine. Following that, a Yueh needle was advanced into the fluid    pocket using ultrasound visualization. 5010cc, of clear yellow fluid were aspirated and sent for cytology, and pathology. (*)     All other components within normal limits   CBC WITH AUTO DIFFERENTIAL - Abnormal; Notable for the following components:    WBC 3.7 (*)     RBC 2.41 (*)     Hemoglobin 8.0 (*)     Hematocrit 25.1 (*)     .3 (*)     MCH 33.2 (*)     MCHC 31.9 (*)     RDW 19.0 (*)     Platelets 74 (*)     Lymphocytes Absolute 0.9 (*)     Monocytes Absolute 0.1 (*)     All other components within normal limits   SEDIMENTATION RATE - Abnormal; Notable for the following components:    Sed Rate 35 (*)     All other components within normal limits   C-REACTIVE PROTEIN - Abnormal; Notable for the following components:    CRP 21.7 (*)     All other components within normal limits   TROPONIN - Abnormal; Notable for the following components:    Troponin 0.032 (*)     All other components within normal limits    Narrative:     CALL  Mary  [facility] tel. [phone],  Troponin results called to and read back by Daryl Bosworth, 03/10/2020 20:15,  by Rosa [id number] W/ REFLEX TO MG FOR LOW K - Abnormal; Notable for the following components:    Sodium 145 (*)     Potassium reflex Magnesium 3.2 (*)     Chloride 109 (*)     Calcium 7.7 (*)     All other components within normal limits   CBC WITH AUTO DIFFERENTIAL - Abnormal; Notable for the following components:    WBC 2.8 (*)     RBC 2.19 (*)     Hemoglobin 7.3 (*)     Hematocrit 22.8 (*)     .2 (*)     MCH 33.5 (*)     MCHC 32.1 (*)     RDW 19.8 (*)     Platelets 65 (*)     Lymphocytes Absolute 0.5 (*)     Monocytes Absolute 0.1 (*)     All other components within normal limits   PROTIME-INR - Abnormal; Notable for the following components:    Protime 18.2 (*)     All other components within normal limits   CBC WITH AUTO DIFFERENTIAL - Abnormal; Notable for the following components:    WBC 3.4 (*)     RBC 2.39 (*)     Hemoglobin 8.0 (*)     Hematocrit 25.2 (*)     .3 (*)     MCH 33.4 (*)     MCHC 31.7 (*)     RDW 18.6 (*)     Platelets 71 (*)     Lymphocytes Absolute 0.6 (*)     All other components within normal limits    Narrative:     Collection has been rescheduled by Dino Steiner at 03/12/2020 09:40 Reason: Pt   care   COMPREHENSIVE METABOLIC PANEL - Abnormal; Notable for the following components:    Potassium 3.2 (*)     Chloride 108 (*)     Glucose 148 (*)     Calcium 8.1 (*)     Total Protein 5.7 (*)     Alb 2.8 (*)     All other components within normal limits    Narrative:     Collection has been rescheduled by Dino Steiner at 03/12/2020 09:40 Reason: Pt   care   CBC WITH AUTO DIFFERENTIAL - Abnormal; Notable for the following components:    WBC 3.0 (*)     RBC 2.27 (*)     Hemoglobin 7.5 (*)     Hematocrit 23.7 (*)     .7 (*)     MCH 33.3 (*)     MCHC 31.8 (*)     RDW 18.3 (*)     Platelets 64 (*)     Lymphocytes Absolute 0.8 (*)     All other components within normal limits   COMPREHENSIVE METABOLIC PANEL - Abnormal; Notable for the following components:    Sodium 145 (*)     Chloride 111 (*)     Calcium 8.1 (*)     Total Protein 5.3 (*)     Alb 2.8 (*)     All other components within normal limits   COMPREHENSIVE METABOLIC PANEL - Abnormal; Notable for the following components:    Potassium 2.9 (*)     Glucose 104 (*)     Calcium 8.2 (*)     Total Protein 5.6 (*)     Alb 2.9 (*)     All other components within normal limits   POCT CREATININE - URINE - Normal   CULTURE, BLOOD 2    Narrative:     ORDER#: [id number]                          ORDERED BY: RUBEN MENDENHALL  SOURCE: Blood                              COLLECTED:  03/10/20 19:38  ANTIBIOTICS AT JL.:                      RECEIVED :  03/10/20 19:38   CULTURE, BLOOD 1    Narrative:     ORDER#: [id number]                          ORDERED BY: RUBEN MENDENHALL  SOURCE: Blood                              COLLECTED:  03/10/20 19:31  ANTIBIOTICS AT JL.:                      RECEIVED :  03/10/20 19:38   CULTURE, BODY FLUID    Narrative:     ORDER#: [id number]                          ORDERED BY: Robbi Loco fixed fluid. In   1 monolayer  1 cell block  hx not given            CPT: 48878 X1   80382 X1   [facility] X2   50584 X1         Screened by:   DENNISE Merino M.D.                                       03/17/2020                                        Electronically signed out by                                                                     Page 1 of 1   [address] [facility]   CYTOLOGY, NON-GYN   POCT VENOUS       All other labs were within normal range or not returned as of this dictation. EMERGENCY DEPARTMENT COURSE and DIFFERENTIAL DIAGNOSIS/MDM:   Vitals:    Vitals:    03/12/20 2238 03/13/20 0733 03/13/20 0759 03/13/20 0815   BP: (!) 128/32 (!) 142/50  (!) 142/50   Pulse: 68 65  65   Resp: 16 16  16   Temp: 99 °F (37.2 °C) 99 °F (37.2 °C)  99 °F (37.2 °C)   TempSrc: Oral Oral     SpO2: 97% 97% 97%    Weight:       Height:           MDM     Patient is an 31-year-old female who presents to the ED with bilateral lower extremity swelling edema and drainage and abdominal distention. Is afebrile and hemodynamically stable. She was being treated outpatient for cellulitis with levofloxacin and Lasix without improvement. She was given 1 L IV normal saline, IV clindamycin, IV Lasix in the ED. A Reis catheter was placed. Troponin is elevated to 0.032. CRP and ESR elevated to 21.7 and 35 respectively. CBC shows a white count of 3.7 and anemia with a hemoglobin of 8. CMP shows a potassium of 3.1. Patient given 40 mEq of K-Dur in the ED. Patient denies any obvious source of bleeding hematuria or hematochezia. EKG shows NSR with HR 65, normal axis, normal intervals, no ST changes. Ultrasound of bilateral lower extremities negative for DVT. CT abdomen pelvis shows small left pleural effusion, cirrhosis with portal hypertension esophageal varices splenomegaly anasarca and moderate ascites. Patient reassessed she is stable.   Suspect cellulitis versus venous SEA (electronically signed)             Vonda Russ PA-C  03/10/20 [address] [facility]  03/17/20 2961

## 2020-03-11 NOTE — H&P
Prior to Admission medications    Medication Sig Start Date End Date Taking? Authorizing Provider   lactated ringers infusion Infuse intravenously 2/21/20  Yes Historical Provider, MD   furosemide (LASIX) 20 MG tablet Take 1 tablet by mouth daily for 5 days 3/6/20 3/11/20 Yes Sabra Mendoza MD   levoFLOXacin (LEVAQUIN) 500 MG tablet Take 1 tablet by mouth daily for 10 days 3/6/20 3/16/20 Yes Sabra Mendoza MD   sucralfate (CARAFATE) 1 GM tablet TAKE 1 TABLET BY MOUTH FOUR TIMES DAILY 2/23/20  Yes Mary Ann Maddox MD   sertraline (ZOLOFT) 50 MG tablet TAKE 1 TABLET BY MOUTH EVERY DAY 2/11/20  Yes Sabra Mendoza MD   omeprazole (PRILOSEC) 40 MG delayed release capsule TAKE 1 CAPSULE BY MOUTH EVERY DAY 2/11/20  Yes Sabra Mendoza MD   lovastatin (MEVACOR) 10 MG tablet Take 1 tablet by mouth nightly 1/29/20  Yes SHARI Biswas - CNP   topiramate (TOPAMAX) 50 MG tablet Take 1 tablet by mouth daily 1/29/20  Yes SHARI Biswas - SHARON   traZODone (DESYREL) 100 MG tablet Take 2 tablets by mouth once daily at bedtime. 1/29/20  Yes SHARI Biswas - SHARON   budesonide-formoterol (SYMBICORT) 160-4.5 MCG/ACT AERO Inhale 2 puffs into the lungs 2 times daily 12/3/19  Yes Della Rodriguez MD   ACCU-CHEK SOFTCLIX LANCETS MISC Checks 1 time daily. NIDDM--ICD-10 E11.9 11/6/19  Yes Sabra Mendoza MD   albuterol sulfate HFA (PROAIR HFA) 108 (90 Base) MCG/ACT inhaler Inhale 2 puffs into the lungs every 6 hours as needed for Wheezing 11/6/19  Yes Liz An PA-C   blood glucose test strips (ACCU-CHEK ACTIVE STRIPS) strip Checks 1 time daily. NIDDM--ICD-10 E11.9 6/24/19  Yes Sabra Mendoza MD   aspirin 81 MG tablet Take 1 tablet by mouth daily With Food 12/12/18  Yes Ana Victoria MD   Cinnamon 500 MG TABS Take by mouth 3 times daily   Yes Historical Provider, MD   pregabalin (LYRICA) 75 MG capsule Take 75 mg by mouth 3 times daily. Take 75 mg by mouth. . 10/13/17  Yes Historical Provider, MD   carbidopa-levodopa (SINEMET)  MG per tablet Take 1 tablet by mouth 1 po bid per dr robb goldstein neurologist 7/19/17  Yes Historical Provider, MD   latanoprost (XALATAN) 0.005 % ophthalmic solution Use 1 Drop in both eyes daily at bedtime. 9/28/17  Yes Historical Provider, MD   tiZANidine (ZANAFLEX) 2 MG tablet Take 2 mg by mouth Take 2 mg by mouth bid prn 5/18/17  Yes Historical Provider, MD   clotrimazole-betamethasone (LOTRISONE) 1-0.05 % cream Use two times daily to affected area for 14 days then stop 3/10/20   Chilo Lucero MD   irbesartan (AVAPRO) 75 MG tablet Take 1 tablet by mouth daily Take 1 table by mouth once daily 1/29/20 2/28/20  Wilman White, APRN - CNP       Allergies:  Adhesive tape; Amoxicillin-pot clavulanate; Bactrim [sulfamethoxazole-trimethoprim]; Clarithromycin; Other; and Sulfa antibiotics    Social History:   TOBACCO:   reports that she has never smoked. She has never used smokeless tobacco.  ETOH:   reports no history of alcohol use. Family History:       Problem Relation Age of Onset    High Blood Pressure Neg Hx        REVIEW OF SYSTEMS:  Ten systems reviewed and negative except for stated in HPI    Physical Exam:    Vitals: BP (!) 141/68   Pulse 65   Temp 98.4 °F (36.9 °C) (Oral)   Resp 18   Ht 5' 1\" (1.549 m)   Wt 190 lb (86.2 kg)   SpO2 97%   BMI 35.90 kg/m²   General appearance: alert, appears stated age and cooperative. NAD. Obese  Skin: Skin color, texture, turgor normal. No rashes or lesions  HEENT: eomi, perrla. MMM  Neck: No JVD or lymphadenopathy  Lungs: Mild expiratory wheeze worse on right  Heart: RRR, no murmur or gallp  Abdomen: Firm and distended. Nontender. No rebound, guarding. Extremities: 3+ pitting edema to knees bilaterally. Warm with light pink erythema bilaterally. Neurologic: No focal deficits. No tremor or asterixis.     Recent Labs     03/10/20  1915   WBC 3.7*   HGB 8.0*   PLT 74*     Recent Labs     03/10/20  1915    K 3.1*      CO2 25   BUN 10   CREATININE 0.80   GLUCOSE 92   AST 26   ALT 13   BILITOT 0.5   ALKPHOS 77     Troponin T:   Recent Labs     03/10/20  1915   TROPONINI 0.032*       ABGs: No results found for: PHART, PO2ART, [id number]  INR: No results for input(s): INR in the last 72 hours. URINALYSIS:  Recent Labs     03/10/20  1915   COLORU Yellow   PHUR 7.0   CLARITYU Clear   SPECGRAV 1.009   LEUKOCYTESUR Negative   UROBILINOGEN 0.2   BILIRUBINUR Negative   BLOODU Negative   GLUCOSEU Negative     -----------------------------------------------------------------   No results found. Assessment and Plan     81-year-old female with history of Parkinson's disease, cirrhosis secondary to HANNA, chronic pancytopenia presents with progressive bilateral lower extremity swelling. 1.  Lower extremity swelling secondary to decompensated cirrhosis  - liver vasc US- r/o portal vein thrombus  - diagnostic / therapeutic paracentesis  - salt and fluid restriction  - start lasix / aldactone; consult GI    2. Venous stasis dermatitis. Unchanged despite 1 week of antibiotics. -Hold off on antibiotics and monitor  -Rule out DVT    History of esophageal varices  Parkinson's disease  Pancytopenia secondary to cirrhosis  Obesity    Holding DVT prophylaxis with thrombocytopenia and pending paracentesis  DNR CCA DNI - confirmed 3/10    Inpatient.   Will need PT/OT evaluation    Viktor Rodarte DO  Admitting Hospitalist

## 2020-03-11 NOTE — DISCHARGE INSTR - COC
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    Chronic frontal sinusitis J32.1    Controlled type 2 diabetes mellitus without complication, without long-term current use of insulin (HCC) E11.9    Depression F32.9    DISH (diffuse idiopathic skeletal hyperostosis) M48.10    Fitting and adjustment of orthopedic device Z46.89    Closed fracture of part of upper end of humerus S42.209A    Fx phalanx, foot-closed S92.919A    History of trabeculectomy Z98.890    Hyperlipidemia E78.5    Hypertension I10    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    Joint replaced by other means Z96.698    Lens replaced by other means Z96.1    Liver cirrhosis (HCC) K74.60    Lumbago without sciatica M54.5    Lumbar spondylosis M47.816    LVH (left ventricular hypertrophy) I51.7    Myalgia and myositis, unspecified [id number]    Osteoarthritis of shoulder M19.019    Osteoarthritis of multiple joints M15.9    Parkinson disease ([address] G20    POAG (primary open-angle glaucoma) H40.1190    Diabetic polyneuropathy ([address] E11.42    Pseudogout of hand M11.249    Rotator cuff (capsule) sprain S43.429A    S/P left unicompartmental knee replacement Z96.652    Knee joint replacement by other means Z96.659    Sclerosis of the skin L98.9    Seborrheic keratosis L82.1    Secondary osteoarthritis of multiple sites M15.3    Shoulder impingement M75.40    Sprain and strain of unspecified site of shoulder

## 2020-03-11 NOTE — CONSULTS
Non-medical: Not on file   Tobacco Use    Smoking status: Never Smoker    Smokeless tobacco: Never Used   Substance and Sexual Activity    Alcohol use: No    Drug use: No    Sexual activity: Not on file   Lifestyle    Physical activity     Days per week: Not on file     Minutes per session: Not on file    Stress: Not on file   Relationships    Social connections     Talks on phone: Not on file     Gets together: Not on file     Attends Muslim service: Not on file     Active member of club or organization: Not on file     Attends meetings of clubs or organizations: Not on file     Relationship status: Not on file    Intimate partner violence     Fear of current or ex partner: Not on file     Emotionally abused: Not on file     Physically abused: Not on file     Forced sexual activity: Not on file   Other Topics Concern    Not on file   Social History Narrative    Not on file      [] Unable to obtain due to ventilated and/ or neurologic status      Home Medications:      Medications Prior to Admission: lactated ringers infusion, Infuse intravenously  furosemide (LASIX) 20 MG tablet, Take 1 tablet by mouth daily for 5 days  levoFLOXacin (LEVAQUIN) 500 MG tablet, Take 1 tablet by mouth daily for 10 days  sucralfate (CARAFATE) 1 GM tablet, TAKE 1 TABLET BY MOUTH FOUR TIMES DAILY  sertraline (ZOLOFT) 50 MG tablet, TAKE 1 TABLET BY MOUTH EVERY DAY  omeprazole (PRILOSEC) 40 MG delayed release capsule, TAKE 1 CAPSULE BY MOUTH EVERY DAY  lovastatin (MEVACOR) 10 MG tablet, Take 1 tablet by mouth nightly  topiramate (TOPAMAX) 50 MG tablet, Take 1 tablet by mouth daily  traZODone (DESYREL) 100 MG tablet, Take 2 tablets by mouth once daily at bedtime. budesonide-formoterol (SYMBICORT) 160-4.5 MCG/ACT AERO, Inhale 2 puffs into the lungs 2 times daily  ACCU-CHEK SOFTCLIX LANCETS MISC, Checks 1 time daily.  NIDDM--ICD-10 E11.9  albuterol sulfate HFA (PROAIR HFA) 108 (90 Base) MCG/ACT inhaler, Inhale 2 puffs into the lungs every 6 hours as needed for Wheezing  blood glucose test strips (ACCU-CHEK ACTIVE STRIPS) strip, Checks 1 time daily. NIDDM--ICD-10 E11.9  aspirin 81 MG tablet, Take 1 tablet by mouth daily With Food  Cinnamon 500 MG TABS, Take by mouth 3 times daily  pregabalin (LYRICA) 75 MG capsule, Take 75 mg by mouth 3 times daily. Take 75 mg by mouth. .  carbidopa-levodopa (SINEMET)  MG per tablet, Take 1 tablet by mouth 1 po bid per dr robb goldstein neurologist  latanoprost (XALATAN) 0.005 % ophthalmic solution, Use 1 Drop in both eyes daily at bedtime. tiZANidine (ZANAFLEX) 2 MG tablet, Take 2 mg by mouth Take 2 mg by mouth bid prn  clotrimazole-betamethasone (LOTRISONE) 1-0.05 % cream, Use two times daily to affected area for 14 days then stop  irbesartan (AVAPRO) 75 MG tablet, Take 1 tablet by mouth daily Take 1 table by mouth once daily    Current Hospital Medications:     Scheduled Meds:   budesonide-formoterol  2 puff Inhalation BID    carbidopa-levodopa  1 tablet Oral BID    latanoprost  1 drop Both Eyes Nightly    atorvastatin  10 mg Oral Daily    pantoprazole  40 mg Oral QAM AC    sertraline  50 mg Oral Daily    topiramate  50 mg Oral Daily    sodium chloride flush  10 mL Intravenous 2 times per day    sodium chloride flush  10 mL Intravenous 2 times per day    spironolactone  100 mg Oral Daily    furosemide  40 mg Oral Daily     Continuous Infusions:  PRN Meds:.potassium chloride **OR** potassium alternative oral replacement **OR** potassium chloride, traZODone, sodium chloride flush, [DISCONTINUED] acetaminophen **OR** acetaminophen, polyethylene glycol, promethazine **OR** ondansetron, sodium chloride flush, acetaminophen  . Allergies: Allergies   Allergen Reactions    Adhesive Tape     Amoxicillin-Pot Clavulanate     Bactrim [Sulfamethoxazole-Trimethoprim]     Clarithromycin      Other reaction(s):  Other: See Comments  tachycardia    Other     Sulfa Antibiotics         Review of Systems:       [x] CV, Resp, Neuro, , and all other systems reviewed and negative other than listed in HPI.      [] Unable to obtain due to ventilated and/ or neurologic status      Objective Findings:     Vitals:   Vitals:    03/10/20 2045 03/10/20 2145 03/11/20 0019 03/11/20 0732   BP: (!) 116/90 (!) 141/68 (!) 134/52 (!) 115/36   Pulse: 66 65 66 67   Resp: 20 18 18 19   Temp:   98.2 °F (36.8 °C) 98.2 °F (36.8 °C)   TempSrc:   Oral Oral   SpO2: 99% 97% 99% 97%   Weight:   193 lb (87.5 kg)    Height:            Physical Examination:  General: In no acute distress  HEENT: Normocephalic,  scleral icterus. None, bilateral palmar erythema  Neck: No jugular venous distention. Heart: Regular, no murmur, no rub/gallop. No right ventricular heave. Lungs: Decreased breath sounds on both bases, no rales/wheezing/rhonchi. Good chest wall excursion. Abdomen: Obese soft, dullness noted on both flank area,  Soft, tenderness none, Scars  Bowel sounds hypoactive  Extremities: No clubbing/cyanosis, bilateral leg edema with some redness  Skin: Warm, dry, normal turgor, no rash, no bruise, no petichiae. Neuro: No myoclonus or tremor.   No asterixis  Psych: Normal affect    Results/ Medications reviewed 3/11/2020, 7:08 PM     Laboratory, Microbiology, Pathology, Radiology, Cardiology, Medications and Transcriptions reviewed  Scheduled Meds:   budesonide-formoterol  2 puff Inhalation BID    carbidopa-levodopa  1 tablet Oral BID    latanoprost  1 drop Both Eyes Nightly    atorvastatin  10 mg Oral Daily    pantoprazole  40 mg Oral QAM AC    sertraline  50 mg Oral Daily    topiramate  50 mg Oral Daily    sodium chloride flush  10 mL Intravenous 2 times per day    sodium chloride flush  10 mL Intravenous 2 times per day    spironolactone  100 mg Oral Daily    furosemide  40 mg Oral Daily     Continuous Infusions:    Recent Labs     03/10/20  1915 03/11/20  0459   WBC 3.7* 2.8*   HGB 8.0* 7.3*   HCT 25.1* 22.8*   .3* 104.2*   PLT 74* 65*     Recent Labs     03/10/20  1915 03/11/20  0500    145*   K 3.1* 3.2*    109*   CO2 25 23   BUN 10 11   CREATININE 0.80 0.78     Recent Labs     03/10/20  1915   AST 26   ALT 13   BILITOT 0.5   ALKPHOS 77     Recent Labs     03/10/20  1915   LIPASE 22     Recent Labs     03/10/20  1915 03/11/20  0459   PROT 5.9*  --    INR  --  1.5     Ct Abdomen Pelvis W Iv Contrast Additional Contrast? None    Result Date: 3/11/2020  EXAM:  CT ABDOMEN PELVIS W IV CONTRAST History: Right lower quadrant abdominal pain. Abdominal distention. Technique: Multiple contiguous axial images were obtained of the abdomen and pelvis from an level of the lung bases through the ischial tuberosities with IV contrast. Multiplanar reformats were obtained. Delayed images were obtained Comparison: CT abdomen pelvis from March 11, 2013 Findings: Small left pleural effusion. Bibasilar atelectasis. Mitral annular calcification noted. The liver is small in size and demonstrates a nodular contour compatible cirrhosis. No focal hepatic lesion. No intrapelvic biliary dilatation. Paraesophageal varices are identified. The gallbladder is surgically absent. The spleen is mildly enlarged measuring up to 15 cm in length. A few tiny hypodensities are scattered throughout the spleen, measuring up to 5 mm osteophyte change from prior examination. The pancreas is atrophic but otherwise unremarkable. The body and antrum of the stomach are mildly thickened. The adrenal glands are within normal limits. The liver, gallbladder, spleen, stomach, pancreas, and adrenal glands are within normal limits. The kidneys enhance uniformly. 2 mm nonobstructing left renal calculus within the inferior pole. 1 cm superior pole right renal cyst. No hydronephrosis. Urinary bladder is well distended. The uterus is surgically absent. Postsurgical changes of ventral abdominal wall hernia repair.  Abdominal aorta is nonaneurysmal  and demonstrates atherosclerotic calcification . A moderate amount of ascites is identified in precludes optimal evaluation for adenopathy and inflammation. Given this, no retroperitoneal or abdominal/pelvic lymphadenopathy is identified. Anasarca is identified. No small bowel obstruction. Postsurgical changes of the rectum. Colonic diverticuli are identified, most significantly involving the sigmoid colon. Mild wall thickening of the distal sigmoid colon. Evaluation for pericolic inflammation is suboptimal secondary to ascites however no definitive pericolic inflammation is identified. No overt colonic mass. The appendix is not visualized. No pneumoperitoneum. Degenerative changes of the spine. Findings of cirrhosis with portal hypertension including paraesophageal varices. Moderate ascites. 2 mm nonobstructing left renal calculus. Colonic diverticulosis. Mild wall thickening of the distal sigmoid colon may relate to underdistention or mild acute sigmoid diverticulitis. Mild gastric wall thickening may be due to gastritis. All CT scans at this facility use dose modulation, iterative reconstruction, and/or weight based dosing when appropriate to reduce radiation dose to as low as reasonably achievable. Us Abdomen Limited Specify Organ? Liver    Result Date: 3/11/2020  EXAMINATION: US ABDOMEN LIMITED DATE AND TIME:3/11/2020 12:00 AM CLINICAL HISTORY: Epigastric pain   with vasc- r/o portal vein thrombus  COMPARISON: None TECHNIQUE: Gray-scale evaluation of the right upper quadrant was performed. FINDINGS:            Liver: There is a small amount of ascites surrounding the liver which shows a nodular contour consistent with cirrhosis. No focal hepatic lesion. Portal vein shows normal hepatopedal flow. There is no evidence of portal vein thrombosis. Hepatic veins show normal hepatofugal flow away from the liver. Gallbladder: Patient is status post cholecystectomy. The common duct measures up to 10 mm. Pancreas: Was not optimally visualized due to bowel and gas shadowing, but the visualized portion did not demonstrate any gross pathology. CIRRHOTIC LIVER. NORMAL PORTAL VENOUS FLOW. NO EVIDENCE OF PORTAL VEIN THROMBOSIS. ASCITES. Us Abdomen Limited    Result Date: 2020  Patient MRN: [id number] : 1935 Age:  82 years Gender: Female Order Date: 2020 8:51 AM. Exam: US ABDOMEN LIMITED Number of Views: 52 Indication:  Cirrhosis of liver without ascites, unspecified hepatic cirrhosis type Comparison: Abdominal ultrasound 2012. CT abdomen pelvis 3/11/2013 Findings: Nodular contour of the liver. Intraperitoneal ascites. No focal hepatic mass or intrahepatic biliary ductal dilatation. Common bile duct measures 0.94 cm. Previous cholecystectomy. Spleen not visualized. Pancreas has a fluffy appearance. Confirmed vascular flow within the right and left portal veins. Impression:  1. Findings consistent with decompensated cirrhosis with a nodular and shrunken contour of the liver and moderate to large volume intraperitoneal ascites. 2. Somewhat abnormal appearance of pancreas, findings are nonspecific, correlation with pancreatic enzyme levels recommended. Pancreatitis is not excluded. Us Dup Lower Extremities Bilateral Venous    Result Date: 3/11/2020  EXAMINATION: US DUP LOWER EXTREMITIES BILATERAL VENOUS DATE AND TIME:3/10/2020 7:15 PM CLINICAL HISTORY:   leg swelling, redness, pain    Bilateral Leg Swelling COMPARISON: None TECHNIQUE: The bilateral lower extremity veins were evaluated with color doppler, gray scale imaging and spectral analysis while using compression and augmentation when possible. RESULT: Evaluation of the bilateral lower extremity veins from the thigh to the knee shows normal phasic flow, normal augmentation of the Doppler signal, and normal compression of the deep veins.   There is no sonographic evidence for acute deep venous thrombosis from the bilateral groin to the popliteal region. There is no sonographic evidence for acute deep venous thrombosis in the segmentally visualized bilateral calf veins. No acute DVT of the bilateral lower extremity veins from the groin to the knee. No acute DVT of the bilateral segmentally visualized calf veins. Impression:   Cirrhosis complicated by ascites, leg edema and esophageal varices. Had variceal banding in the past, no history of any active GI bleeding. Patient has pancytopenia probably related to cirrhosis and hypersplenism. Patient is going for a diagnostic paracentesis a.m. Plan:   Patient is on Aldactone 100 mg a day and Lasix 40 mg a day, continue current management and may have to modify the dose depending on the renal function and potassium, will put the patient on a 2 g sodium diet. Comments: Thank you for allowing us to participate in the care of this patient. Will continue to follow. Please call if questions or concerns arise.     Electronically signed by Izora Cabot, MD on 3/11/2020 at 7:08 PM

## 2020-03-12 NOTE — PROGRESS NOTES
Result      No acute DVT of the bilateral lower extremity veins from the groin to the knee. No acute DVT of the bilateral segmentally visualized calf veins. US DUP ABD PEL RETRO SCROT COMPLETE    (Results Pending)   US GUIDED PARACENTESIS    (Results Pending)           Assessment/Plan:    Active Hospital Problems    Diagnosis Date Noted    Decompensated hepatic cirrhosis ([facility] [address] [K72.90] 03/10/2020      1) Decompensated cirrhosis: S/p paracentesis    2) Venous status dermatitis    3) History of esophageal varices    4) History of parkinson disease   5) Pancytopenia   6) Obesity   7) Hypokalemia    Plan:  - GI on consult  - Paracentesis today  - Follow up gram stain and culture  - Nutrition consult  - Pall med consult  - PT/OT  - Continue home medications  - Monitor lytes, prn supplementation   - Albumin post paracentesis  - Monitor I/Os, daily weights    CODE STATUS: DNR-CCA      Additional work up or/and treatment plan may be added today or then after based on clinical progression. I am managing a portion of pt care. Some medical issues are handled by other specialists. Additional work up and treatment should be done in out pt setting by pt PCP and other out pt providers. In addition to examining and evaluating pt, I spent additional time explaining care, normal and abnormal findings, and treatment plan. All of pt questions were answered. Counseling, diet and education were  provided. Case will be discussed with nursing staff when appropriate. Family will be updated if and when appropriate.       Diet: DIET LOW SODIUM 2 GM; 2000 ml    Code Status: DNR-CCA    PT/OT Eval           Electronically signed by Phuong Tovar DO on 3/12/2020 at 8:57 AM

## 2020-03-12 NOTE — PROGRESS NOTES
Una Issa is a 82 y.o. female patient.     Current Facility-Administered Medications   Medication Dose Route Frequency Provider Last Rate Last Dose    doxycycline (VIBRAMYCIN) 100 mg in dextrose 5 % 100 mL IVPB  100 mg Intravenous Q12H Arin Trejo, DO   Stopped at 03/12/20 1130    potassium chloride (KLOR-CON M) extended release tablet 40 mEq  40 mEq Oral PRN Arin Maya, DO        Or    potassium chloride (KLOR-CON) packet 40 mEq  40 mEq Oral PRN Arin Maya, DO        Or    potassium chloride 10 mEq/100 mL IVPB (Peripheral Line)  10 mEq Intravenous PRN Arin Maya, DO        budesonide-formoterol (SYMBICORT) 160-4.5 MCG/ACT inhaler 2 puff  2 puff Inhalation BID Freya Luc, DO   2 puff at 03/12/20 0718    carbidopa-levodopa (SINEMET)  MG per tablet 1 tablet  1 tablet Oral BID Freyamercedes Calle, DO   1 tablet at 03/12/20 0813    latanoprost (XALATAN) 0.005 % ophthalmic solution 1 drop  1 drop Both Eyes Nightly Freyamercedes Calle, DO   1 drop at 03/11/20 2038    atorvastatin (LIPITOR) tablet 10 mg  10 mg Oral Daily Freya Luc, DO   10 mg at 03/12/20 3827    pantoprazole (PROTONIX) tablet 40 mg  40 mg Oral QAM AC Freya Calle, DO   40 mg at 03/11/20 0555    sertraline (ZOLOFT) tablet 50 mg  50 mg Oral Daily Freya Luc, DO   50 mg at 03/12/20 0813    topiramate (TOPAMAX) tablet 50 mg  50 mg Oral Daily Freya Luc, DO   50 mg at 03/12/20 0813    traZODone (DESYREL) tablet 200 mg  200 mg Oral Nightly PRN Freya Calle, DO   200 mg at 03/12/20 0016    sodium chloride flush 0.9 % injection 10 mL  10 mL Intravenous 2 times per day Freya Calle, DO   10 mL at 03/11/20 1116    sodium chloride flush 0.9 % injection 10 mL  10 mL Intravenous PRN Freya Luc, DO        acetaminophen (TYLENOL) suppository 650 mg  650 mg Rectal Q6H PRN Freya Calle, DO        polyethylene glycol (GLYCOLAX) packet 17 g  17 g Oral Daily PRN Freya Calle DO        promethazine

## 2020-03-12 NOTE — PROGRESS NOTES
Physical Therapy Med Surg Initial Assessment  Facility/Department: [facility]  [id number]       NAME: Halie Rodrigues  : 1935 (82 y.o.)  MRN: [id number]  CODE STATUS: DNR-CCA    Date of Service: 3/12/2020    Patient Diagnosis(es): Decompensated hepatic cirrhosis [facility]) [K72.90]   Chief Complaint   Patient presents with    Leg Swelling     pt sent to the ER by Dr Claude Guerra for BLE redness and edema     Patient Active Problem List    Diagnosis Date Noted    Cough variant asthma 03/10/2020    Morbidly obese ([facility] 03/10/2020    Decompensated hepatic cirrhosis ([facility] 03/10/2020    Secondary esophageal varices without bleeding ([facility]     Hemorrhoids 2020    Iron deficiency anemia due to chronic blood loss 2019    Bilateral carotid artery stenosis 2019    Chest pain 2018    RANDOLPH (dyspnea on exertion) 2018    Lumbar spondylosis 10/30/2017    Controlled type 2 diabetes mellitus without complication, without long-term current use of insulin ([facility] [facility] 2016    S/P left unicompartmental knee replacement 2016    History of trabeculectomy 2015    Lens replaced by other means 2015    POAG (primary open-angle glaucoma) 2015    Osteoarthritis of multiple joints 2014    Actinic keratosis 2014    Seborrheic keratosis 2014    Chondrocalcinosis of hand 2014    Pseudogout of hand 2014    Vitamin D deficiency 2014    Bilateral hand pain 2014    DISH (diffuse idiopathic skeletal hyperostosis) 2014    Lumbago without sciatica 2014    Secondary osteoarthritis of multiple sites 2014    Synovitis of hand 2014    Sclerosis of the skin 05/15/2013    Chondrodermatitis nodularis chronica helicis     Hyperlipidemia 2013    Abdominal pain 2013    Type II or unspecified type diabetes mellitus without mention of complication, not stated as uncontrolled     Fibromyalgia     Depression 08/28/2012    Hypertension 08/28/2012    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    Thrombocytopenia ([facility] 04/24/2012    Carotid stenosis 04/05/2012    LVH (left ventricular hypertrophy) 04/05/2012    Fitting and adjustment of orthopedic device 03/01/2012    Liver cirrhosis ([address] 02/18/2012    Parkinson disease ([facility] 02/18/2012    Sprain and strain of unspecified site of shoulder and upper arm 06/10/2011    Rotator cuff (capsule) sprain 06/15/2010    Osteoarthritis of shoulder 05/14/2010    Shoulder impingement 05/14/2010    Bursitis, hip 03/24/2010    Fx phalanx, foot-closed 09/02/2009    Closed fracture of part of upper end of humerus 02/22/2008    Myalgia and myositis, unspecified 12/26/2007    Diabetic polyneuropathy ([facility] 12/26/2007    Knee joint replacement by other means 12/02/2007    Joint replaced by other means 01/31/2007    Chronic frontal sinusitis 08/30/2004        Past Medical History:   Diagnosis Date    Asthma     Fibromyalgia     Hemorrhoid     Hyperlipidemia     Hypertension     Parkinson's disease ([facility] [facility]     Type II or unspecified type diabetes mellitus without mention of complication, not stated as uncontrolled      Past Surgical History:   Procedure Laterality Date    CHOLECYSTECTOMY      EYE SURGERY      HEMORRHOID SURGERY      HERNIA REPAIR  2/20/2002    ventral hernia    HERNIA REPAIR      LIH repair    HYSTERECTOMY      JOINT REPLACEMENT Right     partial left, full right    LAMINECTOMY      UPPER GASTROINTESTINAL ENDOSCOPY N/A 2/21/2020    EGD WITH BANDING performed by Dennise Wilkes MD at [facility] OR       Chart Reviewed: Yes  Patient assessed for rehabilitation services?: Yes  Family / Caregiver Present: Yes(dtr)  General Comment  Comments: Pt asleep in bed, agreeable to PT eval    Restrictions:  Restrictions/Precautions: Fall Risk     SUBJECTIVE:      Pain  Pre Treatment Pain Screening  Pain at present: 0    Post Treatment Pain Screening:   Pain Screening  Patient Currently in Pain: Denies  Pain Assessment  Pain Level: 0    Prior Level of Function:  Social/Functional History  Lives With: Alone  Type of Home: Apartment  Home Layout: One level(7th story)  Home Access: Elevator, Level entry  Bathroom Shower/Tub: Tub/Shower unit  Bathroom Equipment: Shower chair, Grab bars in shower  Home Equipment: 4 wheeled walker, Cane  ADL Assistance: [address]: Needs assistance(dtrs assist almost daily)  Ambulation Assistance: Independent(rollator or cane intermittently)  Transfer Assistance: Independent  Additional Comments: per dtr- fell in bathroom a few weeks ago    OBJECTIVE:   Vision: Within Functional Limits  Hearing: Exceptions to [facility]  Hearing Exceptions: Hard of hearing/hearing concerns    Cognition:  Overall Orientation Status: Within Functional Limits  Follows Commands: Within Functional Limits    Observation/Palpation  Edema: BLEs    ROM:  RLE AROM: WFL  LLE AROM : WFL    Strength:  Strength RLE  Comment: grossly 4-/5  Strength LLE  Comment: grossly 4-/5    Neuro:  Balance  Sitting - Static: Fair;+  Sitting - Dynamic: Fair;+(able to reach side to side in unsupported sitting)  Standing - Static: Fair;-(reliant on UEs for standing support)  Standing - Dynamic: Fair;-             Bed mobility  Supine to Sit: Moderate assistance(cues for sequencing task, uses bed rails, SOB with getting to EOB)    Transfers  Sit to Stand: Minimal Assistance  Stand to sit: Contact guard assistance  Bed to Chair: Minimal assistance(assist to ensure safety, hand placement, postural control)    Ambulation  Ambulation?: Yes  Ambulation 1  Surface: level tile  Device: Rolling Walker  Assistance: Contact guard assistance  Quality of Gait: shuffling gait, slow cayden  Distance: 15 ft  Comments: pt fatigued with short distance, wheezing noted              Activity Tolerance  Activity Tolerance: Patient limited by fatigue;Patient limited by needed.   Stand by assistance = pt requires verbal cues or instructions from another person, close to but not touching, to perform the activity  Minimal assistance= pt performs 75% or more of the activity; assistance is required to complete the activity  Moderate assistance= pt performs 50% of the activity; assistance is required to complete the activity  Maximal assistance = pt performs 25% of the activity; assistance is required to complete the activity  Dependent = pt requires total physical assistance to accomplish the task

## 2020-03-12 NOTE — PROGRESS NOTES
2316 axox4 follows commands aware of paracentesis tomorrow, abdomen distended, 2+ pitting edema of BLE

## 2020-03-13 NOTE — CONSULTS
file     Inability: Not on file    Transportation needs     Medical: Not on file     Non-medical: Not on file   Tobacco Use    Smoking status: Never Smoker    Smokeless tobacco: Never Used   Substance and Sexual Activity    Alcohol use: No    Drug use: No    Sexual activity: Not on file   Lifestyle    Physical activity     Days per week: Not on file     Minutes per session: Not on file    Stress: Not on file   Relationships    Social connections     Talks on phone: Not on file     Gets together: Not on file     Attends Buddhism service: Not on file     Active member of club or organization: Not on file     Attends meetings of clubs or organizations: Not on file     Relationship status: Not on file    Intimate partner violence     Fear of current or ex partner: Not on file     Emotionally abused: Not on file     Physically abused: Not on file     Forced sexual activity: Not on file   Other Topics Concern    Not on file   Social History Narrative    Not on file       Family History   Problem Relation Age of Onset    High Blood Pressure Neg Hx        No current facility-administered medications on file prior to encounter.       Current Outpatient Medications on File Prior to Encounter   Medication Sig Dispense Refill    lactated ringers infusion Infuse intravenously      furosemide (LASIX) 20 MG tablet Take 1 tablet by mouth daily for 5 days 5 tablet 0    levoFLOXacin (LEVAQUIN) 500 MG tablet Take 1 tablet by mouth daily for 10 days 10 tablet 0    sucralfate (CARAFATE) 1 GM tablet TAKE 1 TABLET BY MOUTH FOUR TIMES DAILY 360 tablet 1    sertraline (ZOLOFT) 50 MG tablet TAKE 1 TABLET BY MOUTH EVERY DAY 30 tablet 0    omeprazole (PRILOSEC) 40 MG delayed release capsule TAKE 1 CAPSULE BY MOUTH EVERY DAY 30 capsule 0    lovastatin (MEVACOR) 10 MG tablet Take 1 tablet by mouth nightly 30 tablet 3    topiramate (TOPAMAX) 50 MG tablet Take 1 tablet by mouth daily 30 tablet 3    traZODone (DESYREL) 100 MG from an infectious disease perspective. WBC trends are being monitored. Lab Results   Component Value Date     03/12/2020    K 3.2 03/12/2020    K 3.2 03/11/2020     03/12/2020    CO2 25 03/12/2020    BUN 9 03/12/2020    CREATININE 0.83 03/12/2020    GLUCOSE 148 03/12/2020    GLUCOSE 248 04/06/2012    CALCIUM 8.1 03/12/2020      Lab Results   Component Value Date    WBC 3.4 (L) 03/12/2020    HGB 8.0 (L) 03/12/2020    HCT 25.2 (L) 03/12/2020    .3 (H) 03/12/2020    PLT 71 (L) 03/12/2020       Radiology:   I have reviewed imaging results per electronic record and most pertinent abnormalities are being addressed from an infectious disease standpoint. Assessment:  Cirrhosis - paracentesis results are pending  Complications include: Esophageal varicies, hypersplenism, pancytopenia    Cellulitis bilateral LE? Possibly initially around the sites of excoriations. She has some stasis dermatitis but will continue abx to see if it makes a difference in the pinkness over the excoriated sites. Plan:  Follow up cultures  Doxycycline ok for now although can contribute to pancytopenia if not needed.    BC negative  Body fluid culture pending      Jasmyn Hu D.O.

## 2020-03-13 NOTE — DISCHARGE SUMMARY
Physician Discharge Summary     Patient ID:  Silverio Laird  [id number]  82 y.o.  1935    Admit date: 3/10/2020    Discharge date : 03/13/20     Admitting Physician: Maritza Grigsby DO     Discharge Physician: Carlitos Caruso DO     [facility]: [facility] [K72.90]    Discharge Diagnoses: Ascites, decompensated hepatic cirrhosis    Admission Condition: fair    Discharged Condition: good    Hospital Course: Ms. Gricel Linder is an 61-year-old female with a history of end-stage liver disease with recurrent ascites who presented with abdominal pain. She was found to have significant amount of ascites. Ultrasound of the abdomen did not show any thrombosis. She was seen by gastroenterology and interventional radiology who performed paracentesis on 3/12/2020. She had removal of 5 L of ascitic fluid, which does not appear to be positive for infection at this time. Full cultures are pending and will need to be followed in the outpatient setting. She also has some redness of the bilateral extremities. ID was consulted to rule out cellulitis. ID feels that this is secondary to stasis dermatitis. We will placed on doxycycline for 10 days as an extra precaution. lower extremity edema and erythema has improved status post paracentesis. She is medically stable for discharge  to [facility]. Spironolactone has also been added to her regimen, which she has tolerated well. Consults: GI, IR, palliative care    Significant Diagnostic Studies: See below.      Discharge Exam:  See progress note 03/13/20      Labs:   Recent Labs     03/11/20  0459 03/12/20  1036 03/13/20  0502   WBC 2.8* 3.4* 3.0*   HGB 7.3* 8.0* 7.5*   HCT 22.8* 25.2* 23.7*   PLT 65* 71* 64*     Recent Labs     03/11/20  0500 03/12/20  1036 03/13/20  0502   * 144 145*   K 3.2* 3.2* 3.4   * 108* 111*   CO2 23 25 21   BUN 11 9 8   CREATININE 0.78 0.83 0.81   CALCIUM 7.7* 8.1* 8.1*     Recent Labs     03/10/20  1915 03/12/20  1036 03/13/20  0502   AST 26 22 20   ALT 13 <5 <5   BILITOT 0.5 0.6 0.6   ALKPHOS 77 61 54     Recent Labs     03/11/20  0459   INR 1.5     Recent Labs     03/10/20  1915   TROPONINI 0.032*       Urinalysis:   Lab Results   Component Value Date    NITRU Negative 03/10/2020    WBCUA 6-10 01/29/2020    BACTERIA Negative 01/29/2020    RBCUA 0-2 01/29/2020    BLOODU Negative 03/10/2020    SPECGRAV 1.009 03/10/2020    GLUCOSEU Negative 03/10/2020    GLUCOSEU NEG 04/01/2012       Radiology:   Most recent    Chest CT      WITH CONTRAST:No results found for this or any previous visit. WITHOUT CONTRAST: No results found for this or any previous visit. CXR      2-view:   Results for orders placed in visit on 11/18/19   XR CHEST STANDARD (2 VW)    Narrative EXAMINATION: XR CHEST (2 VW)    CLINICAL HISTORY: R05 Chronic cough ICD10    COMPARISONS: 1/26/2019    FINDINGS: Cardiac size is borderline. Pulmonary vascularity is normal. There are no acute infiltrates or effusions. There is no pneumothorax. There is calcification the mitral annulus. There are atherosclerotic changes of the thoracic aorta. There are   degenerative changes in the spine with bridging osteophytes. There are degenerative changes of the shoulders with radiographic findings suggesting rotator cuff tear/degeneration      Impression NO ACUTE CHEST DISEASE. Portable: No results found for this or any previous visit. Echo No results found for this or any previous visit.     Disposition: SNF    In process/preliminary results:  Outstanding Order Results     Date and Time Order Name Status Description    3/12/2020 1539 Culture, Body Fluid In process     3/11/2020 0000 Cytology, Non-Gyn In process     3/10/2020 1938 Culture, Blood 2 Preliminary     3/10/2020 1931 Culture, Blood 1 Preliminary           Patient Instructions:   Current Discharge Medication List      START taking these medications    Details

## 2020-03-13 NOTE — PLAN OF CARE
See OT evaluation for all goals and OT POC.  Electronically signed by CARLENE Diego/L on 3/13/2020 at 2:27 PM

## 2020-03-13 NOTE — FLOWSHEET NOTE
1600- Up to bathroom voided. Saline lock removed. 1615- Report called to [address] [place] [address] E home. 1750- Awaiting transfer per ambulance to nursing home. 1806- DISCHARGED TO [facility] PER AMBULANCE WITH BELONGINGS, AND DISCHARGE MATERIALS AND COPIED MATERIALS.

## 2020-03-13 NOTE — PROGRESS NOTES
Physical Therapy Missed Treatment   Facility/Department: [facility] MED SURG [id number]    NAME: Una Issa  Patient Status:   : 1935 (82 y.o.)  MRN: [id number]  Account: [de-identified]  Gender: female        [x] Patient Declines PT Treatment            [] Patient Unavailable:     Pt sleeping and said she wanted to rest and was possibly leaving today. Pt c/o being cold so I provided her with warm blankets. Pt kept eyes closed during our conversation. Will attempt PT Treatment again at earliest convenience.         Electronically signed by [facility] IRAIS SEO PTA on 3/13/20 at 10:22 AM EDT

## 2020-03-13 NOTE — PROGRESS NOTES
2100 axox4 follows commands abdomen soft nontender, servin draining clear yellow urine, fluid restriction maintained

## 2020-03-13 NOTE — CONSULTS
HPI:  Patient is a pleasant 82-year-old woman with nonalcoholic liver cirrhosis presented with increasing bilateral lower extremity swelling and erythema. Upon admission she was discovered to have abdominal ascites as well. Interventional radiology was consulted for diagnostic and therapeutic ultrasound-guided paracentesis. Patient is alert and awake, she has no complaints except for the lower extremity edema. She understands the procedure including the risks, benefits, and alternatives and wishes to proceed. She is slightly anxious since she's never had a paracentesis before. Family History   Problem Relation Age of Onset    High Blood Pressure Neg Hx        Past Surgical History:   Procedure Laterality Date    CHOLECYSTECTOMY      EYE SURGERY      HEMORRHOID SURGERY      HERNIA REPAIR  2/20/2002    ventral hernia    HERNIA REPAIR      LIH repair    HYSTERECTOMY      JOINT REPLACEMENT Right     partial left, full right    LAMINECTOMY      PARACENTESIS Left 03/12/2020    5010cc ascites removed by Dr Cesilia Toth [phone]    UPPER GASTROINTESTINAL ENDOSCOPY N/A 2/21/2020    EGD WITH BANDING performed by Elias Victoria MD at [facility]        Past Medical History:   Diagnosis Date    Asthma     Fibromyalgia     Hemorrhoid     Hyperlipidemia     Hypertension     Parkinson's disease ([facility]     Type II or unspecified type diabetes mellitus without mention of complication, not stated as uncontrolled        Social History     Socioeconomic History    Marital status:       Spouse name: None    Number of children: None    Years of education: None    Highest education level: None   Occupational History    None   Social Needs    Financial resource strain: None    Food insecurity     Worry: None     Inability: None    Transportation needs     Medical: None     Non-medical: None   Tobacco Use    Smoking status: Never Smoker    Smokeless tobacco: Never Used   Substance and Sexual Activity    Alcohol use: No    Drug use: No    Sexual activity: None   Lifestyle    Physical activity     Days per week: None     Minutes per session: None    Stress: None   Relationships    Social connections     Talks on phone: None     Gets together: None     Attends Latter day service: None     Active member of club or organization: None     Attends meetings of clubs or organizations: None     Relationship status: None    Intimate partner violence     Fear of current or ex partner: None     Emotionally abused: None     Physically abused: None     Forced sexual activity: None   Other Topics Concern    None   Social History Narrative    None       Allergies   Allergen Reactions    Adhesive Tape     Amoxicillin-Pot Clavulanate     Bactrim [Sulfamethoxazole-Trimethoprim]     Clarithromycin      Other reaction(s): Other: See Comments  tachycardia    Other     Sulfa Antibiotics        No current facility-administered medications on file prior to encounter. Current Outpatient Medications on File Prior to Encounter   Medication Sig Dispense Refill    lactated ringers infusion Infuse intravenously      furosemide (LASIX) 20 MG tablet Take 1 tablet by mouth daily for 5 days 5 tablet 0    levoFLOXacin (LEVAQUIN) 500 MG tablet Take 1 tablet by mouth daily for 10 days 10 tablet 0    sucralfate (CARAFATE) 1 GM tablet TAKE 1 TABLET BY MOUTH FOUR TIMES DAILY 360 tablet 1    sertraline (ZOLOFT) 50 MG tablet TAKE 1 TABLET BY MOUTH EVERY DAY 30 tablet 0    omeprazole (PRILOSEC) 40 MG delayed release capsule TAKE 1 CAPSULE BY MOUTH EVERY DAY 30 capsule 0    lovastatin (MEVACOR) 10 MG tablet Take 1 tablet by mouth nightly 30 tablet 3    topiramate (TOPAMAX) 50 MG tablet Take 1 tablet by mouth daily 30 tablet 3    traZODone (DESYREL) 100 MG tablet Take 2 tablets by mouth once daily at bedtime.  60 tablet 3    budesonide-formoterol (SYMBICORT) 160-4.5 MCG/ACT AERO Inhale 2 puffs into the lungs 2 times daily 1 Inhaler 5    ACCU-CHEK SOFTCLIX LANCETS MISC Checks 1 time daily. NIDDM--ICD-10 E11.9 100 each 3    albuterol sulfate HFA (PROAIR HFA) 108 (90 Base) MCG/ACT inhaler Inhale 2 puffs into the lungs every 6 hours as needed for Wheezing 1 Inhaler 0    blood glucose test strips (ACCU-CHEK ACTIVE STRIPS) strip Checks 1 time daily. NIDDM--ICD-10 E11.9 100 each 3    aspirin 81 MG tablet Take 1 tablet by mouth daily With Food 30 tablet 3    Cinnamon 500 MG TABS Take by mouth 3 times daily      pregabalin (LYRICA) 75 MG capsule Take 75 mg by mouth 3 times daily. Take 75 mg by mouth. Minerva Mckoy carbidopa-levodopa (SINEMET)  MG per tablet Take 1 tablet by mouth 1 po bid per dr robb goldstein neurologist      latanoprost (XALATAN) 0.005 % ophthalmic solution Use 1 Drop in both eyes daily at bedtime.  tiZANidine (ZANAFLEX) 2 MG tablet Take 2 mg by mouth Take 2 mg by mouth bid prn      clotrimazole-betamethasone (LOTRISONE) 1-0.05 % cream Use two times daily to affected area for 14 days then stop 30 g 0    irbesartan (AVAPRO) 75 MG tablet Take 1 tablet by mouth daily Take 1 table by mouth once daily 30 tablet 3       Review of Systems   Constitutional: Positive for fatigue. Negative for chills, diaphoresis and fever. HENT: Negative. Negative for congestion, ear pain, hearing loss and tinnitus. Eyes: Negative. Negative for photophobia. Respiratory: Negative. Negative for cough, shortness of breath and wheezing. Cardiovascular: Positive for leg swelling. Negative for chest pain and palpitations. Gastrointestinal: Positive for abdominal distention. Negative for abdominal pain, constipation, diarrhea, nausea and vomiting. Genitourinary: Negative. Negative for dysuria, flank pain, frequency, hematuria and urgency. Musculoskeletal: Negative. Negative for back pain and neck pain. Skin: Negative. Negative for rash. Allergic/Immunologic: Negative for environmental allergies. Neurological: Negative. Thought content normal.         Judgment: Judgment normal.         ASSESSMENT ANDPLAN:      ASSESSMENT: Ascites secondary to decompensated nonalcoholic liver cirrhosis    PLAN: Ultrasound-guided diagnostic and therapeutic paracentesis

## 2020-03-13 NOTE — PROGRESS NOTES
[facility]CCUPATIONAL [facility]ERAPY EVALUATION - ACUTE     NAME: Tyesha Reed  : 1935 (82 y.o.)  MRN: [id number]  CODE STATUS: DNR-CCA  Room: P290/U787-64    Date of Service: 3/13/2020    Patient Diagnosis(es): Decompensated hepatic cirrhosis [facility]) [K72.90]   Chief Complaint   Patient presents with    Leg Swelling     pt sent to the ER by Dr Jacinda Jay for BLE redness and edema     Patient Active Problem List    Diagnosis Date Noted    Cough variant asthma 03/10/2020    Morbidly obese ([facility] 03/10/2020    Decompensated hepatic cirrhosis ([facility] 03/10/2020    Secondary esophageal varices without bleeding ([facility]     Hemorrhoids 2020    Iron deficiency anemia due to chronic blood loss 2019    Bilateral carotid artery stenosis 2019    Chest pain 2018    RANDOLPH (dyspnea on exertion) 2018    Lumbar spondylosis 10/30/2017    Controlled type 2 diabetes mellitus without complication, without long-term current use of insulin ([facility] 2016    S/P left unicompartmental knee replacement 2016    History of trabeculectomy 2015    Lens replaced by other means 2015    POAG (primary open-angle glaucoma) 2015    Osteoarthritis of multiple joints 2014    Actinic keratosis 2014    Seborrheic keratosis 2014    Chondrocalcinosis of hand 2014    Pseudogout of hand 2014    Vitamin D deficiency 2014    Bilateral hand pain 2014    DISH (diffuse idiopathic skeletal hyperostosis) 2014    Lumbago without sciatica 2014    Secondary osteoarthritis of multiple sites 2014    Synovitis of hand 2014    Sclerosis of the skin 05/15/2013    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    Hyperlipidemia 2013    Abdominal pain 2013    Type II or unspecified type diabetes mellitus without mention of complication, not stated as uncontrolled     Fibromyalgia     Depression 08/28/2012    Hypertension 08/28/2012    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    Thrombocytopenia ([facility] 04/24/2012    Carotid stenosis 04/05/2012    LVH (left ventricular hypertrophy) 04/05/2012    Fitting and adjustment of orthopedic device 03/01/2012    Liver cirrhosis ([address] 02/18/2012    Parkinson disease ([address] 02/18/2012    Sprain and strain of unspecified site of shoulder and upper arm 06/10/2011    Rotator cuff (capsule) sprain 06/15/2010    Osteoarthritis of shoulder 05/14/2010    Shoulder impingement 05/14/2010    Bursitis, hip 03/24/2010    Fx phalanx, foot-closed 09/02/2009    Closed fracture of part of upper end of humerus 02/22/2008    Myalgia and myositis, unspecified 12/26/2007    Diabetic polyneuropathy ([facility] 12/26/2007    Knee joint replacement by other means 12/02/2007    Joint replaced by other means 01/31/2007    Chronic frontal sinusitis 08/30/2004        Past Medical History:   Diagnosis Date    Asthma     Fibromyalgia     Hemorrhoid     Hyperlipidemia     Hypertension     Parkinson's disease ([address]     Type II or unspecified type diabetes mellitus without mention of complication, not stated as uncontrolled      Past Surgical History:   Procedure Laterality Date    CHOLECYSTECTOMY      EYE SURGERY      HEMORRHOID SURGERY      HERNIA REPAIR  2/20/2002    ventral hernia    HERNIA REPAIR      LIH repair    HYSTERECTOMY      JOINT REPLACEMENT Right     partial left, full right    LAMINECTOMY      PARACENTESIS Left 03/12/2020    5010cc ascites removed by Dr Nolvia Omalley N/A 2/21/2020    EGD WITH BANDING performed by Osman Doll MD at [facility]        Restrictions  Restrictions/Precautions: Fall Risk     Safety Devices: Safety Devices  Safety Devices in place: Yes  Type of devices:  All fall risk precautions in place    Subjective  Pre Treatment Pain Screening  Pain at present: 7  Scale Used: Numeric Score  Intervention List: Patient able to continue with treatment, Patient declined any intervention  Comments / Details: \"All that's ordered is tylenol, and that's not good for my liver'- declines nursing notification. RN Elidia notified anyway, but confirms pt. only has tylenol ordered for pain. Pain Reassessment:   Pain Assessment  Patient Currently in Pain: Yes  Pain Assessment: 0-10  Pain Level: 7  Pain Type: Acute pain  Pain Location: Head  Pain Descriptors: Headache       Prior Level of Function:  Social/Functional History  Lives With: Alone  Type of Home: Apartment  Home Layout: One level(7th story)  Home Access: Elevator, Level entry  Bathroom Shower/Tub: Tub/Shower unit  Bathroom Equipment: Shower chair, Grab bars in shower  Home Equipment: 4 wheeled walker, Cane  ADL Assistance: Independent  Homemaking Assistance: Needs assistance(dtrs assist almost daily)  Ambulation Assistance: Independent(rollator or cane intermittently)  Transfer Assistance: Independent  Additional Comments: per dtr- fell in bathroom a few weeks ago    OBJECTIVE:     Orientation Status:  Orientation  Overall Orientation Status: Within Functional Limits    Observation:  Observation/Palpation  Posture: Fair  Observation: Pt. alert and attentive, reports more fatigue  Edema: BLEs    Cognition Status:  Cognition  Overall Cognitive Status: Exceptions  Arousal/Alertness: Delayed responses to stimuli  Following Commands:  Follows one step commands with repetition  Attention Span: Attends with cues to redirect  Memory: Appears intact  Safety Judgement: Decreased awareness of need for assistance  Problem Solving: Assistance required to implement solutions, Assistance required to identify errors made, Assistance required to generate solutions, Assistance required to correct errors made  Insights: Decreased awareness of deficits  Initiation: Requires cues for some  Sequencing: Requires cues for some  Cognition Comment: Increased time for sequencing and problem solving    Perception Status:  Perception  Overall Perceptual Status: WFL    Sensation Status:  Sensation  Overall Sensation Status: WFL    Vision and Hearing Status:  Vision  Vision: Impaired  Vision Exceptions: Wears glasses at all times  Hearing  Hearing: Exceptions to [facility]  Hearing Exceptions: Hard of hearing/hearing concerns     ROM:   LUE AROM (degrees)  LUE AROM : WFL  Left Hand AROM (degrees)  Left Hand AROM: WFL  RUE AROM (degrees)  RUE AROM : WFL  Right Hand AROM (degrees)  Right Hand AROM: WFL    Strength:  LUE Strength  Gross LUE Strength: Exceptions to [facility]  L Hand General: 3+/5  LUE Strength Comment: 3+/5 all planes  RUE Strength  Gross RUE Strength: Exceptions to [facility]  R Hand General: 3+/5  RUE Strength Comment: 3+/5 all planes    Coordination, Tone, Quality of Movement: Tone RUE  RUE Tone: Normotonic  Tone LUE  LUE Tone: Normotonic  Coordination  Movements Are Fluid And Coordinated: No  Coordination and Movement description: Fine motor impairments, Decreased speed, Decreased accuracy, Right UE, Left UE  Quality of Movement Other  Comment: Arthritic changes    Hand Dominance:  Hand Dominance  Hand Dominance: Left    ADL Status:  ADL  Feeding: Independent  Grooming: Setup  UE Bathing: Setup  LE Bathing: Moderate assistance  UE Dressing: Setup  LE Dressing: Moderate assistance  Toileting: Minimal assistance  Additional Comments: Simulated ADLs as above. Pt. required increased processing time and increased sequencing time.    Toilet Transfers  Toilet Transfer: Unable to assess  Toilet Transfers Comments: Anticipate CGA       Therapy key for assistance levels -   Independent = Pt. is able to perform task with no assistance but may require a device   Stand by assistance = Pt. does not perform task at an independent level but does not need physical assistance, requires verbal cues  Minimal, Moderate, Maximal Assistance = Pt. requires physical assistance (25%, 50%, 75% assist from helper) for task but is able to actively participate in task   Dependent = Pt. requires total assistance with task and is not able to actively participate with task completion     Functional Mobility:  Functional Mobility  Functional - Mobility Device: Rolling Walker  Activity: Other  Assist Level: Contact guard assistance  Functional Mobility Comments: CGA to side step to [facility]; pt. states she is too fatigued for further ambulation  Transfers  Sit to stand: Stand by assistance  Stand to sit: Stand by assistance  Transfer Comments: Increased time for sequencing    Bed Mobility  Bed mobility  Supine to Sit: Moderate assistance(Increased time and cuing to sequence)  Sit to Supine: Minimal assistance  Comment: Increased time and effort throughout mobility    Seated and Standing Balance:  Balance  Sitting Balance: Supervision  Standing Balance: Contact guard assistance    Functional Endurance:  Activity Tolerance  Activity Tolerance: Patient limited by fatigue    D/C Recommendations:  OT D/C RECOMMENDATIONS  REQUIRES OT FOLLOW UP: Yes    Equipment Recommendations:  OT Equipment Recommendations  Other: Continue to assess    OT Education:   OT Education  OT Education: OT Role, Plan of Care  Patient Education: Educated pt. on role of acute care OT  Barriers to Learning: None    OT Follow Up:  OT D/C RECOMMENDATIONS  REQUIRES OT FOLLOW UP: Yes       Assessment/Discharge Disposition:  Assessment: Pt. is an 82year old woman from home who presents to [facility] with the above deficits which impact her ability to perform ADLs and IADLs. Pt. would benefit from continued OT to maximize independence and safety with ADL tasks.    Performance deficits / Impairments: Decreased functional mobility , Decreased ADL status, Decreased strength, Decreased endurance, Decreased high-level IADLs, Decreased fine motor control, Decreased coordination, Decreased safe awareness, Decreased cognition, Decreased balance  Prognosis: Good  Discharge Recommendations: Continue to assess pending progress  Decision Making: Medium Complexity  History: Pt's medical history is moderately complex  Exam: Pt. has 10 performance deficits  Assistance / Modification: Pt. requires min-mod A    Six Click Score   How much help for putting on and taking off regular lower body clothing?: A Lot  How much help for Bathing?: A Lot  How much help for Toileting?: A Lot  How much help for putting on and taking off regular upper body clothing?: A Little  How much help for taking care of personal grooming?: A Little  How much help for eating meals?: A Little  AM-[facility] Inpatient Daily Activity Raw Score: 15  AM-PAC Inpatient ADL T-Scale Score : 34.69  ADL Inpatient CMS 0-100% Score: 56.46    Plan:  Plan  Times per week: 1-3x  Plan weeks: length of acute stay  Current Treatment Recommendations: Strengthening, Balance Training, Endurance Training, Functional Mobility Training, Pain Management, Patient/Caregiver Education & Training, Safety Education & Training, Self-Care / ADL, Home Management Training, Equipment Evaluation, Education, & procurement    Goals:   Patient will:    - Improve functional endurance to tolerate/complete 60 mins of ADL's  - Be Mod I in UB ADLs   - Be SBA in LB ADLs  - Be SBA in ADL transfers without LOB  - Be SBA in toileting tasks  - Improve B hand fine motor coordination to [facility] in order to manage clothing fasteners/self-care containers in a timely manner  - Improve B UE strength and endurance to 4/5 in order to participate in self-care activities as projected. - Access appropriate D/C site with as few architectural barriers as possible. - Sequence self-care tasks with no verbal cues for safety    Patient Goal: Patient goals : \"I want to get stronger\"     Discussed and agreed upon: Yes Comments:     Therapy Time:   OT Individual Minutes  Time In: 0308  Time Out: 1405  Minutes: 12    Eval: 12 minutes     Electronically signed by:     Ry Cormier

## 2020-03-13 NOTE — CARE COORDINATION
95816 for KOV complete.
LSW spoke with the pt and she has agreed to go to [facility] today if accepted. VINCENTW called Constantine Osborne at [facility] and she will review referral.     [facility] has accepted the pt for admission today. AMBULANCE ARRANGED FOR 6PM  AND DAUGHTER IS AWARE.
following treatment goals: TO SNF FOR STRENGTHENING    Initial Discharge Plan? (Note: please see concurrent daily documentation for any updates after initial note). FROM HOME ALONE. WEAK FROM BEING ILL. AGREEABLE TO SNF SO SNF LIST WAS GIVEN FOR FOLLOW UP.       The Patient and/or patient representative: PT was provided with choice of any post-acute providers for care and equipment and agrees with discharge plan  Yes    Electronically signed by KARLY Kessler on 3/12/2020 at 3:38 PM

## 2020-03-13 NOTE — FLOWSHEET NOTE
0815- In bed resting, no complaints offered. Abdomen is softer than yesterday with active bowel sounds. Dressing LLQ is clean , dry, intact. Less redness noted of bilateral lower legs, more of a dark pink in color.

## 2020-03-16 NOTE — TELEPHONE ENCOUNTER
From: [place]  To: Imelda Rivero MD  Sent: 3/14/2020 3:20 PM EDT  Subject: Visit Follow-Up Question    Please advise Dr. Granados Boards my mother, [place], was transferred to [facility] in [place] for inpatient therapy. A pericentesis was performed at [facility] before transferring her. They removed 5 liters of fluids from her abdomen. Very concerned about my mother's continuation of care in regards to her liver while being a patient at [facility]. I am not allowed to visit my mother due to restrictions of coronavirus.    Thank you  Val Ortez

## 2020-03-16 NOTE — TELEPHONE ENCOUNTER
Pt's daughter, David Causey, called asking for you to give her a call as soon as you can regarding mother's liver and US results. Please advise.  Her phone number is [phone]

## 2020-03-17 NOTE — PROGRESS NOTES
Physical Therapy  Facility/Department: [facility] W839/E812-01  Physical Therapy Discharge      NAME: Elsy Koenig    : 1935 (82 y.o.)  MRN: [id number]    Account: [de-identified]  Gender: female      Patient has been discharged from acute care hospital. DC patient from current PT program.      Electronically signed by Sophia Hopkins PT on 3/17/20 at 4:45 PM EDT

## 2020-03-17 NOTE — TELEPHONE ENCOUNTER
Pateints daughter called and stated you wanted her to take Lasix. Patient needs it sent to pharmacy. Please advise. Thanks.

## 2020-03-18 NOTE — TELEPHONE ENCOUNTER
Lyla Plascencia from [facility] called to say that pt has been released from nursing home. Will you follow for home health care orders? She needs a verbal.  Please call Lyla Plascencia at [phone].

## 2020-03-27 NOTE — PROGRESS NOTES
diabetes mellitus with complication, without long-term current use of insulin ([facility]     5.  Diabetic polyneuropathy associated with type 2 diabetes mellitus ([facility]           Plan:    f/u with specialities as already planned   Orders Placed This Encounter   Procedures    Basic Metabolic Panel     Standing Status:   Future     Standing Expiration Date:   3/27/2021    Hemoglobin A1C     Standing Status:   Future     Standing Expiration Date:   3/27/2021     Continue current medications  Non fasting blood work in 3 months and f/u after done  Non fasting blood work in 3 months and f/u after done

## 2020-03-27 NOTE — PROGRESS NOTES
and the  was Dr. Gretchen Interiano on 2/21/2020. FAMILY HISTORY:  Not contributory. SOCIAL HISTORY:  The patient is said to have a prior history of alcohol use, but denies using tobacco or illicit drugs. ALLERGIES:  Adhesive tape, amoxicillin, Bactrim, clarithromycin and other sulfa antibiotics. MEDICATIONS:  Doxycycline 100 mg tablet twice a day for 10 days; spironolactone 100 mg tablet once daily; albuterol HFA 2 puffs q.6 hours p.r.n.; aspirin 81 mg tablet once daily; Symbicort 160-4.5 mcg per actuation 2 puffs into both the lungs twice daily; Sinemet  mg tablet, 1 tablet by mouth twice daily; cinnamon 500 mg tablet twice daily; Lotrisone 1%-0.05% cream apply to affected area x14 days, then stop; furosemide 20 mg tablet daily for 5 days; Avapro 75 mg tablet once daily; Xalatan 0.005% ophthalmic solution 1 drop into both eyes at bedtime; Mevacor 10 mg tablets q.h.s.; omeprazole 40 mg capsule once daily; Lyrica 75 mg tablet 3 times daily; sertraline 50 mg tablets once daily; sucralfate 1 g 4 times daily; tizanidine 2 mg tablet twice daily p.r.n.; topiramate 50 mg tablet once daily; and trazodone 100 mg tablet and she takes 2 tablets by mouth q.h.s. REVIEW OF SYSTEMS:  As in HPI above. PHYSICAL EXAMINATION:  Her vital signs include blood pressure 141/61 mmHg, heart rate is 60 beats per minute, respiratory rate is 18 per minute, temperature is 97.8 degrees Fahrenheit, and she is saturating at 95% on room air. She is 5 feet 1 inch tall and weighs 174.4 pounds. The patient is a warm and pleasant elderly  female who is alert and in no obvious respiratory distress. She is mild-to-moderately pale, but anicteric and afebrile to the touch. Head is normocephalic and atraumatic with pupils equal, round, and reactive to light and accommodation. She has moist oral mucosa. Neck is supple without JVD or palpable organomegaly or thyromegaly.   Lungs are clear to auscultations bilaterally

## 2020-04-01 NOTE — PROGRESS NOTES
Kya Teran  1935  female  Medical Record Number: [id number]      folloe up: bilateral LE erythema thought to be cellulitis on admission. Admitted with abdominal pain although she denies right now. She denies scratching legs but looks like there are some small excoriations. No pain to palpation now and now excess warmth.   + hx of cirrhosis with varices and leg swelling with ascites. Paracentesis results pending. + pancytopenia due to hx of cirrhosis. Subjectively, no new complaints at this time. Review of Systems: All 14 review of systems were discussed with the patient and are negative other than as stated above      Past Medical History:   Diagnosis Date    Asthma     Fibromyalgia     Hemorrhoid     Hyperlipidemia     Hypertension     Parkinson's disease ([facility]     Type II or unspecified type diabetes mellitus without mention of complication, not stated as uncontrolled        Past Surgical History:   Procedure Laterality Date    CHOLECYSTECTOMY      EYE SURGERY      HEMORRHOID SURGERY      HERNIA REPAIR  2/20/2002    ventral hernia    HERNIA REPAIR      LIH repair    HYSTERECTOMY      JOINT REPLACEMENT Right     partial left, full right    LAMINECTOMY      PARACENTESIS Left 03/12/2020    5010cc ascites removed by Dr Willis Memory [phone]   [address] [place] [address] N/A 2/21/2020    EGD WITH BANDING performed by Heide Jarrell MD at [address] Marital status:       Spouse name: Not on file    Number of children: Not on file    Years of education: Not on file    Highest education level: Not on file   Occupational History    Not on file   Social Needs    Financial resource strain: Not on file    Food insecurity     Worry: Not on file     Inability: Not on file    Transportation needs     Medical: Not on file     Non-medical: Not on file   Tobacco Use    Smoking status: Never Smoker    Smokeless tobacco: Never Used   Substance and Sexual Activity    Alcohol use: No    Drug use: No    Sexual activity: Not on file   Lifestyle    Physical activity     Days per week: Not on file     Minutes per session: Not on file    Stress: Not on file   Relationships    Social connections     Talks on phone: Not on file     Gets together: Not on file     Attends Christian service: Not on file     Active member of club or organization: Not on file     Attends meetings of clubs or organizations: Not on file     Relationship status: Not on file    Intimate partner violence     Fear of current or ex partner: Not on file     Emotionally abused: Not on file     Physically abused: Not on file     Forced sexual activity: Not on file   Other Topics Concern    Not on file   Social History Narrative    Not on file       Family History   Problem Relation Age of Onset    High Blood Pressure Neg Hx        No current facility-administered medications on file prior to encounter. Current Outpatient Medications on File Prior to Encounter   Medication Sig Dispense Refill    furosemide (LASIX) 20 MG tablet Take 1 tablet by mouth daily for 5 days 5 tablet 0    sucralfate (CARAFATE) 1 GM tablet TAKE 1 TABLET BY MOUTH FOUR TIMES DAILY 360 tablet 1    lovastatin (MEVACOR) 10 MG tablet Take 1 tablet by mouth nightly 30 tablet 3    topiramate (TOPAMAX) 50 MG tablet Take 1 tablet by mouth daily 30 tablet 3    traZODone (DESYREL) 100 MG tablet Take 2 tablets by mouth once daily at bedtime. 60 tablet 3    ACCU-CHEK SOFTCLIX LANCETS MISC Checks 1 time daily. NIDDM--ICD-10 E11.9 100 each 3    blood glucose test strips (ACCU-CHEK ACTIVE STRIPS) strip Checks 1 time daily.  NIDDM--ICD-10 E11.9 100 each 3    aspirin 81 MG tablet Take 1 tablet by mouth daily With Food 30 tablet 3    Cinnamon 500 MG TABS Take by mouth 3 times daily      carbidopa-levodopa (SINEMET)  MG per tablet Take 1 tablet by mouth 1 po bid per dr robb goldstein neurologist      latanoprost (XALATAN) 0.005 % ophthalmic solution Use 1 Drop in both eyes daily at bedtime.  tiZANidine (ZANAFLEX) 2 MG tablet Take 2 mg by mouth Take 2 mg by mouth bid prn      clotrimazole-betamethasone (LOTRISONE) 1-0.05 % cream Use two times daily to affected area for 14 days then stop 30 g 0    irbesartan (AVAPRO) 75 MG tablet Take 1 tablet by mouth daily Take 1 table by mouth once daily 30 tablet 3       Physical Exam:  Obese female; hard of hearing  General: Patient appears in no acute distress, cooperative  Skin: no new rashes or erythema or induration  HEENT: EOMI, MMM, Neck is supple  Heart: S1 S2  Lungs: bilaterally clear   Abdomen: soft, ND, NTTP, +BS  Extrem:+edema but no pain, the excoriated areas as above. Some scabs that are very small and non infected on some of her toes dorsal surface. Neuro exam: CN II-XII intact, facial expressions symmertrical bilaterally, no slurred speech      Labs: I have reviewed all lab results by electronic record, including most recent CBC, metabolic panel, and pertinent abnormalities were addressed from an infectious disease perspective. WBC trends are being monitored. Lab Results   Component Value Date     03/19/2020    K 4.4 03/19/2020    K 3.2 03/11/2020     03/19/2020    CO2 21 03/19/2020    BUN 10 03/19/2020    CREATININE 0.83 03/19/2020    GLUCOSE 81 03/19/2020    GLUCOSE 248 04/06/2012    CALCIUM 8.6 03/19/2020      Lab Results   Component Value Date    WBC 3.3 (L) 03/19/2020    HGB 8.1 (L) 03/19/2020    HCT 25.1 (L) 03/19/2020    .1 (H) 03/19/2020    PLT 73 (L) 03/19/2020       Radiology:   I have reviewed imaging results per electronic record and most pertinent abnormalities are being addressed from an infectious disease standpoint. Assessment:  Cirrhosis - paracentesis results are pending  Complications include: Esophageal varicies, hypersplenism, pancytopenia    Cellulitis bilateral LE?  Possibly initially around the sites of excoriations. She has some stasis dermatitis but will continue abx to see if it makes a difference in the pinkness over the excoriated sites.        Plan:    Doxycycline   BC negative  Body fluid culture pending final      Jasmyn Hu D.O.

## 2020-05-22 NOTE — PROGRESS NOTES
Medicare Annual Wellness Visit  Are Name: Sage Allen Date: 2020   MRN: [id number] Sex: Female   Age: 82 y.o. Ethnicity: Non-/Non    : 1935 Race: Malka Schaffer is here for Medicare AWV    Screenings for behavioral, psychosocial and functional/safety risks, and cognitive dysfunction are all negative except as indicated below. These results, as well as other patient data from the [address] form, are documented in Flowsheets linked to this Encounter. Allergies   Allergen Reactions    Adhesive Tape     Amoxicillin-Pot Clavulanate     Bactrim [Sulfamethoxazole-Trimethoprim]     Clarithromycin      Other reaction(s): Other: See Comments  tachycardia    Other     Sulfa Antibiotics          Prior to Visit Medications    Medication Sig Taking? Authorizing Provider   sertraline (ZOLOFT) 50 MG tablet TAKE 1 TABLET BY MOUTH EVERY DAY  Yane Amin MD   omeprazole (PRILOSEC) 40 MG delayed release capsule TAKE 1 CAPSULE BY MOUTH EVERY DAY  Yane Amin MD   albuterol sulfate HFA (PROAIR HFA) 108 (90 Base) MCG/ACT inhaler Inhale 2 puffs into the lungs every 6 hours as needed for Wheezing  Historical Provider, MD   pregabalin (LYRICA) 75 MG capsule Take 1 capsule by mouth 3 times daily for 10 days. Take 75 mg by mouth.   SHARI Chapman - CNP   furosemide (LASIX) 40 MG tablet Take 1 tablet by mouth daily  Mansoor Clements MD   spironolactone (ALDACTONE) 100 MG tablet Take 1 tablet by mouth daily  Itzel Mcdonald DO   clotrimazole-betamethasone (LOTRISONE) 1-0.05 % cream Use two times daily to affected area for 14 days then stop  Yane Amin MD   furosemide (LASIX) 20 MG tablet Take 1 tablet by mouth daily for 5 days  Yane Amin MD   sucralfate (CARAFATE) 1 GM tablet TAKE 1 TABLET BY MOUTH FOUR TIMES DAILY  Mansoor Clements MD   irbesartan (AVAPRO) 75 MG tablet Take 1 tablet by mouth daily Take 1 table by mouth once daily Bel Castillo Faiza, APRN - CNP   lovastatin (MEVACOR) 10 MG tablet Take 1 tablet by mouth nightly  Bel Kendall, APRN - CNP   topiramate (TOPAMAX) 50 MG tablet Take 1 tablet by mouth daily  Bel Kendall, APRN - SHARON   traZODone (DESYREL) 100 MG tablet Take 2 tablets by mouth once daily at bedtime. Bel Castillo SHARI Kendall - CNP   ACCU-CHEK SOFTCLIX LANCETS MISC Checks 1 time daily. NIDDM--ICD-10 E11.9  Gera Butler MD   blood glucose test strips (ACCU-CHEK ACTIVE STRIPS) strip Checks 1 time daily. NIDDM--ICD-10 E11.9  Gera Butler MD   aspirin 81 MG tablet Take 1 tablet by mouth daily With Nina Collier MD   Cinnamon 500 MG TABS Take by mouth 3 times daily  Historical Provider, MD   carbidopa-levodopa (SINEMET)  MG per tablet Take 1 tablet by mouth 1 po bid per dr robb goldstein neurologist  Historical Provider, MD   latanoprost (XALATAN) 0.005 % ophthalmic solution Use 1 Drop in both eyes daily at bedtime.   Historical Provider, MD   tiZANidine (ZANAFLEX) 2 MG tablet Take 2 mg by mouth Take 2 mg by mouth bid prn  Historical Provider, MD         Past Medical History:   Diagnosis Date    Asthma     Fibromyalgia     Hemorrhoid     Hyperlipidemia     Hypertension     Parkinson's disease ([address]     Type II or unspecified type diabetes mellitus without mention of complication, not stated as uncontrolled        Past Surgical History:   Procedure Laterality Date    CHOLECYSTECTOMY      EYE SURGERY      HEMORRHOID SURGERY      HERNIA REPAIR  2/20/2002    ventral hernia    HERNIA REPAIR      LIH repair    HYSTERECTOMY      JOINT REPLACEMENT Right     partial left, full right    LAMINECTOMY      PARACENTESIS Left 03/12/2020    5010cc ascites removed by Dr Halie Lambert [phone]    UPPER GASTROINTESTINAL ENDOSCOPY N/A 2/21/2020    EGD WITH BANDING performed by Valery Petersen MD at [facility] OR         Family History   Problem Relation Age of Onset    High Blood Pressure Neg Hx 11/15/2013, 10/14/2014, 09/15/2016, 10/09/2017    Influenza, Quadv, IM, (6 mo and older Fluzone, Flulaval, Fluarix and 3 yrs and older Afluria) 11/08/2018    Influenza, Triv, inactivated, subunit, adjuvanted, IM (Fluad 65 yrs and older) 01/29/2020    Pneumococcal Conjugate 13-valent ([id number]) 02/04/2016    Tdap (Boostrix, Adacel) 12/06/2012        Health Maintenance   Topic Date Due    Hepatitis A vaccine (1 of 2 - Risk 2-dose series) 02/09/1936    Shingles Vaccine (1 of 2) 02/09/1987    DEXA (modify frequency per FRAX score)  02/09/1992    Pneumococcal 65+ years Vaccine (2 of 2 - PPSV23) 02/04/2017    Annual Wellness Visit (AWV)  01/26/2020    Lipid screen  01/29/2021    Potassium monitoring  03/19/2021    Creatinine monitoring  03/19/2021    DTaP/Tdap/Td vaccine (2 - Td) 12/06/2022    Colon cancer screen colonoscopy  12/08/2025    Flu vaccine  Completed    Hib vaccine  Aged Out    Meningococcal (ACWY) vaccine  Aged Out     Recommendations for [employer] Due: see orders and patient instructions/AVS.  . Recommended screening schedule for the next 5-10 years is provided to the patient in written form: see Patient Luther Frank was seen today for medicare awv. Diagnoses and all orders for this visit:    Routine general medical examination at a health care facility              [address] [place] [address] is a 82 y.o. female being evaluated by a Virtual Visit (phone) encounter to address concerns as mentioned above. A caregiver was present when appropriate. Due to this being a TeleHealth encounter (During CVLBJ-64 public health emergency), evaluation of the following organ systems was limited: Vitals/Constitutional/EENT/Resp/CV/GI//MS/Neuro/Skin/Heme-Lymph-Imm.   Pursuant to the emergency declaration under the [address] [place] [address], [address] [place] [address] authority and the [employer] and [employer] Act, this Virtual Visit was conducted with patient's (and/or legal guardian's) consent, to reduce the patient's risk of exposure to COVID-19 and provide necessary medical care. The patient (and/or legal guardian) has also been advised to contact this office for worsening conditions or problems, and seek emergency medical treatment and/or call 911 if deemed necessary. Patient identification was verified at the start of the visit: Yes    Services were provided through phone to substitute for in-person clinic visit. Patient and provider were located at their individual homes. --SHARI Rodriguez CNP on 5/22/2020 at 1:48 PM    An electronic signature was used to authenticate this note.

## 2020-06-03 NOTE — TELEPHONE ENCOUNTER
Pt daughter called stating pt needs a paracentesis. Out pt  Ws given to dr Benito Coreas. Once orders placed will schedule appt.

## 2020-07-06 NOTE — TELEPHONE ENCOUNTER
Pharmacy is requesting medication refill.  Please approve or deny this request.    Rx requested:  Requested Prescriptions     Pending Prescriptions Disp Refills    topiramate (TOPAMAX) 50 MG tablet [Pharmacy Med Name: TOPIRAMATE 50MG TABLETS] 90 tablet      Sig: TAKE 1 TABLET BY MOUTH DAILY    irbesartan (AVAPRO) 75 MG tablet [Pharmacy Med Name: IRBESARTAN 75MG TABLETS] 90 tablet      Sig: TAKE 1 TABLET BY MOUTH EVERY DAY         Last Office Visit:   3/10/2020      Next Visit Date:  Future Appointments   Date Time Provider Arsh Bourne   7/10/2020  1:15 PM Phillip Russo MD [facility]

## 2020-07-08 NOTE — FLOWSHEET NOTE
Pt in CT HR denies complaints. IV albumin started. Sipping pop, denies nausea. VSS. 1135 spoke with pt granddaughter updated on care and d/c instructions. 1145  IV removed   D/C instructions given to pt with verbalized understanding. Discussed liver diet and low sodium diet. Information given in AVS.   VSS. Band aid intact at site. 1155 pt assisted into sitting up on side of cart. Pt requesting w/c for d/c to lobby with family. Personal walker with pt.     1200 d/c to home with family. No complaints voiced or changes noted.

## 2020-07-08 NOTE — SEDATION DOCUMENTATION
NO SEDATION    1005 pt brought to US room via ambulation with walker, gait very slow and steady. Pt states her leg pain is a \"10\" legs. Bilateral 3+ pitting edema to legs. History, medications and allergies reviewed. Pt denies SOB. U/S tech obtained images prior to start of procedure using U/S. VSS,     1010 Procedure explained, consent signed. 1021 Timeout completed. Using U/S, Dr. Juan Briscoe marked, prepped LLQ with Chloraprep and draped with sterile drape and towels. 1022 Site numbed with lidocaine. [id number] Centesis 5F catheter placed using Ultrasound guidance. Fluid appears clear yellow. Catheter tubing connected to 250ok machine to remove fluid. Encouraged pt to keep left arm aware from catheter tubing. Pt verbalizes understanding. 1030 pt tolerating drain well. Speaking with pt about leg swelling. Encouraged her to continue with low salt diet and elevation of legs. Pt verbalized understanding. Liver/low salt diet instructions attached to AVS.     1056 Pt tolerated well. Total 5450 ml removed. Catheter removed, pressure held and bandaid applied. No bleeding noted. Verbal and tactile reassurance given throughout. 1105 IV inserted for albumin infusion. 1110 pt taken to CT HR via cart for albumin infusion.

## 2020-07-10 NOTE — PROGRESS NOTES
Gastroenterology Clinic Follow up Visit    Mario Alberto Rothman  [id number]  Chief Complaint   Patient presents with    Follow-up     Fluid buildup in abd       HPI and A/P at last visit summarized below:  Patient is here for follow-up. She has a history of cirrhosis complicated by ascites and has been requiring large-volume paracentesis. Patient had large-volume paracentesis 2 days ago and she noticed that there was leakage of ascitic fluids from the paracentesis site, had applied a dressing over there to control the leak. Also has history of ankle swelling. On reviewing her medications she is not on any diuretics. No history of any GI bleeding, experience shortness of breath on moderate exertion. No history of any fever or chills. Review of Systems   Constitutional: Negative. Feels fatigue and weak   HENT: Negative. Eyes: Negative. Respiratory: Positive for shortness of breath. Cardiovascular: Negative. Gastrointestinal: Negative. Negative for abdominal distention, abdominal pain, anal bleeding, blood in stool, constipation, diarrhea, nausea, rectal pain and vomiting. History of ascites and leg swelling   Endocrine: Negative. Genitourinary: Negative. Musculoskeletal: Negative. Skin: Negative. Allergic/Immunologic: Negative for food allergies. Neurological: Negative. Hematological: Negative. Psychiatric/Behavioral: Negative. Past medical history, past surgical history, medication list, social and familyhistory reviewed    Pulse 74, resp. rate 22, height 5' 1\" (1.549 m), weight 165 lb (74.8 kg), SpO2 97 %, not currently breastfeeding. Blood pressure on both side is 80/60    Physical Exam  Constitutional:       Appearance: She is well-developed. Comments: No asterixis   HENT:      Head: Normocephalic and atraumatic.       Comments: Mild pallor and possible scleral icterus  Eyes:      Conjunctiva/sclera: Conjunctivae normal.      Pupils: Pupils are equal, round, and reactive to light. Neck:      Musculoskeletal: Normal range of motion. Cardiovascular:      Rate and Rhythm: Normal rate. Comments: S1-S2 regular with a systolic murmur  Pulmonary:      Effort: Pulmonary effort is normal.   Abdominal:      General: Bowel sounds are normal.      Palpations: Abdomen is soft. Comments: Has moderate ascites with dressing over the site of paracentesis   Musculoskeletal: Normal range of motion. Comments: 1-2+ pitting edema   Skin:     General: Skin is warm. Comments: Bilateral palmar erythema   Neurological:      Mental Status: She is alert. Laboratory, Pathology, Radiology reviewed in detail with relevantimportant investigations summarized below:    No results for input(s): WBC, HGB, HCT, MCV, PLT in the last 720 hours. Lab Results   Component Value Date    ALT <5 03/16/2020    AST 27 03/16/2020    ALKPHOS 54 03/16/2020    BILITOT 0.5 03/16/2020     Us Guided Paracentesis    Result Date: 7/8/2020  1. Status post technically successful ultrasound-guided paracentesis. Rock Mathis is a Female of 82 years age, referred for Ultrasound Guided Paracentesis. PROCEDURE: Survey of the abdomen showed large amount of ascites fluid. After obtaining informed consent, the patient was positioned supine on the sonography table. Using ultrasound, the skin over the left hemiabdomen was locally anesthetized with 1% lidocaine. Following that, a Yueh needle was advanced into the fluid pocket using ultrasound visualization. 5450cc, of clear yellow fluid were aspirated and sent for cytology, and pathology. The needle was removed, and hemostasis was obtained with pressure. A Band-Aid was placed. Post procedure images did not demonstrate hemorrhage at the target site. The patient tolerated the procedure well. The patient left the department in good condition.  A radiology nurse was in presence monitoring vital signs, assisting

## 2020-07-20 NOTE — TELEPHONE ENCOUNTER
Spoke with daughter states patient isn't being truthful about her diarrhea states she gets up and her BM come out and she is soiling her pants and panties because she notices when she is washing her laundry please call her daughter at [phone] please advise she would like to know if there is anything that can be done

## 2020-07-23 PROBLEM — D61.818 PANCYTOPENIA (HCC): Status: ACTIVE | Noted: 2020-01-01

## 2020-07-23 PROBLEM — K74.60 CIRRHOSIS OF LIVER WITH ASCITES (HCC): Status: ACTIVE | Noted: 2020-01-01

## 2020-07-23 PROBLEM — E46 MALNUTRITION (HCC): Status: ACTIVE | Noted: 2020-01-01

## 2020-07-23 PROBLEM — R18.8 CIRRHOSIS OF LIVER WITH ASCITES (HCC): Status: ACTIVE | Noted: 2020-01-01
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
## 2020-07-23 PROBLEM — N17.9 AKI (ACUTE KIDNEY INJURY) (HCC): Status: ACTIVE | Noted: 2020-01-01

## 2020-07-23 PROBLEM — R53.1 WEAKNESS: Status: ACTIVE | Noted: 2020-01-01

## 2020-07-23 NOTE — ED NOTES
Patient returned from [address]. Patient resting in bed with call light in reach. Breathes are even and unlabored. Skin is warm and dry. Vital signs are stable. Patient denies any needs at this time.       Kai Gowers, RN  07/23/20 9237

## 2020-07-23 NOTE — PROGRESS NOTES
Hospitalist Progress Note      PCP: Carla Azevedo MD    Date of Admission: 7/22/2020    Chief Complaint:  No acute events, BP borderline low, no fevers, feels better    Medications:  Reviewed    Infusion Medications   Scheduled Medications    sodium chloride flush  10 mL Intravenous 2 times per day    carbidopa-levodopa  1 tablet Oral TID    folic acid  1 mg Oral Daily     PRN Meds: sodium chloride flush, acetaminophen **OR** acetaminophen, promethazine **OR** ondansetron      Intake/Output Summary (Last 24 hours) at 7/23/2020 1129  Last data filed at 7/23/2020 1059  Gross per 24 hour   Intake 10 ml   Output --   Net 10 ml       Exam:    BP (!) 97/41   Pulse 64   Temp 97.5 °F (36.4 °C) (Oral)   Resp 16   Ht 5' 1\" (1.549 m)   Wt 177 lb (80.3 kg)   SpO2 96%   BMI 33.44 kg/m²     General appearance: appears stated age and cooperative. Respiratory: Clear to auscultation, bilaterally   Cardiovascular: Regular rate and rhythm with normal S1/S2 wi  Abdomen: Soft, tender, distended, active bowel sounds. Musculoskeletal: bilateral pitting edema of the LE. Labs:   Recent Labs     07/22/20  2245   WBC 4.1*   HGB 7.1*   HCT 22.0*   PLT 78*     Recent Labs     07/22/20 2245 07/23/20  0530    139   K 4.5 4.2    107   CO2 20 20   BUN 20 20   CREATININE 1.90* 1.62*   CALCIUM 7.8* 7.9*     Recent Labs     07/22/20 2245 07/23/20  0530   AST 31 28   ALT 8 8   BILITOT 0.5 0.5   ALKPHOS 66 62     Recent Labs     07/22/20  2245   INR 1.4     No results for input(s): CKTOTAL, TROPONINI in the last 72 hours. Urinalysis:      Lab Results   Component Value Date    NITRU Negative 03/10/2020    WBCUA 6-10 01/29/2020    BACTERIA Negative 01/29/2020    RBCUA 0-2 01/29/2020    BLOODU Negative 03/10/2020    SPECGRAV 1.009 03/10/2020    GLUCOSEU Negative 03/10/2020    GLUCOSEU NEG 04/01/2012       Radiology:  CT Head WO Contrast   Final Result   Impression:      No acute findings. Mild cerebral atrophy. All CT scans at this facility use dose modulation, iterative reconstruction, and/or weight based dosing when appropriate to reduce radiation dose to as low as reasonably achievable. CT THORACIC SPINE WO CONTRAST   Preliminary Result   NEGATIVE CT OF THE THORACIC SPINE. CT CERVICAL SPINE WO CONTRAST   Preliminary Result   NO ACUTE CERVICAL SPINE ABNORMALITY. CT CHEST WO CONTRAST   Final Result      Bilateral dependent atelectatic change. Small left effusion. All CT scans at this facility use dose modulation, iterative reconstruction, and/or weight based dosing when appropriate to reduce radiation dose to as low as reasonably achievable. CT ABDOMEN PELVIS WO CONTRAST Additional Contrast? None   Final Result      Cirrhosis. Diffuse ascites, unchanged. Diffuse edematous change, abdominal and pelvic walls, stable. Small right kidney. Left kidney lower limits of normal.      Hypoplastic pancreas. Remote hemorrhoid surgery. Cholecystectomy. Hysterectomy. All CT scans at this facility use dose modulation, iterative reconstruction, and/or weight based dosing when appropriate to reduce radiation dose to as low as reasonably achievable. XR CHEST PORTABLE   Final Result      NO EVIDENCE OF ACUTE THORACIC TRAUMA OR ACTIVE CARDIOPULMONARY DISEASE. CT LUMBAR SPINE WO CONTRAST    (Results Pending)   US RETROPERITONEAL COMPLETE    (Results Pending)           Assessment/Plan:    83 y/o female with history of liver cirrhosis with ascites requiring paracentesis, pancytopenia, parkinson who presented after falling at home.     ENMA  - improving  - monitor renal function    Decompensated liver cirrhosis with esophageal varices and ascites  - off diuretic regimen due to low BP  - s/p u/s guided therapeutic paracentesis  - GI following    Pancytopenia   - likely due to advanced liver cirrhosis  - monitor counts

## 2020-07-23 NOTE — CARE COORDINATION
Met with patient and daughter Ted Elmore at the bedside to discuss the plan for discharge. Patient agrees she is weak and in need of some therapy, the daughter states she has not been able to care for herself due the weakness. Both are agreeable to [address] actually requested. Request made to Dr. Rena Kawasaki for order. CMI as follows:    [facility] Case Management Initial Discharge Assessment    Met with Patient and Family to discuss discharge plan. PCP: Gregorio Ortez MD                                Date of Last Visit:     If no PCP, list provided? N/A    Discharge Planning    Living Arrangements: independently at home    Who do you live with? Home alone, family support    Who helps you with your care:  family    If lives at home:     Do you have any barriers navigating in your home? no    Patient can perform ADL? Yes    Current Services (outpatient and in home) :  None    Dialysis: No    Is transportation available to get to your appointments? Yes    DME Equipment:  yes - rolator    Respiratory equipment: None    Respiratory provider:  no     Pharmacy:  yes - [facility]    Consult with Medication Assistance Program?  No      Patient agreeable to [facility] ? No    Patient agreeable to SNF/Rehab? Yes, [address]    Other discharge needs identified? N/A    Freedom of choice list provided with basic dialogue that supports the patient's individualized plan of care/goals and shares the quality data associated with the providers. Yes         The plan for Transition of Care is related to the following treatment goals:PARACENTESIS, IRON TRANSFUSION, REHAB EVAL    Initial Discharge Plan?  SEE ABOVE NOTE    The Patient and patient representative, Darren Capps provided with choice of any post-acute providers for care and equipment and agrees with discharge plan  Yes    Electronically signed by Sruthi Kelley RN on 7/23/2020 at 4:40 PM

## 2020-07-23 NOTE — ED NOTES
CT ready for pt, unable to inject contrast due to pt GFR, provider aware     Tiesha Lord RN  07/22/20 7229

## 2020-07-23 NOTE — PROGRESS NOTES
Physical Therapy Med Surg Initial Assessment  Facility/Department: [facility]  Room: [place] [address]       NAME: Juanjose Richmond  : 1935 (82 y.o.)  MRN: [id number]  CODE STATUS: Full Code    Date of Service: 2020    Patient Diagnosis(es): ENMA (acute kidney injury) [facility]) [N17.9]   Chief Complaint   Patient presents with    Fall     Patient Active Problem List    Diagnosis Date Noted    ENMA (acute kidney injury) ([address] 2020    Pancytopenia ([address] 2020    Fall 2020    Weakness 2020    Malnutrition ([address] 2020    Cirrhosis of liver with ascites ([address] 2020    Cough variant asthma 03/10/2020    Morbidly obese ([address] 03/10/2020    Decompensated hepatic cirrhosis ([address] 03/10/2020    Secondary esophageal varices without bleeding ([address]     Hemorrhoids 2020    Iron deficiency anemia due to chronic blood loss 2019    Bilateral carotid artery stenosis 2019    Chest pain 2018    RANDOLPH (dyspnea on exertion) 2018    Lumbar spondylosis 10/30/2017    Controlled type 2 diabetes mellitus without complication, without long-term current use of insulin ([address] 2016    S/P left unicompartmental knee replacement 2016    History of trabeculectomy 2015    Lens replaced by other means 2015    POAG (primary open-angle glaucoma) 2015    Osteoarthritis of multiple joints 2014    Actinic keratosis 2014    Seborrheic keratosis 2014    Chondrocalcinosis of hand 2014    Pseudogout of hand 2014    Vitamin D deficiency 2014    Bilateral hand pain 2014    DISH (diffuse idiopathic skeletal hyperostosis) 2014    Lumbago without sciatica 2014    Secondary osteoarthritis of multiple sites 2014    Synovitis of hand 2014    Sclerosis of the skin 05/15/2013    Chondrodermatitis nodularis chronica helicis     Hyperlipidemia 2013    Abdominal pain 03/07/2013    DM2 (diabetes mellitus, type 2) ([address]     Fibromyalgia     Depression 08/28/2012    Hypertension 08/28/2012    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    Thrombocytopenia ([address] 04/24/2012    Carotid stenosis 04/05/2012    LVH (left ventricular hypertrophy) 04/05/2012    Fitting and adjustment of orthopedic device 03/01/2012    Liver cirrhosis ([address] 02/18/2012    Parkinson's disease ([address] 02/18/2012    Sprain and strain of unspecified site of shoulder and upper arm 06/10/2011    Rotator cuff (capsule) sprain 06/15/2010    Osteoarthritis of shoulder 05/14/2010    Shoulder impingement 05/14/2010    Bursitis, hip 03/24/2010    Fx phalanx, foot-closed 09/02/2009    Closed fracture of part of upper end of humerus 02/22/2008    Myalgia and myositis, unspecified 12/26/2007    Diabetic polyneuropathy ([address] 12/26/2007    Knee joint replacement by other means 12/02/2007    Joint replaced by other means 01/31/2007    Chronic frontal sinusitis 08/30/2004        Past Medical History:   Diagnosis Date    Asthma     Fibromyalgia     Hemorrhoid     Hyperlipidemia     Hypertension     Parkinson's disease ([address]     Type II or unspecified type diabetes mellitus without mention of complication, not stated as uncontrolled      Past Surgical History:   Procedure Laterality Date    CHOLECYSTECTOMY      EYE SURGERY      HEMORRHOID SURGERY      HERNIA REPAIR  2/20/2002    ventral hernia    HERNIA REPAIR      LIH repair    HYSTERECTOMY      JOINT REPLACEMENT Right     partial left, full right    LAMINECTOMY      PARACENTESIS Left 03/12/2020    5010cc ascites removed by Dr Franicsca Farrell [phone]    PARACENTESIS Left 06/10/2020    3,850 cc removed per Dr Pily Padilla Left 07/08/2020    total 5450 ml removed per Dr Margaret Cornejo 2/21/2020    EGD WITH BANDING performed by Ej Mckenzie MD at [facility]       Chart Reviewed: Yes  Patient assessed for rehabilitation services?: Yes  Family / Caregiver Present: No  General Comment  Comments: Pt is resting in bed, agrees to PT eval with encouragement.  Stating \"I don't know, I just woke up\"    Restrictions:  Restrictions/Precautions: Fall Risk     SUBJECTIVE:      Pain  Pre Treatment Pain Screening  Pain at present: 0    Post Treatment Pain Screening:   Pain Screening  Patient Currently in Pain: Denies  Pain Assessment  Pain Level: 0    Prior Level of Function:  Social/Functional History  Lives With: Alone  Type of Home: Apartment(7th floor)  Home Layout: One level  Home Access: Elevator, Level entry(Level entry, elevator to 7th floor)  Bathroom Shower/Tub: Tub/Shower unit  Home Equipment: 4 wheeled walker  ADL Assistance: Independent  Homemaking Assistance: Independent  Ambulation Assistance: Independent(Rollator)  Transfer Assistance: Independent  Active : No  Patient's  Info: daughter  IADL Comments: Daughter helps with driving and groceries PRN  Additional Comments: Reports recent fall- \"my legs gave out\"    OBJECTIVE:        Cognition:  Overall Orientation Status: Within Functional Limits  Follows Commands: Within Functional Limits         ROM:  RLE AROM: WFL  LLE AROM : WFL    Strength:  Strength RLE  Comment: grossly 4-/5  Strength LLE  Comment: grossly 4-/5    Neuro:  Balance  Sitting - Static: Good  Sitting - Dynamic: Good  Standing - Static: Fair  Standing - Dynamic: Fair     Motor Control  Gross Motor?: WFL  Sensation  Overall Sensation Status: WFL    Bed mobility  Supine to Sit: Stand by assistance(increased time, relies on bed rails)    Transfers  Sit to Stand: Stand by assistance  Stand to sit: Stand by assistance  Bed to Chair: Stand by assistance(good safety awareness)    Ambulation  Ambulation?: Yes  Ambulation 1  Surface: level tile  Device: Rolling Walker  Assistance: Stand by assistance  Quality of Gait: slow pace, good gait quality  Distance: 30 ft assistance from another person for activity completion. Device may be needed.   Stand by assistance = pt requires verbal cues or instructions from another person, close to but not touching, to perform the activity  Minimal assistance= pt performs 75% or more of the activity; assistance is required to complete the activity  Moderate assistance= pt performs 50% of the activity; assistance is required to complete the activity  Maximal assistance = pt performs 25% of the activity; assistance is required to complete the activity  Dependent = pt requires total physical assistance to accomplish the task

## 2020-07-23 NOTE — ED NOTES
Patient resting in bed, A & O x4, skin warm, dry and pink, pulses palpable, msp's intact, 0 distress, resp even and unlabored. Call light in reach. Patient denies any needs at this time.       Kiara Bah RN  07/23/20 [id number]

## 2020-07-23 NOTE — PROGRESS NOTES
[facility] OCCUPATIONAL THERAPY EVALUATION - ACUTE     NAME: Cj Ramsey  : 1935 (82 y.o.)  MRN: [id number]  CODE STATUS: Full Code  Room: Z4/W284-18    Date of Service: 2020    Patient Diagnosis(es): ENMA (acute kidney injury) [facility]) [N17.9]   Chief Complaint   Patient presents with    Fall     Patient Active Problem List    Diagnosis Date Noted    ENMA (acute kidney injury) ([address] 2020    Pancytopenia ([address] 2020    Fall 2020    Weakness 2020    Malnutrition ([address] 2020    Cirrhosis of liver with ascites ([address] 2020    Cough variant asthma 03/10/2020    Morbidly obese ([address] 03/10/2020    Decompensated hepatic cirrhosis ([address] 03/10/2020    Secondary esophageal varices without bleeding ([address]     Hemorrhoids 2020    Iron deficiency anemia due to chronic blood loss 2019    Bilateral carotid artery stenosis 2019    Chest pain 2018    RANDOLPH (dyspnea on exertion) 2018    Lumbar spondylosis 10/30/2017    Controlled type 2 diabetes mellitus without complication, without long-term current use of insulin ([address] 2016    S/P left unicompartmental knee replacement 2016    History of trabeculectomy 2015    Lens replaced by other means 2015    POAG (primary open-angle glaucoma) 2015    Osteoarthritis of multiple joints 2014    Actinic keratosis 2014    Seborrheic keratosis 2014    Chondrocalcinosis of hand 2014    Pseudogout of hand 2014    Vitamin D deficiency 2014    Bilateral hand pain 2014    DISH (diffuse idiopathic skeletal hyperostosis) 2014    Lumbago without sciatica 2014    Secondary osteoarthritis of multiple sites 2014    Synovitis of hand 2014    Sclerosis of the skin 05/15/2013    Chondrodermatitis nodularis chronica helicis     Hyperlipidemia 2013    Abdominal pain 2013    DM2 (diabetes Safety Devices  Safety Devices in place: Yes  Type of devices: All fall risk precautions in place        Subjective  Pre Treatment Pain Screening  Pain at present: 0  Scale Used: Numeric Score  Intervention List: Patient able to continue with treatment, Patient declined any intervention    Pain Reassessment:   Pain Assessment  Patient Currently in Pain: No  Pain Assessment: 0-10  Pain Level: 0       Prior Level of Function:  Social/Functional History  Lives With: Alone  Type of Home: Apartment(7th floor)  Home Layout: One level  Home Access: Elevator, Level entry(Level entry, elevator to 7th floor)  Bathroom Shower/Tub: Tub/Shower unit  Home Equipment: 4 wheeled walker  ADL Assistance: Independent  Homemaking Assistance: Independent  Ambulation Assistance: Independent(Rollator)  Transfer Assistance: Independent  Active : No  Patient's  Info: daughter  IADL Comments: Daughter helps with driving and groceries PRN  Additional Comments: Reports recent fall- \"my legs gave out\"    OBJECTIVE:     Orientation Status:  Orientation  Overall Orientation Status: Within Functional Limits    Observation:  Observation/Palpation  Posture: Good(Kyphotic, rounded shouldersr)  Observation: Pt. alert and attentive; reports she just woke up.  Slow, steady pace    Cognition Status:  Cognition  Overall Cognitive Status: [facility]  Cognition Comment: Increased time for sequencing    Perception Status:  Perception  Overall Perceptual Status: [facility]    Sensation Status:  Sensation  Overall Sensation Status: [facility]    Vision and Hearing Status:  Vision  Vision: Impaired  Vision Exceptions: Wears glasses at all times  Hearing  Hearing: Exceptions to [facility]  Hearing Exceptions: Hard of hearing/hearing concerns, No hearing aid     ROM:   LUE AROM (degrees)  LUE AROM : WFL  Left Hand AROM (degrees)  Left Hand AROM: WFL  RUE AROM (degrees)  RUE AROM : WFL  Right Hand AROM (degrees)  Right Hand AROM: WFL    Strength:  LUE Strength  Gross LUE Strength: Exceptions to [facility]  L Hand General: 3+/5  LUE Strength Comment: 3+/5 all planes  RUE Strength  Gross RUE Strength: Exceptions to [facility]  R Hand General: 3+/5  RUE Strength Comment: 3+/5 all planes    Coordination, Tone, Quality of Movement: Tone RUE  RUE Tone: Normotonic  Tone LUE  LUE Tone: Normotonic  Coordination  Movements Are Fluid And Coordinated: No  Coordination and Movement description: Fine motor impairments, Decreased speed, Decreased accuracy, Right UE, Left UE  Quality of Movement Other  Comment: Pt. requires increased time    Hand Dominance:  Hand Dominance  Hand Dominance: Left    ADL Status:  ADL  Feeding: Independent  Grooming: Supervision  UE Bathing: Supervision  LE Bathing: Stand by assistance  UE Dressing: Supervision  LE Dressing: Stand by assistance  Toileting: Stand by assistance  Additional Comments: Simulated ADLs as above. Pt. toileted. Requires increased time for all mobility.   Toilet Transfers  Toilet - Technique: Ambulating  Equipment Used: Grab bars  Toilet Transfer: Stand by assistance  Toilet Transfers Comments: Verbal cues       Therapy key for assistance levels -   Independent = Pt. is able to perform task with no assistance but may require a device   Stand by assistance = Pt. does not perform task at an independent level but does not need physical assistance, requires verbal cues  Minimal, Moderate, Maximal Assistance = Pt. requires physical assistance (25%, 50%, 75% assist from helper) for task but is able to actively participate in task   Dependent = Pt. requires total assistance with task and is not able to actively participate with task completion     Functional Mobility:  Functional Mobility  Functional - Mobility Device: Rolling Walker  Activity: To/from bathroom  Assist Level: Contact guard assistance  Functional Mobility Comments: SBA to CGA intermittently  Transfers  Sit to stand: Stand by assistance  Stand to sit: Stand by assistance  Transfer Comments: Increased time and effort    Bed Mobility  Bed mobility  Supine to Sit: Stand by assistance  Comment: Used grab bars for mobility. SBA with increased time. Pt. up to chair at end of eval    Seated and Standing Balance:  Balance  Sitting Balance: Supervision  Standing Balance: Stand by assistance    Functional Endurance:  Activity Tolerance  Activity Tolerance: Patient Tolerated treatment well  Activity Tolerance: Pt. demonstrates G endurance overall    D/C Recommendations:  OT D/C RECOMMENDATIONS  REQUIRES OT FOLLOW UP: Yes    Equipment Recommendations:  OT Equipment Recommendations  Other: Continue to assess    OT Education:   OT Education  OT Education: OT Role, Plan of Care  Patient Education: Educated pt. on role of acute care OT  Barriers to Learning: None    OT Follow Up:  OT D/C RECOMMENDATIONS  REQUIRES OT FOLLOW UP: Yes       Assessment/Discharge Disposition:  Assessment: Pt. is an 82year old woman from home who presents to [facility] with the above deficits which impact her ability to perform ADLs and IADLs. Pt. would benefit from OT to maximize independence and safety with ADL tasks.   Performance deficits / Impairments: Decreased functional mobility , Decreased ADL status, Decreased balance, Decreased endurance, Decreased high-level IADLs, Decreased fine motor control, Decreased coordination, Decreased strength  Prognosis: Good  Discharge Recommendations: Continue to assess pending progress  Decision Making: Medium Complexity  History: Pt's medical history is moderately complex  Exam: Pt. has 8 performance deficits  Assistance / Modification: Pt. requires min A    Six Click Score   How much help for putting on and taking off regular lower body clothing?: A Little  How much help for Bathing?: A Little  How much help for Toileting?: A Little  How much help for putting on and taking off regular upper body clothing?: A Little  How much help for taking care of personal grooming?: A Little  How much help for eating meals?: None  AM-PAC Inpatient Daily Activity Raw Score: 19  AM-PAC Inpatient ADL T-Scale Score : 40.22  ADL Inpatient CMS 0-100% Score: 42.8    Plan:  Plan  Times per week: 1-3x  Plan weeks: Length of acute stay  Current Treatment Recommendations: Strengthening, Balance Training, Functional Mobility Training, Endurance Training, Safety Education & Training, Patient/Caregiver Education & Training, Self-Care / ADL, Equipment Evaluation, Education, & procurement    Goals:   Patient will:    - Improve functional endurance to tolerate/complete 30 mins of ADL's  - Be Mod I in UB ADLs   - Be Mod I in LB ADLs  - Be Mod I in ADL transfers without LOB  - Be Mod I in toileting tasks  - Improve B hand fine motor coordination to [facility] in order to manage clothing fasteners/self-care containers in a timely manner  - Improve B UE strength and endurance to 4/5 in order to participate in self-care activities as projected. - Access appropriate D/C site with as few architectural barriers as possible. - Sequence self-care tasks with no verbal cues for safety    Patient Goal: Patient goals : \"I want to get home\"     Discussed and agreed upon: Yes Comments:     Therapy Time:   OT Individual Minutes  Time In: 1025  Time Out: 1045  Minutes: 20    Eval: 20 minutes     Electronically signed by:     PARISA Keyes  7/23/2020, 11:58 AM

## 2020-07-23 NOTE — PROGRESS NOTES
Skin assessment completed with Asif Crump RN. Patient has a skin tear on her right elbow. A pressure ulcer on her sacrum and bruising on her arm, left great toe and redness and warmth to her lower extremities. Heels elevated off the bed and mepilex applied to the sacrum and elbow. Wound documented in the LDA.

## 2020-07-23 NOTE — CONSULTS
Inpatient consult to GI  Consult performed by: David Be MD  Consult ordered by: SHARI Byrne - CNP          Patient Name: Marcin Ho Date: 2020 10:27 PM  MR #: [id number]  : 1935    Attending Physician: Aleks Chuo MD  Reason for consult: ascites    History of Presenting Illness:      Izzy Lopez is a 82 y.o. female on hospital day 0 with a history of asthma, fibromyalgia, hyperlipidemia, hypertension, Parkinson's, and DM2. Past surgical history significant for hernia repair, cholecystectomy, hemorrhoid surgery, hysterectomy, and multiple paracentesis. Family history is reviewed and is negative for GI malignancies. Social history was reviewed patient denies any nicotine, EtOH, or illicit drug use. Savannah Dotson History Obtained From:  patient, electronic medical record  GI consult for ascites-patient presented to the emergency department after a mechanical fall at home. GI was asked to evaluate pt for ascites, she was seen on the F she is hemodynamically stable, she is a poor historian, patient is known to Dr. Jacek Pedraza, has been seen in the office, has a history of decompensated cirrhosis with refractory ascites, and esophageal varices. Patient has a history of being on dual diuretics however recently these have been on hold due to ongoing low blood pressures and worsening kidney function. Most recent EGD in 2020 by Dr. Jacek Pedraza noted  Esophageal varices, s/p EVL, Portal hypertensive gastropathy. Most recent paracentesis 20 noted > 5 L removed.    History:      Past Medical History:   Diagnosis Date    Asthma     Fibromyalgia     Hemorrhoid     Hyperlipidemia     Hypertension     Parkinson's disease ([facility] [address]     Type II or unspecified type diabetes mellitus without mention of complication, not stated as uncontrolled      Past Surgical History:   Procedure Laterality Date    CHOLECYSTECTOMY      EYE SURGERY      HEMORRHOID SURGERY      HERNIA REPAIR 2/20/2002    ventral hernia    HERNIA REPAIR      LIH repair    HYSTERECTOMY      JOINT REPLACEMENT Right     partial left, full right    LAMINECTOMY      PARACENTESIS Left 03/12/2020    5010cc ascites removed by Dr Shaun Nicole [phone]    PARACENTESIS Left 06/10/2020    3,850 cc removed per Dr Jose Rucker Left 07/08/2020    total 5450 ml removed per Dr Ray Ballesteros 2/21/2020    EGD WITH BANDING performed by Pearl Yi MD at [facility]     Family History  Family History   Problem Relation Age of Onset    High Blood Pressure Neg Hx      [] Unable to obtain due to ventilated and/ or neurologic status  Social History     Socioeconomic History    Marital status:       Spouse name: Not on file    Number of children: Not on file    Years of education: Not on file    Highest education level: Not on file   Occupational History    Not on file   Social Needs    Financial resource strain: Not on file    Food insecurity     Worry: Not on file     Inability: Not on file    Transportation needs     Medical: Not on file     Non-medical: Not on file   Tobacco Use    Smoking status: Never Smoker    Smokeless tobacco: Never Used   Substance and Sexual Activity    Alcohol use: No    Drug use: No    Sexual activity: Not on file   Lifestyle    Physical activity     Days per week: Not on file     Minutes per session: Not on file    Stress: Not on file   Relationships    Social connections     Talks on phone: Not on file     Gets together: Not on file     Attends [employer] service: Not on file     Active member of club or organization: Not on file     Attends meetings of clubs or organizations: Not on file     Relationship status: Not on file    Intimate partner violence     Fear of current or ex partner: Not on file     Emotionally abused: Not on file     Physically abused: Not on file     Forced sexual activity: Not on file   Other Topics Concern    Not on file   Social History Narrative    Not on file      [] Unable to obtain due to ventilated and/ or neurologic status    Home Medications:      Medications Prior to Admission: topiramate (TOPAMAX) 50 MG tablet, TAKE 1 TABLET BY MOUTH DAILY  irbesartan (AVAPRO) 75 MG tablet, TAKE 1 TABLET BY MOUTH EVERY DAY  sertraline (ZOLOFT) 50 MG tablet, TAKE 1 TABLET BY MOUTH EVERY DAY  traZODone (DESYREL) 100 MG tablet, TAKE 2 TABLETS BY MOUTH EVERY DAY AT BEDTIME  omeprazole (PRILOSEC) 40 MG delayed release capsule, TAKE 1 CAPSULE BY MOUTH EVERY DAY  lovastatin (MEVACOR) 10 MG tablet, TAKE 1 TABLET BY MOUTH EVERY NIGHT  albuterol sulfate HFA (PROAIR HFA) 108 (90 Base) MCG/ACT inhaler, Inhale 2 puffs into the lungs every 6 hours as needed for Wheezing  pregabalin (LYRICA) 75 MG capsule, Take 1 capsule by mouth 3 times daily for 10 days. Take 75 mg by mouth. furosemide (LASIX) 40 MG tablet, Take 1 tablet by mouth daily  spironolactone (ALDACTONE) 100 MG tablet, Take 1 tablet by mouth daily  clotrimazole-betamethasone (LOTRISONE) 1-0.05 % cream, Use two times daily to affected area for 14 days then stop  furosemide (LASIX) 20 MG tablet, Take 1 tablet by mouth daily for 5 days  sucralfate (CARAFATE) 1 GM tablet, TAKE 1 TABLET BY MOUTH FOUR TIMES DAILY  ACCU-CHEK SOFTCLIX LANCETS MISC, Checks 1 time daily. NIDDM--ICD-10 E11.9  blood glucose test strips (ACCU-CHEK ACTIVE STRIPS) strip, Checks 1 time daily. NIDDM--ICD-10 E11.9  aspirin 81 MG tablet, Take 1 tablet by mouth daily With Food  Cinnamon 500 MG TABS, Take by mouth 3 times daily  carbidopa-levodopa (SINEMET)  MG per tablet, Take 1 tablet by mouth 1 po bid per dr robb goldstein neurologist  latanoprost (XALATAN) 0.005 % ophthalmic solution, Use 1 Drop in both eyes daily at bedtime.   tiZANidine (ZANAFLEX) 2 MG tablet, Take 2 mg by mouth Take 2 mg by mouth bid prn    Current Hospital Medications:   Scheduled Meds:   sodium chloride flush  10 mL Intravenous 2 times per day  carbidopa-levodopa  1 tablet Oral TID     Continuous Infusions:  PRN Meds:.sodium chloride flush, acetaminophen **OR** acetaminophen, promethazine **OR** ondansetron     Allergies: Allergies   Allergen Reactions    Adhesive Tape     Amoxicillin-Pot Clavulanate     Bactrim [Sulfamethoxazole-Trimethoprim]     Clarithromycin      Other reaction(s): Other: See Comments  tachycardia    Other     Sulfa Antibiotics       Review of Systems:       [x] CV, Resp, Neuro, , and all other systems reviewed and negative other than listed in HPI.         Objective Findings:     Vitals:   Vitals:    07/23/20 0252 07/23/20 0521 07/23/20 0523 07/23/20 0708   BP: (!) 101/47 (!) 92/40 (!) 101/46 (!) 97/41   Pulse: 75 63  64   Resp:       Temp:    97.5 °F (36.4 °C)   TempSrc:    Oral   SpO2: 100% 99% 99% 96%   Weight:       Height:            Physical Examination:  General: Alert poor historian  HEENT: Normocephalic, no scleral icterus. Neck: No JVD. Heart: Regular, no murmur, no rub/gallop. No RV heave. Lungs: Clear to ascultation, no rales/wheezing/rhonchi. Good chest wall excursion. Abdomen: Appearance:, noted Distension , Soft, mild tenderness, Bowel sounds present  Extremities: No clubbing/cyanosis, no edema. Skin: Warm, dry, normal turgor, no rash, no bruise, no petichiae. Neuro: No myoclonus or tremor.    Psych: Normal affect    Results/ Medications reviewed 7/23/2020, 9:23 AM     Laboratory, Microbiology, Pathology, Radiology, Cardiology, Medications and Transcriptions reviewed  Scheduled Meds:   sodium chloride flush  10 mL Intravenous 2 times per day    carbidopa-levodopa  1 tablet Oral TID     Continuous Infusions:    Recent Labs     07/22/20 2245   WBC 4.1*   HGB 7.1*   HCT 22.0*   .2*   PLT 78*     Recent Labs     07/22/20 2245 07/23/20  0530    139   K 4.5 4.2    107   CO2 20 20   BUN 20 20   CREATININE 1.90* 1.62*     Recent Labs     07/22/20 2245 07/23/20  0530   AST 31 28 ALT 8 8   BILITOT 0.5 0.5   ALKPHOS 66 62     No results for input(s): LIPASE, AMYLASE in the last 72 hours. Recent Labs     07/22/20  2245 07/23/20  0530   PROT 5.2* 5.4*   INR 1.4  --      Ct Head Wo Contrast    Result Date: 7/23/2020  CT Brain Contrast medium:  Not utilized. History:  Fell from standing. Denies anticoagulation/loss of consciousness. Comparison:  CT brain, July 11, 2014 Findings: Extra-axial spaces:  Normal. Intracranial hemorrhage:  None. Ventricular system: Ventricles mildly enlarged. Sulci mildly prominent. Basal Cisterns:  Normal. Cerebral Parenchyma: Physiologic basal ganglia calcification, unchanged. Midline Shift:  None. Cerebellum:  Normal. Paranasal sinuses and mastoid air cells:  Normal. Visualized Orbits: Remote bilateral ocular surgery. Impression: No acute findings. Mild cerebral atrophy. All CT scans at this facility use dose modulation, iterative reconstruction, and/or weight based dosing when appropriate to reduce radiation dose to as low as reasonably achievable. Us Guided Paracentesis    1. Status post technically successful ultrasound-guided paracentesis. Frederick Sheets is a Female of 82 years age, referred for Ultrasound Guided Paracentesis. PROCEDURE: Survey of the abdomen showed large amount of ascites fluid. After obtaining informed consent, the patient was positioned supine on the sonography table. Using ultrasound, the skin over the left hemiabdomen was locally anesthetized with 1% lidocaine. Following that, a Yueh needle was advanced into the fluid pocket using ultrasound visualization. 5450cc, of clear yellow fluid were aspirated and sent for cytology, and pathology. The needle was removed, and hemostasis was obtained with pressure. A Band-Aid was placed. Post procedure images did not demonstrate hemorrhage at the target site. The patient tolerated the procedure well. The patient left the department in good condition.  A radiology nurse was in presence monitoring vital signs, assisting throughout the procedure. Impression:   73-year-old female with history of decompensated cirrhosis, with refractory ascites and EV, recent paracentesis 7/8/20 for > 5 l ascitic fluid removed. Recent EGD 2/20 noted grade III EV with EVL and portal HTN gastropathy, hgb 7.1 no overt GIB noted. CT abd pelvis is pending  Plan:   1-  Decompensated Cirrhosis:  MELD-Na Score 15  Estimated 90-Day Mortality 6%  2 - Grade 2-3 Ascites:    poor tolerance to dual diuretics, currently on hold due to low blood pressure and worsening kidney function  Ongoing evaluation for therapeutic paracentesis, may give Albumin (6 to 8 g/L of fluid removed) should  be administered ( if removed more than 5 L)  \" No added salt diet\"/ 2 gm NA diet   - No Hx of SBP noted  3- EGD for Variceal surveillance:    Had EGD S/P banding on 2/20 noted grade III EV post EVL and portal htn gastropathy  4-  No Hepatic encephalopathy: ongoing evaluation for Lactulose - will titrate if needed to 2-3 soft bm's per day. Comments:   Agree with above assesment and plann most recent BP has been around 115, will resume low dose diuretic Aldactone 50mg, lasix 40mg and 2gm sodium diet, would need periodic large volume paracentesis. Thank you for allowing us to participate in the care of this patient. Will continue to follow. Please call if questions or concerns arise. Electronically signed by SHARI Wang CNP on 7/23/2020 at 9:23 AM    Please note this report has been partially produced using speech recognition software and may cause contain errors related to that system including grammar, punctuation and spelling as well as words and phrases that may seem inappropriate. If there are questions or concerns please feel free to contact me to clarify.

## 2020-07-23 NOTE — ED NOTES
Patient report called to Graciela Garcia RN on [address] monitor connected to patient.       Marcia Mcardle, RN  07/23/20 [id number]

## 2020-07-23 NOTE — H&P
Social Needs    Financial resource strain: Not on file    Food insecurity     Worry: Not on file     Inability: Not on file    Transportation needs     Medical: Not on file     Non-medical: Not on file   Tobacco Use    Smoking status: Never Smoker    Smokeless tobacco: Never Used   Substance and Sexual Activity    Alcohol use: No    Drug use: No    Sexual activity: Not on file   Lifestyle    Physical activity     Days per week: Not on file     Minutes per session: Not on file    Stress: Not on file   Relationships    Social connections     Talks on phone: Not on file     Gets together: Not on file     Attends [employer] service: Not on file     Active member of club or organization: Not on file     Attends meetings of clubs or organizations: Not on file     Relationship status: Not on file    Intimate partner violence     Fear of current or ex partner: Not on file     Emotionally abused: Not on file     Physically abused: Not on file     Forced sexual activity: Not on file   Other Topics Concern    Not on file   Social History Narrative    Not on file     MEDICATIONS:   Prior to Admission medications    Medication Sig Start Date End Date Taking?  Authorizing Provider   topiramate (TOPAMAX) 50 MG tablet TAKE 1 TABLET BY MOUTH DAILY 7/6/20   Mikal Sims MD   irbesartan (AVAPRO) 75 MG tablet TAKE 1 TABLET BY MOUTH EVERY DAY 7/6/20   Mikal Sims MD   sertraline (ZOLOFT) 50 MG tablet TAKE 1 TABLET BY MOUTH EVERY DAY 6/5/20   Mikal Sims MD   traZODone (DESYREL) 100 MG tablet TAKE 2 TABLETS BY MOUTH EVERY DAY AT BEDTIME 6/5/20   Mikal Sims MD   omeprazole (PRILOSEC) 40 MG delayed release capsule TAKE 1 CAPSULE BY MOUTH EVERY DAY 6/5/20   Mikal Sims MD   lovastatin (MEVACOR) 10 MG tablet TAKE 1 TABLET BY MOUTH EVERY NIGHT 6/4/20   Mikal Sims MD   albuterol sulfate HFA (PROAIR HFA) 108 (90 Base) MCG/ACT inhaler Inhale 2 puffs into the lungs every 6 hours as needed for Wheezing    Historical Provider, MD   pregabalin (LYRICA) 75 MG capsule Take 1 capsule by mouth 3 times daily for 10 days. Take 75 mg by mouth. 3/17/20 7/8/20  SHARI Cramer - CNP   furosemide (LASIX) 40 MG tablet Take 1 tablet by mouth daily 3/17/20   Neda Martinez MD   spironolactone (ALDACTONE) 100 MG tablet Take 1 tablet by mouth daily 3/14/20   Escobar Gonzalez DO   clotrimazole-betamethasone (LOTRISONE) 1-0.05 % cream Use two times daily to affected area for 14 days then stop 3/10/20   Gregorio Ortez MD   furosemide (LASIX) 20 MG tablet Take 1 tablet by mouth daily for 5 days 3/6/20 3/18/20  Gregorio Ortez MD   sucralfate (CARAFATE) 1 GM tablet TAKE 1 TABLET BY MOUTH FOUR TIMES DAILY 2/23/20   Neda Martinez MD   ACCU-CHEK [facility] LANCETS MISC Checks 1 time daily. NIDDM--ICD-10 E11.9 11/6/19   Gregorio Ortez MD   blood glucose test strips (ACCU-CHEK ACTIVE STRIPS) strip Checks 1 time daily. NIDDM--ICD-10 E11.9 6/24/19   Gregorio Ortez MD   aspirin 81 MG tablet Take 1 tablet by mouth daily With Food 12/12/18   Salbador Harrell MD   Cinnamon 500 MG TABS Take by mouth 3 times daily    Historical Provider, MD   carbidopa-levodopa (SINEMET)  MG per tablet Take 1 tablet by mouth 1 po bid per dr robb goldstein neurologist 7/19/17   Historical Provider, MD   latanoprost (XALATAN) 0.005 % ophthalmic solution Use 1 Drop in both eyes daily at bedtime. 9/28/17   Historical Provider, MD   tiZANidine (ZANAFLEX) 2 MG tablet Take 2 mg by mouth Take 2 mg by mouth bid prn 5/18/17   Historical Provider, MD       ALLERGIES: Adhesive tape; Amoxicillin-pot clavulanate; Bactrim [sulfamethoxazole-trimethoprim]; Clarithromycin;  Other; and Sulfa antibiotics    REVIEW OF SYSTEM:   Review of Systems - History obtained from chart review and the patient  General ROS: positive for  - fatigue and sleep disturbance  negative for - malaise, weight gain or weight loss  Psychological ROS: negative for - anxiety, hallucinations, memory difficulties or suicidal ideation  Ophthalmic ROS: negative for - blurry vision or double vision  ENT ROS: negative for trouble swallowing. Hematological and Lymphatic ROS: negative for - weight loss  Respiratory ROS: no cough, shortness of breath, or wheezing  Cardiovascular ROS: no chest pain or dyspnea on exertion  Gastrointestinal ROS: positive for - abdominal pain and acsites  negative for - blood in stools, constipation, diarrhea, hematemesis, melena, nausea/vomiting,    Musculoskeletal ROS: negative for - joint pain, joint stiffness or joint swelling  Dermatological ROS: negative for - dry skin, eczema, hair changes or rash    OBJECTIVE  PHYSICAL EXAM: BP (!) 97/43   Pulse 64   Temp 97.5 °F (36.4 °C)   Resp 11   Ht 5' 1\" (1.549 m)   Wt 177 lb (80.3 kg)   SpO2 100%   BMI 33.44 kg/m²     CONSTITUTIONAL:  fatigued, somnolent, cooperative, no apparent distress and appears stated age  EYES:  pupils equal, round and reactive to light, extra-ocular muscles intact   ENT:  normocepalic, without obvious abnormality  NECK:  supple, symmetrical, trachea midline, skin normal, no lymphadenopathy, thyroid not enlarged, symmetric, no tenderness and no jugular venous distension  HEMATOLOGIC/LYMPHATICS:  no cervical lymphadenopathy  LUNGS:  no increased work of breathing and mild air exchange  ABDOMEN:  Rounded. , firm and distended  CHEST/BREASTS:  Breasts symmetrical, skin without lesion(s), no nipple retraction or dimpling, no nipple discharge, no masses palpated, no axillary or supraclavicular adenopathy  MUSCULOSKELETAL:  there is no redness, warmth, or swelling of the joint  Awake, alert, oriented to name, place and time. Cranial nerves II-XII are grossly intact. SKIN:  mild bruising      no bleeding    DATA:     Diagnostic tests reviewed for today's visit:    Most recent labs and imaging results reviewed.      LABS:    Recent Results (from the past 24 hour(s))   Comprehensive Metabolic Panel    Collection Time: 07/22/20 10:45 PM   Result Value Ref Range    Sodium 135 135 - 144 mEq/L    Potassium 4.5 3.4 - 4.9 mEq/L    Chloride 101 95 - 107 mEq/L    CO2 20 20 - 31 mEq/L    Anion Gap 14 9 - 15 mEq/L    Glucose 91 70 - 99 mg/dL    BUN 20 8 - 23 mg/dL    CREATININE 1.90 (H) 0.50 - 0.90 mg/dL    GFR Non-African American 25.2 (L) >60    GFR  30.5 (L) >60    Calcium 7.8 (L) 8.5 - 9.9 mg/dL    Total Protein 5.2 (L) 6.3 - 8.0 g/dL    Alb 2.7 (L) 3.5 - 4.6 g/dL    Total Bilirubin 0.5 0.2 - 0.7 mg/dL    Alkaline Phosphatase 66 40 - 130 U/L    ALT 8 0 - 33 U/L    AST 31 0 - 35 U/L    Globulin 2.5 2.3 - 3.5 g/dL   CBC Auto Differential    Collection Time: 07/22/20 10:45 PM   Result Value Ref Range    WBC 4.1 (L) 4.8 - 10.8 K/uL    RBC 2.18 (L) 4.20 - 5.40 M/uL    Hemoglobin 7.1 (L) 12.0 - 16.0 g/dL    Hematocrit 22.0 (L) 37.0 - 47.0 %    .2 (H) 82.0 - 100.0 fL    MCH 32.5 (H) 27.0 - 31.3 pg    MCHC 32.1 (L) 33.0 - 37.0 %    RDW 20.2 (H) 11.5 - 14.5 %    Platelets 78 (L) 305 - 400 K/uL    PLATELET SLIDE REVIEW Decreased     Neutrophils % 77.2 %    Lymphocytes % 14.3 %    Monocytes % 5.8 %    Eosinophils % 2.2 %    Basophils % 0.5 %    Neutrophils Absolute 3.2 1.4 - 6.5 K/uL    Lymphocytes Absolute 0.6 (L) 1.0 - 4.8 K/uL    Monocytes Absolute 0.2 0.2 - 0.8 K/uL    Eosinophils Absolute 0.1 0.0 - 0.7 K/uL    Basophils Absolute 0.0 0.0 - 0.2 K/uL   APTT    Collection Time: 07/22/20 10:45 PM   Result Value Ref Range    aPTT 36.2 24.4 - 36.8 sec   Protime-INR    Collection Time: 07/22/20 10:45 PM   Result Value Ref Range    Protime 17.3 (H) 12.3 - 14.9 sec    INR 1.4    Ethanol    Collection Time: 07/22/20 10:45 PM   Result Value Ref Range    Ethanol Lvl <10 mg/dL    Ethanol percent Not indicated G/dL   Magnesium    Collection Time: 07/22/20 10:45 PM   Result Value Ref Range    Magnesium 1.9 1.7 - 2.4 mg/dL       IMAGING:  No results found.     VTE Prophylaxis: low molecular weight heparin - start    ASSESSMENT AND PLAN  Active Problems:    ENMA (acute kidney injury) ([facility] [address]   Significant change in renal labs from 7-10 to  today    B/ Scr 20 / 1.90  Was 10 / 0.80  GFR fell from >60 to 30   She is malnourished and anemic she has considerable ascites. Appears frail,  Denies fever, chills. No  Ill contacts. Plan: Admit  Nephrology consult,  Urine for creat, urea. monitor I&O. Pancytopenia ([facility] [address]  appears stable  Plts 78K,  H/H 7.1 / 22  She receives iron infusions thru her PCP   Has had 2 infusions lately. Denies bleeding or bruising. No significant change since 7/10. Plan: admit  Monitor    Consider Heme consult. Fall -  From standing position because legs gave out. No dizziness, syncope. Awaiting Xray reports but preliminary results w no fracture. Some bruising and abrasions. Overall weakness multifactorial.  Plan: PT  OT      Weakness   Multifactorial.    Anemia  Malnutrition, Parkinson. Age and fragility. Plan: monitor labs   PT  OT   Consider rehab placement. Malnutrition ([facility] [address]    Frail appearing. Weak. TP 5.2,  Albumin 2.8   Decreased appetite   Ascites. Plan: protein supplementation,  Monitor weight and labs. May benefit from dietician visit. Parkinsons   Carbidopa/levodopa   Fine tremor  Ambulatory  Increasing  weakness. Plan PT  OT consult   Continue meds. Cirrhosis w ascites   Has required paracentesis in past.  She states steady increase in abdominal girth over past one month     Anemic, malnourished. Known cirrhosis.     Plan:::      Plan of care discussed with: patient    SIGNATURE: SHARI Madera - CNP  DATE: July 23, 2020  TIME: 3:02 Francisca Valdes MD - supervising physician

## 2020-07-23 NOTE — PROGRESS NOTES
Patient arrived to room from ER. Patient A&Ox4. Lungs clear, diminished. On room air. Abdomen distended. Bowel sounds present. States she has a paracentesis done about once a month. Bilateral lower legs are red, warm, and swollen. Patient has a skin tear to the right elbow, foam dressing applied. Bruising to bilateral elbows and left great toe. Small stage 2 pressure injury noted on the sacrum. Sacral mepilex dressing applied. Call light within reach. Bed alarm on.

## 2020-07-23 NOTE — PROGRESS NOTES
Patient does not have a list of her home medications and is unsure which ones she takes. Patient states that her pharmacy is [facility] [address]. Will pass on to day shift to call pharmacy in the AM for list of medications.

## 2020-07-23 NOTE — ED PROVIDER NOTES
[facility] [address]  EMERGENCY DEPARTMENT ENCOUNTER      Pt Name: Mindy Hughes  MRN: [id number]  [place] 1935  Date of evaluation: 7/22/2020  Provider: Jamaal Gotti PA-C    CHIEF COMPLAINT       Chief Complaint   Patient presents with    Fall         HISTORY OF PRESENT ILLNESS   (Location/Symptom, Timing/Onset, Context/Setting, Quality, Duration, Modifying Factors, Severity)  Note limiting factors. Mindy Hughes is a 82 y.o. female who presents to the emergency department for evaluation of a mechanical fall from standing. Patient states she was in her kitchen and that her legs \"gave out\". Patient denies feeling dizzy or nauseous prior to the incident. Patient denies anticoagulation. Patient also denies loss of consciousness. Patient was ambulatory after the incident. On exam patient GCS 15 complaining of global pain. HPI    Nursing Notes were reviewed. REVIEW OF SYSTEMS    (2-9 systems for level 4, 10 or more for level 5)     Review of Systems   Constitutional: Negative for activity change, chills, fatigue and fever. HENT: Negative for congestion, hearing loss, rhinorrhea, sinus pain, sneezing and tinnitus. Eyes: Negative for discharge, redness and visual disturbance. Respiratory: Negative for cough, chest tightness and shortness of breath. Cardiovascular: Negative for chest pain and leg swelling. Gastrointestinal: Negative for abdominal pain, diarrhea, nausea and vomiting. Endocrine: Negative for heat intolerance, polydipsia and polyuria. Genitourinary: Negative for dysuria, flank pain, hematuria and urgency. Musculoskeletal: Negative for back pain, joint swelling and myalgias. Global pain. Skin: Negative for color change, rash and wound. Allergic/Immunologic: Negative for immunocompromised state. Neurological: Negative for tremors, seizures, syncope, light-headedness and headaches. Hematological: Does not bruise/bleed easily. Psychiatric/Behavioral: Negative for agitation and behavioral problems. The patient is not nervous/anxious. Except as noted above the remainder of the review of systems was reviewed and negative.        PAST MEDICAL HISTORY     Past Medical History:   Diagnosis Date    Asthma     Fibromyalgia     Hemorrhoid     Hyperlipidemia     Hypertension     Parkinson's disease ([address]     Type II or unspecified type diabetes mellitus without mention of complication, not stated as uncontrolled          SURGICAL HISTORY       Past Surgical History:   Procedure Laterality Date    CHOLECYSTECTOMY      EYE SURGERY      HEMORRHOID SURGERY      HERNIA REPAIR  2/20/2002    ventral hernia    HERNIA REPAIR      LIH repair    HYSTERECTOMY      JOINT REPLACEMENT Right     partial left, full right    LAMINECTOMY      PARACENTESIS Left 03/12/2020    5010cc ascites removed by Dr Manoj Mendez [phone]    PARACENTESIS Left 06/10/2020    3,850 cc removed per Dr Miriam Doyle Left 07/08/2020    total 5450 ml removed per Dr Floyce Snellen N/A 2/21/2020    EGD WITH BANDING performed by Claudell Demark, MD at [address] [place] [address]       Current Discharge Medication List      CONTINUE these medications which have NOT CHANGED    Details   topiramate (TOPAMAX) 50 MG tablet TAKE 1 TABLET BY MOUTH DAILY  Qty: 90 tablet, Refills: 0    Comments: **Patient requests 90 days supply**  Associated Diagnoses: Essential hypertension      irbesartan (AVAPRO) 75 MG tablet TAKE 1 TABLET BY MOUTH EVERY DAY  Qty: 90 tablet, Refills: 0    Comments: **Patient requests 90 days supply**  Associated Diagnoses: Essential hypertension      sertraline (ZOLOFT) 50 MG tablet TAKE 1 TABLET BY MOUTH EVERY DAY  Qty: 90 tablet, Refills: 0      traZODone (DESYREL) 100 MG tablet TAKE 2 TABLETS BY MOUTH EVERY DAY AT BEDTIME  Qty: 60 tablet, Refills: 1    Associated Diagnoses: Anxiety and depression tiZANidine (ZANAFLEX) 2 MG tablet Take 2 mg by mouth Take 2 mg by mouth bid prn             ALLERGIES     Adhesive tape; Amoxicillin-pot clavulanate; Bactrim [sulfamethoxazole-trimethoprim]; Clarithromycin; Other; and Sulfa antibiotics    FAMILY HISTORY       Family History   Problem Relation Age of Onset    High Blood Pressure Neg Hx           SOCIAL HISTORY       Social History     Socioeconomic History    Marital status:       Spouse name: Not on file    Number of children: Not on file    Years of education: Not on file    Highest education level: Not on file   Occupational History    Not on file   Social Needs    Financial resource strain: Not on file    Food insecurity     Worry: Not on file     Inability: Not on file    Transportation needs     Medical: Not on file     Non-medical: Not on file   Tobacco Use    Smoking status: Never Smoker    Smokeless tobacco: Never Used   Substance and Sexual Activity    Alcohol use: No    Drug use: No    Sexual activity: Not on file   Lifestyle    Physical activity     Days per week: Not on file     Minutes per session: Not on file    Stress: Not on file   Relationships    Social connections     Talks on phone: Not on file     Gets together: Not on file     Attends [employer] service: Not on file     Active member of club or organization: Not on file     Attends meetings of clubs or organizations: Not on file     Relationship status: Not on file    Intimate partner violence     Fear of current or ex partner: Not on file     Emotionally abused: Not on file     Physically abused: Not on file     Forced sexual activity: Not on file   Other Topics Concern    Not on file   Social History Narrative    Not on file       SCREENINGS        [place] Coma Scale  Eye Opening: Spontaneous  Best Verbal Response: Oriented  Best Motor Response: Obeys commands  N[place]y Coma Scale Score: 15               PHYSICAL EXAM    (up to 7 for level 4, 8 or more for level 5) ED Triage Vitals [07/22/20 2223]   BP Temp Temp Source Pulse Resp SpO2 Height Weight   (!) 101/45 98.6 °F (37 °C) Oral 64 20 98 % 5' 1\" (1.549 m) 177 lb (80.3 kg)       Physical Exam  Vitals signs and nursing note reviewed. Constitutional:       General: She is not in acute distress. Appearance: Normal appearance. She is normal weight. HENT:      Head: Normocephalic. Right Ear: Tympanic membrane, ear canal and external ear normal.      Left Ear: Tympanic membrane, ear canal and external ear normal.      Nose: Nose normal.      Mouth/Throat:      Mouth: Mucous membranes are moist.      Pharynx: Oropharynx is clear. No oropharyngeal exudate or posterior oropharyngeal erythema. Eyes:      Conjunctiva/sclera: Conjunctivae normal.      Pupils: Pupils are equal, round, and reactive to light. Neck:      Musculoskeletal: Normal range of motion. No neck rigidity. Cardiovascular:      Rate and Rhythm: Normal rate and regular rhythm. Pulses: Normal pulses. Heart sounds: Normal heart sounds. No murmur. No friction rub. Pulmonary:      Effort: Pulmonary effort is normal. No respiratory distress. Breath sounds: Normal breath sounds. No stridor. Abdominal:      General: Abdomen is flat and protuberant. Bowel sounds are normal. There is distension. Palpations: Abdomen is soft. There is shifting dullness and fluid wave. Comments: Distended fluid-filled abdomen. Genitourinary:     Vagina: No vaginal discharge. Musculoskeletal: Normal range of motion. General: No swelling or tenderness. Neurological:      General: No focal deficit present. Mental Status: She is alert.    Psychiatric:         Mood and Affect: Mood normal.         Behavior: Behavior normal.         DIAGNOSTIC RESULTS     EKG: All EKG's are interpreted by the Emergency Department Physician who either signs or Co-signs this chart in the absence of a cardiologist.        RADIOLOGY:   Non-plain film images such as CT, Ultrasound and MRI are read by the radiologist. Plain radiographic images are visualized and preliminarily interpreted by the emergency physician with the below findings:      Interpretation per the Radiologist below, if available at the time of this note:    XR CHEST PORTABLE    (Results Pending)   CT Head WO Contrast    (Results Pending)   CT THORACIC SPINE WO CONTRAST    (Results Pending)   CT LUMBAR SPINE WO CONTRAST    (Results Pending)   CT CERVICAL SPINE WO CONTRAST    (Results Pending)   CT CHEST WO CONTRAST    (Results Pending)   CT ABDOMEN PELVIS WO CONTRAST Additional Contrast? None    (Results Pending)   US RETROPERITONEAL COMPLETE    (Results Pending)         ED BEDSIDE ULTRASOUND:   Performed by ED Physician - none    LABS:  Labs Reviewed   COMPREHENSIVE METABOLIC PANEL - Abnormal; Notable for the following components:       Result Value    CREATININE 1.90 (*)     GFR Non- 25.2 (*)     GFR  30.5 (*)     Calcium 7.8 (*)     Total Protein 5.2 (*)     Alb 2.7 (*)     All other components within normal limits   CBC WITH AUTO DIFFERENTIAL - Abnormal; Notable for the following components:    WBC 4.1 (*)     RBC 2.18 (*)     Hemoglobin 7.1 (*)     Hematocrit 22.0 (*)     .2 (*)     MCH 32.5 (*)     MCHC 32.1 (*)     RDW 20.2 (*)     Platelets 78 (*)     Lymphocytes Absolute 0.6 (*)     All other components within normal limits   PROTIME-INR - Abnormal; Notable for the following components:    Protime 17.3 (*)     All other components within normal limits   APTT   ETHANOL   MAGNESIUM   COMPREHENSIVE METABOLIC PANEL W/ REFLEX TO MG FOR LOW K   OSMOLALITY, URINE   CREATININE, RANDOM URINE   SODIUM, URINE, RANDOM   UREA NITROGEN, URINE   VITAMIN B12 & FOLATE       All other labs were within normal range or not returned as of this dictation.     EMERGENCY DEPARTMENT COURSE and DIFFERENTIAL DIAGNOSIS/MDM:   Vitals:    Vitals:    07/23/20 0250 07/23/20 8166 07/23/20 0521 07/23/20 0523   BP: (!) 97/43 (!) 101/47 (!) 92/40 (!) 101/46   Pulse: 64 75 63    Resp: 16      Temp: 97.5 °F (36.4 °C)      TempSrc: Oral      SpO2: 100% 100% 99% 99%   Weight:       Height:           85-year of age female presents via EMS status post mechanical fall. Pan CT scans of the patient's head, C-spine, chest, abdomen, pelvis with CT and L-spine recons will be obtained. Along with a chest x-ray. CBC, CMP, APTT, INR, PT, mag will be obtained. Patient will be reassessed    MDM        REASSESSMENT      On reassessment patient sleeping comfortably. After awakening patient denies nausea, pain. Patient's work-up negative for any acute traumatic injury. However when patient was attempted to ambulate prior to discharge she was unable to claiming she was too weak. Patient was given IV fluids and then again attempted to ambulate and again without success. At this time hospitalist was contacted for attempt to bring the patient in under observation. Hospitalist agreed to accept the patient patient was admitted. CONSULTS:  IP CONSULT TO SOCIAL WORK  IP CONSULT TO GI    PROCEDURES:  Unless otherwise noted below, none     Procedures        FINAL IMPRESSION      1. Fall, initial encounter    2. ENMA (acute kidney injury) [facility])          [address] Admitted 07/23/2020 01:32:16 AM      PATIENT REFERRED TO:  Abram Garcia MD  [address]          Abram Garcia, [address] [facility] [phone]            DISCHARGE MEDICATIONS:  Current Discharge Medication List        Controlled Substances Monitoring:     RX Monitoring 11/20/2018   Attestation The Prescription Monitoring Report for this patient was reviewed today. Periodic Controlled Substance Monitoring No signs of potential drug abuse or diversion identified.        (Please note that portions of this note were completed with a voice recognition program.  Efforts were made to edit the dictations but occasionally words are mis-transcribed.)    Moni Arias PA-C (electronically signed)             Moni Arias PA-C  07/23/20 2609

## 2020-07-24 PROBLEM — K62.3 RECTAL PROLAPSE: Status: ACTIVE | Noted: 2020-01-01

## 2020-07-24 PROBLEM — I25.2 HISTORY OF MYOCARDIAL INFARCTION: Status: ACTIVE | Noted: 2020-01-01

## 2020-07-24 PROBLEM — I87.2 STASIS DERMATITIS: Status: ACTIVE | Noted: 2020-01-01

## 2020-07-24 PROBLEM — R26.0 ATAXIC GAIT: Status: ACTIVE | Noted: 2020-01-01

## 2020-07-24 PROBLEM — R26.9 GAIT ABNORMALITY: Status: ACTIVE | Noted: 2020-01-01

## 2020-07-24 PROBLEM — E44.0 MODERATE MALNUTRITION (HCC): Status: ACTIVE | Noted: 2020-01-01

## 2020-07-24 NOTE — PROGRESS NOTES
Hospitalist Progress Note      PCP: Magdy Connors MD    Date of Admission: 7/22/2020    Chief Complaint:  No acute events, BP remains borderline low, no fevers, s/p paracentesis yesterday with removal of 6.5 liters of fluid per IR    Medications:  Reviewed    Infusion Medications   Scheduled Medications    sodium chloride flush  10 mL Intravenous 2 times per day    carbidopa-levodopa  1 tablet Oral TID    folic acid  1 mg Oral Daily    spironolactone  50 mg Oral Daily    furosemide  40 mg Oral Daily     PRN Meds: sodium chloride flush, acetaminophen **OR** acetaminophen, promethazine **OR** ondansetron      Intake/Output Summary (Last 24 hours) at 7/24/2020 1037  Last data filed at 7/23/2020 1059  Gross per 24 hour   Intake 10 ml   Output --   Net 10 ml       Exam:    BP (!) 108/34   Pulse 66   Temp 97.7 °F (36.5 °C) (Oral)   Resp 16   Ht 5' 1\" (1.549 m)   Wt 177 lb (80.3 kg)   SpO2 100%   BMI 33.44 kg/m²     General appearance: appears stated age and cooperative. Respiratory: Clear to auscultation, bilaterally   Cardiovascular: Regular rate and rhythm with normal S1/S2 wi  Abdomen: Soft, tender, distended, active bowel sounds. Musculoskeletal: bilateral pitting edema of the LE. Labs:   Recent Labs     07/22/20 2245 07/23/20  1219 07/24/20  0509   WBC 4.1* 4.5* 4.6*   HGB 7.1* 7.3* 6.9*   HCT 22.0* 24.4* 22.0*   PLT 78* 90* 85*     Recent Labs     07/22/20 2245 07/23/20  0530 07/24/20  0509    139 138   K 4.5 4.2 4.5    107 106   CO2 20 20 21   BUN 20 20 22   CREATININE 1.90* 1.62* 1.18*   CALCIUM 7.8* 7.9* 8.3*     Recent Labs     07/22/20 2245 07/23/20  0530 07/24/20  0509   AST 31 28 21   ALT 8 8 <5   BILITOT 0.5 0.5 0.5   ALKPHOS 66 62 63     Recent Labs     07/22/20 2245   INR 1.4     No results for input(s): CKTOTAL, TROPONINI in the last 72 hours.     Urinalysis:      Lab Results   Component Value Date    NITRU Negative 03/10/2020    WBCUA 6-10 01/29/2020    BACTERIA Negative 01/29/2020    RBCUA 0-2 01/29/2020    BLOODU Negative 03/10/2020    SPECGRAV 1.009 03/10/2020    GLUCOSEU Negative 03/10/2020    GLUCOSEU NEG 04/01/2012       Radiology:  US GUIDED PARACENTESIS   Final Result      SUCCESSFUL ULTRASOUND-GUIDED PARACENTESIS APPROXIMATELY 6.5 L OF ASCITES. US RETROPERITONEAL LIMITED   Final Result   SYMMETRIC KIDNEYS BORDERLINE SMALL IN SIZE. NO ULTRASOUND SIGNS OF HYDRONEPHROSIS. UPPER ABDOMINAL ASCITES      RADIOLOGY REPORT   Final Result      CT Head WO Contrast   Final Result   Impression:      No acute findings. Mild cerebral atrophy. All CT scans at this facility use dose modulation, iterative reconstruction, and/or weight based dosing when appropriate to reduce radiation dose to as low as reasonably achievable. CT THORACIC SPINE WO CONTRAST   Final Result   NEGATIVE CT OF THE THORACIC SPINE. CT LUMBAR SPINE WO CONTRAST   Final Result   DEGENERATIVE CHANGES. NO ACUTE FRACTURE. CT CERVICAL SPINE WO CONTRAST   Final Result   NO ACUTE CERVICAL SPINE ABNORMALITY. CT CHEST WO CONTRAST   Final Result      Bilateral dependent atelectatic change. Small left effusion. All CT scans at this facility use dose modulation, iterative reconstruction, and/or weight based dosing when appropriate to reduce radiation dose to as low as reasonably achievable. CT ABDOMEN PELVIS WO CONTRAST Additional Contrast? None   Final Result      Cirrhosis. Diffuse ascites, unchanged. Diffuse edematous change, abdominal and pelvic walls, stable. Small right kidney. Left kidney lower limits of normal.      Hypoplastic pancreas. Remote hemorrhoid surgery. Cholecystectomy. Hysterectomy. All CT scans at this facility use dose modulation, iterative reconstruction, and/or weight based dosing when appropriate to reduce radiation dose to as low as reasonably achievable. XR CHEST PORTABLE   Final Result      NO EVIDENCE OF ACUTE THORACIC TRAUMA OR ACTIVE CARDIOPULMONARY DISEASE. Assessment/Plan:    81 y/o female with history of liver cirrhosis with ascites requiring paracentesis, pancytopenia, parkinson who presented after falling at home.     ENMA  - improving  - monitor renal function    Decompensated liver cirrhosis with esophageal varices and ascites  - s/p u/s guided therapeutic paracentesis with removal of 6.5 liters of fluid per IR  - fluid analysis was negative for SBP  - restarted on diuretic regimen by GI     Pancytopenia   - likely due to advanced liver cirrhosis  - monitor counts closely    Anemia   - Hb down to 6.9, baseline is around 8-10  - no active bleeding  - transfuse one unit of PRBCs  - follow H/H    Folic acid deficiency  - started supplement    Parkinson   - continue sinemet    S/p fall  - imaging obtained on admission was negative per report  - neurology consulted  - continue PT/OT    Disposition - acute rehab             Electronically signed by Isamar Mendez MD on 7/24/2020 at 10:37 AM

## 2020-07-24 NOTE — CONSULTS
Subjective:      Patient ID: Unique Boone is a 82 y.o. female who presents today for dizziness    HPI 82-year-old right-handed female was admitted history of dizziness. Patient states he was in the kitchen and her legs gave out and fell. She did not hit her head. She denies any other symptoms. She was noted to be ambulatory after the accident. Patient recalls the events and has no memory issues regarding the same. Patient was noted to have a hemoglobin of 6.9  She denies any previous history of vertigo hearing loss or tinnitus  Review of Systems   Constitutional: Negative for fever. HENT: Negative for ear pain, tinnitus and trouble swallowing. Eyes: Negative for photophobia and visual disturbance. Respiratory: Negative for choking and shortness of breath. Cardiovascular: Negative for chest pain and palpitations. Gastrointestinal: Negative for nausea and vomiting. Musculoskeletal: Negative for back pain, gait problem, joint swelling, myalgias, neck pain and neck stiffness. Skin: Negative for color change. Allergic/Immunologic: Negative for food allergies. Neurological: Negative for dizziness, tremors, seizures, syncope, facial asymmetry, speech difficulty, weakness, light-headedness, numbness and headaches. Psychiatric/Behavioral: Negative for behavioral problems, confusion, hallucinations and sleep disturbance.        Past Medical History:   Diagnosis Date    Asthma     Fibromyalgia     Hemorrhoid     Hyperlipidemia     Hypertension     Parkinson's disease ([facility] [address]     Type II or unspecified type diabetes mellitus without mention of complication, not stated as uncontrolled      Past Surgical History:   Procedure Laterality Date    CHOLECYSTECTOMY      EYE SURGERY      HEMORRHOID SURGERY      HERNIA REPAIR  2/20/2002    ventral hernia    HERNIA REPAIR      LIH repair    HYSTERECTOMY      JOINT REPLACEMENT Right     partial left, full right    LAMINECTOMY      PARACENTESIS Left 03/12/2020    5010cc ascites removed by Dr Sam Nunes [phone]    PARACENTESIS Left 06/10/2020    3,850 cc removed per Dr Holland Dean Left 07/08/2020    total 5450 ml removed per Dr Holland Dean Left 07/23/2020    Dr Xena Joyner [address] N/A 2/21/2020    EGD WITH BANDING performed by Alejandro Dennis MD at [address] [facility] Marital status:      Spouse name: Not on file    Number of children: Not on file    Years of education: 15    Highest education level: High school graduate   Occupational History    Occupation: Homemaker   Social Needs    Financial resource strain: Somewhat hard    Food insecurity     Worry: Never true     Inability: Never true    Transportation needs     Medical: Yes     Non-medical: Yes   Tobacco Use    Smoking status: Never Smoker    Smokeless tobacco: Never Used   Substance and Sexual Activity    Alcohol use: No    Drug use: No    Sexual activity: Not Currently     Partners: Male   Lifestyle    Physical activity     Days per week: 0 days     Minutes per session: 0 min    Stress: Only a little   Relationships    Social connections     Talks on phone: More than three times a week     Gets together: More than three times a week     Attends Sabianism service: 1 to 4 times per year     Active member of club or organization: No     Attends meetings of clubs or organizations: Never     Relationship status:      Intimate partner violence     Fear of current or ex partner: No     Emotionally abused: No     Physically abused: No     Forced sexual activity: No   Other Topics Concern    Not on file   Social History Narrative         Lives With: Alone in [address] she is a     She has a daughter named and who lives in [facility] and that is quite helpful and comes to see her almost daily    Type of Home: Apartment  at [address] [facility] in [place] (7th floor) Home Layout: One level    Home Access: Elevator, Level entry  (Level entry, elevator to 7th floor)    Bathroom Shower/Tub: Tub/Shower unit    Home Equipment: 4 wheeled walker    ADL Assistance: Independent    Homemaking Assistance: Independent    Ambulation Assistance: Independent(Rollator)    Transfer Assistance: Independent    Active : No    Patient's  Info: daughter    IADL Comments: Daughter helps with driving and groceries PRN    Additional Comments: Reports recent fall- \"my legs gave out\"         Family History   Problem Relation Age of Onset    High Blood Pressure Neg Hx      Allergies   Allergen Reactions    Adhesive Tape     Amoxicillin-Pot Clavulanate     Bactrim [Sulfamethoxazole-Trimethoprim]     Clarithromycin      Other reaction(s):  Other: See Comments  tachycardia    Other     Sulfa Antibiotics      Current Facility-Administered Medications   Medication Dose Route Frequency Provider Last Rate Last Dose    0.9 % sodium chloride bolus  20 mL Intravenous Once Maryruth Gottron, MD        sodium chloride flush 0.9 % injection 10 mL  10 mL Intravenous 2 times per day SHARI Iglesias - CNP   10 mL at 07/24/20 1122    sodium chloride flush 0.9 % injection 10 mL  10 mL Intravenous PRN Nohemi Perez APRN - CNP        acetaminophen (TYLENOL) tablet 650 mg  650 mg Oral Q6H PRN Nohemi Perez APRN - CNP        Or    acetaminophen (TYLENOL) suppository 650 mg  650 mg Rectal Q6H PRN Nohemi Perez APRN - SHARON        promethazine (PHENERGAN) tablet 12.5 mg  12.5 mg Oral Q6H PRN Nohemi Perez APRN - CNP        Or    ondansetron [facility]) injection 4 mg  4 mg Intravenous Q6H PRN Nohemi Perez APRN - CNP        carbidopa-levodopa (SINEMET)  MG per tablet 1 tablet  1 tablet Oral TID SHARI Iglesias - CNP   1 tablet at 76/33/59 7074    folic acid (FOLVITE) tablet 1 mg  1 mg Oral Daily Maryruth Gottron, MD   1 mg at 07/24/20 1120    spironolactone (ALDACTONE) tablet 50 mg  50 mg Oral Daily Elizabeth Jorge MD   50 mg at 07/24/20 1118    furosemide (LASIX) tablet 40 mg  40 mg Oral Daily Elizabeth Jorge MD   40 mg at 07/24/20 1118        Objective:   BP (!) 108/34   Pulse 66   Temp 97.7 °F (36.5 °C) (Oral)   Resp 16   Ht 5' 1\" (1.549 m)   Wt 167 lb (75.8 kg)   SpO2 100%   BMI 31.55 kg/m²     Physical Exam  Vitals signs reviewed. Eyes:      Pupils: Pupils are equal, round, and reactive to light. Neck:      Musculoskeletal: Normal range of motion. Cardiovascular:      Rate and Rhythm: Normal rate and regular rhythm. Heart sounds: No murmur. Pulmonary:      Effort: Pulmonary effort is normal.      Breath sounds: Normal breath sounds. Abdominal:      General: Bowel sounds are normal.   Musculoskeletal: Normal range of motion. Skin:     General: Skin is warm. Neurological:      Mental Status: She is alert and oriented to person, place, and time. Cranial Nerves: No cranial nerve deficit. Sensory: No sensory deficit. Motor: No abnormal muscle tone. Coordination: Coordination normal.      Deep Tendon Reflexes: Reflexes are normal and symmetric. Babinski sign absent on the right side. Babinski sign absent on the left side. Psychiatric:         Mood and Affect: Mood normal.       Patient did walk today but appeared to be dizzy when she walks only. Ct Abdomen Pelvis Wo Contrast Additional Contrast? None    Result Date: 7/23/2020  CT of the Abdomen and Pelvis without intravenous contrast medium History:  Fall from standing Technical Factors: CT imaging of the abdomen and pelvis were obtained and formatted as 5 mm contiguous axial images from the domes of the diaphragm to the symphysis pubis. Sagittal and coronal reconstructions were also obtained. Oral contrast medium:  None. Intravenous contrast medium:  None. Comparison:  CT abdomen pelvis, March 10, 2020, March 11, 2013.  Findings: Liver: Small in size, and nodular contour, unchanged. Bile Ducts:  Normal in caliber. Gallbladder:  Surgically absent. Pancreas:  Hypoplastic, without masses, cysts, ductal dilatation or calcification. Spleen:  Normal in size without masses or calcifications. No splenules. Kidneys:  Right and left kidneys measure 8.0 cm, and 9.1 cm, respectively. 1 mm calcification mid pole left kidney, unchanged from 2013. No right renal calculi. Adrenals:  Normal. Small bowel:  Normal in caliber. Appendix:  Not visualized. Colon:  Normal in caliber. Surgical clips at level of rectum. Peritoneum:  Diffuse free fluid within the abdomen and pelvis, unchanged. No free air. Vessels: Aorta normal in course and caliber. Lymph nodes:  Retroperitoneal:  No enlarged retroperitoneal lymph nodes. Mesenteric:  No enlarged mesenteric lymph nodes. Pelvic: No enlarged pelvic lymph nodes. Ureters: Normal in course and caliber. No calcifications. Bladder: Partially decompressed. Reproductive organs: Uterus surgically absent. Dystrophic calcification, left adnexa, unchanged from March, 2013. Abdominal Wall: Remote abdominal wall defect repair. Edematous change within abdominal pelvic soft tissues diffusely, stable. Bones:  No bone lesions. No degenerative changes. No post operative changes. Cirrhosis. Diffuse ascites, unchanged. Diffuse edematous change, abdominal and pelvic walls, stable. Small right kidney. Left kidney lower limits of normal. Hypoplastic pancreas. Remote hemorrhoid surgery. Cholecystectomy. Hysterectomy. All CT scans at this facility use dose modulation, iterative reconstruction, and/or weight based dosing when appropriate to reduce radiation dose to as low as reasonably achievable. Ct Head Wo Contrast    Result Date: 7/23/2020  CT Brain Contrast medium:  Not utilized. History:  Fell from standing. Denies anticoagulation/loss of consciousness.  Comparison:  CT brain, July 11, 2014 Findings: Extra-axial spaces:  Normal. Intracranial hemorrhage:  None. Ventricular system: Ventricles mildly enlarged. Sulci mildly prominent. Basal Cisterns:  Normal. Cerebral Parenchyma: Physiologic basal ganglia calcification, unchanged. Midline Shift:  None. Cerebellum:  Normal. Paranasal sinuses and mastoid air cells:  Normal. Visualized Orbits: Remote bilateral ocular surgery. Impression: No acute findings. Mild cerebral atrophy. All CT scans at this facility use dose modulation, iterative reconstruction, and/or weight based dosing when appropriate to reduce radiation dose to as low as reasonably achievable. Ct Chest Wo Contrast    Result Date: 7/23/2020  CT of the Chest without intravenous contrast medium History:  Mechanical fall from standing. Technical Factors: CT imaging of the chest was obtained and formatted as 5 mm contiguous axial images from the thoracic inlet through the adrenal glands. Sagittal and coronal reconstruction obtained during postprocessing. Intravenous contrast medium:  None. Comparison:  Chest radiograph, July 22, 2020. High-resolution CT chest, December 30, 2019. Findings: Right lung shows mild dependent atelectatic change posterior right upper lobe, posterior right middle lobe, and posterior right lower lobe. No nodules or masses. No pleural effusion. Left lung shows dependent subsegmental atelectatic change, left upper lobe. Small peripheral focus of groundglass opacity lateral lower lingula. Dependent subsegmental atelectatic change, small left pleural effusion. No nodules or masses. No hilar, mediastinal, or axillary lymph node enlargement. Cardiac size normal. No pericardial effusion. Calcified mitral annulus. Thoracic aorta normal in course and caliber. Bridging anterior osteophytes mid to lower thoracic spine. No osteoblastic, and no osteolytic lesions. No fracture. Bilateral dependent atelectatic change. Small left effusion.  All CT scans at this facility use dose modulation, iterative reconstruction, and/or weight based dosing when appropriate to reduce radiation dose to as low as reasonably achievable. Ct Cervical Spine Wo Contrast    Result Date: 7/23/2020  EXAMINATION: CT CERVICAL SPINE WO CONTRAST   DATE AND TIME:7/22/2020 10:45 PM CLINICAL HISTORY:ACUTE POSTERIOR NECK PAIN  TRAUMA  COMPARISON: NONE TECHNIQUE:Helical scanning was performed from the skull base through the remainder of the cervical spine without intravenous contrast. Sagittal and coronal reformats were obtained. The lack of contrast limits CT sensitivity of the soft tissues. All CT scans at this facility use dose modulation, iterative reconstruction, and/or weight based dosing when appropriate to reduce radiation dose to as low as reasonably achievable. FINDINGS   Fracture:No acute fracture. Alignment:  Normal cervical lordosis. No subluxation. No canal stenosis. Dense atlas:Dens atlas relationship is normal. Soft tissues:Airway patent. No soft tissue mass or adenopathy. Other findings: There is mild  cervical spondylosis with multiple level disc osteophyte changes. NO ACUTE CERVICAL SPINE ABNORMALITY. Ct Thoracic Spine Wo Contrast    Result Date: 7/23/2020  EXAMINATION: CT THORACIC SPINE WO CONTRAST DATE AND TIME:7/22/2020 10:45 PM CLINICAL HISTORY: SEVERE POSTERIOR THORACIC SPINE PAIN. FALL  COMPARISON: NONE TECHNIQUE:  Axial noncontrast images were performed through the thoracic spine with coronal and sagittal reconstructions. All CT scans at this facility use dose modulation, iterative reconstruction, and/or weight based dosing when appropriate to reduce radiation dose to as low as reasonably achievable. FINDINGS  Alignment/fracture: Normal alignment without subluxation. No evidence of fracture. Perivertebral soft tissues are normal.  Disc spaces: There are multiple levels of disc space narrowing and end plate changes consistent with degenerative change. NEGATIVE CT OF THE THORACIC SPINE.      Ct Lumbar Spine Wo Contrast    Result Date: 7/24/2020  EXAMINATION: CT LUMBAR SPINE WO CONTRAST CLINICAL HISTORY: Fall from standing. Back pain. COMPARISON: CT abdomen pelvis 3/10/2020 TECHNIQUE:  Spiral scans with no contrast. Multiplanar 2-D reconstructions. All CT scans at this facility use dose modulation, iterative reconstruction, and/or weight based dosing when appropriate to reduce radiation dose to as low as reasonably achievable. FINDINGS: No acute fractures identified. There is T11 superior endplate Schmorl's node with dystrophic calcification, unchanged from prior imaging study. There is lumbar spondylosis with multilevel intervertebral disc space narrowing, marginal osteophytosis, and facet hypertrophy. There is L4-5 degenerative disc vacuum phenomenon. There is L3-4 and L4-5 disc bulging which does not appear to impinge on the exiting nerve roots. There is mild L4-5 central canal stenosis. There is disc bulging and  degenerative disc vacuum phenomenon at L1-2. The SI joints are symmetrically maintained. There is iliac sclerosis of the SI joints. Ascites is noted within the field-of-view. There are 2 left renal midpole nonobstructing stones, measuring 2 mm and 4 mm. DEGENERATIVE CHANGES. NO ACUTE FRACTURE. Xr Chest Portable    Result Date: 7/23/2020  XR CHEST PORTABLE : 7/22/2020 CLINICAL HISTORY: Pain after fall . COMPARISON: High-resolution chest CT 12/30/2019 and two-view chest 11/18/2019. TECHNIQUE: An upright portable AP radiograph of the chest was obtained. FINDINGS: A shallow inspiration is present without significant infiltrate identified. There is no cardiomegaly, significant pleural effusion, vascular congestion, pneumothorax, or displaced fractures identified. NO EVIDENCE OF ACUTE THORACIC TRAUMA OR ACTIVE CARDIOPULMONARY DISEASE. Us Guided Paracentesis    Result Date: 7/23/2020  US GUIDED PARACENTESIS : 7/23/2020 CLINICAL HISTORY:  therapeutic paracentesis .  COMPARISON: Chest, abdomen and pelvis CTs from earlier 7/23/2020 PROCEDURE: Informed consent was obtained. Limited transabdominal ultrasound was performed and marking done in the usual fashion. Sterile technique and local lidocaine anesthesia was used to place a 5 [employer] June Yueh catheter within the abdominal fluid collection. Approximately 6.5 L of low viscosity pale yellow-colored fluid was aspirated without evidence of immediate complication. A sample was sent for analysis as requested. The patient was monitored in the radiology holding area for approximately hold minutes without event. She was returned to the medical floor in stable condition with the usual instructions. SUCCESSFUL ULTRASOUND-GUIDED PARACENTESIS APPROXIMATELY 6.5 L OF ASCITES. Us Retroperitoneal Limited    Result Date: 7/23/2020  EXAMINATION: US RETROPERITONEAL LIMITED CLINICAL HISTORY: 82year-old with renal insufficiency and extensive abdominal ascites COMPARISONS: Unenhanced abdominal CT 7/23/2020 FINDINGS: Renal sonography was performed. The study is compromised due to patient body habitus and sonographic window. Kidneys by ultrasound are symmetric borderline small in size. Right kidney measures 9.3 x 5.3 x 4.3 cm left kidney measures 9.2 x 4.7 x  4.8 cm. There are a few punctate echogenic foci in the central sinus echocomplex raising the question of specular reflectors. Bilateral renal echotexture is otherwise unremarkable. No large cortical cysts, solid masses or shadowing calcifications. No ultrasound signs of hydronephrosis. Note is made of upper abdominal ascites     SYMMETRIC KIDNEYS BORDERLINE SMALL IN SIZE. NO ULTRASOUND SIGNS OF HYDRONEPHROSIS.  UPPER ABDOMINAL ASCITES      Lab Results   Component Value Date    WBC 4.6 07/24/2020    RBC 2.14 07/24/2020    RBC 4.01 04/06/2012    HGB 6.9 07/24/2020    HCT 21.9 07/24/2020    .9 07/24/2020    MCH 32.5 07/24/2020    MCHC 31.6 07/24/2020    RDW 19.9 07/24/2020    PLT 85 07/24/2020    MPV 10.9 02/07/2013     Lab Results   Component Value Date     07/24/2020    K 4.5 07/24/2020     07/24/2020    CO2 21 07/24/2020    BUN 22 07/24/2020    CREATININE 1.18 07/24/2020    GFRAA 52.9 07/24/2020    LABGLOM 43.7 07/24/2020    GLUCOSE 103 07/24/2020    GLUCOSE 248 04/06/2012    PROT 5.0 07/24/2020    LABALBU 2.6 07/24/2020    LABALBU 4.5 03/30/2012    CALCIUM 8.3 07/24/2020    BILITOT 0.5 07/24/2020    ALKPHOS 63 07/24/2020    AST 21 07/24/2020    ALT <5 07/24/2020     Lab Results   Component Value Date    PROTIME 17.3 07/22/2020    PROTIME 12.3 03/30/2012    INR 1.4 07/22/2020     Lab Results   Component Value Date    TSH 2.378 07/05/2012    EZNURFQO43 352 07/23/2020    FOLATE 4.9 07/23/2020    FERRITIN 227.2 07/23/2020    IRON 54 07/23/2020    TIBC 197 07/23/2020     Lab Results   Component Value Date    TRIG 88 01/29/2020    HDL 57 01/29/2020    LDLCALC 22 01/29/2020     Lab Results   Component Value Date    ETOH <10 07/22/2020     No results found for: LITHIUM, DILFRTOT, VALPROATE    Assessment:   Dizziness consistent with underlying hemodynamic changes and low hemoglobin. I did not find anything other than this. Patient has a history of Parkinson's disease. She is on carbidopa levodopa 3 times a day. We are not sure who she is seeing. Patient has history of liver dysfunction. And has ascites. Been trying to reevaluate the GI notes. There is no tremors there is no asterixis. For now I am not recommending any intervention except to correct her hemoglobin and watch her.   We will try and find out more regarding her Parkinson's disease is him not seeing any notes and will contact our old offices for the same as well the interim if there are any changes please call us will reevaluate  Plan:

## 2020-07-24 NOTE — PROGRESS NOTES
1935-Bedside rounding completed at this time. Pt is a+o x 4. Pt denies pain of any kind and is resting comfortably in bed. Pt instructed to call for assistance should she wish to get out of bed during the shift and pt states compliance. Bed alarm noted to be on for pt safety. Call bell and items are with in reach of pt. Will continue to monitor.      2115-PM medication given according to [facility] Assessment completed, see Epic charting. Pt denies pain or SOB and is resting comfortably in bed. Pt instructed to call for assistance should she wish to get out of bed during the shift and pt states compliance. Bed alarm noted to be on for pt safety. Call bell and items are with in reach of pt. Will continue to monitor. 0614Abdoulaye Olson informed through [employer] of the pt's HGB level of 6.9 and HCT level of 22. Awaiting response at this time. Will continue to monitor. 0620Abdoulaye Olson called and spoke with this RN and ordered an Hgb and Hct level for today at 1200. Order entered into the computer by this RN.   Will continue to monitor.

## 2020-07-24 NOTE — CARE COORDINATION
[facility] She needs PRBC for Hgb 6.9 prior to transfer. Anticipated discharge over weekend.   Electronically signed by Thad Schultz RN on 7/24/2020 at 3:31 PM

## 2020-07-24 NOTE — FLOWSHEET NOTE
1200- Patient AOx4, complains of headache, motrin ordered. Patient states she can not have tylenol due to liver issues. PERRLA. Expiratory wheezes heard bilaterally, physician notified for symbicort order. Patient up to bathroom, gait steady with walker and assistance. Urine sent to lab. Bowel sounds present x4. Hgb 6.9 this morning, physician aware. 1530- Blood bank called and said they [facility] so it will take a while to arrive. Physician aware.

## 2020-07-24 NOTE — PROGRESS NOTES
Hgb: 6.9, folate: 4.9 (low), pale. Peripheral line. Pt reported eating 2 meals daily, noted to a poor historian per notes      Wounds:  Skin Tears(skin tear r-elbow, st 2 pressure ulcer to sacrum)       Current Nutrition Therapies:    DIET LOW SODIUM 2 GM;  Dietary Nutrition Supplements: Diabetic Oral Supplement    Anthropometric Measures:  · Height: 5' 1\" (154.9 cm)  · Current Body Weight: 167 lb (75.8 kg)(7/23-[facility])   · Admission Body Weight: 177 lb (80.3 kg)(stated)    · Usual Body Weight: 174 lb (78.9 kg)(3/15/20-[facility] visit, post paracentesis (wt on 3/10/20 was 193 lb prior to paracentesis))     · Ideal Body Weight: 105 lbs; % Ideal Body Weight 159 %   · BMI: 31.6  · Adjusted Body Weight:  ; No Adjustment   · BMI Categories: Obese Class 1 (BMI 30.0-34. 9)       Nutrition Diagnosis:   · In context of chronic illness, Moderate malnutrition related to inadequate protein-energy intake as evidenced by intake 51-75%, weight loss, wounds, mild muscle loss      Nutrition Interventions:   Food and/or Nutrient Delivery:  Continue Current Diet, Modify Oral Nutrition Supplement(Continue General diet (change to Carb Control if glu > 150), Change to diabetic ONS BID (strawberry))  Nutrition Education/Counseling:  No recommendation at this time   Coordination of Nutrition Care:  Continued Inpatient Monitoring    Goals:  po intake > 75% of meals and supplements. Stable weight ~ 167 lb       Nutrition Monitoring and Evaluation:   Food/Nutrient Intake Outcomes:  Food and Nutrient Intake, Supplement Intake  Physical Signs/Symptoms Outcomes:  Fluid Status or Edema, Biochemical Data, Weight, GI Status     Discharge Planning:     Too soon to determine     Electronically signed by Radha Rich RD, LD on 7/24/20 at 12:31 PM EDT

## 2020-07-24 NOTE — PROGRESS NOTES
Physical Therapy Med Surg Daily Treatment Note  Facility/Department: [address] [place] [address] [address]  Room: [place] [address]       NAME: Ajay Lynne  : 1935 (82 y.o.)  MRN: [id number]  CODE STATUS: Full Code    Date of Service: 2020    Patient Diagnosis(es): ENMA (acute kidney injury) [facility]) [N17.9]   Chief Complaint   Patient presents with    Fall     Patient Active Problem List    Diagnosis Date Noted    Ataxic gait 2020    History of myocardial infarction 2020    Rectal prolapse 2020    Stasis dermatitis 2020    Gait abnormality 2020    ENMA (acute kidney injury) ([address] 2020    Pancytopenia ([address] 2020    Fall 2020    Weakness 2020    Malnutrition ([address] 2020    Cirrhosis of liver with ascites ([address] 2020    Cough variant asthma 03/10/2020    Morbidly obese ([address] 03/10/2020    Decompensated hepatic cirrhosis ([address] 03/10/2020    Secondary esophageal varices without bleeding ([address]     Hemorrhoids 2020    Iron deficiency anemia due to chronic blood loss 2019    Bilateral carotid artery stenosis 2019    Chest pain 2018    RANDOLPH (dyspnea on exertion) 2018    Lumbar spondylosis 10/30/2017    Controlled type 2 diabetes mellitus without complication, without long-term current use of insulin ([address] 2016    S/P left unicompartmental knee replacement 2016    History of trabeculectomy 2015    Lens replaced by other means 2015    POAG (primary open-angle glaucoma) 2015    Osteoarthritis of multiple joints 2014    Actinic keratosis 2014    Seborrheic keratosis 2014    Chondrocalcinosis of hand 2014    Pseudogout of hand 2014    Vitamin D deficiency 2014    Bilateral hand pain 2014    DISH (diffuse idiopathic skeletal hyperostosis) 2014    Lumbago without sciatica 2014    Secondary osteoarthritis of multiple sites 2014 removed per Dr Katie Meredith Left 07/23/2020    Dr Jami Rivera [address] [facility] 2/21/2020    EGD WITH BANDING performed by Ekta Jenkins MD at [address] [place] [address]  Restrictions/Precautions: Fall Risk    SUBJECTIVE   General  Chart Reviewed: Yes  Family / Caregiver Present: Yes(daughter)  Subjective  Subjective: i am very tired and cold  General Comment  Comments: upon arrival pt is resting in bed c/o feeling light headed and dizzy with fatigue. pt's daughter was in the room it was explained to both of them that due to pts low HGB level we had to take precautions with therapy and it would be limited today depending on tolerance. nsg in and aware of pt c/o dizziness and headache    Pre-Session Pain Report     Pain Screening  Patient Currently in Pain: Yes  Pre Treatment Pain Screening  Pain at present: 2  Intervention List: Patient able to continue with treatment;Nurse called to administer meds    Post-Session Pain Report  Pain Assessment  Pain Assessment: 0-10  Pain Level: 2  Pain Radiating Towards: headache         OBJECTIVE        Bed mobility  Rolling to Left: Stand by assistance  Supine to Sit: Stand by assistance  Sit to Supine: Stand by assistance  Scooting: Stand by assistance  Comment: pt used grab bars, increased time to perform and c/o dizziness once sitting EOB which did not subside to pt returned to supine position. pt requested to end tx following this. Ambulation  Ambulation?: No(not attempted due to low HGB and pt c/o dizziness and fatigue)                                         Activity Tolerance  Activity Tolerance: limited due to dizziness          ASSESSMENT     While sitting EOB pt was reporting dizziness which did not subside so pt was returned to supine position and nsg notified.      Discharge Recommendations:  Patient would benefit from continued therapy after discharge    Goals  Long term goals  Long term goal 1: pt to be indep with bed mobility  Long term goal 2: pt to be indep with transfers  Long term goal 3: pt to ambulate >100 ft with WW/rollator modified indep  Long term goal 4: pt to be indep with HEP  Patient Goals   Patient goals : to go home    PLAN    Times per week: 3-6  Safety Devices  Type of devices: All fall risk precautions in place, Call light within reach, Bed alarm in place, Left in bed, Nurse notified     [facility] (6 CLICK) [address] Mobility Raw Score : 18     Therapy Time   Individual   Time In 1000   Time Out 1020   Minutes 20      bm:20       Ishmael Snell PTA, 07/24/20 at 10:23 AM         Definitions for assistance levels  Independent = pt does not require any physical supervision or assistance from another person for activity completion. Device may be needed.   Stand by assistance = pt requires verbal cues or instructions from another person, close to but not touching, to perform the activity  Minimal assistance= pt performs 75% or more of the activity; assistance is required to complete the activity  Moderate assistance= pt performs 50% of the activity; assistance is required to complete the activity  Maximal assistance = pt performs 25% of the activity; assistance is required to complete the activity  Dependent = pt requires total physical assistance to accomplish the task

## 2020-07-24 NOTE — PLAN OF CARE
Nutrition Problem #1: In context of chronic illness, Moderate malnutrition  Intervention: Food and/or Nutrient Delivery: Continue Current Diet, Modify Oral Nutrition Supplement(Continue General diet (change to Carb Control if glu > 150), Change to diabetic ONS BID (strawberry))  Nutritional Goals: po intake > 75% of meals and supplements.   Stable weight ~ 167 lb

## 2020-07-24 NOTE — CONSULTS
Physical Medicine & Rehabilitation  Consult Note      Admitting Physician: Carri Calderon MD    Primary Care Provider: Mikal Sims MD     Reason for Consult:  Asses rehab needs,promote physical and mental function, and decrease likelihood of re-admit to the hospital after discharge. History of Present Illness:    Mario Alberto Rothman is a 82 y.o. female admitted to [facility] on 7/22/2020. Patient presented through the emergency room  After falling  From a standing position in her kitchen. She states that her legs simply gave out from underneath her. She was found to be fairly anemic with an H&H of 7 and 22. Platelet count was also low at 78. Her creatinine was elevated between 1.62 and 1.9. She was admitted to the medical floor and evaluated by GI for ascites and anemia. I reviewed GIs note, \"    Decompensated Cirrhosis:  MELD-Na Score 15  Estimated 90-Day Mortality 6%  2 - Grade 2-3 Ascites:    poor tolerance to dual diuretics, currently on hold due to low blood pressure and worsening kidney function  Ongoing evaluation for therapeutic paracentesis, may give Albumin (6 to 8 g/L of fluid removed) should  be administered ( if removed more than 5 L)  \" No added salt diet\"/ 2 gm NA diet   - No Hx of SBP noted  3- EGD for Variceal surveillance:    Had EGD S/P banding on 2/20 noted grade III EV post EVL and portal htn gastropathy  4-  No Hepatic encephalopathy: ongoing evaluation for Lactulose - will titrate if needed to 2-3 soft bm's per day. \"    It looks like she had banding-Of esophageal varices due to portal hypertension back in February. Fall   The accident occurred 2 days ago. The fall occurred while standing. The pain is at a severity of 3/10. The pain is moderate. The symptoms are aggravated by movement, cold and extension. Associated symptoms include abdominal pain.  Pertinent negatives include no fever, headaches, hearing loss, hematuria, loss of consciousness, nausea or vomiting. She has tried heat, elevation and acetaminophen for the symptoms. The treatment provided mild relief. Fatigue   Associated symptoms include abdominal pain, arthralgias, fatigue, myalgias and weakness. Pertinent negatives include no chest pain, chills, congestion, coughing, diaphoresis, fever, headaches, nausea, neck pain, rash, sore throat or vomiting. Neurologic Problem   The patient's primary symptoms include weakness. Associated symptoms include abdominal pain, back pain, confusion, dizziness, fatigue, light-headedness and shortness of breath. Pertinent negatives include no chest pain, diaphoresis, fever, headaches, nausea, neck pain, palpitations or vomiting. Their inpatient work up has included:    Imaging:    Imaging and other studies reviewed and discussed with patient and staff    Ct Abdomen Pelvis   7/23/2020   Liver: Small in size, and nodular contour, unchanged. Bile Ducts:  Normal in caliber. Gallbladder:  Surgically absent. Pancreas:  Hypoplastic, without masses, cysts, ductal dilatation or calcification. Spleen:  Normal in size without masses or calcifications. No splenules. Kidneys:  Right and left kidneys measure 8.0 cm, and 9.1 cm, respectively. 1 mm calcification mid pole left kidney, unchanged from 2013. No right renal calculi. Adrenals:  Normal. Small bowel:  Normal in caliber. Appendix:  Not visualized. Colon:  Normal in caliber. Surgical clips at level of rectum. Peritoneum:  Diffuse free fluid within the abdomen and pelvis, unchanged. No free air. Vessels: Aorta normal in course and caliber. Lymph nodes:  Retroperitoneal:  No enlarged retroperitoneal lymph nodes. Mesenteric:  No enlarged mesenteric lymph nodes. Pelvic: No enlarged pelvic lymph nodes. Ureters: Normal in course and caliber. No calcifications. Bladder: Partially decompressed. Reproductive organs: Uterus surgically absent. Dystrophic calcification, left adnexa, unchanged from March, 2013.  Abdominal Wall: Remote abdominal wall defect repair. Edematous change within abdominal pelvic soft tissues diffusely, stable. Bones:  No bone lesions. No degenerative changes. No post operative changes. Cirrhosis. Diffuse ascites, unchanged. Diffuse edematous change, abdominal and pelvic walls, stable. Small right kidney. Left kidney lower limits of normal. Hypoplastic pancreas. Remote hemorrhoid surgery. Cholecystectomy. Hysterectomy. Ct Head   7/23/2020   : Extra-axial spaces:  Normal. Intracranial hemorrhage:  None. Ventricular system: Ventricles mildly enlarged. Sulci mildly prominent. Basal Cisterns:  Normal. Cerebral Parenchyma: Physiologic basal ganglia calcification, unchanged. Midline Shift:  None. Cerebellum:  Normal. Paranasal sinuses and mastoid air cells:  Normal. Visualized Orbits: Remote bilateral ocular surgery. Impression: No acute findings. Mild cerebral atrophy. Ct Chest   7/23/2020   : Right lung shows mild dependent atelectatic change posterior right upper lobe, posterior right middle lobe, and posterior right lower lobe. No nodules or masses. No pleural effusion. Left lung shows dependent subsegmental atelectatic change, left upper lobe. Small peripheral focus of groundglass opacity lateral lower lingula. Dependent subsegmental atelectatic change, small left pleural effusion. No nodules or masses. No hilar, mediastinal, or axillary lymph node enlargement. Cardiac size normal. No pericardial effusion. Calcified mitral annulus. Thoracic aorta normal in course and caliber. Bridging anterior osteophytes mid to lower thoracic spine. No osteoblastic, and no osteolytic lesions. No fracture. Bilateral dependent atelectatic change. Small left effusion. Ct Cervical Spine   7/23/2020    Fracture:No acute fracture. Alignment:  Normal cervical lordosis. No subluxation. No canal stenosis. Dense atlas:Dens atlas relationship is normal. Soft tissues:Airway patent.  No soft tissue made of upper abdominal ascites     SYMMETRIC KIDNEYS BORDERLINE SMALL IN SIZE. NO ULTRASOUND SIGNS OF HYDRONEPHROSIS. UPPER ABDOMINAL ASCITES             Labs:     labs reviewed and discussed with patient and staff    Lab Results   Component Value Date    POCGLU 114 02/21/2020    POCGLU 108 09/11/2012    POCGLU 177 09/11/2012    POCGLU 167 09/11/2012    POCGLU 128 09/10/2012     Lab Results   Component Value Date     07/24/2020    K 4.5 07/24/2020     07/24/2020    CO2 21 07/24/2020    BUN 22 07/24/2020    CREATININE 1.18 07/24/2020    CALCIUM 8.3 07/24/2020    LABALBU 2.6 07/24/2020    LABALBU 4.5 03/30/2012    BILITOT 0.5 07/24/2020    ALKPHOS 63 07/24/2020    AST 21 07/24/2020    ALT <5 07/24/2020     Lab Results   Component Value Date    WBC 4.6 07/24/2020    RBC 2.14 07/24/2020    RBC 4.01 04/06/2012    HGB 6.9 07/24/2020    HCT 22.0 07/24/2020    .9 07/24/2020    MCH 32.5 07/24/2020    MCHC 31.6 07/24/2020    RDW 19.9 07/24/2020    PLT 85 07/24/2020    MPV 10.9 02/07/2013     Lab Results   Component Value Date    VITD25 72.4 04/02/2012     Lab Results   Component Value Date    APPEARANCE CLEAR 07/05/2012    COLORU Yellow 03/10/2020    NITRU Negative 03/10/2020    GLUCOSEU Negative 03/10/2020    GLUCOSEU NEG 04/01/2012    KETUA Negative 03/10/2020    UROBILINOGEN 0.2 03/10/2020    BILIRUBINUR Negative 03/10/2020    BILIRUBINUR neg 03/27/2018    BILIRUBINUR NEG 04/01/2012     Lab Results   Component Value Date    PROTIME 17.3 07/22/2020    PROTIME 12.3 03/30/2012     Lab Results   Component Value Date    INR 1.4 07/22/2020         I discussed results with patient.     Current Rehabilitation Assessments:    Rehabilitation:  Physical therapy: FIMS:  BedMobility:      Transfers: Sit to Stand: Stand by assistance  Stand to sit: Stand by assistance  Bed to Chair: Stand by assistance(good safety awareness), Ambulation 1  Surface: level tile  Device: Rolling Walker  Assistance: Stand by Asthma     Fibromyalgia     Hemorrhoid     Hyperlipidemia     Hypertension     Parkinson's disease ([facility] [address] [facility]     Type II or unspecified type diabetes mellitus without mention of complication, not stated as uncontrolled          PastSurgical History:          Procedure Laterality Date    CHOLECYSTECTOMY      EYE SURGERY      HEMORRHOID SURGERY      HERNIA REPAIR  2/20/2002    ventral hernia    HERNIA REPAIR      LIH repair    HYSTERECTOMY      JOINT REPLACEMENT Right     partial left, full right    LAMINECTOMY      PARACENTESIS Left 03/12/2020    5010cc ascites removed by Dr Manoj Mendez [phone]    PARACENTESIS Left 06/10/2020    3,850 cc removed per Dr Miriam Doyle Left 07/08/2020    total 5450 ml removed per Dr Miriam Doyle Left 07/23/2020    Dr Andrade Offer 6475 off   [facility] ENDOSCOPY N/A 2/21/2020    EGD WITH BANDING performed by Claudell Demark, MD at [address] [place] [address] Drive: Allergies   Allergen Reactions    Adhesive Tape     Amoxicillin-Pot Clavulanate     Bactrim [Sulfamethoxazole-Trimethoprim]     Clarithromycin      Other reaction(s):  Other: See Comments  tachycardia    Other     Sulfa Antibiotics         CurrentMedications:     Current Facility-Administered Medications: sodium chloride flush 0.9 % injection 10 mL, 10 mL, Intravenous, 2 times per day  sodium chloride flush 0.9 % injection 10 mL, 10 mL, Intravenous, PRN  acetaminophen (TYLENOL) tablet 650 mg, 650 mg, Oral, Q6H PRN **OR** acetaminophen (TYLENOL) suppository 650 mg, 650 mg, Rectal, Q6H PRN  promethazine (PHENERGAN) tablet 12.5 mg, 12.5 mg, Oral, Q6H PRN **OR** ondansetron (ZOFRAN) injection 4 mg, 4 mg, Intravenous, Q6H PRN  carbidopa-levodopa (SINEMET)  MG per tablet 1 tablet, 1 tablet, Oral, TID  folic acid (FOLVITE) tablet 1 mg, 1 mg, Oral, Daily  spironolactone (ALDACTONE) tablet 50 mg, 50 mg, Oral, Daily  furosemide (LASIX) tablet 40 mg, 40 mg, Oral, Daily      Social History:    Social History     Socioeconomic History    Marital status:      Spouse name: Not on file    Number of children: Not on file    Years of education: Not on file    Highest education level: Not on file   Occupational History    Not on file   Social Needs    Financial resource strain: Somewhat hard    Food insecurity     Worry: Never true     Inability: Never true    Transportation needs     Medical: Yes     Non-medical: Yes   Tobacco Use    Smoking status: Never Smoker    Smokeless tobacco: Never Used   Substance and Sexual Activity    Alcohol use: No    Drug use: No    Sexual activity: Not Currently     Partners: Male   Lifestyle    Physical activity     Days per week: 0 days     Minutes per session: 0 min    Stress: Only a little   Relationships    Social connections     Talks on phone: Once a week     Gets together: Once a week     Attends Synagogue service: 1 to 4 times per year     Active member of club or organization: No     Attends meetings of clubs or organizations: Never     Relationship status:      Intimate partner violence     Fear of current or ex partner: No     Emotionally abused: No     Physically abused: No     Forced sexual activity: No   Other Topics Concern    Not on file   Social History Narrative         Lives With: Alone    Type of Home: Apartment  at [address] [facility] [place] (7th floor)    Home Layout: One level    Home Access: Elevator, Level entry  (Level entry, elevator to 7th floor)    Bathroom Shower/Tub: Tub/Shower unit    Home Equipment: 4 wheeled walker    ADL Assistance: Independent    Homemaking Assistance: [address]: Independent(Rollator)    Transfer Assistance: Independent    Active : No    Patient's  Info: daughter    IADL Comments: Daughter helps with driving and groceries PRN    Additional Comments: Reports recent fall- \"my legs gave out\"              Family History:         Problem Relation Age of Onset    High Blood Pressure Neg Hx        Review of Systems:    Review of Systems   Constitutional: Positive for activity change and fatigue. Negative for chills, diaphoresis and fever. HENT: Negative for congestion, ear discharge, ear pain, hearing loss, nosebleeds, sore throat and tinnitus. Eyes: Negative for photophobia, pain and redness. Respiratory: Positive for shortness of breath. Negative for cough, wheezing and stridor. Shortness of breath on exertion   Cardiovascular: Negative for chest pain, palpitations and leg swelling. Gastrointestinal: Positive for abdominal pain and constipation. Negative for blood in stool, diarrhea, nausea and vomiting. Endocrine: Positive for cold intolerance. Negative for polydipsia. Genitourinary: Negative for dysuria, flank pain, frequency, hematuria and urgency. Musculoskeletal: Positive for arthralgias, back pain, gait problem and myalgias. Negative for neck pain. Skin: Positive for wound. Negative for rash. Allergic/Immunologic: Positive for immunocompromised state. Negative for environmental allergies. Neurological: Positive for dizziness, speech difficulty, weakness and light-headedness. Negative for tremors, seizures, loss of consciousness and headaches. Hematological: Does not bruise/bleed easily. Psychiatric/Behavioral: Positive for confusion, decreased concentration, dysphoric mood and sleep disturbance. Negative for hallucinations and suicidal ideas. The patient is not nervous/anxious. Physical Exam:    BP (!) 108/34   Pulse 66   Temp 97.7 °F (36.5 °C) (Oral)   Resp 16   Ht 5' 1\" (1.549 m)   Wt 177 lb (80.3 kg)   SpO2 100%   BMI 33.44 kg/m²      Physical Exam  Constitutional:       General: She is not in acute distress. Appearance: She is well-developed. She is ill-appearing. She is not toxic-appearing or diaphoretic. HENT:      Head: Normocephalic. Not macrocephalic and not microcephalic.  No raccoon eyes or Arora's sign. Mouth/Throat:      Pharynx: Oropharyngeal exudate present. Eyes:      General: No scleral icterus. Right eye: No discharge. Left eye: No discharge. Conjunctiva/sclera: Conjunctivae normal.      Pupils: Pupils are equal, round, and reactive to light. Neck:      Musculoskeletal: Normal range of motion. Spinous process tenderness and muscular tenderness present. Thyroid: No thyromegaly. Vascular: Normal carotid pulses. No carotid bruit, hepatojugular reflux or JVD. Trachea: No tracheal deviation. Cardiovascular:      Heart sounds: Heart sounds are distant. Pulmonary:      Effort: Pulmonary effort is normal.      Breath sounds: No stridor. Decreased breath sounds present. Abdominal:      General: Bowel sounds are decreased. There is no distension. Palpations: Abdomen is soft. Tenderness: There is no abdominal tenderness. There is no guarding or rebound. Musculoskeletal:      Cervical back: She exhibits decreased range of motion and tenderness. Thoracic back: She exhibits decreased range of motion and tenderness. Lumbar back: She exhibits decreased range of motion and tenderness. Lymphadenopathy:      Head:      Right side of head: No submental or submandibular adenopathy. Left side of head: No submental or submandibular adenopathy. Cervical: No cervical adenopathy. Skin:     General: Skin is warm and dry. Coloration: Skin is pale. Findings: Bruising present. Comments: Old IV sites   Neurological:      Sensory: Sensory deficit present. Coordination: Coordination abnormal. Finger-Nose-Finger Test abnormal, Heel to [employer] abnormal and Romberg Test abnormal.      Gait: Gait abnormal and tandem walk abnormal.      Deep Tendon Reflexes:      Reflex Scores:       Tricep reflexes are 0 on the right side and 0 on the left side.        Bicep reflexes are 1+ on the right side and 1+ on the left side.       Brachioradialis reflexes are 1+ on the right side and 1+ on the left side. Patellar reflexes are 0 on the right side and 0 on the left side. Achilles reflexes are 0 on the right side and 0 on the left side. Psychiatric:         Attention and Perception: She is attentive. Mood and Affect: Mood is not anxious or depressed. Affect is not angry. Speech: Speech is delayed. Speech is not rapid and pressured. Behavior: Behavior is slowed. Behavior is not withdrawn. Thought Content: Thought content normal.         Cognition and Memory: Memory is not impaired. She exhibits impaired recent memory. She does not exhibit impaired remote memory. Judgment: Judgment is not impulsive or inappropriate. Back Exam     Muscle Strength   Right Quadriceps:  4/5   Left Quadriceps:  4/5   Right Hamstrings:  4/5   Left Hamstrings:  4/5           Neurologic Exam     Mental Status   Level of consciousness: alert  Knowledge: inconsistent with education. Able to name object. Unable to read. Cranial Nerves     CN III, IV, VI   Pupils are equal, round, and reactive to light.     Motor Exam   Muscle bulk: decreased  Right arm tone: cogwheel rigidity  Left arm tone: cogwheel rigidity    Strength   Right neck flexion: 4/5  Left neck flexion: 4/5  Right neck extension: 4/5  Left neck extension: 4/5  Right deltoid: 4/5  Left deltoid: 4/5  Right biceps: 4/5  Left biceps: 4/5  Right triceps: 4/5  Left triceps: 4/5  Right wrist flexion: 4/5  Left wrist flexion: 4/5  Right wrist extension: 4/5  Left wrist extension: 4/5  Right interossei: 4/5  Left interossei: 4/5  Right abdominals: 4/5  Left abdominals: 4/5  Right iliopsoas: 4/5  Left iliopsoas: 4/5  Right quadriceps: 4/5  Left quadriceps: 4/5  Right hamstrin/5  Left hamstrin/5  Right glutei: 4/5  Left glutei: 4/5  Right anterior tibial: 4/5  Left anterior tibial: 4/5  Right posterior tibial: 4/5  Left posterior tibial: 4/5  Right peroneal: 4/5  Left peroneal: 4/5  Right gastroc: 4/5  Left gastroc: 4/5    Gait, Coordination, and Reflexes     Gait  Gait: shuffling    Coordination   Romberg: positive  Finger to nose coordination: abnormal  Heel to shin coordination: abnormal  Tandem walking coordination: abnormal    Reflexes   Right brachioradialis: 1+  Left brachioradialis: 1+  Right biceps: 1+  Left biceps: 1+  Right triceps: 0  Left triceps: 0  Right patellar: 0  Left patellar: 0  Right achilles: 0  Left achilles: 0            Diagnostics:    Recent Results (from the past 24 hour(s))   CBC    Collection Time: 07/23/20 12:19 PM   Result Value Ref Range    WBC 4.5 (L) 4.8 - 10.8 K/uL    RBC 2.31 (L) 4.20 - 5.40 M/uL    Hemoglobin 7.3 (L) 12.0 - 16.0 g/dL    Hematocrit 24.4 (L) 37.0 - 47.0 %    .4 (H) 82.0 - 100.0 fL    MCH 31.7 (H) 27.0 - 31.3 pg    MCHC 30.1 (L) 33.0 - 37.0 %    RDW 20.1 (H) 11.5 - 14.5 %    Platelets 90 (L) 934 - 400 K/uL   Magnesium    Collection Time: 07/23/20 12:19 PM   Result Value Ref Range    Magnesium 2.2 1.7 - 2.4 mg/dL   Iron and TIBC    Collection Time: 07/23/20 12:19 PM   Result Value Ref Range    Iron 54 37 - 145 ug/dL    TIBC 197 178 - 450 ug/dL    Iron Saturation 27 11 - 46 %   Ferritin    Collection Time: 07/23/20 12:19 PM   Result Value Ref Range    Ferritin 227.2 (H) 13.0 - 150.0 ng/mL   Body fluid cell count with differential    Collection Time: 07/23/20  3:39 PM   Result Value Ref Range    Cell Count Fluid Type Paracentesis     Color, Fluid Pale Yellow     Appearance, Fluid Clear     Clot Eval. see below     Nucl Cell, Fluid 80 /cumm    RBC, Fluid 774 /cumm    Neutrophil Count, Fluid 52 %    Lymphocytes, Body Fluid 34 %    Monocyte Count, Fluid 14 %    Number of Cells Counted Fluid 100    Amylase, body fluid    Collection Time: 07/23/20  3:39 PM   Result Value Ref Range    Amylase, Fluid 10 U/L    Fluid Type Paracentesis    Albumin, fluid    Collection Time: 07/23/20  3:39 PM   Result Value Ref Range    Albumin, Fluid 0.4 g/dL   Glucose, body fluid    Collection Time: 07/23/20  3:39 PM   Result Value Ref Range    Glucose, Fluid 119.0 mg/dL   Protein, body fluid    Collection Time: 07/23/20  3:39 PM   Result Value Ref Range    Protein, Fluid 0.9 g/dL   Lactate dehydrogenase, body fluid    Collection Time: 07/23/20  3:39 PM   Result Value Ref Range    LD, Fluid 60.0 U/L   Comprehensive Metabolic Panel w/ Reflex to MG    Collection Time: 07/24/20  5:09 AM   Result Value Ref Range    Sodium 138 135 - 144 mEq/L    Potassium reflex Magnesium 4.5 3.4 - 4.9 mEq/L    Chloride 106 95 - 107 mEq/L    CO2 21 20 - 31 mEq/L    Anion Gap 11 9 - 15 mEq/L    Glucose 103 (H) 70 - 99 mg/dL    BUN 22 8 - 23 mg/dL    CREATININE 1.18 (H) 0.50 - 0.90 mg/dL    GFR Non-African American 43.7 (L) >60    GFR  52.9 (L) >60    Calcium 8.3 (L) 8.5 - 9.9 mg/dL    Total Protein 5.0 (L) 6.3 - 8.0 g/dL    Alb 2.6 (L) 3.5 - 4.6 g/dL    Total Bilirubin 0.5 0.2 - 0.7 mg/dL    Alkaline Phosphatase 63 40 - 130 U/L    ALT <5 0 - 33 U/L    AST 21 0 - 35 U/L    Globulin 2.4 2.3 - 3.5 g/dL   CBC    Collection Time: 07/24/20  5:09 AM   Result Value Ref Range    WBC 4.6 (L) 4.8 - 10.8 K/uL    RBC 2.14 (L) 4.20 - 5.40 M/uL    Hemoglobin 6.9 (LL) 12.0 - 16.0 g/dL    Hematocrit 22.0 (L) 37.0 - 47.0 %    .9 (H) 82.0 - 100.0 fL    MCH 32.5 (H) 27.0 - 31.3 pg    MCHC 31.6 (L) 33.0 - 37.0 %    RDW 19.9 (H) 11.5 - 14.5 %    Platelets 85 (L) 485 - 400 K/uL   Magnesium    Collection Time: 07/24/20  5:09 AM   Result Value Ref Range    Magnesium 2.0 1.7 - 2.4 mg/dL              Impression:    1. Impaired mobility and ADLs due to exacerbation of Parkinson's disease status post GI bleeding of esophageal varices and progressive anemia  2. Fatigue and Malaise      Complex Active General Medical Issues thatcomplicate care Assess & Plan:     1.  Active Problems:    Hypertension    Liver cirrhosis (HCC)    Lumbar spondylosis    Parkinson's disease ([address]    Secondary osteoarthritis of multiple sites    Morbidly obese ([address]    ENMA (acute kidney injury) ([address]    Pancytopenia ([address]    Fall    Weakness    Malnutrition ([address]    Cirrhosis of liver with ascites ([address]    Gait abnormality  Resolved Problems:    * No resolved hospital problems. *            Recommendations:    1. Considering all of the factors aboveincluding the patient's current medical status, social status/home envirnment, their functional needs and their ability to participate in a therapy program, I feel that they would best be served at a acute   Rehabilitationlevel of care. It is my opinion that they will be able to tolerate and benefit from 3 hours of therapy a day. I reviewed the varous local options re these levels of care with the patient and family. 2. Nursing care to focus on bowel and bladder issues. 3. Vitamin B12 shot times one  4. Improve hydration and Nutrition by adding Proteinex and push PO fluids    Greater 50% of 50 minutes spent evaluating patient face to face. It was my pleasure toevaluate [place] today. Please call [phone] with questions.     Tc Braga, DO

## 2020-07-24 NOTE — PLAN OF CARE
Problem: Pain:  Goal: Pain level will decrease  Description: Pain level will decrease  Outcome: Ongoing  Goal: Control of acute pain  Description: Control of acute pain  Outcome: Ongoing  Goal: Control of chronic pain  Description: Control of chronic pain  Outcome: Ongoing     Problem: Falls - Risk of:  Goal: Will remain free from falls  Description: Will remain free from falls  Outcome: Ongoing  Goal: Absence of physical injury  Description: Absence of physical injury  Outcome: Ongoing     Problem: Skin Integrity:  Goal: Will show no infection signs and symptoms  Description: Will show no infection signs and symptoms  Outcome: Ongoing  Goal: Absence of new skin breakdown  Description: Absence of new skin breakdown  Outcome: Ongoing     Problem: Mobility - Impaired:  Goal: Mobility will improve  Description: Mobility will improve  Outcome: Ongoing

## 2020-07-25 NOTE — PROGRESS NOTES
noted. Recent EGD 2/20 noted grade III EV with EVL and portal HTN gastropathy, hgb 6.9,  no overt GIB noted. PLAN :  1-  Decompensated Cirrhosis:  MELD-Na Score 15  Estimated 90-Day Mortality 6%  2 - Grade 2-3 Ascites:   Restarted on  dual diuretics, tolerating well  Paracentesis yesterday for 6.5 L removed, Ongoing evaluation for therapeutic paracentesis, may give Albumin (6 to 8 g/L of fluid removed) should  be administered ( if removed more than 5 L)  \" No added salt diet\"/ 2 gm NA diet   - No Hx of SBP noted  3- EGD for Variceal surveillance:    Had EGD S/P banding on 2/20 noted grade III EV post EVL and portal htn gastropathy  Noted hgb 6.9, no Over GIB  Will need repeat EGD and banding as outpatient  4-  No Hepatic encephalopathy: ongoing evaluation for Lactulose - will titrate if needed to 2-3 soft bm's per day. Thank you for allowing me to participate in the care of your patient. Please feel free to contact me with any concerns.     Mathew Adams, APRN - CNP

## 2020-07-25 NOTE — FLOWSHEET NOTE
Patient oriented to unit and use of call light. Daughter at the bedside. Patient alert and oriented x 4.  Electronically signed by Hector Ratliff RN on 7/25/2020 at 6:12 PM

## 2020-07-25 NOTE — PROGRESS NOTES
Spoke to lab and was told the patient's blood is here, but the blood is not ready for transfusion yet, and that lab will notify nurse when blood is ready for transfusion.

## 2020-07-25 NOTE — PROGRESS NOTES
Late entry: Ceferino Burns was informed today that the patients daughter and the patient did not want him as her doctor and did not want him coming into her room. Patient's other daughter was here and she was updated. Patient was transferred to rehab with all belongings and in stable condition.

## 2020-07-25 NOTE — PROGRESS NOTES
Assessment complete. See flow sheet. Patient alert and oriented times 4 and follows commands with generalized weakness. Bilateral lung sounds clear. Bowel sounds times 4 quad, abdomen rounded and soft, and patient denies pain upon light abdominal palpation. Trace edema present BLE. Peripheral pulses present times 4 extremities. Bandaid removed from paracentesis puncture from LLQ; site clean, dry, and intact. Right elbow skin tear with mepelx that is clean, dry, and intact. Mepelex to sacrum clean, dry, and intact and covering stage 2 wound. Patient's needs addressed. Call light in reach.

## 2020-07-25 NOTE — DISCHARGE INSTR - COC
Continuity of Care Form    Patient Name: Marty Ewing   :  1935  MRN:  [id number]    Admit date:  2020  Discharge date:  *20*    Code Status Order: Full Code   Advance Directives:   [facility] Documentation     Date/Time Healthcare Directive Type of Healthcare Directive Copy in [address] Agent's Name Healthcare Agent's Phone Number    20 [phone]  No, patient does not have an advance directive for healthcare treatment -- -- -- -- --          Admitting Physician:   Hailey Weston MD  PCP: Kitty Leslie MD    Discharging Nurse: [facility] Unit/Room#: Q430/M683-98  Discharging Unit Phone Number: *[phone]**    Emergency Contact:   Extended Emergency Contact Information  Primary Emergency Contact: Jonny Santacruz [address] Phone: [phone]  Mobile Phone: [phone]  Relation: Child  Secondary Emergency Contact: Silvestre Jones [address] Phone: [phone]  Mobile Phone: [phone]  Relation: Child    Past Surgical History:  Past Surgical History:   Procedure Laterality Date    CHOLECYSTECTOMY      EYE SURGERY      HEMORRHOID SURGERY      HERNIA REPAIR  2002    ventral hernia    HERNIA REPAIR      LIH repair    HYSTERECTOMY      JOINT REPLACEMENT Right     partial left, full right    LAMINECTOMY      PARACENTESIS Left 2020    5010cc ascites removed by Dr Calzada [facility] [phone]    PARACENTESIS Left 06/10/2020    3,850 cc removed per Dr Jesús Figueredo Left 2020    total 5450 ml removed per Dr Jesús Figueredo Left 2020    Dr Ben Rangel [address]   [address] [facility] 2020    EGD WITH BANDING performed by Elizabeth Jorge MD at [facility]       Immunization History:   Immunization History   Administered Date(s) Administered    Influenza A (S8u7-28),all Formulations 2016    Influenza Vaccine, unspecified replacement by other means Z96.659    Sclerosis of the skin L98.9    Seborrheic keratosis L82.1    Secondary osteoarthritis of multiple sites M15.3    Shoulder impingement M75.40    Sprain and strain of unspecified site of shoulder and upper arm [id number]    Synovitis of hand M65.9    Thrombocytopenia (HCC) D69.6    Vitamin D deficiency E55.9    Chest pain R07.9    RANDOLPH (dyspnea on exertion) R06.09    Bilateral carotid artery stenosis I65.23    Iron deficiency anemia due to chronic blood loss D50.0    Hemorrhoids K64.9    Secondary esophageal varices without bleeding (HCC) I85.10    Cough variant asthma J45.991    Morbidly obese (HCC) E66.01    Decompensated hepatic cirrhosis (HCC) K72.90    ENMA (acute kidney injury) (HCC) N17.9    Pancytopenia (HCC) D61.818    Fall W19. XXXA    Weakness R53.1    Malnutrition ([address] E46    Cirrhosis of liver with ascites ([address] K74.60, R18.8    Ataxic gait R26.0    History of myocardial infarction I25.2    Rectal prolapse K62.3    Stasis dermatitis I87.2    Gait abnormality R26.9    Moderate malnutrition (HCC) E44.0       Isolation/Infection:   Isolation          No Isolation        Patient Infection Status     None to display          Nurse Assessment:  Last Vital Signs: BP (!) 124/55   Pulse 63   Temp 98.1 °F (36.7 °C) (Oral)   Resp 18   Ht 5' 1\" (1.549 m)   Wt 167 lb (75.8 kg)   SpO2 99%   BMI 31.55 kg/m²     Last documented pain score (0-10 scale): Pain Level: 7  Last Weight:   Wt Readings from Last 1 Encounters:   07/24/20 167 lb (75.8 kg)     Mental Status:  oriented, alert, coherent, logical, thought processes intact and able to concentrate and follow conversation    IV Access:  - None    Nursing Mobility/ADLs:  Walking   Assisted  Transfer  Assisted  Bathing  Assisted  Dressing  Assisted  Toileting  Assisted  Feeding  Assisted  Med Admin  Assisted  Med Delivery   whole    Wound Care Documentation and Therapy:  Wound 07/23/20 Sacrum (Active) Discharging to Facility/ Agency   · Name:   · Address:  · Phone:  · Fax:    Dialysis Facility (if applicable)   · Name:  · Address:  · Dialysis Schedule:  · Phone:  · Fax:    / signature: {Esignature:[id number]}    PHYSICIAN SECTION    Prognosis: {Prognosis:5690003102}    Condition at Discharge: Jacobo Moise Patient Condition:819429460}    Rehab Potential (if transferring to Rehab): {Prognosis:6018812372}    Recommended Labs or Other Treatments After Discharge: ***    Physician Certification: I certify the above information and transfer of Urbano Sprinkle  is necessary for the continuing treatment of the diagnosis listed and that she requires {Admit to Appropriate Level of Care:32170} for {GREATER/LESS:522047645} 30 days.      Update Admission H&P: {CHP DME Changes in EMRQQ:995246299}    PHYSICIAN SIGNATURE:  {Esignature:[id number]}

## 2020-07-25 NOTE — PROGRESS NOTES
Hospitalist Progress Note      PCP: Ishmael Keys MD    Date of Admission: 7/22/2020    Chief Complaint:  No acute events, afebrile, stable HD, going to acute rehab today    Medications:  Reviewed    Infusion Medications   Scheduled Medications    budesonide-formoterol  2 puff Inhalation BID    sodium chloride flush  10 mL Intravenous 2 times per day    carbidopa-levodopa  1 tablet Oral TID    folic acid  1 mg Oral Daily    spironolactone  50 mg Oral Daily    furosemide  40 mg Oral Daily     PRN Meds: sodium chloride flush, acetaminophen **OR** acetaminophen, promethazine **OR** ondansetron      Intake/Output Summary (Last 24 hours) at 7/25/2020 1106  Last data filed at 7/25/2020 0729  Gross per 24 hour   Intake 867 ml   Output --   Net 867 ml       Exam:    BP (!) 124/55   Pulse 63   Temp 98.1 °F (36.7 °C) (Oral)   Resp 18   Ht 5' 1\" (1.549 m)   Wt 167 lb (75.8 kg)   SpO2 99%   BMI 31.55 kg/m²     General appearance: appears stated age and cooperative. Respiratory: Clear to auscultation, bilaterally   Cardiovascular: Regular rate and rhythm with normal S1/S2 wi  Abdomen: Soft, tender, distended, active bowel sounds. Musculoskeletal: bilateral pitting edema of the LE. Labs:   Recent Labs     07/22/20  2245 07/23/20  1219 07/24/20  0509 07/24/20  1208 07/25/20  1000   WBC 4.1* 4.5* 4.6*  --   --    HGB 7.1* 7.3* 6.9* 6.9* 8.4*   HCT 22.0* 24.4* 22.0* 21.9* 25.8*   PLT 78* 90* 85*  --   --      Recent Labs     07/23/20  0530 07/24/20  0509 07/25/20  1001    138 141   K 4.2 4.5 3.5    106 106   CO2 20 21 19*   BUN 20 22 21   CREATININE 1.62* 1.18* 1.01*   CALCIUM 7.9* 8.3* 8.3*     Recent Labs     07/23/20  0530 07/24/20  0509 07/25/20  1001   AST 28 21 28   ALT 8 <5 6   BILITOT 0.5 0.5 0.7   ALKPHOS 62 63 53     Recent Labs     07/22/20  2245   INR 1.4     No results for input(s): CKTOTAL, TROPONINI in the last 72 hours.     Urinalysis:      Lab Results   Component Value Date NITRU Negative 03/10/2020    WBCUA 6-10 01/29/2020    BACTERIA Negative 01/29/2020    RBCUA 0-2 01/29/2020    BLOODU Negative 03/10/2020    SPECGRAV 1.009 03/10/2020    GLUCOSEU Negative 03/10/2020    GLUCOSEU NEG 04/01/2012       Radiology:  US GUIDED PARACENTESIS   Final Result      SUCCESSFUL ULTRASOUND-GUIDED PARACENTESIS APPROXIMATELY 6.5 L OF ASCITES. US RETROPERITONEAL LIMITED   Final Result   SYMMETRIC KIDNEYS BORDERLINE SMALL IN SIZE. NO ULTRASOUND SIGNS OF HYDRONEPHROSIS. UPPER ABDOMINAL ASCITES      RADIOLOGY REPORT   Final Result      CT Head WO Contrast   Final Result   Impression:      No acute findings. Mild cerebral atrophy. All CT scans at this facility use dose modulation, iterative reconstruction, and/or weight based dosing when appropriate to reduce radiation dose to as low as reasonably achievable. CT THORACIC SPINE WO CONTRAST   Final Result   NEGATIVE CT OF THE THORACIC SPINE. CT LUMBAR SPINE WO CONTRAST   Final Result   DEGENERATIVE CHANGES. NO ACUTE FRACTURE. CT CERVICAL SPINE WO CONTRAST   Final Result   NO ACUTE CERVICAL SPINE ABNORMALITY. CT CHEST WO CONTRAST   Final Result      Bilateral dependent atelectatic change. Small left effusion. All CT scans at this facility use dose modulation, iterative reconstruction, and/or weight based dosing when appropriate to reduce radiation dose to as low as reasonably achievable. CT ABDOMEN PELVIS WO CONTRAST Additional Contrast? None   Final Result      Cirrhosis. Diffuse ascites, unchanged. Diffuse edematous change, abdominal and pelvic walls, stable. Small right kidney. Left kidney lower limits of normal.      Hypoplastic pancreas. Remote hemorrhoid surgery. Cholecystectomy. Hysterectomy.          All CT scans at this facility use dose modulation, iterative reconstruction, and/or weight based dosing when appropriate to reduce radiation dose to as low as reasonably achievable. XR CHEST PORTABLE   Final Result      NO EVIDENCE OF ACUTE THORACIC TRAUMA OR ACTIVE CARDIOPULMONARY DISEASE. Assessment/Plan:    83 y/o female with history of liver cirrhosis with ascites requiring paracentesis, pancytopenia, parkinson who presented after falling at home.     ENMA  - resolved    Decompensated liver cirrhosis with esophageal varices and ascites  - s/p u/s guided therapeutic paracentesis with removal of 6.5 liters of fluid per IR  - fluid analysis was negative for SBP  - restarted on diuretic regimen by GI     Pancytopenia   - likely due to advanced liver cirrhosis  - monitor counts closely    Anemia   - Hb improved to 8.4 s/p transfusion of 1 unit of PRBCs  - no overt active bleeding  - monitor H/H    Folic acid deficiency  - started supplement    Parkinson   - continue Sinemet    S/p fall  - imaging obtained on admission was negative per report  - followed by neurology   - continue PT/OT    Disposition - acute rehab today             Electronically signed by Melinda Newell MD on 7/25/2020 at 11:06 AM

## 2020-07-25 NOTE — PROGRESS NOTES
Late entry: Patient received 1 unit of blood which started on night shift and finished this morning during shift change. Vital signs are stable, and pt. had no adverse reaction to the blood. Patient complains of feeling tired because she did not get much sleep last night, and also does not have much of an appetite today. Patient transfers from bed to standing and ambulates with a slow but steady gait using a walker. Pt. Is continent of bowel and bladder and pt. had a bm today. Report was called to Nurse Ioana Bradford on rehab unit. Pt's daughter was left a voice message to update her. Patient taken to radiology and will be transferred to room 251 after she arrives back to . Dr. Radu rodriguez served to ask if he wants telemetry discontinued.

## 2020-07-25 NOTE — CARE COORDINATION
Eliza Estrada RN from rehab called and they have accepted pt, can come to rehab when medically stable, [place] will call floor with room number.  Dr. Harjit Quintero informed and states she could go today, hgb better 8.4 Electronically signed by Kristina Spence RN on 7/25/2020 at 11:02 AM

## 2020-07-25 NOTE — PROGRESS NOTES
Subjective: The patient complains of severe  acute  On chronic fatigue relieved by  PBBCs, rest, PT and exacerbated by  anemia. I am concerned about patients BLE weakness. She is completing a transfusion this AM.. ROS x10: The patient also complains of severely impaired mobility and activities of daily living. Otherwise no new problems with vision, hearing, nose, mouth, throat, dermal, cardiovascular, GI, , pulmonary, musculoskeletal, psychiatric or neurological. See Rehab consult on Rehab chart . Vital signs:  BP (!) 124/55   Pulse 63   Temp 98.1 °F (36.7 °C) (Oral)   Resp 18   Ht 5' 1\" (1.549 m)   Wt 167 lb (75.8 kg)   SpO2 99%   BMI 31.55 kg/m²   I/O:   PO/Intake:   Poor to  fair PO intake, no problems observed or reported. Bowel/Bladder:  continent, no problems noted. General:  Patient is well developed, adequately nourished, non-obese and     well kempt. HEENT:    PERRLA, hearing intact to loud voice, external inspection of ear     and nose benign. Inspection of lips, tongue and gums benign  Musculoskeletal: No significant change in strength or tone. All joints stable. Inspection and palpation of digits and nails show no clubbing,       cyanosis or inflammatory conditions. Neuro/Psychiatric: Affect: flat but pleasant. Alert and oriented to person, place and     situation. No significant change in deep tendon reflexes or     Sensation-Parki  Lungs:  Diminished, CTA-B. Respiration effort is normal at rest.     Heart:   S1 = S2,  RRR. No loud murmurs. Abdomen:  Soft, non-tender, Ascites, no enlargement of liver or spleen. Extremities:  No significant lower extremity edema or tenderness. Skin:    BUE bruises dt blood draws, no visualized or palpated problems.     Rehabilitation:  Physical therapy: FIMS:  Bed Mobility: Scooting: Stand by assistance    Transfers: Sit to Stand: Stand by assistance  Stand to sit: Stand by assistance  Bed to Chair: Stand by assistance(good safety awareness), Ambulation 1  Surface: level tile  Device: Rolling Walker  Assistance: Stand by assistance  Quality of Gait: slow pace, good gait quality  Distance: 30 ft x2  Comments: pt fatigued after ambulating to bathroom, completing toileting/toilet hygiene, and getting back to chair,      FIMS:  ,  , Assessment: Pt with above impairments, moving with increased time and effort but was able to complete mobility to the bathroom, toilet hygiene, and ambulating around room. Continue PT to address impairments and return to OF. Occupational therapy: FIMS:   ,  , Assessment: Pt. is an 82year old woman from home who presents to [facility] with the above deficits which impact her ability to perform ADLs and IADLs. Pt. would benefit from OT to maximize independence and safety with ADL tasks.     Speech therapy: FIMS:        Lab/X-ray studies reviewed, analyzed and discussed with patient and staff:   Recent Results (from the past 24 hour(s))   Osmolality, urine    Collection Time: 07/24/20  9:50 AM   Result Value Ref Range    Osmolality, Ur 406 300 - 900 mOsm/kg   Creatinine, urine, random - (Necessary for FENa calculation)    Collection Time: 07/24/20  9:50 AM   Result Value Ref Range    Creatinine, Ur 76.4 Not Established mg/dL   Sodium, urine, random - (Necessary for FENa calculation)    Collection Time: 07/24/20  9:50 AM   Result Value Ref Range    Sodium, Ur 50 Not Established mEq/L   UREA NITROGEN, URINE    Collection Time: 07/24/20  9:50 AM   Result Value Ref Range    Urea Nitrogen, Ur 437.0 Not Established mg/dL   Hemoglobin and Hematocrit, Blood    Collection Time: 07/24/20 12:08 PM   Result Value Ref Range    Hemoglobin 6.9 (LL) 12.0 - 16.0 g/dL    Hematocrit 21.9 (L) 37.0 - 47.0 %   TYPE AND SCREEN    Collection Time: 07/24/20 12:08 PM   Result Value Ref Range    ABO/Rh O POS     Antibody Screen POS    EXTRA TUBE    Collection Time: 07/24/20  2:11 PM   Result Value Ref Range    Specimen Status JL    TYPE AND SCREEN    Collection Time: 07/24/20  2:11 PM   Result Value Ref Range    ABO/Rh O POS     Antibody Screen POS    ANTIBODY IDENTIFICATION    Collection Time: 07/24/20  2:11 PM   Result Value Ref Range    Antibody ID POS, Anti-s (little)     Antibody ID POS, Anti-Fya     Antibody ID POS, Anti-Jka    PREPARE RBC (CROSSMATCH)    Collection Time: 07/24/20  2:11 PM   Result Value Ref Range    Product Code Blood Bank [id number]     Description Blood Bank Red Blood Cells, Leuko-reduced     Unit Number [id number]     Dispense Status Blood Bank issued        Previous extensive, complex labs, notes and diagnostics reviewed and analyzed. ALLERGIES:    Allergies as of 07/22/2020 - Review Complete 07/10/2020   Allergen Reaction Noted    Adhesive tape  11/22/2004    Amoxicillin-pot clavulanate  03/07/2013    Bactrim [sulfamethoxazole-trimethoprim]  03/07/2013    Clarithromycin  06/23/2015    Other  08/30/2004    Sulfa antibiotics  08/30/2004      (please also verify by checking [facility])     Complex Physical Medicine & Rehab Issues Assess & Plan:   1. Severe abnormality of gait and mobility and impaired self-care and ADL's secondary to progressive Parkinson's  Ataxia and BP changes dt low H and H . Functional and medical status reassessed regarding patients ability to participate in therapies and patient found to be able to participate in  acute intensive comprehensive inpatient rehabilitation program including PT/OT to improve balance, ambulation, ADLs, and to improve the P/AROM. It is my opinion that they will be able to tolerate 3 hours of therapy a day and benefit from it at an acute level. 2. Bowel and Bladder dysfunction:  frequent toileting, ambulate to bathroom with assistance, check post void residuals.   Check for C.difficile x1 if >2 loose stools in 24 hours, continue bowel & bladder program.   3. Moderate  LBP and generalized OA pain: reassess pain every shift and prior to and after

## 2020-07-25 NOTE — DISCHARGE SUMMARY
Hospital Medicine Discharge Summary    Izzy Lopez  :  1935  MRN:  [id number]    Admit date:  2020  Discharge date:  2020    Admitting Physician: Doron Chowdary MD  Primary Care Physician:  Diana Murray MD      Discharge Diagnoses: Active Problems:    Hypertension    Liver cirrhosis (HCC)    Lumbar spondylosis    Parkinson's disease (HCC)    Secondary osteoarthritis of multiple sites    Morbidly obese (HCC)    ENMA (acute kidney injury) ([address]    Pancytopenia ([address]    Fall    Weakness    Malnutrition ([address]    Cirrhosis of liver with ascites ([address]    Gait abnormality    Moderate malnutrition ([address]  Resolved Problems:    * No resolved hospital problems.  *      Hospital Course:   Izzy Lopez is a 82 y.o. female that was admitted and treated at [facility] for the following medical issues:     ENMA  - resolved    Decompensated liver cirrhosis with esophageal varices and ascites  - s/p u/s guided therapeutic paracentesis with removal of 6.5 liters of fluid per IR  - fluid analysis was negative for SBP  - restarted on diuretic regimen by GI     Pancytopenia   - likely due to advanced liver cirrhosis  - monitor counts closely    Anemia   - Hb improved to 8.4 s/p transfusion of 1 unit of PRBCs  - no overt active bleeding  - monitor H/H    Folic acid deficiency  - started supplement    Parkinson   - continue Sinemet    S/p fall  - imaging obtained on admission was negative per report  - followed by neurology   - continue PT/OT    Disposition - acute rehab today        Patient was seen by the following consultants while admitted to [facility]:   Consults:  [address] TO GI  IP CONSULT TO NEUROLOGY  IP CONSULT TO REHAB/TCU ADMISSION COORDINATOR    Significant Diagnostic Studies:    Ct Abdomen Pelvis Wo Contrast Additional Contrast? None    Result Date: 2020  CT of the Abdomen and Pelvis without intravenous contrast medium History:  Fall from standing Technical Factors: CT imaging of the abdomen and pelvis were obtained and formatted as 5 mm contiguous axial images from the domes of the diaphragm to the symphysis pubis. Sagittal and coronal reconstructions were also obtained. Oral contrast medium:  None. Intravenous contrast medium:  None. Comparison:  CT abdomen pelvis, March 10, 2020, March 11, 2013. Findings: Liver: Small in size, and nodular contour, unchanged. Bile Ducts:  Normal in caliber. Gallbladder:  Surgically absent. Pancreas:  Hypoplastic, without masses, cysts, ductal dilatation or calcification. Spleen:  Normal in size without masses or calcifications. No splenules. Kidneys:  Right and left kidneys measure 8.0 cm, and 9.1 cm, respectively. 1 mm calcification mid pole left kidney, unchanged from 2013. No right renal calculi. Adrenals:  Normal. Small bowel:  Normal in caliber. Appendix:  Not visualized. Colon:  Normal in caliber. Surgical clips at level of rectum. Peritoneum:  Diffuse free fluid within the abdomen and pelvis, unchanged. No free air. Vessels: Aorta normal in course and caliber. Lymph nodes:  Retroperitoneal:  No enlarged retroperitoneal lymph nodes. Mesenteric:  No enlarged mesenteric lymph nodes. Pelvic: No enlarged pelvic lymph nodes. Ureters: Normal in course and caliber. No calcifications. Bladder: Partially decompressed. Reproductive organs: Uterus surgically absent. Dystrophic calcification, left adnexa, unchanged from March, 2013. Abdominal Wall: Remote abdominal wall defect repair. Edematous change within abdominal pelvic soft tissues diffusely, stable. Bones:  No bone lesions. No degenerative changes. No post operative changes. Cirrhosis. Diffuse ascites, unchanged. Diffuse edematous change, abdominal and pelvic walls, stable. Small right kidney. Left kidney lower limits of normal. Hypoplastic pancreas. Remote hemorrhoid surgery. Cholecystectomy. Hysterectomy.  All CT scans at this facility use dose modulation, iterative reconstruction, and/or weight based dosing when appropriate to reduce radiation dose to as low as reasonably achievable. Ct Head Wo Contrast    Result Date: 7/23/2020  CT Brain Contrast medium:  Not utilized. History:  Fell from standing. Denies anticoagulation/loss of consciousness. Comparison:  CT brain, July 11, 2014 Findings: Extra-axial spaces:  Normal. Intracranial hemorrhage:  None. Ventricular system: Ventricles mildly enlarged. Sulci mildly prominent. Basal Cisterns:  Normal. Cerebral Parenchyma: Physiologic basal ganglia calcification, unchanged. Midline Shift:  None. Cerebellum:  Normal. Paranasal sinuses and mastoid air cells:  Normal. Visualized Orbits: Remote bilateral ocular surgery. Impression: No acute findings. Mild cerebral atrophy. All CT scans at this facility use dose modulation, iterative reconstruction, and/or weight based dosing when appropriate to reduce radiation dose to as low as reasonably achievable. Ct Chest Wo Contrast    Result Date: 7/23/2020  CT of the Chest without intravenous contrast medium History:  Mechanical fall from standing. Technical Factors: CT imaging of the chest was obtained and formatted as 5 mm contiguous axial images from the thoracic inlet through the adrenal glands. Sagittal and coronal reconstruction obtained during postprocessing. Intravenous contrast medium:  None. Comparison:  Chest radiograph, July 22, 2020. High-resolution CT chest, December 30, 2019. Findings: Right lung shows mild dependent atelectatic change posterior right upper lobe, posterior right middle lobe, and posterior right lower lobe. No nodules or masses. No pleural effusion. Left lung shows dependent subsegmental atelectatic change, left upper lobe. Small peripheral focus of groundglass opacity lateral lower lingula. Dependent subsegmental atelectatic change, small left pleural effusion. No nodules or masses.  No hilar, mediastinal, or axillary lymph node enlargement. Cardiac size normal. No pericardial effusion. Calcified mitral annulus. Thoracic aorta normal in course and caliber. Bridging anterior osteophytes mid to lower thoracic spine. No osteoblastic, and no osteolytic lesions. No fracture. Bilateral dependent atelectatic change. Small left effusion. All CT scans at this facility use dose modulation, iterative reconstruction, and/or weight based dosing when appropriate to reduce radiation dose to as low as reasonably achievable. Ct Cervical Spine Wo Contrast    Result Date: 7/23/2020  EXAMINATION: CT CERVICAL SPINE WO CONTRAST   DATE AND TIME:7/22/2020 10:45 PM CLINICAL HISTORY:ACUTE POSTERIOR NECK PAIN  TRAUMA  COMPARISON: NONE TECHNIQUE:Helical scanning was performed from the skull base through the remainder of the cervical spine without intravenous contrast. Sagittal and coronal reformats were obtained. The lack of contrast limits CT sensitivity of the soft tissues. All CT scans at this facility use dose modulation, iterative reconstruction, and/or weight based dosing when appropriate to reduce radiation dose to as low as reasonably achievable. FINDINGS   Fracture:No acute fracture. Alignment:  Normal cervical lordosis. No subluxation. No canal stenosis. Dense atlas:Dens atlas relationship is normal. Soft tissues:Airway patent. No soft tissue mass or adenopathy. Other findings: There is mild  cervical spondylosis with multiple level disc osteophyte changes. NO ACUTE CERVICAL SPINE ABNORMALITY. Ct Thoracic Spine Wo Contrast    Result Date: 7/23/2020  EXAMINATION: CT THORACIC SPINE WO CONTRAST DATE AND TIME:7/22/2020 10:45 PM CLINICAL HISTORY: SEVERE POSTERIOR THORACIC SPINE PAIN. FALL  COMPARISON: NONE TECHNIQUE:  Axial noncontrast images were performed through the thoracic spine with coronal and sagittal reconstructions.   All CT scans at this facility use dose modulation, iterative reconstruction, and/or weight based dosing when appropriate to reduce radiation dose to as low as reasonably achievable. FINDINGS  Alignment/fracture: Normal alignment without subluxation. No evidence of fracture. Perivertebral soft tissues are normal.  Disc spaces: There are multiple levels of disc space narrowing and end plate changes consistent with degenerative change. NEGATIVE CT OF THE THORACIC SPINE. Ct Lumbar Spine Wo Contrast    Result Date: 7/24/2020  EXAMINATION: CT LUMBAR SPINE WO CONTRAST CLINICAL HISTORY: Fall from standing. Back pain. COMPARISON: CT abdomen pelvis 3/10/2020 TECHNIQUE:  Spiral scans with no contrast. Multiplanar 2-D reconstructions. All CT scans at this facility use dose modulation, iterative reconstruction, and/or weight based dosing when appropriate to reduce radiation dose to as low as reasonably achievable. FINDINGS: No acute fractures identified. There is T11 superior endplate Schmorl's node with dystrophic calcification, unchanged from prior imaging study. There is lumbar spondylosis with multilevel intervertebral disc space narrowing, marginal osteophytosis, and facet hypertrophy. There is L4-5 degenerative disc vacuum phenomenon. There is L3-4 and L4-5 disc bulging which does not appear to impinge on the exiting nerve roots. There is mild L4-5 central canal stenosis. There is disc bulging and  degenerative disc vacuum phenomenon at L1-2. The SI joints are symmetrically maintained. There is iliac sclerosis of the SI joints. Ascites is noted within the field-of-view. There are 2 left renal midpole nonobstructing stones, measuring 2 mm and 4 mm. DEGENERATIVE CHANGES. NO ACUTE FRACTURE. Xr Chest Portable    Result Date: 7/23/2020  XR CHEST PORTABLE : 7/22/2020 CLINICAL HISTORY: Pain after fall . COMPARISON: High-resolution chest CT 12/30/2019 and two-view chest 11/18/2019. TECHNIQUE: An upright portable AP radiograph of the chest was obtained.  FINDINGS: A shallow inspiration is present without significant infiltrate identified. There is no cardiomegaly, significant pleural effusion, vascular congestion, pneumothorax, or displaced fractures identified. NO EVIDENCE OF ACUTE THORACIC TRAUMA OR ACTIVE CARDIOPULMONARY DISEASE. Us Guided Paracentesis    Result Date: 7/23/2020  US GUIDED PARACENTESIS : 7/23/2020 CLINICAL HISTORY:  therapeutic paracentesis . COMPARISON: Chest, abdomen and pelvis CTs from earlier 7/23/2020 PROCEDURE: Informed consent was obtained. Limited transabdominal ultrasound was performed and marking done in the usual fashion. Sterile technique and local lidocaine anesthesia was used to place a 5 [employer] Yueh catheter within the abdominal fluid collection. Approximately 6.5 L of low viscosity pale yellow-colored fluid was aspirated without evidence of immediate complication. A sample was sent for analysis as requested. The patient was monitored in the radiology holding area for approximately hold minutes without event. She was returned to the medical floor in stable condition with the usual instructions. SUCCESSFUL ULTRASOUND-GUIDED PARACENTESIS APPROXIMATELY 6.5 L OF ASCITES. Us Retroperitoneal Limited    Result Date: 7/23/2020  EXAMINATION: US RETROPERITONEAL LIMITED CLINICAL HISTORY: 82year-old with renal insufficiency and extensive abdominal ascites COMPARISONS: Unenhanced abdominal CT 7/23/2020 FINDINGS: Renal sonography was performed. The study is compromised due to patient body habitus and sonographic window. Kidneys by ultrasound are symmetric borderline small in size. Right kidney measures 9.3 x 5.3 x 4.3 cm left kidney measures 9.2 x 4.7 x  4.8 cm. There are a few punctate echogenic foci in the central sinus echocomplex raising the question of specular reflectors. Bilateral renal echotexture is otherwise unremarkable. No large cortical cysts, solid masses or shadowing calcifications. No ultrasound signs of hydronephrosis.  Note is made of upper abdominal ascites     SYMMETRIC Take 2 mg by mouth bid prn             topiramate (TOPAMAX) 50 MG tablet  TAKE 1 TABLET BY MOUTH DAILY             traZODone (DESYREL) 100 MG tablet  TAKE 2 TABLETS BY MOUTH EVERY DAY AT BEDTIME                 Disposition:   Discharged to acute rehab. Any [facility] needs that were indicated and/or required as been addressed and set up by Social Work. Condition at discharge: Pt was medically stable at the time of discharge. Significant improvement in clinical condition compared to initial condition at presentation to hospital    Activity: , fall precautions. Total time taken for discharging this patient: 40 minutes. Greater than 70% of time was spent focused exclusively on this patient. Time was taken to review chart, discuss plans with consultants, reconciling medications, discussing plan answering questions with patient. Gerardo Estrada  7/25/2020, 11:13 AM  ----------------------------------------------------------------------------------------------------------------------    Emily Constantino,     Please return to ER or call 911 if you develop any significant signs or symptoms.     I may not have addressed all of your medical illnesses or the abnormal blood work or imaging therefore please ask your PCP, Janice Rowe MD ,  to obtain [facility] record to follow up on all of the abnormal labs, imaging and findings that I have and have not addressed during your hospitalization.      Discharging you from the hospital does not mean that your medical care ends here and now. You may still need additional work up, investigation, monitoring, and treatment to be handled from this point on by outside providers including your PCP, Janice Rowe MD , Specialists and other healthcare providers.      Please review your list of discharge medications prior to resuming medications you might still have at home, as the medications you need to be taking, dosages or how often you must take them may have changed.  For

## 2020-07-26 PROBLEM — Z74.09 IMPAIRED MOBILITY: Status: ACTIVE | Noted: 2020-01-01

## 2020-07-26 PROBLEM — R26.9 ABNORMALITY OF GAIT AND MOBILITY: Status: ACTIVE | Noted: 2020-01-01

## 2020-07-26 NOTE — PROGRESS NOTES
Pt states she doesn't want to take anything w/Tylenol in it d/t her liver cirrhosis. Updated Dr. Zaid Sharp of pain and suggested PRN Christine or MS Contin d/t multiple comorbidities.  Electronically signed by Janina Carrasco RN on 7/26/2020 at 10:56 AM

## 2020-07-26 NOTE — PLAN OF CARE
Problem: Falls - Risk of:  Goal: Will remain free from falls  Description: Will remain free from falls  Outcome: Ongoing  Goal: Absence of physical injury  Description: Absence of physical injury  Outcome: Ongoing     Problem: Skin Integrity:  Goal: Will show no infection signs and symptoms  Description: Will show no infection signs and symptoms  Outcome: Ongoing  Goal: Absence of new skin breakdown  Description: Absence of new skin breakdown  Outcome: Ongoing     Problem: Infection:  Goal: Will remain free from infection  Description: Will remain free from infection  Outcome: Ongoing     Problem: Safety:  Goal: Free from accidental physical injury  Description: Free from accidental physical injury  Outcome: Ongoing  Goal: Free from intentional harm  Description: Free from intentional harm  Outcome: Ongoing     Problem: Daily Care:  Goal: Daily care needs are met  Description: Daily care needs are met  Outcome: Ongoing     Problem: Pain:  Goal: Patient's pain/discomfort is manageable  Description: Patient's pain/discomfort is manageable  Outcome: Ongoing     Problem: Skin Integrity:  Goal: Skin integrity will stabilize  Description: Skin integrity will stabilize  Outcome: Ongoing

## 2020-07-26 NOTE — CONSULTS
Hospitalist Consult/Progress Note  7/26/2020 4:01 PM    Assessment and Plan:   1. Generalized weakness Gait instability and Decreased Functional Status secondary to Parkinson's: Continue Sinemet, fall precautions. PT OT to evaluate. Maximize nutrition status. Assessing if needs DME at home. SW on board    2. Decompensated liver cirrhosis with esophageal varices and ascites, s/p u/s guided therapeutic paracentesis with removal of 6.5 liters of fluid per IR, fluid analysis was negative for SB, restarted on diuretic regimen by GI      3. Pancytopenia:  likely due to advanced liver cirrhosis, monitor counts closely     4. Anemia: Improved, baseline. no active bleeding, follow H/H     5. Folic acid deficiency, started supplement    6. ENMA: improved, monityor renal function and I&O's     7. Bowel Regimen and GI PPx: stool softners PRN ordered with hold parameters for loose stools or diarrhea. On antiacid    8. Discharge planning: SW on board. Will discharge once medically stable and cleared by all consultants. 9. High Risk Readmission Screening Tool Score Noted. Additionally, the following hospital problems were addressed:  Principal Problem:    Abnormality of gait and mobility dt exacerbation of Parkinson's disease status post ascites and liver failure acute rehab admission 7/25/2020  Active Problems:    Fibromyalgia    Depression    Lumbago without sciatica    LVH (left ventricular hypertrophy)    Parkinson's disease (HCC)    Rotator cuff (capsule) sprain    Shoulder impingement    Thrombocytopenia (HCC)    Vitamin D deficiency    Secondary esophageal varices without bleeding (HCC)    Morbidly obese (HCC)    Stasis dermatitis    Moderate malnutrition (HCC)    Impaired mobility  Resolved Problems:    * No resolved hospital problems. *      ** Total time spent reviewing medical records, evaluating patient, speaking with RN's and consultants where I was focused exclusively on this patient: 35 minutes.    This time is excluding time spent performing procedures or significant events occurring earlier or later in the day requiring my attention and focus. Subjective:   Admit Date: 7/25/2020  PCP: Octavia Williamson MD    No acute events overnight. Afebrile  Vitals reviewed. No new complaints. Pt denies chest pain, SOB, N/V, fevers or chills. Objective:     Vitals:    07/25/20 1732 07/25/20 1743 07/25/20 2015 07/26/20 0803   BP: (!) 144/61  136/60 (!) 114/56   Pulse: 65  67 61   Resp: 17  18 16   Temp: 98 °F (36.7 °C)  98 °F (36.7 °C) 98 °F (36.7 °C)   TempSrc: Oral  Oral Oral   SpO2: 95%  94% 94%   Weight:  177 lb 0.7 oz (80.3 kg)     Height:  5' 1\" (1.549 m)       General appearance: No acute distress, A + O x 3. No conversational dyspnea noted. Dentition intact. Answers questions appropriately  Lungs: CTAB Diminished  no exp wheezes, No rales No retractions; No use of accessory muscles  Heart:  S1, S2 normal, RRR, no MRG appreciated  Abdomen: (+) BS, soft, non-tender; non distended no guarding or rigidity. Extremities:  no cyanosis, no edema bilat lower exts, no calf tenderness bilaterally.  Dry skin noted       Medications:      analgesic ointment   Topical TID    lidocaine  3 patch Transdermal Daily    pantoprazole  40 mg Oral QAM AC    topiramate  50 mg Oral Daily    traZODone  150 mg Oral Nightly    budesonide-formoterol  2 puff Inhalation BID    carbidopa-levodopa  1 tablet Oral TID    folic acid  1 mg Oral Daily    furosemide  40 mg Oral Daily    spironolactone  50 mg Oral Daily       LABS Reviewed    IMAGING Reviewed    Livan Wells CNP  [facility] [place] Hospitalist

## 2020-07-26 NOTE — PROGRESS NOTES
Placed k-pad in room and gave pt warm blanket as requested by physician. Pt was thankful and call light w/in reach. IR consult was put out to Dr. Earlene Whipple office and was completed by unit secretary.  Electronically signed by Michelle Salazar RN on 7/26/2020 at 5:06 PM

## 2020-07-26 NOTE — PROGRESS NOTES
[facility]   Facility/Department: [facility]  Speech Language Pathology  Clinical Bedside Swallow Evaluation    Mack Shen  : 1935 (82 y.o.)   MRN: [id number]  ROOM: [place] [address]-  ADMISSION DATE: 2020  PATIENT DIAGNOSIS(ES): Impaired mobility [Z74.09]  Abnormality of gait and mobility [R26.9]  No chief complaint on file.     Patient Active Problem List    Diagnosis Date Noted    Impaired mobility 2020    Abnormality of gait and mobility dt exacerbation of Parkinson's disease status post ascites and liver failure acute rehab admission 2020    Ataxic gait 2020    History of myocardial infarction 2020    Rectal prolapse 2020    Stasis dermatitis 2020    Gait abnormality 2020    Moderate malnutrition ([address] 2020    ENMA (acute kidney injury) ([address] 2020    Pancytopenia ([address] 2020    Fall 2020    Weakness 2020    Malnutrition ([address] 2020    Cirrhosis of liver with ascites ([address] 2020    Cough variant asthma 03/10/2020    Morbidly obese ([address] 03/10/2020    Decompensated hepatic cirrhosis ([address] 03/10/2020    Secondary esophageal varices without bleeding ([address]     Hemorrhoids 2020    Iron deficiency anemia due to chronic blood loss 2019    Bilateral carotid artery stenosis 2019    Chest pain 2018    RANDOLPH (dyspnea on exertion) 2018    Lumbar spondylosis 10/30/2017    Controlled type 2 diabetes mellitus without complication, without long-term current use of insulin ([address] 2016    S/P left unicompartmental knee replacement 2016    History of trabeculectomy 2015    Lens replaced by other means 2015    POAG (primary open-angle glaucoma) 2015    Osteoarthritis of multiple joints 2014    Actinic keratosis 2014    Seborrheic keratosis 2014    Chondrocalcinosis of hand 2014    Pseudogout of hand 2014  LAMINECTOMY      PARACENTESIS Left 03/12/2020    5010cc ascites removed by Dr Amina Olsen [phone]    PARACENTESIS Left 06/10/2020    3,850 cc removed per Dr Yvonne Franks Left 07/08/2020    total 5450 ml removed per Dr Yvonne Franks Left 07/23/2020    Dr Princess Pagan [id number]   [facility] 2/21/2020    EGD WITH BANDING performed by Bennett Javed MD at [facility]   Allergen Reactions    Adhesive Tape     Amoxicillin-Pot Clavulanate     Bactrim [Sulfamethoxazole-Trimethoprim]     Clarithromycin      Other reaction(s): Other: See Comments  tachycardia    Other     Sulfa Antibiotics        DATE ONSET: 7/22/2020    Date of Evaluation: 7/26/2020   Evaluating Therapist: Nabeel Mckeon CF-SLP      Recommended Diet and Intervention  Diet Solids Recommendation: Regular  Liquid Consistency Recommendation: Thin  Recommended Form of Meds: PO        Compensatory Swallowing Strategies  Compensatory Swallowing Strategies: Small bites/sips;Eat/Feed slowly;Upright as possible for all oral intake    Reason for Referral  Atilio Velazquez was referred for a bedside swallow evaluation to assess the efficiency of her swallow function, identify signs and symptoms of aspiration and make recommendations regarding safe dietary consistencies, effective compensatory strategies, and safe eating environment. General  Chart Reviewed: Yes  Subjective  Subjective: Pt was alert and cooperative for BSE. Behavior/Cognition: Alert; Cooperative  Respiratory Status: Room air  O2 Device: None (Room air)  Communication Observation: Functional  Follows Directions: Simple  Dentition: Adequate; Some missing teeth;Dentures top(Partial on bottom)  Patient Positioning: Upright in bed  Baseline Vocal Quality: Weak  Consistencies Administered: Reg solid; Dysphagia Pureed (Dysphagia I); Thin - cup; Thin - straw    Current Diet level:  Current Diet : Regular  Current Liquid Diet : Thin    Oral Motor Deficits  Oral/Motor  Oral Motor: Within functional limits    Oral Phase Dysfunction  Oral Phase  Oral Phase: WFL  Oral Phase  Oral Phase - Comment: Oral phase of swallow WFL. Pt was able to form and clear a cohesive bolus with min lingual residue. Pt independently cleared residue with use of lingual sweep. Indicators of Pharyngeal Phase Dysfunction   Pharyngeal Phase  Pharyngeal Phase: WFL  Pharyngeal Phase   Pharyngeal: Pharyngeal phase of swallow WFL. Laryngeal elevation present during evaluation, noted through observation and palpation. No overt s/s of aspiration observed following any of the presented consistencies. Impression  Dysphagia Diagnosis: Swallow function appears grossly intact  Dysphagia Impression : Oropharyngeal phase of swallow WFL  Dysphagia Outcome Severity Scale: Level 6: Within functional limits/Modified independence     Treatment Plan  Requires SLP Intervention: Yes  Duration/Frequency of Treatment: No f/u swallow  D/C Recommendations: To be determined       Treatment/Goals   N/A    Prognosis  Individuals consulted  Consulted and agree with results and recommendations: Patient  Family member consulted: Daughter    Education  Patient Education: Pt educated on results of BSE. Patient Education Response: Verbalizes understanding  Safety Devices in place: Yes  Type of devices: Bed alarm in place;Call light within reach;Nurse notified    [x]  Gabrielle Medina RN notified     Pain:  Pain Assessment  Patient Currently in Pain: Yes  Pain Level: 7  Pain Type: Acute pain  Pain Location: Back  Pt stated pain was acceptable for session. RN - Gabrielle Medina informed of pt's pain.        NATIONAL OUTCOMES MEASUREMENT SYSTEM (NOMS):    SWALLOWING  Ratin    Therapy Time  SLP Individual Minutes  Time In: 1110  Time Out: 1120  Minutes: 10        Signature: Electronically signed by KATHERIN Mcwilliams-SLP on 2020 at 12:31 PM

## 2020-07-26 NOTE — PLAN OF CARE
Problem: Falls - Risk of:  Goal: Will remain free from falls  Description: Will remain free from falls  7/26/2020 1033 by Leatha Amaro RN  Outcome: Ongoing  7/26/2020 0154 by Tong Henning. Katherine Reis RN  Outcome: Ongoing  Goal: Absence of physical injury  Description: Absence of physical injury  7/26/2020 1033 by Leatha Amaro RN  Outcome: Ongoing  7/26/2020 0154 by Tong Henning. Katherine Reis RN  Outcome: Ongoing     Problem: Skin Integrity:  Goal: Will show no infection signs and symptoms  Description: Will show no infection signs and symptoms  7/26/2020 1033 by Leatha Amaro RN  Outcome: Ongoing  7/26/2020 0154 by Tong Henning. Katherine Reis RN  Outcome: Ongoing  Goal: Absence of new skin breakdown  Description: Absence of new skin breakdown  7/26/2020 1033 by Leatha Amaro RN  Outcome: Ongoing  7/26/2020 0154 by Tong Henning. Katherine Reis RN  Outcome: Ongoing     Problem: Infection:  Goal: Will remain free from infection  Description: Will remain free from infection  7/26/2020 1033 by Leatha Amaro RN  Outcome: Ongoing  7/26/2020 0154 by Tong Henning. Katherine Reis RN  Outcome: Ongoing     Problem: Safety:  Goal: Free from accidental physical injury  Description: Free from accidental physical injury  7/26/2020 1033 by Leatha Amaro RN  Outcome: Ongoing  7/26/2020 0154 by Tong Henning. Katherine Reis RN  Outcome: Ongoing  Goal: Free from intentional harm  Description: Free from intentional harm  7/26/2020 1033 by Leatha Amaro RN  Outcome: Ongoing  7/26/2020 0154 by Tong Henning. Katherine Reis RN  Outcome: Ongoing     Problem: Daily Care:  Goal: Daily care needs are met  Description: Daily care needs are met  7/26/2020 1033 by Leatha Amaro RN  Outcome: Ongoing  7/26/2020 0154 by Tong Henning. Katherine Reis RN  Outcome: Ongoing     Problem: Pain:  Goal: Patient's pain/discomfort is manageable  Description: Patient's pain/discomfort is manageable  7/26/2020 1033 by Leatha Amaro RN  Outcome: Ongoing  7/26/2020 0154 by Tong Henning.  Katherine Reis RN  Outcome: Ongoing Problem: Skin Integrity:  Goal: Skin integrity will stabilize  Description: Skin integrity will stabilize  7/26/2020 1033 by Sudhir Lake, RN  Outcome: Ongoing  7/26/2020 0154 by Anabela Rojo.  Raciel Herbert RN  Outcome: Ongoing     Problem: Discharge Planning:  Goal: Patients continuum of care needs are met  Description: Patients continuum of care needs are met  Outcome: Ongoing

## 2020-07-26 NOTE — PROGRESS NOTES
Assumed care of pt at [address] [place] [address] at 7/25/20. Pt is a new admit. Bruising noted to arms bilaterally. Skin intact. Coccyx reddened. Mepilex intact. Pt is oriented x4. Quiet. Trace edema to legs. Belly is distended. Pt had paracentesis recently on 7/23 and Sob with exertion. Lungs clear and diminished in bases. No complaints of sob at rest. No telemetry order for rehab will remove tele and send back to [facility]. Pt has some stress incontinence. Pt states she uses walker to walk to bathroom. Pt was pivoted in w/c Viralize. Pt took hs meds and went to sleep. Will monitor on rounds. Call light in reach and bed alarm on. Electronically signed by Lulu Coleman.  César Wilkins on 7/26/2020 at 1:53 AM

## 2020-07-26 NOTE — PROGRESS NOTES
Facility/Department: [facility] Initial Assessment: Physical Therapy  Room: R251/R251-01    NAME: Raffy Mejia  : 1935  MRN: [id number]    Date of Service: 2020    Rehab Diagnosis(es):Impaired mobility/ ADLs secondary to exacerbation of Parkinson's Disease  Patient Active Problem List    Diagnosis Date Noted    Impaired mobility 2020    Abnormality of gait and mobility dt exacerbation of Parkinson's disease status post ascites and liver failure acute rehab admission 2020    Ataxic gait 2020    History of myocardial infarction 2020    Rectal prolapse 2020    Stasis dermatitis 2020    Gait abnormality 2020    Moderate malnutrition ([address] 2020    ENMA (acute kidney injury) ([address] 2020    Pancytopenia ([address] 2020    Fall 2020    Weakness 2020    Malnutrition ([address] 2020    Cirrhosis of liver with ascites ([address] 2020    Cough variant asthma 03/10/2020    Morbidly obese ([address] 03/10/2020    Decompensated hepatic cirrhosis ([address] 03/10/2020    Secondary esophageal varices without bleeding ([address]     Hemorrhoids 2020    Iron deficiency anemia due to chronic blood loss 2019    Bilateral carotid artery stenosis 2019    Chest pain 2018    RANDOLPH (dyspnea on exertion) 2018    Lumbar spondylosis 10/30/2017    Controlled type 2 diabetes mellitus without complication, without long-term current use of insulin ([address] 2016    S/P left unicompartmental knee replacement 2016    History of trabeculectomy 2015    Lens replaced by other means 2015    POAG (primary open-angle glaucoma) 2015    Osteoarthritis of multiple joints 2014    Actinic keratosis 2014    Seborrheic keratosis 2014    Chondrocalcinosis of hand 2014    Pseudogout of hand 2014    Vitamin D deficiency 2014    Bilateral hand pain 5010cc ascites removed by Dr Espinosa Pay [phone]    PARACENTESIS Left 06/10/2020    3,850 cc removed per Dr Pily Padilla Left 07/08/2020    total 5450 ml removed per Dr Pily Padilla Left 07/23/2020    Dr Kena Mathew [address] off   [facility] ENDOSCOPY N/A 2/21/2020    EGD WITH BANDING performed by Ej Mckenzie MD at [facility] OR       Chart Reviewed: Yes  Patient assessed for rehabilitation services?: Yes  Family / Caregiver Present: No  Diagnosis: Impaired mobility/ ADLs secondary to exacerbation of Parkinson's Disease    Restrictions:  Restrictions/Precautions: Fall Risk       SUBJECTIVE: Subjective: Pt reports that she is feeling better than yesterday. Pain is better controlled now that she had medicine prior to lunch  Response To Previous Treatment: Not applicable    Pre Treatment Pain Screening  Pain at present: 0  Scale Used: Numeric Score  Intervention List: Patient able to continue with treatment    Post Treatment Pain Screening:  Pain Assessment  Pain Assessment: 0-10  Pain Level: 0    Prior Level of Function:  Social/Functional History  Lives With: Alone  Type of Home: Apartment(7th floor)  Home Layout: One level  Home Access: Elevator, Level entry  Bathroom Shower/Tub: Tub/Shower unit, Curtain  Bathroom Equipment: Grab bars in shower  Home Equipment: 4 wheeled walker, Reacher, Alert Button(Patient reported that her life alert button was not working the day she fell but her daughter has had it fixed.)  ADL Assistance: [facility]: Independent  Homemaking Responsibilities: (Patient does her own cooking. 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 Patient plays the piano but is waiting for her piano to get tuned. Yamileth reports that she was \"quite good\"  IADL Comments: Patient has 3 daughters and one recently came to the area from out of town in order to help patient  Additional Comments: Reports recent fall- \"my legs gave out\"    OBJECTIVE:   Vision/Hearing:  Vision: Within Functional Limits  Vision Exceptions: Wears glasses at all times  Hearing: Exceptions to [facility]  Hearing Exceptions: Hard of hearing/hearing concerns;Bilateral hearing aid    Cognition:  Overall Orientation Status: Within Functional Limits  Follows Commands: Within Functional Limits  Observation/Palpation  Observation: alert, attentive, [place], no acute distress noted    ROM:  RLE AROM: WFL  LLE AROM : WFL    Strength:  Strength RLE  Comment: grossly 4-/5  Strength LLE  Comment: grossly 4-/5    Neuro:  Sensation  Overall Sensation Status: WFL     Balance  Sitting - Static: Good  Sitting - Dynamic: Good  Standing - Static: Fair;+(hand washing without UE support and SBA)  Standing - Dynamic: Fair(functional reach 6 inch without UE support)  Motor Control  Gross Motor?: WFL    Bed mobility  Rolling to Left: Supervision  Rolling to Right: Supervision  Supine to Sit: Stand by assistance  Sit to Supine: Stand by assistance  Comment: increased time noted, VC for pt to attempt without bed rail.   bed flat    Transfers  Sit to Stand: Stand by assistance  Stand to sit: Stand by assistance  Bed to Chair: Stand by assistance(good safety awareness)    Ambulation  Ambulation?: Yes  Ambulation 1  Surface: level tile  Device: Rolling Walker  Assistance: Stand by assistance  Gait Deviations: Slow Vivian  Distance: 50ft with turns  Comments: increased time for turns, fatigued        Activity Tolerance  Activity Tolerance: Patient Tolerated treatment well     Quality Indicators (IRF-ANDI):  Rolling L and R: Supervision or Touching Assistance - 4  Sit>Supine: Supervision or Touching Assistance - 4  Supine>Sit: Supervision or Touching Assistance - 4  Sit>Stand: Supervision or Touching Assistance - 4  Chair/Bed>Chair Transfer: Supervision or Touching Assistance - 4  Car Transfers: Not attempted due to Medical Condition or Safety Concerns (I.e. unsafe or physician orders) - 80  Walk 10 ft: Supervision or Touching Assistance - 4  Walk 50 ft with two 90 degree turns: Supervision or Touching Assistance - 4  Walk 150 ft in [address]: Not attempted due to Medical Condition or Safety Concerns (I.e. unsafe or physician orders) - 80  Walking 10 ft on Unlevel Surface: Not attempted due to Medical Condition or Safety Concerns (I.e. unsafe or physician orders) - 80  Picking up Objects from Standing Position: Not attempted due to Medical Condition or Safety Concerns (I.e. unsafe or physician orders) - 80  Stairs: No Not attempted due to medical condition or safety concerns (i.e. unsafe or physician order) - 88  WC Mobility: No Not Applicable (pt did not complete item prior to admission) - 9      ASSESSMENT:  Body structures, Functions, Activity limitations: Decreased functional mobility ; Decreased balance;Decreased endurance;Decreased strength  Decision Making: Medium Complexity  History: medium  Exam: medium  Clinical Presentation: medium    Prognosis: Good  PT Education: Goals;PT Role;General Safety  Barriers to Learning: hard of hearing    CLINICAL IMPRESSION: Pt demonstrates the above deficits and decline in functional mobility with hx of recent fall at home. Pt would benefit from physical therapy to address above deficits and allow for safe return home at highest level of function, decrease risk for falls, and improve QOL.       PLAN OF CARE:  Frequency: 1-2 treatment sessions per day, 5-7 days per week     Current Treatment Recommendations: Strengthening, Transfer Training, Endurance Training, Neuromuscular Re-education, Patient/Caregiver Education & Training, Equipment Evaluation, Education, & procurement, Balance Training, Gait Training, Home Exercise Program, Functional Mobility Training, Safety Education & Training, Pain Management, Modalities, Stair training, Manual Therapy - Joint Manipulation, Manual Therapy - Soft Tissue Mobilization, ROM    Patient's Goal:  To walk better, get stronger, go home    GOALS:  Short term goals  Short term goal 1: pt to be indep with HEP  Long term goals  Time Frame for Long term goals : 2 week  Long term goal 1: Pt to be indep with bed mobility  Long term goal 2: Pt to be indep with transfers  Long term goal 3: pt to ambulate >150ft with rollator and indep  Long term goal 4: 4 steps with handrail and indep to demonstrate improved LE strength and functional endurance    ELOS:   Plan weeks: 2    Therapy Time:    Individual   Time In 1309   Time Out 1339   Minutes Jeff Arce [address] [place] [address], 07/26/20 at 2:54 PM

## 2020-07-26 NOTE — PROGRESS NOTES
Occupational Therapy   Occupational Therapy Initial Assessment  Date: 2020   Patient Name: Carol Conde  MRN: [id number]     : 1935    Date of Service: 2020    Discharge Recommendations:  Continue to assess pending progress       Assessment   Performance deficits / Impairments: Decreased functional mobility ; Decreased ADL status; Decreased balance;Decreased endurance;Decreased high-level IADLs;Decreased fine motor control;Decreased coordination;Decreased strength  Assessment: Pt. is an 82year old woman from home who presents to [facility] with the above deficits which impact her ability to perform ADLs and IADLs. Pt. would benefit from OT to maximize independence and safety with ADL tasks. Prognosis: Good  Decision Making: Low Complexity  History: Pt's medical history is moderately complex  Exam: Pt. has 8 performance deficits  Assistance / Modification: Pt. requires min A  REQUIRES OT FOLLOW UP: Yes  Activity Tolerance  Activity Tolerance: Patient limited by fatigue;Patient limited by pain  Safety Devices  Safety Devices in place: Yes  Type of devices: All fall risk precautions in place           Patient Diagnosis(es): There were no encounter diagnoses. has a past medical history of Asthma, Fibromyalgia, Hemorrhoid, Hyperlipidemia, Hypertension, Parkinson's disease ([facility] [address] and Type II or unspecified type diabetes mellitus without mention of complication, not stated as uncontrolled. has a past surgical history that includes Hysterectomy; Cholecystectomy; laminectomy; eye surgery; joint replacement (Right); hernia repair (2002); hernia repair; Hemorrhoid surgery; Upper gastrointestinal endoscopy (N/A, 2020); Paracentesis (Left, 2020); Paracentesis (Left, 06/10/2020); Paracentesis (Left, 2020); and Paracentesis (Left, 2020).            Restrictions  Restrictions/Precautions  Restrictions/Precautions: Fall Risk    Subjective   General  Chart Reviewed: Yes  Referring Practitioner: Dr David Daniel  Diagnosis: Imp Mob and ADLs 2° Exac of Parkinsons  Pain Assessment  Pain Level: 7  Pain Location: Back  Pain Orientation: Upper  Pain Descriptors: Aching  Pre Treatment Pain Screening  Pain at present: 7  Intervention List: Patient able to continue with treatment;Nurse/Physician notified(Andres RN was notified of pain at the beginning and end of the evaluation)  Comments / Details: Patient reported that she fell in her kitchen and had to scoot to her rollator on the other side of the kitchen in order to call for help  Social/Functional History  Social/Functional History  Lives With: Alone  Type of Home: Apartment(7th floor)  Home Layout: One level  Home Access: Elevator, Level entry  Bathroom Shower/Tub: Tub/Shower unit, Curtain  Bathroom Equipment: Grab bars in shower  Home Equipment: 4 wheeled walker, Reacher, Alert Button(Patient reported that her life alert button was not working the day she fell but her daughter has had it fixed.)  ADL Assistance: [address] [place] [address] Avenue: Independent  Homemaking Responsibilities: (Patient does her own cooking. 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 Patient plays the piano but is waiting for her piano to get tuned. Pateint reports that she was \"quite good\"  IADL Comments: Patient has 3 daughters and one recently came to the area from out of town in order to help patient       Objective   Vision: (Patient reported that she is trying to get new glasses)  Vision Exceptions: Wears glasses at all times  Hearing Exceptions: Hard of hearing/hearing concerns; No hearing aid    Orientation  Overall Orientation Status: Within Functional Limits  Observation/Palpation  Observation: Patient was very willing to participate in therapy  Balance  Sitting Balance: Supervision  Standing Balance: Supervision  Functional Mobility  Functional - Mobility Device: Rolling Walker  Activity: To/from bathroom  Assist Level: Stand by assistance  Toilet Transfers  Toilet - Technique: Ambulating  Equipment Used: Grab bars  Toilet Transfer: Stand by assistance  Tub Transfers  Tub Transfers Comments: patient declined a shower today due to fatigue  ADL  Feeding: Independent  Grooming: Supervision  UE Bathing: Supervision  LE Bathing: None(Patient declined lower body bathing due to pain and fatigue)  UE Dressing: None(Patient donned a new gown. Dtr is to bring clothing today)  LE Dressing: Stand by assistance(Patient did not change her socks)  Toileting: Stand by assistance  Tone RUE  RUE Tone: Normotonic  Tone LUE  LUE Tone: Normotonic  Coordination  Movements Are Fluid And Coordinated: No  Coordination and Movement description: Fine motor impairments;Decreased speed;Decreased accuracy; Right UE;Left UE     Bed mobility  Rolling to Right: Supervision  Sit to Supine: Supervision  Scooting: Supervision        Cognition  Overall Cognitive Status: Exceptions  Arousal/Alertness: Appropriate responses to stimuli  Following Commands:  Follows one step commands with increased time  Attention Span: Appears intact  Memory: Decreased short term memory  Safety Judgement: Good awareness of safety precautions  Problem Solving: Assistance required to implement solutions  Insights: Fully aware of deficits  Initiation: Requires cues for some  Sequencing: Requires cues for some  Cognition Comment: comp - min, exp - supervision, soc int - supervision, prob solv - min, mem - min  Perception  Overall Perceptual Status: WFL     Sensation  Overall Sensation Status: WFL        LUE AROM (degrees)  LUE AROM : WFL  Left Hand AROM (degrees)  Left Hand AROM: WFL  RUE AROM (degrees)  RUE AROM : WFL  Right Hand AROM (degrees)  Right Hand AROM: WFL  LUE Strength  LUE Strength Comment: overall 3/5  RUE Strength  RUE Strength Comment: overall 3/5     Hand Dominance  Hand Dominance: Left             Plan   Plan  Times per week: 5-7 times per day  Plan weeks: 1.5 weeks  Current Treatment Recommendations: Strengthening, Balance Training, Functional Mobility Training, Endurance Training, Safety Education & Training, Patient/Caregiver Education & Training, Self-Care / ADL, Equipment Evaluation, Education, & procurement      Goals  Patient Goals   Patient goals : \"To get stronger\"      [x]  Patient will complete self care as followed using the recommended adaptive equipment and/or adaptive techniques as instructed:  Feeding:indpendent  Grooming: independent   Bathing: supervision  UE Dressing: independent  LE Dressing: modified independent  Toileting: independent  Toilet Transfers: modified independent  Tub Transfers: supervision     [x]  Patient will sequence self-care routine with no verbal/tactile cues. [x]  Patient will improve static and dynamic standing balance to complete pants management at independent level         [x]  Patient will improve functional endurance to tolerate/complete 60 minutes of ADLs. [x]  Patient will improve B UE strength and endurance to G+ in order to participate in self-care activities as projected. [x]  Patient will improve B hand fine motor coordination to [facility] in order to manage clothing fasteners/self-care containers in a timely manner         [x]  Patient will perform kitchen mobility at device level without episodes of LOB and good safety awareness      [x]  Patient will perform basic room mobility at modified independent level. [x]  Patient will access appropriate D/C site with as few architectural barriers as possible. [x]  Patient and/or caregiver will demonstrate understanding of recommended HEP for UE strengthening.         [x]  Patient's cognition will improve to safely perform ADLs:  Comprehension: supervision  Expression: modified indep  Social Interaction: modified indep  Problem Solving: supervision  Memory: supervision        Therapy Time   Individual Concurrent Group Co-treatment   Time In 1482         Time Out 1050         Minutes 53              Eval: 53 minutes    PARISA Zhang    Electronically signed by PARISA Zhang on 7/26/2020 at 11:10 AM

## 2020-07-26 NOTE — PROGRESS NOTES
[facility]   Facility/Department: [facility]  Speech Language Pathology  Initial Speech/Language/Cognitive Assessment    Redge Castleman  : 1935 (82 y.o.)   MRN: [id number]  ROOM: [place] [address]  ADMISSION DATE: 2020  PATIENT DIAGNOSIS(ES): Impaired mobility [Z74.09]  Abnormality of gait and mobility [R26.9]  No chief complaint on file.     Patient Active Problem List    Diagnosis Date Noted    Impaired mobility 2020    Abnormality of gait and mobility dt exacerbation of Parkinson's disease status post ascites and liver failure acute rehab admission 2020    Ataxic gait 2020    History of myocardial infarction 2020    Rectal prolapse 2020    Stasis dermatitis 2020    Gait abnormality 2020    Moderate malnutrition ([address] 2020    ENMA (acute kidney injury) ([address] 2020    Pancytopenia ([address] 2020    Fall 2020    Weakness 2020    Malnutrition ([address] 2020    Cirrhosis of liver with ascites ([address] 2020    Cough variant asthma 03/10/2020    Morbidly obese ([address] 03/10/2020    Decompensated hepatic cirrhosis ([address] 03/10/2020    Secondary esophageal varices without bleeding ([address]     Hemorrhoids 2020    Iron deficiency anemia due to chronic blood loss 2019    Bilateral carotid artery stenosis 2019    Chest pain 2018    RANDOLPH (dyspnea on exertion) 2018    Lumbar spondylosis 10/30/2017    Controlled type 2 diabetes mellitus without complication, without long-term current use of insulin ([address] 2016    S/P left unicompartmental knee replacement 2016    History of trabeculectomy 2015    Lens replaced by other means 2015    POAG (primary open-angle glaucoma) 2015    Osteoarthritis of multiple joints 2014    Actinic keratosis 2014    Seborrheic keratosis 2014    Chondrocalcinosis of hand 2014    Pseudogout of hand 05/02/2014    Vitamin D deficiency 05/02/2014    Bilateral hand pain 05/01/2014    DISH (diffuse idiopathic skeletal hyperostosis) 05/01/2014    Lumbago without sciatica 05/01/2014    Secondary osteoarthritis of multiple sites 05/01/2014    Synovitis of hand 05/01/2014    Sclerosis of the skin 05/15/2013    Chondrodermatitis nodularis chronica helicis 80/56/3172    Hyperlipidemia 04/17/2013    Abdominal pain 03/07/2013    DM2 (diabetes mellitus, type 2) ([address]     Fibromyalgia     Depression 08/28/2012    Hypertension 08/28/2012    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    Thrombocytopenia ([address] 04/24/2012    Carotid stenosis 04/05/2012    LVH (left ventricular hypertrophy) 04/05/2012    Fitting and adjustment of orthopedic device 03/01/2012    Liver cirrhosis ([address] 02/18/2012    Parkinson's disease ([address] 02/18/2012    Sprain and strain of unspecified site of shoulder and upper arm 06/10/2011    Rotator cuff (capsule) sprain 06/15/2010    Osteoarthritis of shoulder 05/14/2010    Shoulder impingement 05/14/2010    Bursitis, hip 03/24/2010    Fx phalanx, foot-closed 09/02/2009    Closed fracture of part of upper end of humerus 02/22/2008    Myalgia and myositis, unspecified 12/26/2007    Diabetic polyneuropathy ([address] 12/26/2007    Knee joint replacement by other means 12/02/2007    Joint replaced by other means 01/31/2007    Chronic frontal sinusitis 08/30/2004     Past Medical History:   Diagnosis Date    Asthma     Fibromyalgia     Hemorrhoid     Hyperlipidemia     Hypertension     Parkinson's disease ([address]     Type II or unspecified type diabetes mellitus without mention of complication, not stated as uncontrolled      Past Surgical History:   Procedure Laterality Date    CHOLECYSTECTOMY      EYE SURGERY      HEMORRHOID SURGERY      HERNIA REPAIR  2/20/2002    ventral hernia    HERNIA REPAIR      LIH repair    HYSTERECTOMY      JOINT REPLACEMENT Right     partial left, full right    LAMINECTOMY      PARACENTESIS Left 03/12/2020    5010cc ascites removed by Dr Rober Walters [phone]    PARACENTESIS Left 06/10/2020    3,850 cc removed per Dr Radha Weston Left 07/08/2020    total 5450 ml removed per Dr Radha Weston Left 07/23/2020    Dr Genia Ferguson 0603 off   [facility] 2/21/2020    EGD WITH BANDING performed by David Be MD at [address]: 7/22/2020    Date of Evaluation: 7/26/2020   Evaluating Therapist: KATHERIN Watson-SLP    Assessment:      Diagnosis: Pt presents with moderate cognitive-linguistic impairment characterized by decreased problem solving, abstract and concrete verbal reasoning, STM, and increased processing time negatively impacting her ability to effectively communicate wants and needs with caregivers upon discharge. Recommendations:  Requires SLP Intervention: Yes  Duration/Frequency of Treatment: 2-3x/wk for LOS or until all goals met. D/C Recommendations: To be determined        Goals:  Short-term Goals  Timeframe for Short-term Goals: 2-3 weeks  Goal 1: Pt will complete abstract verbal reasoning tasks (similarities/differences, divergent naming) with 80% acc given cues as needed in order to promote effective communication of wants and needs with caregivers upon discharge. Goal 2: Pt will be educated on 3 memory strategies and will state their use in functional activities with 80% acc given cues as needed in order to promote safety with the completion of ADLs upon discharge. Goal 3: Pt will complete mid-level problem solving task related to ADLs with 80% acc given cues as needed in order to promote safety with the completion of ADLs upon discharge. Goal 4: Pt will complete delayed recall task with 80% acc given cues as needed in order to promote safety with the completion of ADLs upon discharge.   Long-term Goals  Timeframe for Long-term Goals: 2-3 weeks  Goal 1: Pt will increase Moderate(Pt unable to recall 3 words after 5-minute delay.)  Problem Solving  Problem Solving: Exceptions to [facility]  Complex Functional Tasks: Moderate  Managing Finances: To be assessed in therapy  Managing Medications: To be assessed in therapy  Simple Functional Tasks: Mild  Verbal Reasoning Skills: Moderate  Numeric Reasoning  Numeric Reasoning: Unable to assess  Abstract Reasoning  Abstract Reasoning: Exceptions to [facility]  Convergent Thinking: Moderate  Divergent Thinking: Moderate  Safety/Judgement  Safety/Judgement: Exceptions to [facility]  Complex Functional Tasks: Moderate  Routine Tasks: Mild  Unable to Self-monitor and Self-correct Consistently: Mild  Insight: Mild      Additional Assessments:    Portions of the RIPA-2 Michael Two [employer] Information Processing Assessment-2nd Edition ) were administered. Scores are as follows:  Subtest: Raw Score Standard Score   I. Immediate Memory 14 6   II. Recent Memory 26 11   III. Temporal Orientation 28 12   VIII. Problem Solving and          Abstract Reasoning 19 8     Patient exhibited delayed processing, reduced hearing with the need for increased time, volume, and repetitions. Prognosis:  Speech Therapy Prognosis  Prognosis: Fair  Prognosis Considerations: Age;Participation Level  Individuals consulted  Consulted and agree with results and recommendations: Patient; Family member  Family member consulted: Daughter    Education:  Patient Education: Pt educated on results of SLE. Patient Education Response: Verbalizes understanding;Needs reinforcement  Safety Devices in place: Yes  Type of devices: Bed alarm in place;Call light within reach;Nurse notified    Pain:  Pain Assessment  Patient Currently in Pain: Yes  Pain Assessment: 0-10  Pain Level: 7 in back  Pt stated pain was acceptable for session. GERARDO Goldsmith informed of pt's pain.     NATIONAL OUTCOMES MEASUREMENT SYSTEM (NOMS):  SPOKEN LANGUAGE COMPREHENSION  Ratin    SPOKEN LANGUAGE EXPRESSION  Ratin    MOTOR SPEECH  Ratin    PROBLEM SOLVING  Ratin    MEMORY  Ratin             Therapy Time  SLP Individual Minutes  Time In: 1120  Time Out: 1150  Minutes: 30        Signature: Electronically signed by KATHERIN Zaman-SLP on 2020 at 12:24 PM

## 2020-07-26 NOTE — H&P
HISTORY & PHYSICAL       DATE OF ADMISSION:  7/25/2020    DATE OF SERVICE:  7/26/20    Subjective:    Luz Maria Newton, 82 y.o. female presents today with:     CHIEF COMPLAINT:  57-year-old female with a history of Parkinson's disease and severe arthritis in her mid and low back has had progressive weakness in her legs. She recently had a mechanical fall from a standing position after her legs went out from under her. On admission she was found to be anemic with an H&H of 7.1 and 22. Her platelet count was also low at 78. She had a transfusion of packed red blood cells on 7/25/2020. Recent work-up including a UA was negative. See other labs as below. Current concerns are recurrent ascites and increased confusion. She was found to also have renal disease with an elevated creatinine between 1.6 and 1.9. Diagnostic studies of the spine as noted below  No acute fracture but severe arthritis. Due to the abdomen revealed cirrhosis of the liver and ascites. Dr Nicolette Longoria with GI was consulted and a paracentesis was done by Dr. Marie Ardon. I am worried that her ascites has worsened again. She was found to have severe cognitive deficits mobility and ADL deficits and was admitted to acute rehabilitation at [facility] 7/25/2020 with exacerbation of Parkinson's disease. Neurologic Problem   The patient's primary symptoms include an altered mental status, clumsiness, a loss of balance, memory loss and weakness. The patient's pertinent negatives include no focal sensory loss, focal weakness, near-syncope, slurred speech, syncope or visual change. This is a chronic problem. The current episode started more than 1 year ago. The neurological problem developed insidiously. The problem has been gradually worsening since onset. There was no focality noted. Associated symptoms include back pain, bladder incontinence, confusion, fatigue, light-headedness and shortness of breath.  Pertinent negatives include no abdominal pain, chest pain, dizziness, fever, headaches, nausea, neck pain, palpitations or vomiting. Past treatments include walking, sleep and position change (meds). The treatment provided mild relief. Her past medical history is significant for a bleeding disorder, dementia, liver disease and mood changes. There is no history of a clotting disorder, a CVA, head trauma or seizures. Fatigue   This is a recurrent problem. The current episode started in the past 7 days. The problem occurs constantly. The problem has been gradually improving. Associated symptoms include anorexia, arthralgias, fatigue, joint swelling, myalgias and weakness. Pertinent negatives include no abdominal pain, chest pain, chills, coughing, fever, headaches, nausea, neck pain, numbness, rash, visual change or vomiting. The symptoms are aggravated by walking. She has tried immobilization, rest, position changes, relaxation and lying down for the symptoms. The treatment provided mild relief. Back Pain   This is a recurrent problem. The current episode started in the past 7 days. The problem occurs constantly. The problem has been rapidly worsening since onset. The pain is present in the gluteal, lumbar spine and thoracic spine. The quality of the pain is described as cramping, burning and aching. The pain does not radiate. The pain is at a severity of 9/10. The pain is severe. The pain is worse during the day. Associated symptoms include bladder incontinence and weakness. Pertinent negatives include no abdominal pain, chest pain, dysuria, fever, headaches or numbness. Risk factors include obesity, menopause and sedentary lifestyle. She has tried walking, NSAIDs, muscle relaxant, heat, home exercises, analgesics and bed rest for the symptoms. The treatment provided mild relief.            The patient has stabilized medically andis able to participate at acute level rehab but is too medically complex for SNF due to need for therapy at the acute level with at least 15 hours a week of PT OT and cognitive and recreational therapy at an acute level with daily medical monitoring. Imaging:    Imaging and other studies reviewed and discussed with patient and staff    Ct Abdomen Pelvis   7/23/2020   : Liver: Small in size, and nodular contour, unchanged. Bile Ducts:  Normal in caliber. Gallbladder:  Surgically absent. Pancreas:  Hypoplastic, without masses, cysts, ductal dilatation or calcification. Spleen:  Normal in size without masses or calcifications. No splenules. Kidneys:  Right and left kidneys measure 8.0 cm, and 9.1 cm, respectively. 1 mm calcification mid pole left kidney, unchanged from 2013. No right renal calculi. Adrenals:  Normal. Small bowel:  Normal in caliber. Appendix:  Not visualized. Colon:  Normal in caliber. Surgical clips at level of rectum. Peritoneum:  Diffuse free fluid within the abdomen and pelvis, unchanged. No free air. Vessels: Aorta normal in course and caliber. Lymph nodes:  Retroperitoneal:  No enlarged retroperitoneal lymph nodes. Mesenteric:  No enlarged mesenteric lymph nodes. Pelvic: No enlarged pelvic lymph nodes. Ureters: Normal in course and caliber. No calcifications. Bladder: Partially decompressed. Reproductive organs: Uterus surgically absent. Dystrophic calcification, left adnexa, unchanged from March, 2013. Abdominal Wall: Remote abdominal wall defect repair. Edematous change within abdominal pelvic soft tissues diffusely, stable. Bones:  No bone lesions. No degenerative changes. No post operative changes. Cirrhosis. Diffuse ascites, unchanged. Diffuse edematous change, abdominal and pelvic walls, stable. Small right kidney. Left kidney lower limits of normal. Hypoplastic pancreas. Remote hemorrhoid surgery. Cholecystectomy. Hysterectomy.  All CT scans at this facility use dose modulation, iterative reconstruction, and/or weight based dosing when appropriate to reduce radiation dose to as low as reasonably achievable. Xr Hip Right  7/25/2020    Moderate degenerative changes with joint space loss in the bilateral hips. No acute fracture identified. Vascular calcifications. Impression:  1. No acute fracture identified. 2. Moderate bilateral osteoarthritis of the hips. 3. Vascular calcifications. Ct Head   7/23/2020   : Extra-axial spaces:  Normal. Intracranial hemorrhage:  None. Ventricular system: Ventricles mildly enlarged. Sulci mildly prominent. Basal Cisterns:  Normal. Cerebral Parenchyma: Physiologic basal ganglia calcification, unchanged. Midline Shift:  None. Cerebellum:  Normal. Paranasal sinuses and mastoid air cells:  Normal. Visualized Orbits: Remote bilateral ocular surgery. Impression: No acute findings. Mild cerebral atrophy. Ct Chest   7/23/2020   Right lung shows mild dependent atelectatic change posterior right upper lobe, posterior right middle lobe, and posterior right lower lobe. No nodules or masses. No pleural effusion. Left lung shows dependent subsegmental atelectatic change, left upper lobe. Small peripheral focus of groundglass opacity lateral lower lingula. Dependent subsegmental atelectatic change, small left pleural effusion. No nodules or masses. No hilar, mediastinal, or axillary lymph node enlargement. Cardiac size normal. No pericardial effusion. Calcified mitral annulus. Thoracic aorta normal in course and caliber. Bridging anterior osteophytes mid to lower thoracic spine. No osteoblastic, and no osteolytic lesions. No fracture. Bilateral dependent atelectatic change. Small left effusion. Ct Cervical Spine   7/23/2020     Fracture:No acute fracture. Alignment:  Normal cervical lordosis. No subluxation. No canal stenosis. Dense atlas:Dens atlas relationship is normal. Soft tissues:Airway patent. No soft tissue mass or adenopathy. Other findings: There is mild  cervical spondylosis with multiple level disc osteophyte changes.        NO ACUTE CERVICAL SPINE ABNORMALITY. Ct Thoracic Spine  7/23/2020     Alignment/fracture: Normal alignment without subluxation. No evidence of fracture. Perivertebral soft tissues are normal.  Disc spaces: There are multiple levels of disc space narrowing and end plate changes consistent with degenerative change. NEGATIVE CT OF THE THORACIC SPINE. Ct Lumbar Spine  7/24/2020   : No acute fractures identified. There is T11 superior endplate Schmorl's node with dystrophic calcification, unchanged from prior imaging study. There is lumbar spondylosis with multilevel intervertebral disc space narrowing, marginal osteophytosis, and facet hypertrophy. There is L4-5 degenerative disc vacuum phenomenon. There is L3-4 and L4-5 disc bulging which does not appear to impinge on the exiting nerve roots. There is mild L4-5 central canal stenosis. There is disc bulging and  degenerative disc vacuum phenomenon at L1-2. The SI joints are symmetrically maintained. There is iliac sclerosis of the SI joints. Ascites is noted within the field-of-view. There are 2 left renal midpole nonobstructing stones, measuring 2 mm and 4 mm. DEGENERATIVE CHANGES. NO ACUTE FRACTURE. Xr Chest   7/23/2020  XR CHEST PORTABLE : 7/22/2020 CLINICAL HISTORY: Pain after fall . COMPARISON: High-resolution chest CT 12/30/2019 and two-view chest 11/18/2019. TECHNIQUE: An upright portable AP radiograph of the chest was obtained. FINDINGS: A shallow inspiration is present without significant infiltrate identified. There is no cardiomegaly, significant pleural effusion, vascular congestion, pneumothorax, or displaced fractures identified. NO EVIDENCE OF ACUTE THORACIC TRAUMA OR ACTIVE CARDIOPULMONARY DISEASE. Us Guided   7/23/2020    Informed consent was obtained. Limited transabdominal ultrasound was performed and marking done in the usual fashion.  Sterile technique and local lidocaine anesthesia was used to place a 5 [place] catheter 106 07/25/2020    CO2 19 07/25/2020    BUN 21 07/25/2020    CREATININE 1.01 07/25/2020    CALCIUM 8.3 07/25/2020    LABALBU 2.9 07/25/2020    LABALBU 4.5 03/30/2012    BILITOT 0.7 07/25/2020    ALKPHOS 53 07/25/2020    AST 28 07/25/2020    ALT 6 07/25/2020     Lab Results   Component Value Date    WBC 4.6 07/24/2020    RBC 2.14 07/24/2020    RBC 4.01 04/06/2012    HGB 8.4 07/25/2020    HCT 25.8 07/25/2020    .9 07/24/2020    MCH 32.5 07/24/2020    MCHC 31.6 07/24/2020    RDW 19.9 07/24/2020    PLT 85 07/24/2020    MPV 10.9 02/07/2013     Lab Results   Component Value Date    VITD25 72.4 04/02/2012     Lab Results   Component Value Date    APPEARANCE CLEAR 07/05/2012    COLORU Yellow 07/26/2020    NITRU Negative 07/26/2020    GLUCOSEU Negative 07/26/2020    GLUCOSEU NEG 04/01/2012    KETUA Negative 07/26/2020    UROBILINOGEN 0.2 07/26/2020    BILIRUBINUR Negative 07/26/2020    BILIRUBINUR neg 03/27/2018    BILIRUBINUR NEG 04/01/2012     Lab Results   Component Value Date    PROTIME 17.3 07/22/2020    PROTIME 12.3 03/30/2012     Lab Results   Component Value Date    INR 1.4 07/22/2020         I discussed results with patient. The patient remains highly medically complex and continues to have severe problems with activities of daily living and mobility. The patient was assessed to be able to tolerate intensive rehabilitation and therefore was admitted to Rehabilitation to address these needs. Prior Function; everyday activities:     Social History     Socioeconomic History    Marital status:       Spouse name: Not on file    Number of children: Not on file    Years of education: 15    Highest education level: High school graduate   Occupational History    Occupation: Homemaker   Social Needs    Financial resource strain: Somewhat hard    Food insecurity     Worry: Never true     Inability: Never true    Transportation needs     Medical: Yes     Non-medical: Yes   Tobacco Use    Smoking status: Never Smoker    Smokeless tobacco: Never Used   Substance and Sexual Activity    Alcohol use: No    Drug use: No    Sexual activity: Not Currently     Partners: Male   Lifestyle    Physical activity     Days per week: 0 days     Minutes per session: 0 min    Stress: Only a little   Relationships    Social connections     Talks on phone: More than three times a week     Gets together: More than three times a week     Attends Cheondo[employer] service: 1 to 4 times per year     Active member of club or organization: No     Attends meetings of clubs or organizations: Never     Relationship status:     Intimate partner violence     Fear of current or ex partner: No     Emotionally abused: No     Physically abused: No     Forced sexual activity: No   Other Topics Concern    Not on file   Social History Narrative     Lives With: Alone in [address] she is a     She has a daughter named -----and who lives in [facility] and that is quite helpful and comes to see her almost daily    Type of Home: Apartment  at [address] in [place] (7th floor)    Home Layout: One level    Home Access: Elevator, Level entry  (Level entry, elevator to 7th floor)    Bathroom Shower/Tub: Tub/Shower unit    Home Equipment: 4 wheeled walker    ADL Assistance: Independent    Homemaking Assistance: Independent    Ambulation Assistance: Independent(Rollator)    Transfer Assistance: Independent    Active : No    Patient's  Info: daughter    IADL Comments: Daughter helps with driving and groceries PRN    Additional Comments: Reports recent fall- \"my legs gave out\"         Social supports listed above.       Prior Device(s) used:  As above    History of falls:  Rarely falls    In depth analysis of complex functional data; the patient has been:    Current Rehabilitation Assessments:    Physical therapy: FIMS:  Bed Mobility: Scooting: Supervision    Transfers: ,  ,      FIMS:  , ,      Occupational therapy: FIMS:   ,  , Assessment: Pt. is an 82year old woman from home who presents to [facility] with the above deficits which impact her ability to perform ADLs and IADLs. Pt. would benefit from OT to maximize independence and safety with ADL tasks. OCCUPATIONAL THERAPY  Hand Dominance: Left  ADL  Feeding: Independent (07/26/20 1055)  Grooming: Supervision (07/26/20 1055)  UE Bathing: Supervision (07/26/20 1055)  LE Bathing: None(Patient declined lower body bathing due to pain and fatigue) (07/26/20 1055)  UE Dressing: None(Patient donned a new gown. Dtr is to bring clothing today) (07/26/20 1055)  LE Dressing: Stand by assistance(Patient did not change her socks) (07/26/20 1055)  Toileting: Stand by assistance (07/26/20 1055)  Toilet Transfers  Toilet - Technique: Ambulating (07/26/20 1058)  Equipment Used: Grab bars (07/26/20 1058)  Toilet Transfer: Stand by assistance (07/26/20 1058)  Tub Transfers  Tub Transfers Comments: patient declined a shower today due to fatigue (07/26/20 1058)       Speech therapy: FIMS:       Prior to admission patient was independent with all ADLs and mobilityand did not require any outside services.        Past Medical History:   Diagnosis Date    Asthma     Fibromyalgia     Hemorrhoid     Hyperlipidemia     Hypertension     Parkinson's disease ([address]     Type II or unspecified type diabetes mellitus without mention of complication, not stated as uncontrolled        Past Surgical History:   Procedure Laterality Date    CHOLECYSTECTOMY      EYE SURGERY      HEMORRHOID SURGERY      HERNIA REPAIR  2/20/2002    ventral hernia    HERNIA REPAIR      LIH repair    HYSTERECTOMY      JOINT REPLACEMENT Right     partial left, full right    LAMINECTOMY      PARACENTESIS Left 03/12/2020    5010cc ascites removed by Dr Cristi Amato [phone]    PARACENTESIS Left 06/10/2020    3,850 cc removed per Dr Sukhjinder Mart Left 07/08/2020    total 5450 ml removed per Dr Consuelo Lawton PARACENTESIS Left 07/23/2020    Dr Kiki Woods 1101 off    UPPER GASTROINTESTINAL ENDOSCOPY N/A 2/21/2020    EGD WITH BANDING performed by Norman Lua MD at [facility]       Current Facility-Administered Medications   Medication Dose Route Frequency Provider Last Rate Last Dose    oxyCODONE (ROXICODONE) immediate release tablet 5 mg  5 mg Oral Q4H PRN Karen Scullin, DO   5 mg at 07/26/20 1157    traMADol (ULTRAM) tablet 25 mg  25 mg Oral Q6H PRN Karen Scullin, DO        ondansetron (ZOFRAN) tablet 4 mg  4 mg Oral Q8H PRN Deepti Atkinson, DO        fleet rectal enema 1 enema  1 enema Rectal Daily PRN Karen Scullin, DO        albuterol (PROVENTIL) nebulizer solution 2.5 mg  2.5 mg Nebulization Q6H PRN Atul Hudson MD        pantoprazole (PROTONIX) tablet 40 mg  40 mg Oral QAM AC Atul Hudson MD   40 mg at 07/26/20 0643    topiramate (TOPAMAX) tablet 50 mg  50 mg Oral Daily Atul Hudson MD   50 mg at 07/26/20 0834    traZODone (DESYREL) tablet 150 mg  150 mg Oral Nightly Atul Hudson MD   150 mg at 07/25/20 2210    ondansetron (ZOFRAN) injection 4 mg  4 mg Intravenous Q6H PRN Atul Hudson MD        budesonide-formoterol (SYMBICORT) 160-4.5 MCG/ACT inhaler 2 puff  2 puff Inhalation BID Atul Hudson MD        carbidopa-levodopa (SINEMET)  MG per tablet 1 tablet  1 tablet Oral TID Atul Hudson MD   1 tablet at 68/73/27 1760    folic acid (FOLVITE) tablet 1 mg  1 mg Oral Daily Atul Hudson MD   1 mg at 07/26/20 0835    furosemide (LASIX) tablet 40 mg  40 mg Oral Daily Atul Hudson MD   40 mg at 07/26/20 0835    spironolactone (ALDACTONE) tablet 50 mg  50 mg Oral Daily Atul Hudson MD   50 mg at 07/26/20 3536       Allergies   Allergen Reactions    Adhesive Tape     Amoxicillin-Pot Clavulanate     Bactrim [Sulfamethoxazole-Trimethoprim]     Clarithromycin      Other reaction(s):  Other: See Comments  tachycardia    Other     Sulfa Antibiotics        Social History Socioeconomic History    Marital status:      Spouse name: Not on file    Number of children: Not on file    Years of education: 15    Highest education level: High school graduate   Occupational History    Occupation: Homemaker   Social Needs    Financial resource strain: Somewhat hard    Food insecurity     Worry: Never true     Inability: Never true    Transportation needs     Medical: Yes     Non-medical: Yes   Tobacco Use    Smoking status: Never Smoker    Smokeless tobacco: Never Used   Substance and Sexual Activity    Alcohol use: No    Drug use: No    Sexual activity: Not Currently     Partners: Male   Lifestyle    Physical activity     Days per week: 0 days     Minutes per session: 0 min    Stress: Only a little   Relationships    Social connections     Talks on phone: More than three times a week     Gets together: More than three times a week     Attends Holiness service: 1 to 4 times per year     Active member of club or organization: No     Attends meetings of clubs or organizations: Never     Relationship status:      Intimate partner violence     Fear of current or ex partner: No     Emotionally abused: No     Physically abused: No     Forced sexual activity: No   Other Topics Concern    Not on file   Social History Narrative     Lives With: Alone in [address] [place] [address] she is a     She has a daughter named -----and who lives in [facility] and that is quite helpful and comes to see her almost daily    Type of Home: Apartment  at [address] in [place] (7th floor)    Home Layout: One level    Home Access: Elevator, Level entry  (Level entry, elevator to 7th floor)    Bathroom Shower/Tub: Tub/Shower unit    Home Equipment: 4 wheeled walker    ADL Assistance: Independent    Homemaking Assistance: Independent    Ambulation Assistance: Independent(Rollator)    Transfer Assistance: Independent    Active : No    Patient's  Info: daughter    IADL Comments: Daughter helps with driving and groceries PRN    Additional Comments: Reports recent fall- \"my legs gave out\"                     FAMILY HISTORY:  Does not pertain tochief complaint. Review of Systems   Constitutional: Positive for activity change and fatigue. Negative for appetite change, chills, fever and unexpected weight change. HENT: Negative for ear discharge, ear pain, facial swelling, hearing loss and trouble swallowing. Eyes: Negative for photophobia, pain and redness. Respiratory: Positive for shortness of breath. Negative for cough, choking, chest tightness and wheezing. Cardiovascular: Negative for chest pain, palpitations, leg swelling and near-syncope. Gastrointestinal: Positive for abdominal distention, anorexia and constipation. Negative for abdominal pain, anal bleeding, blood in stool, nausea and vomiting. Genitourinary: Positive for bladder incontinence. Negative for difficulty urinating, dysuria, flank pain, frequency and urgency. Musculoskeletal: Positive for arthralgias, back pain, gait problem, joint swelling and myalgias. Negative for neck pain and neck stiffness. Skin: Negative for color change, pallor, rash and wound. Allergic/Immunologic: Positive for immunocompromised state. Neurological: Positive for weakness, light-headedness and loss of balance. Negative for dizziness, tremors, focal weakness, syncope, facial asymmetry, speech difficulty, numbness and headaches. Hematological: Negative for adenopathy. Does not bruise/bleed easily. Psychiatric/Behavioral: Positive for confusion, memory loss and sleep disturbance. Negative for agitation, behavioral problems, decreased concentration, dysphoric mood, hallucinations, self-injury and suicidal ideas. The patient is not nervous/anxious and is not hyperactive. All other systems reviewed and are negative.          Objective  BP (!) 114/56   Pulse 61   Temp 98 °F (36.7 °C) (Oral)   Resp 16   Ht 5' 1\" (1.549 m)   Wt 177 lb 0.7 oz (80.3 kg)   SpO2 94%   BMI 33.45 kg/m² *    Physical Exam  Vitals signs reviewed. Constitutional:       General: She is not in acute distress. Appearance: She is well-developed. She is not ill-appearing, toxic-appearing or diaphoretic. Comments:         HENT:      Head: Normocephalic and atraumatic. Right Ear: Hearing normal.      Left Ear: Hearing normal.      Nose: Nose normal.      Mouth/Throat:      Mouth: No oral lesions. Dentition: Normal dentition. Pharynx: No oropharyngeal exudate. Eyes:      General: No scleral icterus. Right eye: No discharge. Left eye: No discharge. Conjunctiva/sclera: Conjunctivae normal.      Right eye: No chemosis or exudate. Left eye: No chemosis or exudate. Pupils: Pupils are equal, round, and reactive to light. Neck:      Musculoskeletal: Normal range of motion and neck supple. No edema or neck rigidity. Thyroid: No thyromegaly. Vascular: No JVD. Trachea: No tracheal deviation. Cardiovascular:      Pulses: No decreased pulses. Pulmonary:      Effort: Pulmonary effort is normal. No tachypnea, bradypnea, accessory muscle usage or respiratory distress. Breath sounds: Decreased breath sounds present. No wheezing or rales. Chest:      Chest wall: No tenderness. Abdominal:      General: Bowel sounds are normal. There is no distension. Palpations: Abdomen is soft. There is shifting dullness. There is no mass. Tenderness: There is no abdominal tenderness. There is no guarding or rebound. Comments: Severe ascites   Musculoskeletal:         General: Tenderness present.       Right shoulder: Normal.      Left shoulder: Normal.      Right elbow: Normal.     Left elbow: Normal.      Right wrist: Normal.      Left wrist: Normal.      Right hip: Normal.      Left hip: Normal.      Right knee: Normal.      Left knee: Normal.      Right ankle: Normal. Achilles tendon normal.      Left ankle: Normal. Achilles tendon normal.      Cervical back: Normal.      Thoracic back: Normal.      Lumbar back: She exhibits decreased range of motion, tenderness, bony tenderness and pain. She exhibits no swelling, no edema, no deformity, no laceration and normal pulse. Right upper arm: Normal.      Left upper arm: Normal.      Right forearm: Normal.      Left forearm: Normal.      Right hand: Normal.      Left hand: Normal.      Right upper leg: Normal.      Left upper leg: Normal.      Right lower leg: Normal.      Left lower leg: Normal.      Right foot: Normal.      Left foot: Normal.      Comments: Tender areas are indicated by numbered spot         Lymphadenopathy:      Cervical: No cervical adenopathy. Skin:     General: Skin is warm and dry. Coloration: Skin is not pale. Findings: No abrasion, bruising, ecchymosis, erythema, laceration, petechiae or rash. Rash is not macular, pustular or urticarial.      Nails: There is no clubbing. Neurological:      Mental Status: She is alert and oriented to person, place, and time. Cranial Nerves: No cranial nerve deficit. Sensory: Sensory deficit present. Motor: No tremor, atrophy or abnormal muscle tone. Coordination: Coordination normal. Finger-Nose-Finger Test abnormal, Heel to [employer] abnormal and Romberg Test abnormal.      Gait: Gait abnormal and tandem walk abnormal.      Deep Tendon Reflexes: Reflexes abnormal. Babinski sign absent on the right side. Babinski sign absent on the left side. Reflex Scores:       Tricep reflexes are 1+ on the right side and 1+ on the left side. Bicep reflexes are 1+ on the right side and 1+ on the left side. Brachioradialis reflexes are 1+ on the right side and 1+ on the left side. Patellar reflexes are 0 on the right side and 0 on the left side. Achilles reflexes are 0 on the right side and 0 on the left side.   Psychiatric:         Attention and Perception: She is inattentive. Mood and Affect: Mood is depressed. Mood is not anxious. Affect is not labile, blunt, angry or inappropriate. Speech: She is communicative. Speech is not rapid and pressured, delayed, slurred or tangential.         Behavior: Behavior is slowed and withdrawn. Behavior is not agitated, aggressive, hyperactive or combative. Thought Content: Thought content normal. Thought content is not paranoid or delusional. Thought content does not include homicidal or suicidal ideation. Thought content does not include homicidal or suicidal plan. Cognition and Memory: Cognition is impaired. Memory is impaired. She exhibits impaired recent memory. She does not exhibit impaired remote memory. Judgment: Judgment normal. Judgment is not impulsive or inappropriate. Back Exam     Muscle Strength   Right Quadriceps:  4/5   Left Quadriceps:  4/5   Right Hamstrings:  4/5   Left Hamstrings:  4/5           Neurologic Exam     Mental Status   Oriented to person, place, and time. Attention: normal. Concentration: normal.   Speech: not slurred   Level of consciousness: alert  Knowledge: inconsistent with education. Able to name object. Unable to read. Able to repeat. Unable to write. Abnormal comprehension. Cranial Nerves     CN III, IV, VI   Pupils are equal, round, and reactive to light.     Motor Exam   Muscle bulk: normal  Right arm tone: cogwheel rigidity  Left arm tone: cogwheel rigidity    Strength   Right neck flexion: 4/5  Left neck flexion: 4/5  Right neck extension: 4/5  Left neck extension: 4/5  Right deltoid: 4/5  Left deltoid: 4/5  Right biceps: 4/5  Left biceps: 4/5  Right triceps: 4/5  Left triceps: 4/5  Right wrist flexion: 4/5  Left wrist flexion: 4/5  Right wrist extension: 4/5  Left wrist extension: 4/5  Right interossei: 4/5  Left interossei: 4/5  Right abdominals: 4/5  Left abdominals: 4/5  Right iliopsoas: 4/5  Left iliopsoas: 4/5  Right quadriceps: 4/5  Left quadriceps: 4/5  Right hamstrin/5  Left hamstrin/5  Right glutei: 4/5  Left glutei: 4/5  Right anterior tibial: 4/5  Left anterior tibial: 4/5  Right posterior tibial: 4/5  Left posterior tibial: 4/5  Right peroneal: 4/5  Left peroneal: 4/5  Right gastroc: 4/5  Left gastroc: 4/5    Sensory Exam   Right arm light touch: decreased from fingers  Left arm light touch: decreased from fingers  Right leg light touch: decreased from knee  Left leg light touch: decreased from knee    Gait, Coordination, and Reflexes     Gait  Gait: shuffling    Coordination   Romberg: positive  Finger to nose coordination: abnormal  Heel to shin coordination: abnormal  Tandem walking coordination: abnormal    Tremor   Resting tremor: present  Intention tremor: present    Reflexes   Right brachioradialis: 1+  Left brachioradialis: 1+  Right biceps: 1+  Left biceps: 1+  Right triceps: 1+  Left triceps: 1+  Right patellar: 0  Left patellar: 0  Right achilles: 0  Left achilles: 0  Right plantar: normal  Left plantar: normal      After extensive review of the records and above physical exam, I have formulated the followingdiagnoses and plan:      DIAGNOSES:    1. The patient was admitted to the acute rehabilitation unit with the primary rehab diagnoses being severe abnormality of gait and mobility andimpaired self care and ADL's due to intolerance to many medications and not a able to take many medications because of her cirrhosis    Compared to Pre-Admission Assessment, patients medical and functional status remain challengingly complex and patient continues to requireintensive therapeutic intervention from multiple therapies, therefore, initiate acute intensive comprehensive inpatient rehabilitation program including PT/OT to improve balance, ambulation, ADLs, and to improve the P/AROM.   Functional and medical status reassessed regarding patients ability to participate in therapies and patient found to be able to participate in acute intensivecomprehensive inpatient rehabilitation program.  Therapeutic modifications regarding activities in therapies, place, amount of time per day and intensity of therapy made daily. Enroll in acute course of therapy program to include 1 1/2 hours per day of PT 5 to 7days per week and 1 1/2 hours per day of OT 5 to 7 days per week, and  SLP and Rec T 1/2 hour per day 3-5 days per week. The patient is stable medically and physically on previous exam.       This patient present with significant new onset decreased mobility andinability to perform activities of daily living skills independently and is at significant risk for prolonged disability  For this reason they have been admitted to [facility]. Thepatient's current functional and medical status are highly complex but the patient is able to participate in intensive rehabilitation. A comprehensive inpatient rehabilitation program is appropriate. The patient Kena [place] initial evaluation by the rehabilitation team and be discussed at regular treatment team meetings to assess progress, mobility, self care, mood and discharge issues. Physical therapy will be consulted for mobilityand endurance issues and should be performed 1 to 2 times per day, 7 days per week for the length of stay. Occupational therapy will be consulted for activities of daily living and should be performed 1 to 2 times per day,7 days per week for the length of stay. Their capacity to participate at an acute level, decision to be treated in the gym, room or on the unit, their activity goals for the day and the number of minutes of activeparticipation will be reassessed and re-prescribed daily. Because this patient is medically complex, I will check a CBC, BMP, UA and orthostatic blood pressures.   They will be reassessed daily regarding their ability toparticipate in an acute level rehabilitation program. Thrombocytopenia with severe anemia and recent blood transfusion-recheck CBC add vitamin B12 vitamin D  6. Vitamin D deficiency-add daily vitamin D and recheck as an outpatient  7. Secondary esophageal varices without bleeding-monitor stools for blood avoid strong blood thinners if possible  8. Morbidly obese but with  Moderate malnutrition -add high-protein low-carb supplementation if possible however because of her her recent liver disease she may not be able to process proteins very well. 9.   Stasis dermatitis-protect skin add ET mix to the lower extremities add co-Q10  10. Severe progressive and recurrent ascites of the liver-eliminate toxic medications recheck as an outpatient monitor ammonia level-reconsult interventional radiology for repeat paracentesis. 11. Hepatic encephalopathy with Elavil elevated ammonia level-titrate lactulose recheck ammonia           I am especially concerned about cirrhosis    The patient's high risk medication use includes Lyrica given her confusion. The patient is high risk for urinary tract infection, an admission urinalysis has been ordered. I will have the nurses check post void residual bladder volumes and place acatheter if excessive urine is retained in the bladder after voiding. The patient is risk for deep venous thromosis, complex deep venous thrombosis protocol prophylaxis has been ordered avoid thinners if possible due to low platelet count. The patient is high risk for orthostasis and a hydration program and orthostatic blood pressure screening have been ordered. I will attempt to get old records from the patient's previous hospital stay. Care [facility]. 3.  Current and previous medications were reviewed and summarized and compared to old medication lists from home and from the acute floor. 4.  Complex labs and x-rays were reviewed. I will review patient's old EKG and labs.     5.  Will provide emotional support for this patient regarding adjustment to their disability. Cognition and mood will be screened daily and addressed by rehabilitationpsychology and/or speech therapy as appropriate. I have encouraged the patient to attend the Rehab Adjustment to Disability Support Group and recreational therapy. 6.  Estimated length of stay is 2-3 weeks. Discharge to home with help from family and home health PT, OT, RN, and aide. Patient should be independent at discharge. 7.  The patient's medical and rehab prognosis are good. [address] regarding the patient's back up to general medical needs. A welcome letter was presented with an explanation of my services, my specialty and what to expect during the rehabilitation process. Aswell as introducing myself, I also wrote my name on their bedside marker board with their name as well as the names of the other physicians with an explanation of our individual roles in their care, as well as the rehab process.             Cha Vo D.O., [email or web address].[email or web address]

## 2020-07-27 PROBLEM — H40.9 GLAUCOMA: Status: ACTIVE | Noted: 2020-01-01

## 2020-07-27 PROBLEM — N17.9 ACUTE RENAL FAILURE (ARF) (HCC): Status: ACTIVE | Noted: 2020-01-01

## 2020-07-27 PROBLEM — E66.9 OBESITY (BMI 30-39.9): Status: ACTIVE | Noted: 2020-01-01

## 2020-07-27 PROBLEM — K76.6 PORTAL HYPERTENSION (HCC): Status: ACTIVE | Noted: 2020-01-01

## 2020-07-27 PROBLEM — K31.89 DIABETIC GASTROPATHY (HCC): Status: ACTIVE | Noted: 2020-01-01

## 2020-07-27 PROBLEM — K31.9 DIABETIC GASTROPATHY (HCC): Status: ACTIVE | Noted: 2020-01-01

## 2020-07-27 PROBLEM — E11.69 DIABETIC GASTROPATHY (HCC): Status: ACTIVE | Noted: 2020-01-01

## 2020-07-27 PROBLEM — Z98.890 HISTORY OF LUMBAR LAMINECTOMY: Status: ACTIVE | Noted: 2020-01-01

## 2020-07-27 PROBLEM — H91.90 HOH (HARD OF HEARING): Status: ACTIVE | Noted: 2020-01-01

## 2020-07-27 NOTE — PROGRESS NOTES
Subjective: The patient complains of severe  acute on chronic confusion rigidity ascites malaise partially relieved by recent paracentesis, PT, OT, speech and language pathology, rest and exacerbated by reaccumulation of ascites. I am concerned about patients drop of H&H and abdominal pain. ROS x10: The patient also complains of severely impaired mobility and activities of daily living. Otherwise no new problems with vision, hearing, nose, mouth, throat, dermal, cardiovascular, GI, , pulmonary, musculoskeletal, psychiatric or neurological. See Rehab H&P on Rehab chart dated . Vital signs:  /69   Pulse 79   Temp 98 °F (36.7 °C) (Oral)   Resp 16   Ht 5' 1\" (1.549 m)   Wt 177 lb 0.7 oz (80.3 kg)   SpO2 94%   BMI 33.45 kg/m²   I/O:   PO/Intake:  fair PO intake, no problems observed or reported. Bowel/Bladder:  continent, no problems noted. General:  Patient is well developed, adequately nourished, non-obese and     well kempt. HEENT:    PERRLA, hearing intact to loud voice, external inspection of ear     and nose benign. Inspection of lips, tongue and gums benign  Musculoskeletal: No significant change in strength or tone. All joints stable. Inspection and palpation of digits and nails show no clubbing,       cyanosis or inflammatory conditions. Neuro/Psychiatric: Affect: flat but pleasant. Alert and oriented to person, place and     situation. No significant change in deep tendon reflexes or     sensation  Lungs:  CTA-B. Respiration effort is normal at rest.     Heart:   S1 = S2, RRR. No loud murmurs. Abdomen:  Firm with tenderness due to ascites, non-tender, no enlargement of liver or spleen. Extremities:  No significant lower extremity edema or tenderness. Skin:   Intact to general survey, no visualized or palpated problems.     Rehabilitation:  Physical therapy: FIMS:  Bed Mobility: Scooting: Supervision    Transfers: Sit to Stand: Stand by assistance  Stand to sit: Stand by assistance  Bed to Chair: Stand by assistance(good safety awareness), Ambulation 1  Surface: level tile  Device: Rolling Walker  Assistance: Stand by assistance  Gait Deviations: Slow Vivian  Distance: 50ft with turns  Comments: increased time for turns, fatigued,      FIMS:  ,  ,      Occupational therapy: FIMS:   ,  , Assessment: Pt. is an 82year old woman from home who presents to [address] [facility] with the above deficits which impact her ability to perform ADLs and IADLs. Pt. would benefit from OT to maximize independence and safety with ADL tasks. Speech therapy: FIMS:        Lab/X-ray studies reviewed, analyzed and discussed with patient and staff:   No results found for this or any previous visit (from the past 24 hour(s)). Ct Abdomen Pelvis  7/23/2020    Liver: Small in size, and nodular contour, unchanged. Bile Ducts:  Normal in caliber. Gallbladder:  Surgically absent. Pancreas:  Hypoplastic, without masses, cysts, ductal dilatation or calcification. Spleen:  Normal in size without masses or calcifications. No splenules. Kidneys:  Right and left kidneys measure 8.0 cm, and 9.1 cm, respectively. 1 mm calcification mid pole left kidney, unchanged from 2013. No right renal calculi. Adrenals:  Normal. Small bowel:  Normal in caliber. Appendix:  Not visualized. Colon:  Normal in caliber. Surgical clips at level of rectum. Peritoneum:  Diffuse free fluid within the abdomen and pelvis, unchanged. No free air. Vessels: Aorta normal in course and caliber. Lymph nodes:  Retroperitoneal:  No enlarged retroperitoneal lymph nodes. Mesenteric:  No enlarged mesenteric lymph nodes. Pelvic: No enlarged pelvic lymph nodes. Ureters: Normal in course and caliber. No calcifications. Bladder: Partially decompressed. Reproductive organs: Uterus surgically absent. Dystrophic calcification, left adnexa, unchanged from March, 2013. Abdominal Wall: Remote abdominal wall defect repair.  Edematous change within abdominal pelvic soft tissues diffusely, stable. Bones:  No bone lesions. No degenerative changes. No post operative changes. Cirrhosis. Diffuse ascites, unchanged. Diffuse edematous change, abdominal and pelvic walls, stable. Small right kidney. Left kidney lower limits of normal. Hypoplastic pancreas. Remote hemorrhoid surgery. Cholecystectomy. Hysterectomy. Xr Hip Right 7/25/2020    Moderate degenerative changes with joint space loss in the bilateral hips. No acute fracture identified. Vascular calcifications. Impression:  1. No acute fracture identified. 2. Moderate bilateral osteoarthritis of the hips. 3. Vascular calcifications. Ct Head   7/23/2020: Extra-axial spaces:  Normal. Intracranial hemorrhage:  None. Ventricular system: Ventricles mildly enlarged. Sulci mildly prominent. Basal Cisterns:  Normal. Cerebral Parenchyma: Physiologic basal ganglia calcification, unchanged. Midline Shift:  None. Cerebellum:  Normal. Paranasal sinuses and mastoid air cells:  Normal. Visualized Orbits: Remote bilateral ocular surgery. Impression: No acute findings. Mild cerebral atrophy. Ct Chest  : 7/23/2020    Right lung shows mild dependent atelectatic change posterior right upper lobe, posterior right middle lobe, and posterior right lower lobe. No nodules or masses. No pleural effusion. Left lung shows dependent subsegmental atelectatic change, left upper lobe. Small peripheral focus of groundglass opacity lateral lower lingula. Dependent subsegmental atelectatic change, small left pleural effusion. No nodules or masses. No hilar, mediastinal, or axillary lymph node enlargement. Cardiac size normal. No pericardial effusion. Calcified mitral annulus. Thoracic aorta normal in course and caliber. Bridging anterior osteophytes mid to lower thoracic spine. No osteoblastic, and no osteolytic lesions. No fracture. Bilateral dependent atelectatic change. Small left effusion.       Ct Cervical Spine 7/23/2020    Fracture:No acute fracture. Alignment:  Normal cervical lordosis. No subluxation. No canal stenosis. Dense atlas:Dens atlas relationship is normal. Soft tissues:Airway patent. No soft tissue mass or adenopathy. Other findings: There is mild  cervical spondylosis with multiple level disc osteophyte changes. NO ACUTE CERVICAL SPINE ABNORMALITY. Ct Thoracic Spine  7/23/2020   Alignment/fracture: Normal alignment without subluxation. No evidence of fracture. Perivertebral soft tissues are normal.  Disc spaces: There are multiple levels of disc space narrowing and end plate changes consistent with degenerative change. NEGATIVE CT OF THE THORACIC SPINE. Ct Lumbar Spine 7/24/2020  EXAMINATION: CT LUMBAR SPINE WO CONTRAST CLINICAL HISTORY: Fall from standing. Back pain. COMPARISON: CT abdomen pelvis 3/10/2020 TECHNIQUE:  Spiral scans with no contrast. Multiplanar 2-D reconstructions. All CT scans at this facility use dose modulation, iterative reconstruction, and/or weight based dosing when appropriate to reduce radiation dose to as low as reasonably achievable. FINDINGS: No acute fractures identified. There is T11 superior endplate Schmorl's node with dystrophic calcification, unchanged from prior imaging study. There is lumbar spondylosis with multilevel intervertebral disc space narrowing, marginal osteophytosis, and facet hypertrophy. There is L4-5 degenerative disc vacuum phenomenon. There is L3-4 and L4-5 disc bulging which does not appear to impinge on the exiting nerve roots. There is mild L4-5 central canal stenosis. There is disc bulging and  degenerative disc vacuum phenomenon at L1-2. The SI joints are symmetrically maintained. There is iliac sclerosis of the SI joints. Ascites is noted within the field-of-view. There are 2 left renal midpole nonobstructing stones, measuring 2 mm and 4 mm. DEGENERATIVE CHANGES. NO ACUTE FRACTURE. Xr Chest   7/23/2020   :  A shallow Us Retroperitoneal   7/23/2020  EXAMINATION: US RETROPERITONEAL LIMITED CLINICAL HISTORY: 82year-old with renal insufficiency and extensive abdominal ascites COMPARISONS: Unenhanced abdominal CT 7/23/2020 FINDINGS: Renal sonography was performed. The study is compromised due to patient body habitus and sonographic window. Kidneys by ultrasound are symmetric borderline small in size. Right kidney measures 9.3 x 5.3 x 4.3 cm left kidney measures 9.2 x 4.7 x  4.8 cm. There are a few punctate echogenic foci in the central sinus echocomplex raising the question of specular reflectors. Bilateral renal echotexture is otherwise unremarkable. No large cortical cysts, solid masses or shadowing calcifications. No ultrasound signs of hydronephrosis. Note is made of upper abdominal ascites     SYMMETRIC KIDNEYS BORDERLINE SMALL IN SIZE. NO ULTRASOUND SIGNS OF HYDRONEPHROSIS. UPPER ABDOMINAL ASCITES       Previous extensive, complex labs, notes and diagnostics reviewed and analyzed. ALLERGIES:    Allergies as of 07/25/2020 - Review Complete 07/25/2020   Allergen Reaction Noted    Adhesive tape  11/22/2004    Amoxicillin-pot clavulanate  03/07/2013    Bactrim [sulfamethoxazole-trimethoprim]  03/07/2013    Clarithromycin  06/23/2015    Other  08/30/2004    Sulfa antibiotics  08/30/2004      (please also verify by checking MAR)     Today I evaluated this patient for periodic reassessment of medical and functional status. The patient was discussed in detail at the treatment team meeting focusing on current medical issues, progress in therapies, social issues, psychological issues, barriers to progress and strategies to address these barriers, and discharge planning. See the addendum to rehab progress note-as a second progress note in the chart.   The patient continues to be high risk for future disability and their medical and rehabilitation prognosis continue to be good and therefore, we will continue the patient's prn.  6. Acute episodic insomnia with situational adjustment disorder:  prn Ambien, monitor for day time sedation. 7. Falls risk elevated:  patient to use call light to get nursing assistance to get up, bed and chair alarm. 8. Elevated DVT risk: progressive activities in PT, continue prophylaxis DAWN hose, elevation and no thinners due to liver failure. 9. Complex discharge planning:  Weekly team meeting every Monday to assess progress towards goals, discuss and address social, psychological and medical comorbidities and to address difficulties they may be having progressing in therapy. Patient and family education is in progress. The patient is to follow-up with their family physician after discharge. Complex Active General Medical Issues that complicate care Assess & Plan:    1. Whole body pain as well as cervical thoracic and low back pain due to fibromyalgia,   Lumbago without sciatica,   Rotator cuff (capsule) deficits, Shoulder impingement-use lowest effective dose of opiate medication to include Roxicodone and Ultram, titrate Desyrel, add Lidoderm BenGay heat massage-titrate Topamax and avoid use of Tylenol due to liver disease as well as Lyrica due to intolerance  2. Depression and anxiety-emotional support given daily add recreational therapy and rehabilitation psychology when available and support group when available  3. LVH (left ventricular hypertrophy), coronary artery disease-vital signs to shift dose and titrate cardiac locations consult hospitalist for backup medical  4. Parkinson's disease -consult neurology avoid sedatives such as Lyrica which she does not tolerate well titrate Sinemet. 5.   Thrombocytopenia with severe anemia and recent blood transfusion-recheck CBC add vitamin B12 vitamin D  6. Vitamin D deficiency-add daily vitamin D and recheck as an outpatient  7.    Secondary esophageal varices without bleeding-monitor stools for blood avoid strong blood thinners if

## 2020-07-27 NOTE — PLAN OF CARE
GERARDO Berg  Outcome: Ongoing  Goal: Pain level will decrease  Description: Pain level will decrease  Outcome: Ongoing     Problem: Skin Integrity:  Goal: Skin integrity will stabilize  Description: Skin integrity will stabilize  Outcome: Ongoing     Problem: Discharge Planning:  Goal: Patients continuum of care needs are met  Description: Patients continuum of care needs are met  7/27/2020 0528 by Brain Cota RN  Outcome: Ongoing  7/27/2020 0101 by Brain Cota RN  Outcome: Ongoing

## 2020-07-27 NOTE — PROGRESS NOTES
Physical Therapy Missed Treatment   Facility/Department: [facility] S012/[id number]    NAME: Luz Maria Newton    : 1935 (82 y.o.)  MRN: [id number]    Account: [de-identified]  Gender: female      Pt initially off floor procedure. Pt returned at .   RN requests therapy be on hold for AM d/t needing to place IV and to let pt rest.        Ame Diaz, PTA, 20 at 11:21 AM

## 2020-07-27 NOTE — SEDATION DOCUMENTATION
NO SEDATION    0935 Pt to xray room 6 via cart. Pt A&Ox4, respirations even and unlabored, skin warm and dry. Pt denies any pain. Abdomen distended. 9250 U/S tech obtained images prior to start of procedure using U/S. P  VSS.     0915 Procedure explained, consent verified. 9016 Dr Best Alvarez here, speaking with pt.    7976 Timeout completed. Using U/S, Dr. Best Alvarez marked, prepped LLQ with Chloraprep and draped with sterile drape and towels. 0460 Site numbed with lidocaine 2%. [id number] Centesis 5F catheter placed using Ultrasound guidance. Fluid appears clear, yellow. Catheter tubing connected to Hitlab machine to remove fluid. Pt tolerating procedure well. 1009 Pt tolerating procedure well. VSS. 1019 VSS, no distress noted. Pt tolerating procedure well. 1034 Pt tolerating procedure well. VSS. 1045 Pt tolerated well. Total 5900 ml removed. Catheter removed, pressure held and bandaid applied. Verbal and tactile reassurance given throughout. 1049 Report called to juancarlos CARRILLO on rehab. Awaiting transport. 1055 pt taken back to rehab via cart with transporter.

## 2020-07-27 NOTE — PROGRESS NOTES
Gastroenterology Progress Note    Cheryl Valverde is a 82 y.o. female patient. Hospitalization Day:2    Chief C/O: Cirrhosis    SUBJECTIVE: Patient was seen and examined in rehab. She was previously seen during this hospitalization for management of cirrhosis she carries a diagnosis of cirrhosis with decompensation in the form of ascites and esophageal varices, was restarted on dual diuretics and has been tolerating well. Currently she is alert and oriented, has been tolerating diet, no nausea or vomiting, hematemesis, melena, or hematochezia    ROS:  Gastrointestinal ROS: no abdominal pain, change in bowel habits, or black or bloody stools    Physical    VITALS:  /69   Pulse 79   Temp 98 °F (36.7 °C) (Oral)   Resp 16   Ht 5' 1\" (1.549 m)   Wt 177 lb 0.7 oz (80.3 kg)   SpO2 94%   BMI 33.45 kg/m²   TEMPERATURE:  Current - Temp: 98 °F (36.7 °C); Max - Temp  Av °F (36.7 °C)  Min: 98 °F (36.7 °C)  Max: 98 °F (36.7 °C)    General:  Alert and oriented,  No apparent distress  Skin- without jaundice  Eyes: anicteric sclera  Cardiac: RRR, Nl s1s2, without murmurs  Lungs CTA Bilaterally, normal effort  Abdomen soft, ND, NT, no HSM, Bowel sounds normal  Ext: without edema  Neuro: no asterixis     Data    Data Review:    Recent Labs     20  1208 20  1000   HGB 6.9* 8.4*   HCT 21.9* 25.8*     Recent Labs     20  1001      K 3.5      CO2 19*   BUN 21   CREATININE 1.01*     Recent Labs     20  1001   AST 28   ALT 6   BILITOT 0.7   ALKPHOS 53     No results for input(s): LIPASE, AMYLASE in the last 72 hours. No results for input(s): PROTIME, INR in the last 72 hours. ASSESSMENT:  26-year-old female with history of decompensated cirrhosis, with refractory ascites and EV, recent paracentesis 6.5L ascitic fluid removed, no SBP noted. Recent EGD  noted grade III EV with EVL and portal HTN gastropathy, hgb 8.4,  no overt GIB noted.      PLAN :  1-  Decompensated

## 2020-07-27 NOTE — PROGRESS NOTES
Physical Therapy Missed Treatment   Facility/Department: [facility] B691/W699-06    NAME: Georgiana Lund    : 1935 (82 y.o.)  MRN: [id number]    Account: [de-identified]  Gender: female    Pt off floor for MRI.       Elise Lopes PTA, 20 at 3:42 PM

## 2020-07-27 NOTE — CONSULTS
Inpatient consult to Neurology  Consult performed by: Quinn Rubio MD  Consult ordered by: Tc Braga DO          Patient Name: Sonny Teran Date: 2020  5:18 PM  MR #: [id number]  : 1935    Attending Physician: Tc Braga DO  Reason for consult: Parkinson's disease    History of Presenting Illness:      Mario Alberto Rothman is a 82 y.o. female is started with acute rehab after hospitalization on  following a fall. Patient describes her legs have become progressively weak, and they just went out from under her, on admission she had a hemoglobin of 6.9. There was a CT scan of the head, as well as the entire spine. CT of the head demonstrated no acute findings and was read out as \"mild cerebral atrophy\". Cervical spine and thoracic spine read out as no changes, lumbar spine showed lumbar spondylosis with multilevel intervertebral disc narrowing with vacuum disc at L4-L5, bulging disc at L3-4 as well as L4-5 without any impingement on nerve roots, there is mild L4-L5 central canal stenosis. Iliac sclerosis of the SI joints, ascites were also seen. On  the patient had a high volume paracentesis, 6.5 L which was well-tolerated. Dr. Mona Aguilera was following for decompensated cirrhosis with refractory ascites and esophageal varices (EGD  grade 3 sp banding), portal hypertension and gastropathy. The patient has a history of Parkinson's disease and she tell me that she follows with Dr. Bon Reyes. There is commentary from the PM&R physician that she was found to have \"severe cognitive deficits\"    B12 from  was low normal (352), folate is low at 4.9. Thiamine was checked 2020 and was low at 4. I do not see thyroid testing.     History:      Past Medical History:   Diagnosis Date    Asthma     Fibromyalgia     Hemorrhoid     Hyperlipidemia     Hypertension     Parkinson's disease ([facility] [address]     Type II or unspecified type diabetes mellitus without mention of complication, not stated as uncontrolled      Past Surgical History:   Procedure Laterality Date    CHOLECYSTECTOMY      EYE SURGERY      HEMORRHOID SURGERY      HERNIA REPAIR  2/20/2002    ventral hernia    HERNIA REPAIR      LIH repair    HYSTERECTOMY      JOINT REPLACEMENT Right     partial left, full right    LAMINECTOMY      PARACENTESIS Left 03/12/2020    5010cc ascites removed by Dr José Antonio Orantes [phone]    PARACENTESIS Left 06/10/2020    3,850 cc removed per Dr Sunil Foster Left 07/08/2020    total 5450 ml removed per Dr Sunil Foster Left 07/23/2020    Dr Demetria Vyas 3732 off    UPPER GASTROINTESTINAL ENDOSCOPY N/A 2/21/2020    EGD WITH BANDING performed by [place] Krishnamurthy MD at [facility]       Family History  Family History   Problem Relation Age of Onset    High Blood Pressure Neg Hx      [] Unable to obtain due to ventilated and/ or neurologic status    Social History     Socioeconomic History    Marital status:      Spouse name: Not on file    Number of children: Not on file    Years of education: 15    Highest education level: High school graduate   Occupational History    Occupation: Homemaker   Social Needs    Financial resource strain: Somewhat hard    Food insecurity     Worry: Never true     Inability: Never true    Transportation needs     Medical: Yes     Non-medical: Yes   Tobacco Use    Smoking status: Never Smoker    Smokeless tobacco: Never Used   Substance and Sexual Activity    Alcohol use: No    Drug use: No    Sexual activity: Not Currently     Partners: Male   Lifestyle    Physical activity     Days per week: 0 days     Minutes per session: 0 min    Stress:  Only a little   Relationships    Social connections     Talks on phone: More than three times a week     Gets together: More than three times a week     Attends Denominational service: 1 to 4 times per year     Active member of club or organization: No Attends meetings of clubs or organizations: Never     Relationship status:      Intimate partner violence     Fear of current or ex partner: No     Emotionally abused: No     Physically abused: No     Forced sexual activity: No   Other Topics Concern    Not on file   Social History Narrative     Lives With: Alone in [address] she is a     She has a daughter named -----and [place] [facility] [place] that is quite helpful and comes to see her almost daily    Type of Home: Apartment  at [address] [facility] in [place] (7th floor)    Home Layout: One level    Home Access: Elevator, Level entry  (Level entry, elevator to 7th floor)    Bathroom Shower/Tub: Tub/Shower unit    Home Equipment: 4 wheeled walker    ADL Assistance: Independent    Homemaking Assistance: Independent    Ambulation Assistance: Independent(Rollator)    Transfer Assistance: Independent    Active : No    Patient's  Info: daughter    IADL Comments: Daughter helps with driving and groceries PRN    Additional Comments: Reports recent fall- \"my legs gave out\"          [] Unable to obtain due to ventilated and/ or neurologic status      Home Medications:      Medications Prior to Admission: ibuprofen (ADVIL;MOTRIN) 200 MG tablet, Take 200 mg by mouth every 6 hours as needed for Pain (Headache)  topiramate (TOPAMAX) 50 MG tablet, TAKE 1 TABLET BY MOUTH DAILY  irbesartan (AVAPRO) 75 MG tablet, TAKE 1 TABLET BY MOUTH EVERY DAY  sertraline (ZOLOFT) 50 MG tablet, TAKE 1 TABLET BY MOUTH EVERY DAY  traZODone (DESYREL) 100 MG tablet, TAKE 2 TABLETS BY MOUTH EVERY DAY AT BEDTIME  omeprazole (PRILOSEC) 40 MG delayed release capsule, TAKE 1 CAPSULE BY MOUTH EVERY DAY  lovastatin (MEVACOR) 10 MG tablet, TAKE 1 TABLET BY MOUTH EVERY NIGHT  albuterol sulfate HFA (PROAIR HFA) 108 (90 Base) MCG/ACT inhaler, Inhale 2 puffs into the lungs every 6 hours as needed for Wheezing  furosemide (LASIX) 40 MG tablet, Take 1 tablet by mouth daily  spironolactone (ALDACTONE) 100 MG tablet, Take 1 tablet by mouth daily  clotrimazole-betamethasone (LOTRISONE) 1-0.05 % cream, Use two times daily to affected area for 14 days then stop  sucralfate (CARAFATE) 1 GM tablet, TAKE 1 TABLET BY MOUTH FOUR TIMES DAILY  ACCU-CHEK SOFTCLIX LANCETS MISC, Checks 1 time daily. NIDDM--ICD-10 E11.9  blood glucose test strips (ACCU-CHEK ACTIVE STRIPS) strip, Checks 1 time daily. NIDDM--ICD-10 E11.9  aspirin 81 MG tablet, Take 1 tablet by mouth daily With Food  Cinnamon 500 MG TABS, Take by mouth 3 times daily  carbidopa-levodopa (SINEMET)  MG per tablet, Take 1 tablet by mouth 1 po bid per dr robb goldstein neurologist  latanoprost (XALATAN) 0.005 % ophthalmic solution, Use 1 Drop in both eyes daily at bedtime. tiZANidine (ZANAFLEX) 2 MG tablet, Take 2 mg by mouth Take 2 mg by mouth bid prn  pregabalin (LYRICA) 75 MG capsule, Take 1 capsule by mouth 3 times daily for 10 days. Take 75 mg by mouth. furosemide (LASIX) 20 MG tablet, Take 1 tablet by mouth daily for 5 days    Current Hospital Medications:     Scheduled Meds:   analgesic ointment   Topical TID    lidocaine  3 patch Transdermal Daily    pantoprazole  40 mg Oral QAM AC    topiramate  50 mg Oral Daily    traZODone  150 mg Oral Nightly    budesonide-formoterol  2 puff Inhalation BID    carbidopa-levodopa  1 tablet Oral TID    folic acid  1 mg Oral Daily    furosemide  40 mg Oral Daily    spironolactone  50 mg Oral Daily     Continuous Infusions:  PRN Meds:.albumin human, oxyCODONE, traMADol, ondansetron, fleet, albuterol, [DISCONTINUED] promethazine **OR** ondansetron  . Allergies: Allergies   Allergen Reactions    Adhesive Tape     Amoxicillin-Pot Clavulanate     Bactrim [Sulfamethoxazole-Trimethoprim]     Clarithromycin      Other reaction(s):  Other: See Comments  tachycardia    Other     Sulfa Antibiotics         Review of Systems:      Constitutional: Patient denies fever, chills, night sweats, weight loss, or change an appetite. Eyes: No vision change, eye pain, or double vision. Ears, nose, mouth, throat: No change in the hearing, or sinus congestion. Cardiovascular: Denies orthopnea, palpitations, or chest pain. Respiratory: No cough or exertional dyspnea. GI: No heartburn, nausea, vomiting, diarrhea, or constipation. : There´s no loss of control of bowel or bladder, no urinary urgency, no urinary retention. MSK: No arthralgia or myalgia. Skin: Denies rashes. Neurological problems as discussed above. Psychiatric: No depression, anxiety, hallucinations. No suicidal ideation. Hematologic: No mucosal bleeding or easy bruising. Objective Findings:     Vitals:BP (!) 106/52   Pulse 72   Temp 98 °F (36.7 °C) (Oral)   Resp 16   Ht 5' 1\" (1.549 m)   Wt 177 lb 0.7 oz (80.3 kg)   SpO2 96%   BMI 33.45 kg/m²      Physical Examination:  GENERAL APPEARANCE: No distress, alert, interactive and cooperative. RESP: Clear to auscultation bilaterally on anterior examination    CARDIOVASCULAR: Regular, rate and rhythm, no murmurs, normal S1 and S2. Carotid pulses intact without any bruits. MENTAL STATE: Orientation was normal to time, place and person. Answers are delayed, the patient is slow in responding and has clear difficulty with mutlitasking, however she is able to provide accurate responses. Attention span and concentration were impaired. Language testing was normal for comprehension, repetition, expression, and naming, correctly interpreted \"if the tiger is chased by the lion, who is in front? \" General fund of knowledge was intact. CRANIAL NERVES:   CN 2 Visual fields full to confrontation. CN 3, 4, 6 Pupils round, equally reactive to light. No ptosis. EOMs normal alignment, full range with normal saccades, pursuit and convergence. No nystagmus. CN 5 Facial sensation intact bilaterally. CN 7 Normal and symmetric facial strength.  Nasolabial folds symmetric. CN 8 Hearing intact to finger rub bilaterally. CN 9 Palate elevates symmetrically. CN 11 Bilaterally normal strength of shoulder shrug and neck turning. CN 12 Tongue midline, with normal bulk and strength; no fasciculations. MOTOR: Motor exam was normal. Muscle bulk and tone were normal in both upper and lower extremities. Muscle strength was 5/5 in distal and proximal muscles in both upper and lower extremities except for the right hip flexion and left knee extension which were mildly weak at 4/5. No fasciculations, tremor or other abnormal movements were present. COORDINATION: Coordination exam was normal. In both upper extremities, finger-nose-finger was intact without dysmetria. GAIT: Gait showed slight shuffling, using the walker effectively. Results/ Medications reviewed 7/27/2020, 10:46 AM     Laboratory, Microbiology, Pathology, Radiology, Cardiology, Medications and Transcriptions reviewed  Scheduled Meds:   analgesic ointment   Topical TID    lidocaine  3 patch Transdermal Daily    pantoprazole  40 mg Oral QAM AC    topiramate  50 mg Oral Daily    traZODone  150 mg Oral Nightly    budesonide-formoterol  2 puff Inhalation BID    carbidopa-levodopa  1 tablet Oral TID    folic acid  1 mg Oral Daily    furosemide  40 mg Oral Daily    spironolactone  50 mg Oral Daily     Continuous Infusions:    Recent Labs     07/24/20  1208 07/25/20  1000   HGB 6.9* 8.4*   HCT 21.9* 25.8*     Recent Labs     07/25/20  1001      K 3.5      CO2 19*   BUN 21   CREATININE 1.01*     Recent Labs     07/25/20  1001   AST 28   ALT 6   BILITOT 0.7   ALKPHOS 53     No results for input(s): LIPASE, AMYLASE in the last 72 hours.   Recent Labs     07/25/20  1001   PROT 5.3*     Ct Abdomen Pelvis Wo Contrast Additional Contrast? None    Result Date: 7/23/2020  CT of the Abdomen and Pelvis without intravenous contrast medium History:  Fall from standing Technical Factors: CT imaging of the abdomen and pelvis were obtained and formatted as 5 mm contiguous axial images from the domes of the diaphragm to the symphysis pubis. Sagittal and coronal reconstructions were also obtained. Oral contrast medium:  None. Intravenous contrast medium:  None. Comparison:  CT abdomen pelvis, March 10, 2020, March 11, 2013. Findings: Liver: Small in size, and nodular contour, unchanged. Bile Ducts:  Normal in caliber. Gallbladder:  Surgically absent. Pancreas:  Hypoplastic, without masses, cysts, ductal dilatation or calcification. Spleen:  Normal in size without masses or calcifications. No splenules. Kidneys:  Right and left kidneys measure 8.0 cm, and 9.1 cm, respectively. 1 mm calcification mid pole left kidney, unchanged from 2013. No right renal calculi. Adrenals:  Normal. Small bowel:  Normal in caliber. Appendix:  Not visualized. Colon:  Normal in caliber. Surgical clips at level of rectum. Peritoneum:  Diffuse free fluid within the abdomen and pelvis, unchanged. No free air. Vessels: Aorta normal in course and caliber. Lymph nodes:  Retroperitoneal:  No enlarged retroperitoneal lymph nodes. Mesenteric:  No enlarged mesenteric lymph nodes. Pelvic: No enlarged pelvic lymph nodes. Ureters: Normal in course and caliber. No calcifications. Bladder: Partially decompressed. Reproductive organs: Uterus surgically absent. Dystrophic calcification, left adnexa, unchanged from March, 2013. Abdominal Wall: Remote abdominal wall defect repair. Edematous change within abdominal pelvic soft tissues diffusely, stable. Bones:  No bone lesions. No degenerative changes. No post operative changes. Cirrhosis. Diffuse ascites, unchanged. Diffuse edematous change, abdominal and pelvic walls, stable. Small right kidney. Left kidney lower limits of normal. Hypoplastic pancreas. Remote hemorrhoid surgery. Cholecystectomy. Hysterectomy.  All CT scans at this facility use dose modulation, iterative reconstruction, and/or weight based dosing when appropriate to reduce radiation dose to as low as reasonably achievable. Xr Hip Right (2-3 Views)    Result Date: 2020  Patient MRN: [id number] : 1935 Age:  82 years Gender: Female Order Date: 2020 12:00 PM. Exam: XR HIP RIGHT (2-3 VIEWS) Number of Views: 2 Indication:  Fall at home one week ago with persistent right hip pain since that time Comparison: 2011 radiographs. CT abdomen and pelvis 2020. Findings: Moderate degenerative changes with joint space loss in the bilateral hips. No acute fracture identified. Vascular calcifications. Impression:  1. No acute fracture identified. 2. Moderate bilateral osteoarthritis of the hips. 3. Vascular calcifications. Ct Head Wo Contrast    Result Date: 2020  CT Brain Contrast medium:  Not utilized. History:  Fell from standing. Denies anticoagulation/loss of consciousness. Comparison:  CT brain, 2014 Findings: Extra-axial spaces:  Normal. Intracranial hemorrhage:  None. Ventricular system: Ventricles mildly enlarged. Sulci mildly prominent. Basal Cisterns:  Normal. Cerebral Parenchyma: Physiologic basal ganglia calcification, unchanged. Midline Shift:  None. Cerebellum:  Normal. Paranasal sinuses and mastoid air cells:  Normal. Visualized Orbits: Remote bilateral ocular surgery. Impression: No acute findings. Mild cerebral atrophy. All CT scans at this facility use dose modulation, iterative reconstruction, and/or weight based dosing when appropriate to reduce radiation dose to as low as reasonably achievable. Ct Chest Wo Contrast    Result Date: 2020  CT of the Chest without intravenous contrast medium History:  Mechanical fall from standing. Technical Factors: CT imaging of the chest was obtained and formatted as 5 mm contiguous axial images from the thoracic inlet through the adrenal glands. Sagittal and coronal reconstruction obtained during postprocessing. Intravenous contrast medium:  None. Comparison:  Chest radiograph, July 22, 2020. High-resolution CT chest, December 30, 2019. Findings: Right lung shows mild dependent atelectatic change posterior right upper lobe, posterior right middle lobe, and posterior right lower lobe. No nodules or masses. No pleural effusion. Left lung shows dependent subsegmental atelectatic change, left upper lobe. Small peripheral focus of groundglass opacity lateral lower lingula. Dependent subsegmental atelectatic change, small left pleural effusion. No nodules or masses. No hilar, mediastinal, or axillary lymph node enlargement. Cardiac size normal. No pericardial effusion. Calcified mitral annulus. Thoracic aorta normal in course and caliber. Bridging anterior osteophytes mid to lower thoracic spine. No osteoblastic, and no osteolytic lesions. No fracture. Bilateral dependent atelectatic change. Small left effusion. All CT scans at this facility use dose modulation, iterative reconstruction, and/or weight based dosing when appropriate to reduce radiation dose to as low as reasonably achievable. Ct Cervical Spine Wo Contrast    Result Date: 7/23/2020  EXAMINATION: CT CERVICAL SPINE WO CONTRAST   DATE AND TIME:7/22/2020 10:45 PM CLINICAL HISTORY:ACUTE POSTERIOR NECK PAIN  TRAUMA  COMPARISON: NONE TECHNIQUE:Helical scanning was performed from the skull base through the remainder of the cervical spine without intravenous contrast. Sagittal and coronal reformats were obtained. The lack of contrast limits CT sensitivity of the soft tissues. All CT scans at this facility use dose modulation, iterative reconstruction, and/or weight based dosing when appropriate to reduce radiation dose to as low as reasonably achievable. FINDINGS   Fracture:No acute fracture. Alignment:  Normal cervical lordosis. No subluxation. No canal stenosis. Dense atlas:Dens atlas relationship is normal. Soft tissues:Airway patent. No soft tissue mass or adenopathy. Other findings:  There is mild  cervical spondylosis with multiple level disc osteophyte changes. NO ACUTE CERVICAL SPINE ABNORMALITY. Ct Thoracic Spine Wo Contrast    Result Date: 7/23/2020  EXAMINATION: CT THORACIC SPINE WO CONTRAST DATE AND TIME:7/22/2020 10:45 PM CLINICAL HISTORY: SEVERE POSTERIOR THORACIC SPINE PAIN. FALL  COMPARISON: NONE TECHNIQUE:  Axial noncontrast images were performed through the thoracic spine with coronal and sagittal reconstructions. All CT scans at this facility use dose modulation, iterative reconstruction, and/or weight based dosing when appropriate to reduce radiation dose to as low as reasonably achievable. FINDINGS  Alignment/fracture: Normal alignment without subluxation. No evidence of fracture. Perivertebral soft tissues are normal.  Disc spaces: There are multiple levels of disc space narrowing and end plate changes consistent with degenerative change. NEGATIVE CT OF THE THORACIC SPINE. Ct Lumbar Spine Wo Contrast    Result Date: 7/24/2020  EXAMINATION: CT LUMBAR SPINE WO CONTRAST CLINICAL HISTORY: Fall from standing. Back pain. COMPARISON: CT abdomen pelvis 3/10/2020 TECHNIQUE:  Spiral scans with no contrast. Multiplanar 2-D reconstructions. All CT scans at this facility use dose modulation, iterative reconstruction, and/or weight based dosing when appropriate to reduce radiation dose to as low as reasonably achievable. FINDINGS: No acute fractures identified. There is T11 superior endplate Schmorl's node with dystrophic calcification, unchanged from prior imaging study. There is lumbar spondylosis with multilevel intervertebral disc space narrowing, marginal osteophytosis, and facet hypertrophy. There is L4-5 degenerative disc vacuum phenomenon. There is L3-4 and L4-5 disc bulging which does not appear to impinge on the exiting nerve roots. There is mild L4-5 central canal stenosis. There is disc bulging and  degenerative disc vacuum phenomenon at L1-2.  The SI joints are symmetrically maintained. There is iliac sclerosis of the SI joints. Ascites is noted within the field-of-view. There are 2 left renal midpole nonobstructing stones, measuring 2 mm and 4 mm. DEGENERATIVE CHANGES. NO ACUTE FRACTURE. Xr Chest Portable    Result Date: 7/23/2020  XR CHEST PORTABLE : 7/22/2020 CLINICAL HISTORY: Pain after fall . COMPARISON: High-resolution chest CT 12/30/2019 and two-view chest 11/18/2019. TECHNIQUE: An upright portable AP radiograph of the chest was obtained. FINDINGS: A shallow inspiration is present without significant infiltrate identified. There is no cardiomegaly, significant pleural effusion, vascular congestion, pneumothorax, or displaced fractures identified. NO EVIDENCE OF ACUTE THORACIC TRAUMA OR ACTIVE CARDIOPULMONARY DISEASE. Us Guided Paracentesis    Result Date: 7/23/2020  US GUIDED PARACENTESIS : 7/23/2020 CLINICAL HISTORY:  therapeutic paracentesis . COMPARISON: Chest, abdomen and pelvis CTs from earlier 7/23/2020 PROCEDURE: Informed consent was obtained. Limited transabdominal ultrasound was performed and marking done in the usual fashion. Sterile technique and local lidocaine anesthesia was used to place a 5 [employer] June Yueh catheter within the abdominal fluid collection. Approximately 6.5 L of low viscosity pale yellow-colored fluid was aspirated without evidence of immediate complication. A sample was sent for analysis as requested. The patient was monitored in the radiology holding area for approximately hold minutes without event. She was returned to the medical floor in stable condition with the usual instructions. SUCCESSFUL ULTRASOUND-GUIDED PARACENTESIS APPROXIMATELY 6.5 L OF ASCITES. Us Guided Paracentesis    1. Status post technically successful ultrasound-guided paracentesis. Danny Suarez is a Female of 82 years age, referred for Ultrasound Guided Paracentesis.   PROCEDURE: Survey of the abdomen showed large amount of ascites fluid. After obtaining informed consent, the patient was positioned supine on the sonography table. Using ultrasound, the skin over the left hemiabdomen was locally anesthetized with 1% lidocaine. Following that, a Yueh needle was advanced into the fluid pocket using ultrasound visualization. 5450cc, of clear yellow fluid were aspirated and sent for cytology, and pathology. The needle was removed, and hemostasis was obtained with pressure. A Band-Aid was placed. Post procedure images did not demonstrate hemorrhage at the target site. The patient tolerated the procedure well. The patient left the department in good condition. A radiology nurse was in presence monitoring vital signs, assisting throughout the procedure. Us Retroperitoneal Limited    Result Date: 7/23/2020  EXAMINATION: US RETROPERITONEAL LIMITED CLINICAL HISTORY: 82year-old with renal insufficiency and extensive abdominal ascites COMPARISONS: Unenhanced abdominal CT 7/23/2020 FINDINGS: Renal sonography was performed. The study is compromised due to patient body habitus and sonographic window. Kidneys by ultrasound are symmetric borderline small in size. Right kidney measures 9.3 x 5.3 x 4.3 cm left kidney measures 9.2 x 4.7 x  4.8 cm. There are a few punctate echogenic foci in the central sinus echocomplex raising the question of specular reflectors. Bilateral renal echotexture is otherwise unremarkable. No large cortical cysts, solid masses or shadowing calcifications. No ultrasound signs of hydronephrosis. Note is made of upper abdominal ascites     SYMMETRIC KIDNEYS BORDERLINE SMALL IN SIZE. NO ULTRASOUND SIGNS OF HYDRONEPHROSIS. UPPER ABDOMINAL ASCITES         Impression:     Leg buckling raises the concern for lumbar radiculopathy. Suspect mild cognitive changes are derived from metabolic and nutritional causes.      Plan:     - Check TSH, free T4  - Will add high dose thiamine for wernicke's encephalopathy (500 mg IV TID x 3 days)  - MRI lumbar spine  - continue sinement  - continue rehab activities  - Anemia treatment ongoing  - will follow while admitted      Comments: Thank you for allowing us to participate in the care of this patient. Please call if questions or concerns arise.     Electronically signed by Paulette Bourgeois MD on 7/27/2020 at 10:46 AM

## 2020-07-27 NOTE — PROGRESS NOTES
Occupational Therapy  Facility/Department: [facility]  Daily Treatment Note  NAME: [place]  : 1935  MRN: [id number]    Date of Service: 2020    Discharge Recommendations:  Continue to assess pending progress       Assessment      Activity Tolerance  Activity Tolerance: Patient limited by pain  Safety Devices  Safety Devices in place: Yes  Type of devices: All fall risk precautions in place         Patient Diagnosis(es): There were no encounter diagnoses. has a past medical history of Asthma, Fibromyalgia, Hemorrhoid, Hyperlipidemia, Hypertension, Parkinson's disease ([facility] [address] and Type II or unspecified type diabetes mellitus without mention of complication, not stated as uncontrolled. has a past surgical history that includes Hysterectomy; Cholecystectomy; laminectomy; eye surgery; joint replacement (Right); hernia repair (2002); hernia repair; Hemorrhoid surgery; Upper gastrointestinal endoscopy (N/A, 2020); Paracentesis (Left, 2020); Paracentesis (Left, 06/10/2020); Paracentesis (Left, 2020); and Paracentesis (Left, 2020). Restrictions  Restrictions/Precautions  Restrictions/Precautions: Fall Risk     Subjective   General  Chart Reviewed: Yes  Patient assessed for rehabilitation services?: Yes  Referring Practitioner: Dr Roland Foy  Diagnosis: Imp Mob and ADLs 2° Exac of Parkinsons    Pain Assessment  Pain Level: 7  Pain Type: Acute pain  Pain Location: Back  Pain Descriptors:  Other (Comment)(It's just hurting.)  Pre Treatment Pain Screening  Pain at present: 7  Intervention List: Patient able to continue with treatment;Patient declined any intervention     Orientation  Orientation  Overall Orientation Status: Within Functional Limits     Objective      Bed mobility  Supine to Sit: SupervisionADL    LE Dressing: Stand by assistance(Patient donned pants only)    Wheelchair Bed Transfers  Wheelchair/Bed - Technique: Stand step  Equipment Used: Bed;Wheelchair  Level of Asssistance: Stand by assistance  Wheelchair Transfers Comments: EOB -> w/c with ww    Patient completed questions from Problem Solving: Thinking Creatively worksheets. Patient able to correctly answer questions with 0% accuracy. Patient completed Simple Problem Solving Worksheet: Comparison/Contrast Level 2 Differentiating Category Members. Patient able to correctly identify and Craig the one word of four that does not belong with 23% accuracy. Plan   Plan  Times per week: 5-7 times per day  Plan weeks: 1.5 weeks  Current Treatment Recommendations: Strengthening, Balance Training, Functional Mobility Training, Endurance Training, Safety Education & Training, Patient/Caregiver Education & Training, Self-Care / ADL, Equipment Evaluation, Education, & procurement  G-Code     OutComes Score                                                  AM-PAC Score             Goals  Patient Goals   Patient goals :  \"To get stronger\"       Therapy Time   Individual Concurrent Group Co-treatment   Time In 1300         Time Out 1330         Minutes 30             ADL training: 15 minutes  Cognitive Retraining: 15 minutes       Electronically signed by Francyne Dance on 7/27/20 at 2:00 PM SOCORRO Ballard Ma

## 2020-07-27 NOTE — PROGRESS NOTES
[facility] Occupational Therapy      Date: 2020  Patient Name: Kaila Clemente        MRN: [id number]  Account: [de-identified]   : 1935  (82 y.o.)  Room: [place] [address]    Chart reviewed, attempted OT at 930 for OT eval. Patient not seen 2° to:    Pt. off floor for test/procedure    Spoke to GERARDO Arguelles RN aware. Will attempt again when able.     Electronically signed by Garcia Wiley OT on 2020 at 9:36 AM

## 2020-07-27 NOTE — CONSULTS
CC: Ascites and abdominal pain    HPI:  Patient is a pleasant 68-year-old woman was admitted after falling at home. Patient has history of Parkinson's disease and has been progressively getting worse. Patient is familiar to our service secondary to recurrent ascites from liver cirrhosis. During her admission, she has had increasing ascites and abdominal girth causing abdominal pain and discomfort. Interventional radiology was consulted for a paracentesis. She has no other complaints. Denies any shortness of breath, chest pain, or other symptoms. Family History   Problem Relation Age of Onset    High Blood Pressure Neg Hx        Past Surgical History:   Procedure Laterality Date    CHOLECYSTECTOMY      EYE SURGERY      HEMORRHOID SURGERY      HERNIA REPAIR  2/20/2002    ventral hernia    HERNIA REPAIR      LIH repair    HYSTERECTOMY      JOINT REPLACEMENT Right     partial left, full right    LAMINECTOMY      PARACENTESIS Left 03/12/2020    5010cc ascites removed by Dr Yohana Angeles [phone]    PARACENTESIS Left 06/10/2020    3,850 cc removed per Dr Latonia Zelaya Left 07/08/2020    total 5450 ml removed per Dr Latonia Zelaya Left 07/23/2020    Dr Sary Miguel [id number]   [address] [place] [address] Drive N/A 2/21/2020    EGD WITH BANDING performed by Marc Jones MD at [facility]        Past Medical History:   Diagnosis Date    Asthma     Fibromyalgia     Hemorrhoid     Hyperlipidemia     Hypertension     Parkinson's disease ([facility] [address]     Type II or unspecified type diabetes mellitus without mention of complication, not stated as uncontrolled        Social History     Socioeconomic History    Marital status:       Spouse name: None    Number of children: None    Years of education: 15    Highest education level: High school graduate   Occupational History    Occupation: Homemaker   Social Needs    Financial resource strain: Somewhat hard    Food insecurity     Worry: Never true     Inability: Never true    Transportation needs     Medical: Yes     Non-medical: Yes   Tobacco Use    Smoking status: Never Smoker    Smokeless tobacco: Never Used   Substance and Sexual Activity    Alcohol use: No    Drug use: No    Sexual activity: Not Currently     Partners: Male   Lifestyle    Physical activity     Days per week: 0 days     Minutes per session: 0 min    Stress: Only a little   Relationships    Social connections     Talks on phone: More than three times a week     Gets together: More than three times a week     Attends Yazidi service: 1 to 4 times per year     Active member of club or organization: No     Attends meetings of clubs or organizations: Never     Relationship status:     Intimate partner violence     Fear of current or ex partner: No     Emotionally abused: No     Physically abused: No     Forced sexual activity: No   Other Topics Concern    None   Social History Narrative     Lives With: Alone in [address] [place] [address] Rd she is a     She has a daughter named -----and who lives in [facility] and that is quite helpful and comes to see her almost daily    Type of Home: Apartment  at [address] [facility] in [place] (7th floor)    Home Layout: One level    Home Access: Elevator, Level entry  (Level entry, elevator to 7th floor)    Bathroom Shower/Tub: Tub/Shower unit    Home Equipment: 4 wheeled walker    ADL Assistance: Independent    Homemaking Assistance: Independent    Ambulation Assistance: Independent(Rollator)    Transfer Assistance: Independent    Active : No    Patient's  Info: daughter    IADL Comments: Daughter helps with driving and groceries PRN    Additional Comments: Reports recent fall- \"my legs gave out\"           Allergies   Allergen Reactions    Adhesive Tape     Amoxicillin-Pot Clavulanate     Bactrim [Sulfamethoxazole-Trimethoprim]     Clarithromycin      Other reaction(s):  Other: See Comments  tachycardia    Other     Take 2 mg by mouth bid prn      pregabalin (LYRICA) 75 MG capsule Take 1 capsule by mouth 3 times daily for 10 days. Take 75 mg by mouth. 30 capsule 0    furosemide (LASIX) 20 MG tablet Take 1 tablet by mouth daily for 5 days 5 tablet 0       Review of Systems   Constitutional: Positive for fatigue. Negative for chills, diaphoresis and fever. HENT: Negative. Negative for congestion, ear pain, hearing loss and tinnitus. Eyes: Negative. Negative for photophobia. Respiratory: Negative. Negative for cough, shortness of breath and wheezing. Cardiovascular: Negative. Negative for chest pain, palpitations and leg swelling. Gastrointestinal: Positive for abdominal distention and abdominal pain. Negative for constipation, diarrhea, nausea and vomiting. Genitourinary: Negative. Negative for dysuria, flank pain, frequency, hematuria and urgency. Musculoskeletal: Negative. Negative for back pain and neck pain. Skin: Negative. Negative for rash. Allergic/Immunologic: Negative for environmental allergies. Neurological: Positive for weakness. Negative for dizziness, tremors and headaches. Hematological: Does not bruise/bleed easily. Psychiatric/Behavioral: Negative. Negative for hallucinations and suicidal ideas. The patient is not nervous/anxious. OBJECTIVE:  BP (!) 113/56   Pulse 73   Temp 98 °F (36.7 °C) (Oral)   Resp 16   Ht 5' 1\" (1.549 m)   Wt 177 lb 0.7 oz (80.3 kg)   SpO2 96%   BMI 33.45 kg/m²     Physical Exam  Constitutional:       General: She is not in acute distress. Appearance: She is well-developed. She is not diaphoretic. HENT:      Head: Normocephalic and atraumatic. Nose: Nose normal.      Mouth/Throat:      Pharynx: No oropharyngeal exudate. Eyes:      General: No scleral icterus. Right eye: No discharge. Left eye: No discharge. Conjunctiva/sclera: Conjunctivae normal.   Neck:      Musculoskeletal: Neck supple.       Thyroid: No

## 2020-07-27 NOTE — PROGRESS NOTES
Assumed care for this patient at [phone]. She alls approprietly for needs. She was up with a walker to the bathroom. Slow but steady. [place] pad for her back She has stress incontinence cleaned with soap and water an a new change of undergarments. And pad. Pt was easily back to sleep no pain. Call light within in reach bed alarm on monitoring for safety and needs.

## 2020-07-27 NOTE — PROGRESS NOTES
Assumed care of pt this shift. Pt is a/o to person, place and times; can be disoriented to situation at times. Pts daughter was concerned about Lyrica not being ordered. Perfect served and spoke with Dr. Kaplan Members regarding this. Daughter was also concerned that pt was not eating. Pt has eaten about 50% of meals this shift. Daughter write a list of foods that pt likes to eat, left at bedside.

## 2020-07-27 NOTE — PROGRESS NOTES
INDIVIDUALIZED OVERALL REHAB PLAN OF CARE  ADDENDUM TO REHAB PROGRESS NOTE-for audit purposes must also refer to this day's clinical note and combine the information      Date: 2020  Patient Name: Mindy Hughes   Room: V689/L111-71    MRN: [id number]    : 1935  (82 y.o.)  Gender: female       Today 2020 during weekly team meeting, I reviewed the patient Mindy Hughes in detail with the therapists and nurses involved in patient's care gathering complex physiatric data regarding current medical issues, progress in therapies, factors limiting progress, social issues, psychological issues, ongoing therapeutic plans and discharge planning. Legend:  I= independent Im =Modified independent  S=Supervised SB=stand by FITZGERALD=set up CG=contact analy Min= minimal Mod=Moderate Max=maximal Max of 2 =maximal assist of 2 people      CURRENT FUNCTIONAL STATUS:    NURSING ISSUES:       I am concerned about history of hypothyroidism she may need Synthroid adjusted. Nursing will continue to focus on bowel and bladder continence transitioning toward independence by time of discharge. Monitoring post void residuals monitoring for severe constipation and bowel obstruction. Focus on achieving ADL goals with co-treating with OT when possible.     PHYSICAL THERAPY  Bed mobility:  Supine to Sit: Stand by assistance (20 1447)  Sit to Supine: Stand by assistance (20 1447)  Transfers:  Sit to Stand: Stand by assistance (20 1448)  Bed to Chair: Stand by assistance(good safety awareness) (20 1448)  Gait:   Device: Pablo KamiGlassbeam (20 1448)  Assistance: Stand by assistance (20 1448)  Distance: 50ft with turns (20 1448)  Comments: increased time for turns, fatigued (20 1448)  Stairs:     W/C mobility:         OCCUPATIONAL THERAPY  Hand Dominance: Left  ADL  Feeding: Independent (20 1055)  Grooming: Supervision (20 1055)  UE Bathing: Supervision (20 1055)  CARLOS Bathing: None(Patient declined lower body bathing due to pain and fatigue) (07/26/20 1055)  UE Dressing: None(Patient donned a new gown. Dtr is to bring clothing today) (07/26/20 1055)  LE Dressing: Stand by assistance(Patient did not change her socks) (07/26/20 1055)  Toileting: Stand by assistance (07/26/20 1055)  Toilet Transfers  Toilet - Technique: Ambulating (07/26/20 1058)  Equipment Used: Grab bars (07/26/20 1058)  Toilet Transfer: Stand by assistance (07/26/20 1058)  Tub Transfers  Tub Transfers Comments: patient declined a shower today due to fatigue (07/26/20 1058)         SPEECH THERAPY  Motor Speech: Within Functional Limits  Comprehension: Exceptions  Verbal Expression: Exceptions to functional limits      Diet/Swallow:  Diet Solids Recommendation: Regular  Liquid Consistency Recommendation:  Thin  Dysphagia Outcome Severity Scale: Level 6: Within functional limits/Modified independence    Compensatory Swallowing Strategies: Small bites/sips, Eat/Feed slowly, Upright as possible for all oral intake             COGNITION  OT: Cognition Comment: comp - min, exp - supervision, soc int - supervision, prob solv - min, mem - min  SP:Memory: Exceptions to [facility]  Problem Solving: Exceptions to [facility]        THERAPY, MEDICAL AND NURSING COORDINATION:    []  Pain medication before therapies     []  Check orthostatic BP      [x]  Ambulate to the bathroom in room    [x]  Add scheduled rest beaks     []  In room therapies      Discharge date set for:               8/8/2020      Home with:   Alone with help from   daughters who live locally and home health care          And:     [address]:     [x]  PT    []  OT    []  ST   [x]  Aide   []  SW    [x]  RN                    Outpatient Therapy:  []  PT    []  OT    []  ST   []  Rehab Psych                 Equipment:  [employer]      At D/C their function is goaled at:   PT:Long term goal 1: Pt to be indep with bed mobility  Long term goal 2: Pt to be indep with transfers  Long term goal 3: pt to ambulate >150ft with rollator and indep  Long term goal 4: 4 steps with handrail and indep to demonstrate improved LE strength and functional endurance  OT:Eating  Assistance Needed: Independent  CARE Score: 6  Discharge Goal: Independent, Oral Hygiene  Reason if not Attempted: Patient refused(reported she wanted to wait for her dtr)  CARE Score: 7  Discharge Goal: Independent, Toileting Hygiene  Assistance Needed: Supervision or touching assistance  CARE Score: 4  Discharge Goal: Independent, Shower/Bathe Self  Assistance Needed: Supervision or touching assistance  CARE Score: 4  Discharge Goal: Supervision or touching assistance  Upper Body Dressing  Reason if not Attempted: Not attempted due to environmental limitations  CARE Score: 10  Discharge Goal: Independent, Lower Body Dressing  Assistance Needed: Supervision or touching assistance  CARE Score: 4  Discharge Goal: Independent, Putting On/Taking Off Footwear  Reason if not Attempted: Patient refused  CARE Score: 7  Discharge Goal: Independent, Toilet Transfer  Assistance Needed: Supervision or touching assistance  CARE Score: 4  Discharge Goal: Independent  SP:Long-term Goals  Timeframe for Long-term Goals: 2-3 weeks  Goal 1: Pt will increase cognitive-linguistic skills in order to promote effective communication of wants and needs and to promote safety with the completion of ADLs upon discharge. From a cognitive standpoint they will need:        24 hr supervision  --progress to occasional           Significant problems/ barriers to functional progress include: Pt is at a high risk for functional loss,    [x]  Acute infection/UTI    []  Low BP's     []  COPD flare-up   []  Uncontrolled blood sugar     []  Progressive anemia         [x]  Severe pain exacerbation     [x]  Impaired mental status    []  Urinary incontinence    []  Bowel incontinence           Plan to correct barriers to functional progress:    Add scheduled rest

## 2020-07-27 NOTE — ACP (ADVANCE CARE PLANNING)
Advance Care Planning     Advance Care Planning Activator (Inpatient)  Conversation Note      Date of ACP Conversation: 7/25/2020    Conversation Conducted with: Patient with Decision Making Capacity    ACP Activator: Maikol MCDONOUGH Phillip Quijano makes decisions on behalf of the incapacitated patient: Decision Maker is asked to consider and make decisions based on patient values, known preferences, or best interests. Health Care Decision Maker:  Lionel Hines    Current Designated Health Care Decision Maker:   (If there is a valid Health Care Decision Maker named in the [facility] Makers\" box in the ACP activity, but it is not visible above, be sure to open that field and then select the health care decision maker relationship (ie \"primary\") in the blank space to the right of the name.) Validate  this information as still accurate & up-to-date; edit [place] field as needed.)    Note: Assess and validate information in current ACP documents, as indicated. If no Decision Maker listed above or available through scanned documents, then:    If no Authorized Decision Maker has previously been identified, then patient chooses [place]:  \"Who would you like to name as your primary health care decision-maker? \"               Name: Daria Donaldson        Relationship: Daughter          Phone number: [phone]  Sonal Jarrell this person be reached easily? \" Yes  \"Who would you like to name as your back-up decision maker? \"   Name: Fozia Petty        Relationship: Daughter          Phone number: [phone]  Sonal Jarrell this person be reached easily? \" Yes    Note: If the relationship of these Decision-Makers to the patient does NOT follow your state's Next of Kin hierarchy, recommend that patient complete ACP document that meets state-specific requirements to allow them to act on the patient's behalf when appropriate. Care Preferences    Ventilation:   \"If you were in your present state of health and suddenly became very ill and were unable to breathe on your own, what would your preference be about the use of a ventilator (breathing machine) if it were available to you? \"      Would the patient desire the use of ventilator (breathing machine)?:     \"If your health worsens and it becomes clear that your chance of recovery is unlikely, what would your preference be about the use of a ventilator (breathing machine) if it were available to you? \"     Would the patient desire the use of ventilator (breathing machine)?:       Resuscitation  \"CPR works best to restart the heart when there is a sudden event, like a heart attack, in someone who is otherwise healthy. Unfortunately, CPR does not typically restart the heart for people who have serious health conditions or who are very sick. \"    \"In the event your heart stopped as a result of an underlying serious health condition, would you want attempts to be made to restart your heart (answer \"yes\" for attempt to resuscitate) or would you prefer a natural death (answer \"no\" for do not attempt to resuscitate)? \"       NOTE: If the patient has a valid advance directive AND now provides care preference(s) that are inconsistent with that prior directive, advise the patient to consider either: creating a new advance directive that complies with state-specific requirements; or, if that is not possible, orally revoking that prior directive in accordance with state-specific requirements, which must be documented in the EHR. [] Yes   [] No   Educated Patient / Lindsey Gooden regarding differences between Advance Directives and portable DNR orders.     Length of ACP Conversation in minutes:      Conversation Outcomes:  [] ACP discussion completed  [] Existing advance directive reviewed with patient; no changes to patient's previously recorded wishes  [] New Advance Directive completed  [] Portable Do Not Rescitate prepared for Provider

## 2020-07-27 NOTE — CARE COORDINATION
[address] [place] [address] NOTE  Room: R251/R251-01  Admit Date: 2020       Date: 2020  Patient Name: Unique Boone        MRN: [id number]    : 1935  (82 y.o.)  Gender: female      Diagnosis: Impaired mobility/ ADLs secondary to exacerbation of Parkinson's Disease    REHAB DIAGNOSIS:   Diagnosis: Impaired mobility/ ADLs secondary to exacerbation of Parkinson's Disease    CO MORBIDITIES:      Past Medical History:   Diagnosis Date    Asthma     Fibromyalgia     Hemorrhoid     Hyperlipidemia     Hypertension     Parkinson's disease ([address]     Type II or unspecified type diabetes mellitus without mention of complication, not stated as uncontrolled      Past Surgical History:   Procedure Laterality Date    CHOLECYSTECTOMY      EYE SURGERY      HEMORRHOID SURGERY      HERNIA REPAIR  2002    ventral hernia    HERNIA REPAIR      LIH repair    HYSTERECTOMY      JOINT REPLACEMENT Right     partial left, full right    LAMINECTOMY      PARACENTESIS Left 2020    5010cc ascites removed by Dr Venessa Ontiveros [phone]    PARACENTESIS Left 06/10/2020    3,850 cc removed per Dr Lucien Benjamin Left 2020    total 5450 ml removed per Dr Lucien Benjamin Left 2020    Dr Olmedo Resides [address] off   [facility] ENDOSCOPY N/A 2020    EGD WITH BANDING performed by Rafaela Andres MD at [facility]        Restrictions  Restrictions/Precautions: Fall Risk  CASE MANAGEMENT    Social/Functional History  Social/Functional History  Lives With: Alone  Type of Home: Apartment(7th floor)  Home Layout: One level  Home Access: Elevator, Level entry  Bathroom Shower/Tub: Tub/Shower unit, Curtain  Bathroom Equipment: Grab bars in [facility] [place]: 4 wheeled walker, Reacher, Alert Button(Patient reported that her life alert button was not working the day she fell but her daughter has had it fixed.)  ADL Assistance: Independent  Homemaking Assistance: Independent  Homemaking Responsibilities: (Patient does her own cooking. 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 Patient plays the piano but is waiting for her piano to get tuned. Pateint reports that she was \"quite good\"  IADL Comments: Patient has 3 daughters and one recently came to the area from out of town in order to help patient  Additional Comments: Reports recent fall- \"my legs gave out\"       Pts personal preferences: n/a    Pts assets/resources/support system: daughters    COVERAGE INFORMATION:Payor: MEDICARE / Plan: MEDICARE PART A AND B / Product Type: *No Product type* /       NURSING  Weight: 177 lb 0.7 oz (80.3 kg) / Body mass index is 33.45 kg/m². DIET LOW SODIUM 2 GM;  Dietary Nutrition Supplements: Diabetic Oral Supplement    SpO2: 96 % (07/27/20 1047)  No active isolations    Skin Issues: No    Pain Managed: Yes    Bladder continence: No stress incontinence    Bowel continence: Yes      Other: 5L removed from paracentesis today.  MRI of Lumbar spine, positive orthos      PHYSICAL THERAPY  Bed mobility:  Supine to Sit: Stand by assistance (07/26/20 1447)  Sit to Supine: Stand by assistance (07/26/20 1447)  Transfers:  Sit to Stand: Stand by assistance (07/26/20 1448)  Bed to Chair: Stand by assistance(good safety awareness) (07/26/20 1448)  Gait:   Device: Rolling Walker (07/26/20 1448)  Assistance: Stand by assistance (07/26/20 1448)  Distance: 50ft with turns (07/26/20 1448)  Comments: increased time for turns, fatigued (07/26/20 1448)  Stairs:     W/C mobility:     LTG:  Long term goal 1: Pt to be indep with bed mobility  Long term goal 2: Pt to be indep with transfers  Long term goal 3: pt to ambulate >150ft THERAPY    Motor Speech: Within Functional Limits  Comprehension: Exceptions  Verbal Expression: Exceptions to functional limits      Diet/Swallow:  Diet Solids Recommendation: Regular  Liquid Consistency Recommendation: Thin  Dysphagia Outcome Severity Scale: Level 7: Normal in all situations    Compensatory Swallowing Strategies: Upright as possible for all oral intake         LTG:  Long-term Goals  Timeframe for Long-term Goals: 2-3 weeks  Goal 1: Pt will increase cognitive-linguistic skills in order to promote effective communication of wants and needs and to promote safety with the completion of ADLs upon discharge. COGNITION  OT: Cognition Comment: comp - min, exp - supervision, soc int - supervision, prob solv - min, mem - min  SP:Memory: Exceptions to [facility]  Problem Solving: Exceptions to [address]  Attendance to recreational therapy programs:    []  Pet Therapy  [] Music Therapy  [] Art Therapy    [] Recreation Therapy Group [] Support Group           Patient social interaction (mood, participation): good      Patient strengths: motivated    Patients goal:  To get stronger\"         Problems/Barriers: medical issues        1. Safety:          - Intervention / Plan:    [x]  falls protocol     [x]  PT/OT    []  SP        - Results:         2. Potential DME needs:         - Intervention / Plan:  [x]  PT/OT     [x]  Assess equipment needs/access       - Results:         3. Weakness:          - Intervention / Plan:  [x]  PT/OT      []  Other:         - Results:         4.   Discharge planning needs:          - Intervention / Plan:  [x]  Weekly team conference      []  family training        - Results:         5.            - Intervention / Plan:          - Results:         6.            - Intervention / Plan:         - Results:         7.            - Intervention / Plan:         - Results:           Discharge Plan   Estimated Length of Stay: TBD    Tentative Discharge date: `8/8/20      Anticipated Discharge Destination:  Home      Team recommendations:    1. Follow up Therapy :    PT  OT  RN  Social Work  [facility] Aide    2. Home Health    Other:     Equipment needed at Discharge:  Other: TBD      Team Members Present at Conference:    Physician: Dr. Ervin Dahl  : Yoandy Wen RN  RN: Gregory Linder RN  Physical Therapist: Ran Pelaez, STARLA  Occupational Therapist:Jeannette Johansen  Speech Therapist: WANDER Mcconnell  Nurse Manager: Fanny Pereira RN    Electronically signed by Varsha Santiago RN on 7/27/2020 at 3:19 PM

## 2020-07-27 NOTE — PLAN OF CARE
Problem: IP SWALLOWING  Goal: LTG - patient will tolerate the least restrictive diet consistency to allow for safe consumption of daily meals  7/26/2020 1232 by WANDER Zaman  Outcome: Met This Shift

## 2020-07-27 NOTE — CARE COORDINATION
Social/Functional Status:  Social/Functional History  Lives With: Alone  Type of Home: Apartment(7th floor)  Home Layout: One level  Home Access: Elevator, Level entry  Bathroom Shower/Tub: Tub/Shower unit, Curtain  Bathroom Equipment: Grab bars in [address]: 4 wheeled walker, Reacher, Alert Button(Patient reported that her life alert button was not working the day she fell but her daughter has had it fixed.)  ADL Assistance: [place]: Independent  Homemaking Responsibilities: (Patient does her own cooking. 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 Patient plays the piano but is waiting for her piano to get tuned. Pateint reports that she was \"quite good\"  IADL Comments: Patient has 3 daughters and one recently came to the area from out of town in order to help patient  Additional Comments: Reports recent fall- \"my legs gave out\"    Spoke with patient and explained role in the team. Patient questions answered appropriately. Explained discharge process. Patient stated understanding. Patient stated that she lives alone, has an elevator to get to apartment. Patient has 3 daughters, 2 local for support. Daughters help with cleaning and laundry, patient does own medications and bills. Patient has a rollator.   Electronically signed by Shaniqua Bui RN on 7/27/2020 at 1:52 PM

## 2020-07-28 NOTE — PLAN OF CARE
Problem: Falls - Risk of:  Goal: Will remain free from falls  Description: Will remain free from falls  7/28/2020 1158 by Gus Jones RN  Outcome: Ongoing  7/28/2020 0037 by Leonor Mota RN  Outcome: Ongoing  Goal: Absence of physical injury  Description: Absence of physical injury  7/28/2020 1158 by Gus Jones RN  Outcome: Ongoing  7/28/2020 0037 by Leonor Mota RN  Outcome: Ongoing     Problem: Skin Integrity:  Goal: Will show no infection signs and symptoms  Description: Will show no infection signs and symptoms  7/28/2020 1158 by Gus Jones RN  Outcome: Ongoing  7/28/2020 0037 by Leonor Mota RN  Outcome: Ongoing  Goal: Absence of new skin breakdown  Description: Absence of new skin breakdown  7/28/2020 1158 by Gus Jones RN  Outcome: Ongoing  7/28/2020 0037 by Leonor Mota RN  Outcome: Ongoing     Problem: Infection:  Goal: Will remain free from infection  Description: Will remain free from infection  7/28/2020 1158 by Gus Jones RN  Outcome: Ongoing  7/28/2020 0037 by Leonor Mota RN  Outcome: Ongoing     Problem: Safety:  Goal: Free from accidental physical injury  Description: Free from accidental physical injury  7/28/2020 1158 by Gus Jones RN  Outcome: Ongoing  7/28/2020 0037 by Leonor Mota RN  Outcome: Ongoing  Goal: Free from intentional harm  Description: Free from intentional harm  7/28/2020 1158 by Gus Jnoes RN  Outcome: Ongoing  7/28/2020 0037 by Leonor Mota RN  Outcome: Ongoing     Problem: Daily Care:  Goal: Daily care needs are met  Description: Daily care needs are met  7/28/2020 1158 by Gus Jones RN  Outcome: Ongoing  7/28/2020 0037 by Leonor Mota RN  Outcome: Ongoing     Problem: Pain:  Goal: Patient's pain/discomfort is manageable  Description: Patient's pain/discomfort is manageable  7/28/2020 1158 by Gus Jones RN  Outcome: Ongoing  7/28/2020 0037 by Leonor Mota RN  Outcome: Ongoing  Goal: Pain level will decrease  Description: Pain level will decrease  7/28/2020 1158 by Ishmael Lazar RN  Outcome: Ongoing  7/28/2020 0037 by Idania Marquez RN  Outcome: Ongoing  Goal: Control of acute pain  Description: Control of acute pain  7/28/2020 1158 by Ishmael Lazar RN  Outcome: Ongoing  7/28/2020 0037 by Idania Marquez RN  Outcome: Ongoing  Goal: Control of chronic pain  Description: Control of chronic pain  7/28/2020 1158 by Ishmael Lazar RN  Outcome: Ongoing  7/28/2020 0037 by Idania Marquez RN  Outcome: Ongoing     Problem: Skin Integrity:  Goal: Skin integrity will stabilize  Description: Skin integrity will stabilize  7/28/2020 1158 by Ishmael Lazar RN  Outcome: Ongoing  7/28/2020 0037 by Idania Marquez RN  Outcome: Ongoing     Problem: Discharge Planning:  Goal: Patients continuum of care needs are met  Description: Patients continuum of care needs are met  7/28/2020 1158 by Ishmael Lazar RN  Outcome: Ongoing  7/28/2020 0037 by Idania Marquez RN  Outcome: Ongoing     Problem: IP COMMUNICATION/DYSARTHRIA  Goal: LTG - patient will improve expressive language skills to allow for communication of wants and needs in daily activities  7/28/2020 0037 by Idania Marquez RN  Outcome: Ongoing     Problem: IP SWALLOWING  Goal: LTG - patient will tolerate the least restrictive diet consistency to allow for safe consumption of daily meals  7/28/2020 0037 by Idania Marquez RN  Outcome: Ongoing

## 2020-07-28 NOTE — PROGRESS NOTES
Occupational Therapy  Facility/Department: [facility]  Daily Treatment Note  NAME: [place]  : 1935  MRN: [id number]    Date of Service: 2020    Discharge Recommendations:  Continue to assess pending progress       Assessment   Assessment: Pt demonstrates decreased ability to sequence during ADL tasks this AM. Pt continues to benefit from OT services to maximize independence and safety during ADLs. REQUIRES OT FOLLOW UP: Yes  Activity Tolerance  Activity Tolerance: Patient Tolerated treatment well;Patient limited by pain  Activity Tolerance: Pt reported being cold and in 7/10 pain upon arrival. Able to engage in tabletop tasks with intermittent RBs and reported decreased pain at end of session. Safety Devices  Safety Devices in place: Yes  Type of devices: All fall risk precautions in place         Patient Diagnosis(es): There were no encounter diagnoses. has a past medical history of Asthma, Fibromyalgia, Hemorrhoid, Hyperlipidemia, Hypertension, Parkinson's disease ([facility] [address] and Type II or unspecified type diabetes mellitus without mention of complication, not stated as uncontrolled. has a past surgical history that includes Hysterectomy; Cholecystectomy; laminectomy; eye surgery; joint replacement (Right); hernia repair (2002); hernia repair; Hemorrhoid surgery; Upper gastrointestinal endoscopy (N/A, 2020); Paracentesis (Left, 2020); Paracentesis (Left, 06/10/2020); Paracentesis (Left, 2020); and Paracentesis (Left, 2020).     Restrictions  Restrictions/Precautions  Restrictions/Precautions: Fall Risk  Subjective   General  Referring Practitioner: Dr Zaid Sharp  Diagnosis: Imp Mob and ADLs 2° Exac of Parkinsons  Pain Assessment  Pain Assessment: 0-10  Pain Level: 5  Pain Type: Chronic pain  Pain Location: Back  Pain Orientation: Mid  Pain Descriptors: Aching  Pre Treatment Pain Screening  Pain at present: 7  Scale Used: Numeric Score  Intervention List: Patient able to continue with treatment;Nurse/Physician notified  Comments / Details: Pt stated she had just been given pain meds prior to session   Orientation     Objective    Pt engaged in bilateral fine motor task to promote IND with ADLs. Pt placed pony beads on cord x20, working at a slow and steady pace. Required a cue to problem solve, place beads on the end without a knot. Pt reports she completes her own medication management at home using a pill box. Pt then engaged in pill box task. Opens bottles and pill compartents IND'ly. Pt completed 1 of 3 pill bottles (simple directions) with 0 errors after increased time for processing. Required cues to identify and correct errors for remaining 2 bottles involving 2-step directions. Cognition  Overall Cognitive Status: Exceptions  Arousal/Alertness: Appropriate responses to stimuli  Following Commands: Follows one step commands with repetition; Follows multistep commands with repitition  Attention Span: Attends with cues to redirect  Memory: Decreased short term memory  Safety Judgement: Good awareness of safety precautions  Problem Solving: Decreased awareness of errors;Assistance required to identify errors made  Initiation: Requires cues for some  Sequencing: Requires cues for some  Cognition Comment: comp - min, exp - supervision, soc int - mod I, prob solv - min, mem - min     Plan   Plan  Times per week: 5-7 times per day  Plan weeks: 1.5 weeks  Current Treatment Recommendations: Strengthening, Balance Training, Functional Mobility Training, Endurance Training, Safety Education & Training, Patient/Caregiver Education & Training, Self-Care / ADL, Equipment Evaluation, Education, & procurement  Plan Comment: Continue OT per POC    Goals  Patient Goals   Patient goals :  \"To get stronger\"       Therapy Time   Individual Concurrent Group Co-treatment   Time In 1400         Time Out 1430         Minutes 30              Therapeutic activities: 15 minutes  Cognitive Retraining: 15 minutes      Lila Reynoso OT    Electronically signed by iLla Reynoso OT on 7/28/2020 at 3:50 PM

## 2020-07-28 NOTE — PROGRESS NOTES
[facility]  Facility/Department: [facility]  Speech Language Pathology   Treatment Note          Jesusita Mandujano  1935  R251/R251-01        Rehab Dx/Hx: Impaired mobility [Z74.09]  Abnormality of gait and mobility [R26.9]    Precautions: falls    Medical Dx: Impaired mobility [Z74.09]  Abnormality of gait and mobility [R26.9]  Speech Dx: Cognitive Linguistic Impairment     Date: 7/28/2020    Subjective:  Alert and Other: Not participating      Interventions used this date:  Cognitive Skill Development    Objective/Assessment:  Patient progressing towards goals:  Goal 1: Pt will complete abstract verbal reasoning tasks (similarities/differences, divergent naming) with 80% acc given cues as needed in order to promote effective communication of wants and needs with caregivers upon discharge. Goal 2: Pt will be educated on 3 memory strategies and will state their use in functional activities with 80% acc given cues as needed in order to promote safety with the completion of ADLs upon discharge. Pt stated incorrect birthday year. Unable to recall despite max cues. Pt was able to read from wrist band when prompted. Pt educated on white board in room. Read aloud her nurses's name. Goal 3: Pt will complete mid-level problem solving task related to ADLs with 80% acc given cues as needed in order to promote safety with the completion of ADLs upon discharge. Goal 4: Pt will complete delayed recall task with 80% acc given cues as needed in order to promote safety with the completion of ADLs upon discharge. Pt could not recall prior events of day. Pt oriented to place, but unable to recall time of day despite cues. Pt primarily just staring at SLP after questions were asked of her or in attempts to engage pt in simple conversation. Pt's daughter reported this is a big change for patient and she is very concerned.   Pt's daughter reported she has not received her Lyrica medication in 3 days and she is concerned

## 2020-07-28 NOTE — PROGRESS NOTES
Occupational Therapy  Facility/Department: [facility]  Daily Treatment Note  NAME: [place]  : 1935  MRN: [id number]    Date of Service: 2020    Discharge Recommendations:  Continue to assess pending progress       Assessment   Assessment: Pt demonstrates decreased ability to sequence during ADL tasks this AM. Pt continues to benefit from OT services to maximize independence and safety during ADLs. REQUIRES OT FOLLOW UP: Yes  Activity Tolerance  Activity Tolerance: Treatment limited secondary to decreased cognition  Activity Tolerance: good  Safety Devices  Safety Devices in place: Yes  Type of devices: All fall risk precautions in place         Patient Diagnosis(es): There were no encounter diagnoses. has a past medical history of Asthma, Fibromyalgia, Hemorrhoid, Hyperlipidemia, Hypertension, Parkinson's disease ([facility] [address] and Type II or unspecified type diabetes mellitus without mention of complication, not stated as uncontrolled. has a past surgical history that includes Hysterectomy; Cholecystectomy; laminectomy; eye surgery; joint replacement (Right); hernia repair (2002); hernia repair; Hemorrhoid surgery; Upper gastrointestinal endoscopy (N/A, 2020); Paracentesis (Left, 2020); Paracentesis (Left, 06/10/2020); Paracentesis (Left, 2020); and Paracentesis (Left, 2020).     Restrictions  Restrictions/Precautions  Restrictions/Precautions: Fall Risk  Subjective   General  Chart Reviewed: Yes  Patient assessed for rehabilitation services?: Yes  Family / Caregiver Present: Yes  Referring Practitioner: Dr Leona Saldana  Diagnosis: Imp Mob and ADLs 2° Exac of Parkinsons  Pain Assessment  Pain Assessment: 0-10  Pain Level: 7  Pain Type: Acute pain  Pain Location: Back  Pain Orientation: Other (Comment)(generalized)  Pain Descriptors: Aching  Pain Frequency: Intermittent  Pre Treatment Pain Screening  Pain at present: 7  Scale Used: Numeric Score  Intervention List: Nurse called to administer meds; Patient able to continue with treatment  Vital Signs  Patient Currently in Pain: Yes   Orientation  Orientation  Overall Orientation Status: Within Functional Limits  Objective    ADL  Feeding: Independent  Grooming: Supervision  UE Bathing: Supervision  LE Bathing: Supervision  UE Dressing: Setup  LE Dressing: Increased time to complete;Supervision  Toileting: Stand by assistance  Additional Comments: Pt requires mod verbal cues for sequencing ADL this AM        Balance  Sitting Balance: Stand by assistance  Standing Balance: Stand by assistance  Functional Mobility  Functional - Mobility Device: Rolling Walker  Activity: To/from bathroom  Assist Level: Stand by assistance  Toilet Transfers  Toilet - Technique: Ambulating  Equipment Used: Grab bars  Toilet Transfer: Stand by assistance  Shower Transfers  Shower Transfers: Not tested  Shower Transfers Comments: pt completed sponge bath sinkside     Transfers  Sit to stand: Stand by assistance  Stand to sit: Stand by assistance        Cognition  Cognition Comment: comp - min, exp - supervision, soc int - supervision, prob solv - min, mem - min              Plan   Plan  Times per week: 5-7 times per day  Plan weeks: 1.5 weeks  Current Treatment Recommendations: Strengthening, Balance Training, Functional Mobility Training, Endurance Training, Safety Education & Training, Patient/Caregiver Education & Training, Self-Care / ADL, Equipment Evaluation, Education, & procurement  Plan Comment: Continue per OT plan of care  G-Code     OutComes Score                                                  AM-PAC Score             Goals  Patient Goals   Patient goals :  \"To get stronger\"       Therapy Time   Individual Concurrent Group Co-treatment   Time In 930         Time Out 1030         Minutes 60           ADL trainin minutes     Fermin Petty OT   Electronically signed by Fermin Petty OT on 2020 at 11:54 AM

## 2020-07-28 NOTE — PROGRESS NOTES
Patient has slept at long intervals. Up to BR x2 with walker and SBA. Continent but coccyx excoriated so zinc applied. Denies pain. Bandaid to LLQ in clean and intact.

## 2020-07-28 NOTE — FLOWSHEET NOTE
RN called therapy to get a scheduled rest break during therapy. Patient's family was concerned about her medications and not getting enough rest in bed. RN talked with daughter and explained about the medications and the scheduled rest breaks. She seemed satisfied.

## 2020-07-28 NOTE — PROGRESS NOTES
Subjective: The patient complains of severe  acute on chronic confusion rigidity ascites malaise partially relieved by recent paracentesis, PT, OT, speech and language pathology, rest and exacerbated by reaccumulation of ascites. I am concerned about patients drop of H&H and abdominal pain. I am concerned about her elevated TSH I am meeting need to adjust Synthroid. I am concerned about her history of liver disease and and will discontinue Tylenol give a low-dose of Ultram and her oxycodone monitor for constipation. I will add K pad and topicals to avoid medication interactions and toxicity. ROS x10: The patient also complains of severely impaired mobility and activities of daily living. Otherwise no new problems with vision, hearing, nose, mouth, throat, dermal, cardiovascular, GI, , pulmonary, musculoskeletal, psychiatric or neurological. See Rehab H&P on Rehab chart dated . Vital signs:  BP 97/62   Pulse 70   Temp 98 °F (36.7 °C)   Resp 15   Ht 5' 1\" (1.549 m)   Wt 177 lb 0.7 oz (80.3 kg)   SpO2 96%   BMI 33.45 kg/m²   I/O:   PO/Intake:  fair PO intake, no problems observed or reported. Bowel/Bladder:  continent, no problems noted. General:  Patient is well developed, adequately nourished, non-obese and     well kempt. HEENT:    PERRLA, hearing intact to loud voice, external inspection of ear     and nose benign. Inspection of lips, tongue and gums benign  Musculoskeletal: No significant change in strength or tone. All joints stable. Inspection and palpation of digits and nails show no clubbing,       cyanosis or inflammatory conditions. Neuro/Psychiatric: Affect: flat but pleasant. Alert and oriented to person, place and     situation. No significant change in deep tendon reflexes or     sensation  Lungs:  CTA-B. Respiration effort is normal at rest.     Heart:   S1 = S2, RRR. No loud murmurs.     Abdomen:  Firm with tenderness due to ascites, non-tender, no nonobstructing stones, measuring 2 mm and 4 mm. DEGENERATIVE CHANGES. NO ACUTE FRACTURE. Xr Chest   7/23/2020   : A shallow inspiration is present without significant infiltrate identified. There is no cardiomegaly, significant pleural effusion, vascular congestion, pneumothorax, or displaced fractures identified. NO EVIDENCE OF ACUTE THORACIC TRAUMA OR ACTIVE CARDIOPULMONARY DISEASE. Us Guided  : 7/23/2020    Sterile technique and local lidocaine anesthesia was used to place a 5 [employer] Yueh catheter within the abdominal fluid collection. Approximately 6.5 L of low viscosity pale yellow-colored fluid was aspirated without evidence of immediate complication. A sample was sent for analysis as requested. The patient was monitored in the radiology holding area for approximately hold minutes without event. She was returned to the medical floor in stable condition with the usual instructions. SUCCESSFUL ULTRASOUND-GUIDED PARACENTESIS APPROXIMATELY 6.5 L OF ASCITES. Us Guided Paracentesis   Survey of the abdomen showed large amount of ascites fluid. After obtaining informed consent, the patient was positioned supine on the sonography table. Using ultrasound, the skin over the left hemiabdomen was locally anesthetized with 1% lidocaine. Following that, a Yueh needle was advanced into the fluid pocket using ultrasound visualization. 5450cc, of clear yellow fluid were aspirated and sent for cytology, and pathology. The needle was removed, and hemostasis was obtained with pressure. A Band-Aid was placed. Post procedure images did not demonstrate hemorrhage at the target site. The patient tolerated the procedure well. The patient left the department in good condition. A radiology nurse was in presence monitoring vital signs, assisting throughout the procedure. Us Retroperitoneal   7/23/2020    Renal sonography was performed.  The study is compromised due to patient body habitus and sonographic window. Kidneys by ultrasound are symmetric borderline small in size. Right kidney measures 9.3 x 5.3 x 4.3 cm left kidney measures 9.2 x 4.7 x  4.8 cm. There are a few punctate echogenic foci in the central sinus echocomplex raising the question of specular reflectors. Bilateral renal echotexture is otherwise unremarkable. No large cortical cysts, solid masses or shadowing calcifications. No ultrasound signs of hydronephrosis. Note is made of upper abdominal ascites     SYMMETRIC KIDNEYS BORDERLINE SMALL IN SIZE. NO ULTRASOUND SIGNS OF HYDRONEPHROSIS. UPPER ABDOMINAL ASCITES       Previous extensive, complex labs, notes and diagnostics reviewed and analyzed. ALLERGIES:    Allergies as of 07/25/2020 - Review Complete 07/25/2020   Allergen Reaction Noted    Adhesive tape  11/22/2004    Amoxicillin-pot clavulanate  03/07/2013    Bactrim [sulfamethoxazole-trimethoprim]  03/07/2013    Clarithromycin  06/23/2015    Other  08/30/2004    Sulfa antibiotics  08/30/2004      (please also verify by checking MAR)      Yesterday I evaluated this patient for periodic reassessment of medical and functional status. The patient was discussed in detail at the treatment team meeting focusing on current medical issues, progress in therapies, social issues, psychological issues, barriers to progress and strategies to address these barriers, and discharge planning. See the hand written addendum to rehab progress note. The patient continues to be high risk for future disability and their medical and rehabilitation prognosis continue to be good and therefore, we will continue the patient's rehabilitation course as planned. The patient's tentative discharge date was set. Patient and family education was discussed. The patient was made aware of the team discussion regarding their progress.   Discharge plans were discussed along with barriers to progress and strategies to address these barriers, patient encouraged to continue to light to get nursing assistance to get up, bed and chair alarm. 8. Elevated DVT risk: progressive activities in PT, continue prophylaxis DAWN hose, elevation and no thinners due to liver failure. 9. Complex discharge planning: Discharge 8/8/2020 home alone with help from her daughters who live locally. Weekly team meeting every Monday to assess progress towards goals, discuss and address social, psychological and medical comorbidities and to address difficulties they may be having progressing in therapy. Patient and family education is in progress. The patient is to follow-up with their family physician after discharge. Complex Active General Medical Issues that complicate care Assess & Plan:    1. Whole body pain as well as cervical thoracic and low back pain due to fibromyalgia,   Lumbago without sciatica,   Rotator cuff (capsule) deficits, Shoulder impingement-use lowest effective dose of opiate medication to include Roxicodone and Ultram, titrate Desyrel, add Lidoderm BenGay heat massage-titrate Topamax and avoid use of Tylenol due to liver disease as well as Lyrica due to intolerance  2. Depression and anxiety-emotional support given daily add recreational therapy and rehabilitation psychology when available and support group when available  3. LVH (left ventricular hypertrophy), coronary artery disease-vital signs to shift dose and titrate cardiac locations consult hospitalist for backup medical  4. Parkinson's disease -consult neurology avoid sedatives such as Lyrica which she does not tolerate well titrate Sinemet. 5.   Thrombocytopenia with severe anemia and recent blood transfusion-recheck CBC add vitamin B12 vitamin D  6. Vitamin D deficiency-add daily vitamin D and recheck as an outpatient  7. Secondary esophageal varices without bleeding-monitor stools for blood avoid strong blood thinners if possible  8.    Morbidly obese but with  Moderate malnutrition -add high-protein low-carb supplementation if possible however because of her her recent liver disease she may not be able to process proteins very well. 9.   Stasis dermatitis-protect skin add ET mix to the lower extremities add co-Q10  10. Severe progressive and recurrent ascites of the liver-eliminate toxic medications recheck as an outpatient monitor ammonia level-reconsult interventional radiology for repeat paracentesis. 11. Hepatic encephalopathy with Elavil elevated ammonia level-titrate lactulose recheck ammonia  12.  Severe hypothyroidism-consult endocrinology titrate Synthroid             Smooth Aguirre D.O., PM&R     Attending    [address]

## 2020-07-28 NOTE — PROGRESS NOTES
Physical Therapy Rehab Treatment Note  Facility/Department: [facility]  Room: R2/R251-01       NAME: Kelli Brown  : 1935 (82 y.o.)  MRN: [id number]  CODE STATUS: Full Code    Date of Service: 2020  Chart Reviewed: Yes  Family / Caregiver Present: No    Restrictions:  Restrictions/Precautions: Fall Risk    SUBJECTIVE: Subjective: Pt states she is ok. Pain Screening  Patient Currently in Pain: No     Post Treatment Pain Screenin/10     OBJECTIVE:                 Transfers  Sit to Stand: Stand by assistance  Stand to sit: Stand by assistance  Comment: VCs to improve safety and technique. Ambulation  Ambulation?: Yes  Ambulation 1  Surface: level tile;carpet  Device: Rollator  Assistance: Stand by assistance  Quality of Gait: Improved cadece  Distance: 80ftx2  Stairs/Curb  Stairs?: No(Pt declined. States she does not have to do steps.)        Exercises  Neurodevelopmental Techniques: Ambulating around obstacles with rollator. Sit to stands x5    ASSESSMENT/COMMENTS:  Assessment: Slow to initiate and complete tasks. Improved cayden with gait training. Pt with c/o nausea end of session. PLAN OF CARE/Safety:   Plan Comment: Cont per POC.     Therapy Time:   Individual   Time In 1530   Time Out 1600   Minutes 30     Minutes:      Transfer/Bed mobility trainin      Gait training:10      Neuro re education:10     Therapeutic ex:5    Matt Tesfaye PTA, 20 at 4:18 PM

## 2020-07-28 NOTE — PROGRESS NOTES
Physical Therapy Rehab Treatment Note  [facility]Department[facility]  [facility]       NAME: Tammi Guadalupe  : 1935 (82 y.o.)  MRN: [id number]  CODE STATUS: Full Code    Date of Service: 2020  Chart Reviewed: Yes  Family / Caregiver Present: No    Restrictions:  Restrictions/Precautions: Fall Risk  SUBJECTIVE: Subjective: Pt states she is ok. Pain Screening  Patient Currently in Pain: No     Post Treatment Pain Screenin/10     OBJECTIVE: TUG 44.3 sec with rollator                Transfers  Sit to Stand: Stand by assistance  Stand to sit: Stand by assistance  Comment: VCs to improve safety and technique. Ambulation  Ambulation?: Yes  Ambulation 1  Surface: carpet  Device: Rollator  Assistance: Stand by assistance  Quality of Gait: slow pace, decreased step length, requires increased time with change of direction and fatigues with increased distance. Distance: 75ft           Exercises  Neurodevelopmental Techniques: Functional ambulation tasks with rollator: locating and retreiving cones. Standing reaching for cones with 1 UE support. Other exercises  Other exercises 1: sit to stands x5 without UE support  Other exercises 2: standing heel raises x10  Other exercises 3: standing marches x10     ASSESSMENT/COMMENTS:  Assessment: Slow to initiate and complete tasks. Required VCs to locate 3/8 cones while ambulating. Fatigued with standing and ambulation requiring seated rest breaks. PLAN OF CARE/Safety:   Plan Comment: Cont per POC.     Therapy Time:   Individual   Time In 1100   Time Out 1200   Minutes 60     Minutes:      Transfer/Bed mobility training:10      Gait training:15      Neuro re education:25     Therapeutic ex:10    Meghan De La Rosa PTA, 20 at 12:55 PM

## 2020-07-29 NOTE — PROGRESS NOTES
Physical Therapy Rehab Treatment Note  [facility]  [facility]       NAME: Raffy Mejia  : 1935 (82 y.o.)  MRN: [id number]  CODE STATUS: Full Code    Date of Service: 2020  Chart Reviewed: Yes    Restrictions:  Restrictions/Precautions: Fall Risk    SUBJECTIVE: Subjective: Pt states she is cold. Pain Screening  Patient Currently in Pain: Yes  Pre Treatment Pain Screening  Pain at present: 7  Intervention List: Patient able to continue with treatment;Nurse called to administer meds  Comments / Details: mid back sore/ache. Post Treatment Pain Screening:  Same as above. RN medicated pt. OBJECTIVE:              Transfers  Stand to sit: Stand by assistance  Bed to Chair: Stand by assistance    Ambulation  Ambulation?: Yes  Ambulation 1  Surface: level tile;carpet  Device: Rollator  Assistance: Stand by assistance  Quality of Gait: slow cayden with decreased step length. Distances limited by fatigue. Distance: 100ft           Exercises  Neurodevelopmental Techniques: Standing without UE support 60 sec; eyes closed 10sec; blue foam 20 sec with finger tip support; Single stepping to targets x5 Prabhakar    ASSESSMENT/COMMENTS:  Assessment: Increased time to complete functional mobility. PLAN OF CARE/Safety:   Plan Comment: Cont per POC.     Therapy Time:   Individual   Time In 1530   Time Out 1600   Minutes 30     Minutes:        Transfer/Bed mobility trainin      Gait training:15      Neuro re education:15     Therapeutic ex:0    Geradine Essex, PTA, 20 at 4:12 PM

## 2020-07-29 NOTE — PLAN OF CARE
Problem: Falls - Risk of:  Goal: Will remain free from falls  Description: Will remain free from falls  7/29/2020 0148 by Freeman Sorensen RN  Outcome: Ongoing  7/28/2020 1158 by Johnnie Braxton RN  Outcome: Ongoing  Goal: Absence of physical injury  Description: Absence of physical injury  7/29/2020 0148 by Freeman Sorensen RN  Outcome: Ongoing  7/28/2020 1158 by Johnnie Braxton RN  Outcome: Ongoing     Problem: Skin Integrity:  Goal: Will show no infection signs and symptoms  Description: Will show no infection signs and symptoms  7/29/2020 0148 by Freeman Sorensen RN  Outcome: Ongoing  7/28/2020 1158 by Johnnie Braxton RN  Outcome: Ongoing  Goal: Absence of new skin breakdown  Description: Absence of new skin breakdown  7/29/2020 0148 by Freeman Sorensen RN  Outcome: Ongoing  7/28/2020 1158 by Johnnie Braxton RN  Outcome: Ongoing     Problem: Infection:  Goal: Will remain free from infection  Description: Will remain free from infection  7/29/2020 0148 by Freeman Sorensen RN  Outcome: Ongoing  7/28/2020 1158 by Johnnie Braxton RN  Outcome: Ongoing     Problem: Safety:  Goal: Free from accidental physical injury  Description: Free from accidental physical injury  7/29/2020 0148 by Freeman Sorensen RN  Outcome: Ongoing  7/28/2020 1158 by Johnnie Braxton RN  Outcome: Ongoing  Goal: Free from intentional harm  Description: Free from intentional harm  7/29/2020 0148 by Freeman Sorensen RN  Outcome: Ongoing  7/28/2020 1158 by Johnnie Braxton RN  Outcome: Ongoing     Problem: Daily Care:  Goal: Daily care needs are met  Description: Daily care needs are met  7/29/2020 0148 by Freeman Sorensen RN  Outcome: Ongoing  7/28/2020 1158 by Johnnie Braxton RN  Outcome: Ongoing     Problem: Pain:  Goal: Patient's pain/discomfort is manageable  Description: Patient's pain/discomfort is manageable  7/29/2020 0148 by Freeman Sorensen RN  Outcome: Ongoing  7/28/2020 1158 by

## 2020-07-29 NOTE — PROGRESS NOTES
Occupational Therapy  Facility/Department: [facility]  Daily Treatment Note  NAME: [place]  : 1935  MRN: [id number]    Date of Service: 2020    Discharge Recommendations:  Continue to assess pending progress  OT Equipment Recommendations  Other: Continue to assess    Assessment   Performance deficits / Impairments: Decreased functional mobility ; Decreased ADL status; Decreased balance;Decreased endurance;Decreased high-level IADLs;Decreased fine motor control;Decreased coordination;Decreased strength  Activity Tolerance  Activity Tolerance: Patient Tolerated treatment well  Activity Tolerance: increased time to complete all tasks  Safety Devices  Safety Devices in place: Yes  Type of devices: All fall risk precautions in place         Patient Diagnosis(es): There were no encounter diagnoses. has a past medical history of Asthma, Fibromyalgia, Hemorrhoid, Hyperlipidemia, Hypertension, [facility] and Type II or unspecified type diabetes mellitus without mention of complication, not stated as uncontrolled. has a past surgical history that includes Hysterectomy; Cholecystectomy; laminectomy; eye surgery; joint replacement (Right); hernia repair (2002); hernia repair; Hemorrhoid surgery; Upper gastrointestinal endoscopy (N/A, 2020); Paracentesis (Left, 2020); Paracentesis (Left, 06/10/2020); Paracentesis (Left, 2020); and Paracentesis (Left, 2020).     Restrictions  Restrictions/Precautions  Restrictions/Precautions: Fall Risk  Subjective   General  Chart Reviewed: Yes  Patient assessed for rehabilitation services?: Yes  Family / Caregiver Present: Yes  Referring Practitioner: Dr Silver Arreola  Diagnosis: Imp Mob and ADLs 2° Exac of Parkinsons  Pain Assessment  Pain Assessment: 0-10  Pain Level: 0  Pre Treatment Pain Screening  Pain at present: 0  Scale Used: Numeric Score  Intervention List: Patient able to continue with treatment   Orientation Objective    Pt completed sponge bath ADL per pt request wc level. Pt required increased time to complete all tasks. ADL  Feeding: Modified independent   Grooming: Stand by assistance  UE Bathing: Supervision  LE Bathing: Minimal assistance(assist for feet)  UE Dressing: Supervision  LE Dressing: Minimal assistance(assist with socks)  Toileting: Stand by assistance        Toilet Transfers  Toilet - Technique: Stand pivot  Equipment Used: Grab bars  Toilet Transfer: Stand by assistance  Shower Transfers  Shower Transfers Comments: pt completed sponge bath per request     Transfers  Sit to stand: Stand by assistance  Stand to sit: Stand by assistance                       Cognition  Cognition Comment: comp - min, exp - supervision, soc int - mod I, prob solv - min, mem - min         Plan   Plan  Times per week: 5-7 times per day  Plan weeks: 1.5 weeks  Current Treatment Recommendations: Strengthening, Balance Training, Functional Mobility Training, Endurance Training, Safety Education & Training, Patient/Caregiver Education & Training, Self-Care / ADL, Equipment Evaluation, Education, & procurement  Plan Comment: Continue OT per POC    Goals  Patient Goals   Patient goals :  \"To get stronger\"       Therapy Time   Individual Concurrent Group Co-treatment   Time In 0830         Time Out 0930         Minutes 60              ADL trainin minutes    Hector Noriega OT    Electronically signed by Hector Noriega OT on 2020 at 1:02 PM

## 2020-07-29 NOTE — PROGRESS NOTES
Subjective: The patient complains of severe  acute on chronic confusion rigidity ascites malaise partially relieved by recent paracentesis, PT, OT, speech and language pathology, rest and exacerbated by reaccumulation of ascites. I am concerned about patients drop of H&H and abdominal pain. I have consulted neurology regarding her Lyrica dosing I do not agree with using Lyrica in a patient above the age of [de-identified] especially with medical comorbidities. I have passed that along with her daughter and I have asked neurology to address this. The problems she is having have nothing to do with her coming off Lyrica. At some point they transition her to Topamax because they thought that her problems were from the Lyrica. This was something that happened before she got to our unit and I agree that she should not be on the Lyrica but again I am deferring this to neurology. Myself will not prescribe the Lyrica in somebody over 80 as I do not believe this is good medical practice. Also she has ascites and liver disease and we need to be limiting her medications as much as possible. Another large amount of fluid was drained off this week and she continues to reaccumulate ascitic fluid. ROS x10: The patient also complains of severely impaired mobility and activities of daily living. Otherwise no new problems with vision, hearing, nose, mouth, throat, dermal, cardiovascular, GI, , pulmonary, musculoskeletal, psychiatric or neurological. See Rehab H&P on Rehab chart dated . Vital signs:  BP 98/62   Pulse 93   Temp 97 °F (36.1 °C)   Resp 18   Ht 5' 1\" (1.549 m)   Wt 177 lb 0.7 oz (80.3 kg)   SpO2 97%   BMI 33.45 kg/m²   I/O:   PO/Intake:  fair PO intake, no problems observed or reported. Bowel/Bladder:  continent, no problems noted. General:  Patient is well developed, adequately nourished, non-obese and     well kempt.      HEENT:    PERRLA, hearing intact to loud voice, external inspection of ear     and nose benign. Inspection of lips, tongue and gums benign  Musculoskeletal: No significant change in strength or tone. All joints stable. Inspection and palpation of digits and nails show no clubbing,       cyanosis or inflammatory conditions. Neuro/Psychiatric: Affect: flat but pleasant. Alert and oriented to person, place and     situation. No significant change in deep tendon reflexes or     sensation  Lungs:  CTA-B. Respiration effort is normal at rest.     Heart:   S1 = S2, RRR. No loud murmurs. Abdomen:  Firm with tenderness due to ascites, non-tender, no enlargement of liver or spleen. Extremities:  No significant lower extremity edema or tenderness. Skin:   Intact to general survey, no visualized or palpated problems. Rehabilitation:  Physical therapy: FIMS:  Bed Mobility: Scooting: Supervision    Transfers: Sit to Stand: Stand by assistance  Stand to sit: Stand by assistance  Bed to Chair: Stand by assistance(good safety awareness), Ambulation 1  Surface: level tile, carpet  Device: Rollator  Assistance: Stand by assistance  Quality of Gait: Improved cadece  Gait Deviations: Slow Vivian  Distance: 80ft  Comments: increased time for turns, fatigued,      FIMS:  ,  ,      Occupational therapy: FIMS:   ,  , Assessment: Pt demonstrates decreased ability to sequence during ADL tasks this AM. Pt continues to benefit from OT services to maximize independence and safety during ADLs. Speech therapy: FIMS:        Lab/X-ray studies reviewed, analyzed and discussed with patient and staff:   No results found for this or any previous visit (from the past 24 hour(s)). Ct Abdomen Pelvis  7/23/2020    Liver: Small in size, and nodular contour, unchanged. Bile Ducts:  Normal in caliber. Gallbladder:  Surgically absent. Pancreas:  Hypoplastic, without masses, cysts, ductal dilatation or calcification. Spleen:  Normal in size without masses or calcifications. No splenules.  Kidneys: Right and left kidneys measure 8.0 cm, and 9.1 cm, respectively. 1 mm calcification mid pole left kidney, unchanged from 2013. No right renal calculi. Adrenals:  Normal. Small bowel:  Normal in caliber. Appendix:  Not visualized. Colon:  Normal in caliber. Surgical clips at level of rectum. Peritoneum:  Diffuse free fluid within the abdomen and pelvis, unchanged. No free air. Vessels: Aorta normal in course and caliber. Lymph nodes:  Retroperitoneal:  No enlarged retroperitoneal lymph nodes. Mesenteric:  No enlarged mesenteric lymph nodes. Pelvic: No enlarged pelvic lymph nodes. Ureters: Normal in course and caliber. No calcifications. Bladder: Partially decompressed. Reproductive organs: Uterus surgically absent. Dystrophic calcification, left adnexa, unchanged from March, 2013. Abdominal Wall: Remote abdominal wall defect repair. Edematous change within abdominal pelvic soft tissues diffusely, stable. Bones:  No bone lesions. No degenerative changes. No post operative changes. Cirrhosis. Diffuse ascites, unchanged. Diffuse edematous change, abdominal and pelvic walls, stable. Small right kidney. Left kidney lower limits of normal. Hypoplastic pancreas. Remote hemorrhoid surgery. Cholecystectomy. Hysterectomy. Xr Hip Right 7/25/2020    Moderate degenerative changes with joint space loss in the bilateral hips. No acute fracture identified. Vascular calcifications. Impression:  1. No acute fracture identified. 2. Moderate bilateral osteoarthritis of the hips. 3. Vascular calcifications. Ct Head   7/23/2020: Extra-axial spaces:  Normal. Intracranial hemorrhage:  None. Ventricular system: Ventricles mildly enlarged. Sulci mildly prominent. Basal Cisterns:  Normal. Cerebral Parenchyma: Physiologic basal ganglia calcification, unchanged. Midline Shift:  None. Cerebellum:  Normal. Paranasal sinuses and mastoid air cells:  Normal. Visualized Orbits: Remote bilateral ocular surgery. Impression: No acute findings. Mild cerebral atrophy. Ct Chest  : 7/23/2020    Right lung shows mild dependent atelectatic change posterior right upper lobe, posterior right middle lobe, and posterior right lower lobe. No nodules or masses. No pleural effusion. Left lung shows dependent subsegmental atelectatic change, left upper lobe. Small peripheral focus of groundglass opacity lateral lower lingula. Dependent subsegmental atelectatic change, small left pleural effusion. No nodules or masses. No hilar, mediastinal, or axillary lymph node enlargement. Cardiac size normal. No pericardial effusion. Calcified mitral annulus. Thoracic aorta normal in course and caliber. Bridging anterior osteophytes mid to lower thoracic spine. No osteoblastic, and no osteolytic lesions. No fracture. Bilateral dependent atelectatic change. Small left effusion. Ct Cervical Spine   7/23/2020    Fracture:No acute fracture. Alignment:  Normal cervical lordosis. No subluxation. No canal stenosis. Dense atlas:Dens atlas relationship is normal. Soft tissues:Airway patent. No soft tissue mass or adenopathy. Other findings: There is mild  cervical spondylosis with multiple level disc osteophyte changes. NO ACUTE CERVICAL SPINE ABNORMALITY. Ct Thoracic Spine  7/23/2020   Alignment/fracture: Normal alignment without subluxation. No evidence of fracture. Perivertebral soft tissues are normal.  Disc spaces: There are multiple levels of disc space narrowing and end plate changes consistent with degenerative change. NEGATIVE CT OF THE THORACIC SPINE. Ct Lumbar Spine 7/24/2020   : No acute fractures identified. There is T11 superior endplate Schmorl's node with dystrophic calcification, unchanged from prior imaging study. There is lumbar spondylosis with multilevel intervertebral disc space narrowing, marginal osteophytosis, and facet hypertrophy.  There is L4-5 degenerative disc vacuum phenomenon. There is L3-4 and L4-5 disc bulging which does not appear to impinge on the exiting nerve roots. There is mild L4-5 central canal stenosis. There is disc bulging and  degenerative disc vacuum phenomenon at L1-2. The SI joints are symmetrically maintained. There is iliac sclerosis of the SI joints. Ascites is noted within the field-of-view. There are 2 left renal midpole nonobstructing stones, measuring 2 mm and 4 mm. DEGENERATIVE CHANGES. NO ACUTE FRACTURE. Xr Chest   7/23/2020  NO EVIDENCE OF ACUTE THORACIC TRAUMA OR ACTIVE CARDIOPULMONARY DISEASE. Us Guided  : 7/23/2020  SUCCESSFUL ULTRASOUND-GUIDED PARACENTESIS APPROXIMATELY 6.5 L OF ASCITES. Us Retroperitoneal   7/23/2020    Renal sonography was performed. The study is compromised due to patient body habitus and sonographic window. Kidneys by ultrasound are symmetric borderline small in size. Right kidney measures 9.3 x 5.3 x 4.3 cm left kidney measures 9.2 x 4.7 x  4.8 cm. There are a few punctate echogenic foci in the central sinus echocomplex raising the question of specular reflectors. Bilateral renal echotexture is otherwise unremarkable. No large cortical cysts, solid masses or shadowing calcifications. No ultrasound signs of hydronephrosis. Note is made of upper abdominal ascites     SYMMETRIC KIDNEYS BORDERLINE SMALL IN SIZE. NO ULTRASOUND SIGNS OF HYDRONEPHROSIS. UPPER ABDOMINAL ASCITES       Previous extensive, complex labs, notes and diagnostics reviewed and analyzed.      ALLERGIES:    Allergies as of 07/25/2020 - Review Complete 07/25/2020   Allergen Reaction Noted    Adhesive tape  11/22/2004    Amoxicillin-pot clavulanate  03/07/2013    Bactrim [sulfamethoxazole-trimethoprim]  03/07/2013    Clarithromycin  06/23/2015    Other  08/30/2004    Sulfa antibiotics  08/30/2004      (please also verify by checking MAR)       I reviewed her [facility] prescription monitoring service data sheets in hopes of eliminating polypharmacy and weaning to the lowest effective dose of pain medications and eliminating the concomitant use of benzodiazepines. I see no medications of concern. I see no habits of combining sedatives and narcotics. Complex Physical Medicine & Rehab Issues Assess & Plan:   1. Severe abnormality of gait and mobility and impaired self-care and ADL's secondary to progressive Parkinson's disease with liver failure and ascites. Functional and medical status reassessed regarding patients ability to participate in therapies and patient found to be able to participate in acute intensive comprehensive inpatient rehabilitation program including PT/OT to improve balance, ambulation, ADLs, and to improve the P/AROM. Therapeutic modifications regarding activities in therapies, place, amount of time per day and intensity of therapy made daily. In bed therapies or bedside therapies prn.   2. Bowel constipation due to ascites and Bladder dysfunction:  frequent toileting, ambulate to bathroom with assistance, check post void residuals. Check for C.difficile x1 if >2 loose stools in 24 hours, continue bowel & bladder program.  Monitor bowel and bladder function. Lactinex 2 PO every AC. MOM prn, Brown Bomb prn, Glycerin suppository prn, enema prn. Add scheduled lactulose  3. Severe low back and abdominal pain due to ascites as well as rigid muscle Parkinson's related pain as well as generalized OA pain: reassess pain every shift and prior to and after each therapy session, give prn Tylenol and Roxicodone and Ultram as needed, modalities prn in therapy, Lidoderm, K-pad prn. Tylenol is limited due to liver failure therefore no Tylenol consider Duragesic patch-she was taken off Lyrica because of its potential toxicity especially in light of liver failure. Neurology consulted to clarify and simplify medications.   She has been getting  Topamax-- I will consult pain management-as neurologist do not seem comfortable addressing this issue. 4. Skin healing and breakdown risk:  continue pressure relief program.  Daily skin exams and reports from nursing. 5. Severe fatigue due to nutritional and hydration deficiency: Add vitamin B12 vitamin D and CoQ10 continue to monitor I&Os, calorie counts prn, dietary consult prn.  6. Acute episodic insomnia with situational adjustment disorder:  prn Ambien, monitor for day time sedation. 7. Falls risk elevated:  patient to use call light to get nursing assistance to get up, bed and chair alarm. 8. Elevated DVT risk: progressive activities in PT, continue prophylaxis DAWN hose, elevation and no thinners due to liver failure. 9. Complex discharge planning: Discharge 8/8/2020 home alone with help from her daughters who live locally. Weekly team meeting every Monday to assess progress towards goals, discuss and address social, psychological and medical comorbidities and to address difficulties they may be having progressing in therapy. Patient and family education is in progress. The patient is to follow-up with their family physician after discharge. Complex Active General Medical Issues that complicate care Assess & Plan:    1. Whole body pain as well as cervical thoracic and low back pain due to fibromyalgia,   Lumbago without sciatica,   Rotator cuff (capsule) deficits, Shoulder impingement-use lowest effective dose of opiate medication to include Roxicodone and Ultram, titrate Desyrel, add Lidoderm BenGay heat massage-titrate Topamax and avoid use of Tylenol due to liver disease as well as Lyrica due to intolerance  2. Depression and anxiety-emotional support given daily add recreational therapy and rehabilitation psychology when available and support group when available  3. LVH (left ventricular hypertrophy), coronary artery disease-vital signs to shift dose and titrate cardiac locations consult hospitalist for backup medical  4.    Parkinson's disease -consult neurology-who have not addressed this issue avoid sedatives such as Lyrica which she does not tolerate well titrate Sinemet. I will consult pain management regarding the use of Lyrica. 5.   Thrombocytopenia with severe anemia and recent blood transfusion-recheck CBC add vitamin B12 vitamin D  6. Vitamin D deficiency-add daily vitamin D and recheck as an outpatient  7. Secondary esophageal varices without bleeding-monitor stools for blood avoid strong blood thinners if possible  8. Morbidly obese but with  Moderate malnutrition -add high-protein low-carb supplementation if possible however because of her her recent liver disease she may not be able to process proteins very well. 9.   Stasis dermatitis-protect skin add ET mix to the lower extremities add co-Q10  10. Severe progressive and recurrent ascites of the liver-eliminate toxic medications recheck as an outpatient monitor ammonia level-reconsult interventional radiology for repeat paracentesis. 11. Hepatic encephalopathy with Elavil elevated ammonia level-titrate lactulose recheck ammonia  12. Severe hypothyroidism-consult endocrinology titrate Synthroid  13.  Polypharmacy in the elderly-consult pain management agree with avoiding use of Lyrica             Lurdes Jewell D.O., PM&R     Attending    [id number] [facility]

## 2020-07-29 NOTE — PROGRESS NOTES
NEUROLOGY INPATIENT PROGRESS NOTE    Patient- Kaila Clemente    MRN -  [id number]   Acct # - [de-identified]      - 1935    82 y.o. Subjective: The patient is seen in follow-up. Patient is alert at this time. Patient is alert  She is moving all the extremities  She feels she is doing good in the rehab  Family had no new concerns or  Thiamine being changed to p.o. Thiamine deficiency, folate deficiency, being addressed  Elevated TSH to be watched  B12 was normal  Result Data:  CBC: No results for input(s): WBC, HGB, PLT in the last 72 hours. BMP:  No results for input(s): NA, K, CL, CO2, BUN, CREATININE, GLUCOSE in the last 72 hours. TSH:   Recent Labs     20  1140   TSH 7.665*     Folic Acid: No results for input(s): FOLATE in the last 72 hours. B12:    Lab Results   Component Value Date    [id number] 352 2020     Vit. D: No components found for: VITAMIND  Lipids: No results for input(s): CHOL, TRIG, HDL, LDLCALC in the last 72 hours. Ammonia: No results for input(s): AMMONIA in the last 72 hours. LFT: No results for input(s): AST, ALT, ALB, BILITOT, ALKPHOS in the last 72 hours. Urine: No results for input(s): COLORU, PHUR, LABCAST, WBCUA, RBCUA, MUCUS, YEAST, BACTERIA, CLARITYU, SPECGRAV, LEUKOCYTESUR, UROBILINOGEN, [employer] in the last 72 hours. Invalid input(s): NITRATE, GLUCOSEUKETONESUAMORPHOUS     Imaging    Ct Abdomen Pelvis Wo Contrast Additional Contrast? None    Result Date: 2020  CT of the Abdomen and Pelvis without intravenous contrast medium History:  Fall from standing Technical Factors: CT imaging of the abdomen and pelvis were obtained and formatted as 5 mm contiguous axial images from the domes of the diaphragm to the symphysis pubis. Sagittal and coronal reconstructions were also obtained. Oral contrast medium:  None. Intravenous contrast medium:  None.  Comparison:  CT abdomen pelvis, March 10, 2020,  . Findings: Liver: Small in size, and nodular contour, unchanged. Bile Ducts:  Normal in caliber. Gallbladder:  Surgically absent. Pancreas:  Hypoplastic, without masses, cysts, ductal dilatation or calcification. Spleen:  Normal in size without masses or calcifications. No splenules. Kidneys:  Right and left kidneys measure 8.0 cm, and 9.1 cm, respectively. 1 mm calcification mid pole left kidney, unchanged from 2013. No right renal calculi. Adrenals:  Normal. Small bowel:  Normal in caliber. Appendix:  Not visualized. Colon:  Normal in caliber. Surgical clips at level of rectum. Peritoneum:  Diffuse free fluid within the abdomen and pelvis, unchanged. No free air. Vessels: Aorta normal in course and caliber. Lymph nodes:  Retroperitoneal:  No enlarged retroperitoneal lymph nodes. Mesenteric:  No enlarged mesenteric lymph nodes. Pelvic: No enlarged pelvic lymph nodes. Ureters: Normal in course and caliber. No calcifications. Bladder: Partially decompressed. Reproductive organs: Uterus surgically absent. Dystrophic calcification, left adnexa, unchanged from . Abdominal Wall: Remote abdominal wall defect repair. Edematous change within abdominal pelvic soft tissues diffusely, stable. Bones:  No bone lesions. No degenerative changes. No post operative changes. Cirrhosis. Diffuse ascites, unchanged. Diffuse edematous change, abdominal and pelvic walls, stable. Small right kidney. Left kidney lower limits of normal. Hypoplastic pancreas. Remote hemorrhoid surgery. Cholecystectomy. Hysterectomy. All CT scans at this facility use dose modulation, iterative reconstruction, and/or weight based dosing when appropriate to reduce radiation dose to as low as reasonably achievable.      Xr Hip Right (2-3 Views)    Result Date: 2020  Patient MRN: [id number] : 1935 Age:  82 years Gender: Female Order Date: 2020 12:00 PM. Exam: XR HIP RIGHT (2-3 VIEWS) Number of Views: 2 Indication:  Fall at home one week ago with persistent right hip pain since that time Comparison: 12/5/2011 radiographs. CT abdomen and pelvis 7/23/2020. Findings: Moderate degenerative changes with joint space loss in the bilateral hips. No acute fracture identified. Vascular calcifications. Impression:  1. No acute fracture identified. 2. Moderate bilateral osteoarthritis of the hips. 3. Vascular calcifications. Ct Head Wo Contrast    Result Date: 7/23/2020  CT Brain Contrast medium:  Not utilized. History:  Fell from standing. Denies anticoagulation/loss of consciousness. Comparison:  CT brain, July 11, 2014 Findings: Extra-axial spaces:  Normal. Intracranial hemorrhage:  None. Ventricular system: Ventricles mildly enlarged. Sulci mildly prominent. Basal Cisterns:  Normal. Cerebral Parenchyma: Physiologic basal ganglia calcification, unchanged. Midline Shift:  None. Cerebellum:  Normal. Paranasal sinuses and mastoid air cells:  Normal. Visualized Orbits: Remote bilateral ocular surgery. Impression: No acute findings. Mild cerebral atrophy. All CT scans at this facility use dose modulation, iterative reconstruction, and/or weight based dosing when appropriate to reduce radiation dose to as low as reasonably achievable. Ct Chest Wo Contrast    Result Date: 7/23/2020  CT of the Chest without intravenous contrast medium History:  Mechanical fall from standing. Technical Factors: CT imaging of the chest was obtained and formatted as 5 mm contiguous axial images from the thoracic inlet through the adrenal glands. Sagittal and coronal reconstruction obtained during postprocessing. Intravenous contrast medium:  None. Comparison:  Chest radiograph, July 22, 2020. High-resolution CT chest, December 30, 2019. Findings: Right lung shows mild dependent atelectatic change posterior right upper lobe, posterior right middle lobe, and posterior right lower lobe. No nodules or masses. No pleural effusion.  Left lung shows dependent subsegmental atelectatic change, left upper lobe. Small peripheral focus of groundglass opacity lateral lower lingula. Dependent subsegmental atelectatic change, small left pleural effusion. No nodules or masses. No hilar, mediastinal, or axillary lymph node enlargement. Cardiac size normal. No pericardial effusion. Calcified mitral annulus. Thoracic aorta normal in course and caliber. Bridging anterior osteophytes mid to lower thoracic spine. No osteoblastic, and no osteolytic lesions. No fracture. Bilateral dependent atelectatic change. Small left effusion. All CT scans at this facility use dose modulation, iterative reconstruction, and/or weight based dosing when appropriate to reduce radiation dose to as low as reasonably achievable. Ct Cervical Spine Wo Contrast    Result Date: 7/23/2020  EXAMINATION: CT CERVICAL SPINE WO CONTRAST   DATE AND TIME:7/22/2020 10:45 PM CLINICAL HISTORY:ACUTE POSTERIOR NECK PAIN  TRAUMA  COMPARISON: NONE TECHNIQUE:Helical scanning was performed from the skull base through the remainder of the cervical spine without intravenous contrast. Sagittal and coronal reformats were obtained. The lack of contrast limits CT sensitivity of the soft tissues. All CT scans at this facility use dose modulation, iterative reconstruction, and/or weight based dosing when appropriate to reduce radiation dose to as low as reasonably achievable. FINDINGS   Fracture:No acute fracture. Alignment:  Normal cervical lordosis. No subluxation. No canal stenosis. Dense atlas:Dens atlas relationship is normal. Soft tissues:Airway patent. No soft tissue mass or adenopathy. Other findings: There is mild  cervical spondylosis with multiple level disc osteophyte changes. NO ACUTE CERVICAL SPINE ABNORMALITY. Ct Thoracic Spine Wo Contrast    Result Date: 7/23/2020  EXAMINATION: CT THORACIC SPINE WO CONTRAST DATE AND TIME:7/22/2020 10:45 PM CLINICAL HISTORY: SEVERE POSTERIOR THORACIC SPINE PAIN. FALL  COMPARISON: NONE TECHNIQUE:  Axial noncontrast images were performed through the thoracic spine with coronal and sagittal reconstructions. All CT scans at this facility use dose modulation, iterative reconstruction, and/or weight based dosing when appropriate to reduce radiation dose to as low as reasonably achievable. FINDINGS  Alignment/fracture: Normal alignment without subluxation. No evidence of fracture. Perivertebral soft tissues are normal.  Disc spaces: There are multiple levels of disc space narrowing and end plate changes consistent with degenerative change. NEGATIVE CT OF THE THORACIC SPINE. Ct Lumbar Spine Wo Contrast    Result Date: 7/24/2020  EXAMINATION: CT LUMBAR SPINE WO CONTRAST CLINICAL HISTORY: Fall from standing. Back pain. COMPARISON: CT abdomen pelvis 3/10/2020 TECHNIQUE:  Spiral scans with no contrast. Multiplanar 2-D reconstructions. All CT scans at this facility use dose modulation, iterative reconstruction, and/or weight based dosing when appropriate to reduce radiation dose to as low as reasonably achievable. FINDINGS: No acute fractures identified. There is T11 superior endplate Schmorl's node with dystrophic calcification, unchanged from prior imaging study. There is lumbar spondylosis with multilevel intervertebral disc space narrowing, marginal osteophytosis, and facet hypertrophy. There is L4-5 degenerative disc vacuum phenomenon. There is L3-4 and L4-5 disc bulging which does not appear to impinge on the exiting nerve roots. There is mild L4-5 central canal stenosis. There is disc bulging and  degenerative disc vacuum phenomenon at L1-2. The SI joints are symmetrically maintained. There is iliac sclerosis of the SI joints. Ascites is noted within the field-of-view. There are 2 left renal midpole nonobstructing stones, measuring 2 mm and 4 mm. DEGENERATIVE CHANGES. NO ACUTE FRACTURE.     Mri Lumbar Spine Wo Contrast    Result Date: 7/27/2020  EXAMINATION: MRI lumbar spine without contrast CLINICAL HISTORY: Recent fall. Bilateral leg weakness. FINDINGS: Unenhanced scans obtained. T1 and T2-weighted sagittal and axial sequences were acquired. A STIR sagittal sequence are included. Today's study is compared with recent CT dated July 23, 2020. Vertebral bodies are intact and normal in height. Degenerative endplate changes are seen at L4-L5 particularly involving the superior endplate of L5. There is disc desiccation from L1 through L4. There is disc space narrowing at L3-L4 and L4-L5. Conus is  normal in caliber and signal and terminates at T12-L1. T12-L1: Unremarkable. L1-L2: Small focal disc protrusion centrally and to the left of the midline slightly effaces the thecal sac without narrowing the central canal or compressing transiting or exiting nerve roots. Foramina patent. L2-L3: No disc herniation. No central canal or foraminal stenosis. L3-L4: Generalized bulging of the annulus effaces the thecal sac anteriorly without compromising the transiting or exiting nerve roots. No significant central canal or foraminal stenosis. L4-L5: Generalized bulging of the annulus anteriorly and proliferative changes in the facet joints noted more prominent right side than left. There is narrowing of the right L4 nerve root foramen. No significant central canal stenosis and no definite disc herniation. L5-S1: No disc herniation or central canal or foraminal stenosis. Postlaminectomy changes on the right side noted. Prevertebral soft tissues unremarkable     DIFFUSE DEGENERATIVE DISEASE. NO COMPRESSION FRACTURE OR INFILTRATIVE BONY PROCESS. L4 NERVE ROOT FORAMINAL STENOSIS ON THE RIGHT. NO EVIDENCE OF DISC HERNIATION OR CENTRAL CANAL STENOSIS. Xr Chest Portable    Result Date: 7/23/2020  XR CHEST PORTABLE : 7/22/2020 CLINICAL HISTORY: Pain after fall . COMPARISON: High-resolution chest CT 12/30/2019 and two-view chest 11/18/2019.  TECHNIQUE: An upright portable AP radiograph of the chest was obtained. FINDINGS: A shallow inspiration is present without significant infiltrate identified. There is no cardiomegaly, significant pleural effusion, vascular congestion, pneumothorax, or displaced fractures identified. NO EVIDENCE OF ACUTE THORACIC TRAUMA OR ACTIVE CARDIOPULMONARY DISEASE. Ir Us Guided Paracentesis    1. Status post technically successful ultrasound-guided paracentesis. Cheyenne Carbajal is a Female of 82 years age, referred for Ultrasound Guided Paracentesis. PROCEDURE: Survey of the abdomen showed large amount of ascites fluid. After obtaining informed consent, the patient was positioned supine on the sonography table. Using ultrasound, the skin over the left hemiabdomen was locally anesthetized with 1% lidocaine. Following that, a Yueh needle was advanced into the fluid pocket using ultrasound visualization. 5900cc, of clear yellow fluid were aspirated and sent for cytology, and pathology. The needle was removed, and hemostasis was obtained with pressure. A Band-Aid was placed. Post procedure images did not demonstrate hemorrhage at the target site. The patient tolerated the procedure well. The patient left the department in good condition. A radiology nurse was in presence monitoring vital signs, assisting throughout the procedure. Us Guided Paracentesis    Result Date: 7/23/2020  US GUIDED PARACENTESIS : 7/23/2020 CLINICAL HISTORY:  therapeutic paracentesis . COMPARISON: Chest, abdomen and pelvis CTs from earlier 7/23/2020 PROCEDURE: Informed consent was obtained. Limited transabdominal ultrasound was performed and marking done in the usual fashion. Sterile technique and local lidocaine anesthesia was used to place a 5 [employer] Yueh catheter within the abdominal fluid collection. Approximately 6.5 L of low viscosity pale yellow-colored fluid was aspirated without evidence of immediate complication. A sample was sent for analysis as requested.  The patient was monitored in the radiology holding area for approximately hold minutes without event. She was returned to the medical floor in stable condition with the usual instructions. SUCCESSFUL ULTRASOUND-GUIDED PARACENTESIS APPROXIMATELY 6.5 L OF ASCITES. Us Guided Paracentesis    1. Status post technically successful ultrasound-guided paracentesis. Danny Suarez is a Female of 82 years age, referred for Ultrasound Guided Paracentesis. PROCEDURE: Survey of the abdomen showed large amount of ascites fluid. After obtaining informed consent, the patient was positioned supine on the sonography table. Using ultrasound, the skin over the left hemiabdomen was locally anesthetized with 1% lidocaine. Following that, a [employer] needle was advanced into the fluid pocket using ultrasound visualization. 5450cc, of clear yellow fluid were aspirated and sent for cytology, and pathology. The needle was removed, and hemostasis was obtained with pressure. A Band-Aid was placed. Post procedure images did not demonstrate hemorrhage at the target site. The patient tolerated the procedure well. The patient left the department in good condition. A radiology nurse was in presence monitoring vital signs, assisting throughout the procedure. Us Retroperitoneal Limited    Result Date: 7/23/2020  EXAMINATION: US RETROPERITONEAL LIMITED CLINICAL HISTORY: 82year-old with renal insufficiency and extensive abdominal ascites COMPARISONS: Unenhanced abdominal CT 7/23/2020 FINDINGS: Renal sonography was performed. The study is compromised due to patient body habitus and sonographic window. Kidneys by ultrasound are symmetric borderline small in size. Right kidney measures 9.3 x 5.3 x 4.3 cm left kidney measures 9.2 x 4.7 x  4.8 cm. There are a few punctate echogenic foci in the central sinus echocomplex raising the question of specular reflectors. Bilateral renal echotexture is otherwise unremarkable.  No large cortical cysts, solid masses or shadowing calcifications. No ultrasound signs of hydronephrosis. Note is made of upper abdominal ascites     SYMMETRIC KIDNEYS BORDERLINE SMALL IN SIZE. NO ULTRASOUND SIGNS OF HYDRONEPHROSIS. UPPER ABDOMINAL ASCITES        Objective:   BP 98/62   Pulse 93   Temp 97 °F (36.1 °C)   Resp 18   Ht 5' 1\" (1.549 m)   Wt 177 lb 0.7 oz (80.3 kg)   SpO2 97%   BMI 33.45 kg/m²       Intake/Output Summary (Last 24 hours) at 7/29/2020 1751  Last data filed at 7/29/2020 1015  Gross per 24 hour   Intake 240 ml   Output --   Net 240 ml            Physical Exam:  MUSCULOSKELETAL:  There is no redness, warmth, or swelling of the joints. Tone is mildly increased. Patient has had full range of motion in all the limbs.         NEUROLOGIC:    Speech Expression: appropriate quality, quantity and organization of sentences  Speech Comprehension: intact, patient has a mild hearing loss    Mental Status Exam: alert, oriented to person, place, and time    Motor Strength:   right upper extremity:   5/5  left upper extremity:  5/5  right lower extremity: 5/5    left lower extremity:  5/5      Medications:     [START ON 7/30/2020] pantoprazole  20 mg Oral QAM AC    folic acid  5 mg Oral Daily    thiamine (VITAMIN B1) IVPB  500 mg Intravenous TID    topiramate  50 mg Oral Daily    traZODone  150 mg Oral Nightly    budesonide-formoterol  2 puff Inhalation BID    carbidopa-levodopa  1 tablet Oral TID    furosemide  40 mg Oral Daily    spironolactone  50 mg Oral Daily                 Principal Problem:    Abnormality of gait and mobility dt exacerbation of Parkinson's disease status post ascites and liver failure acute rehab admission 7/25/2020  Active Problems:    DM2 (diabetes mellitus, type 2) (HCC)    Fibromyalgia    Depression    Hyperlipidemia    Hypertension    Lumbago without sciatica    LVH (left ventricular hypertrophy)    Osteoarthritis of multiple joints    Parkinson's disease ([address] Rotator cuff (capsule) sprain    Shoulder impingement    Thrombocytopenia (HCC)    Vitamin D deficiency    Bilateral carotid artery stenosis    Secondary esophageal varices without bleeding (HCC)    Cough variant asthma    Morbidly obese (HCC)    Decompensated hepatic cirrhosis (HCC)    Stasis dermatitis    Moderate malnutrition (HCC)    Impaired mobility    Glaucoma    Diabetic gastropathy (HCC)    [place] (hard of hearing)    Acute renal failure (ARF) (HCC)    History of lumbar laminectomy    Obesity (BMI 30-39. 9)    Portal hypertension ([address]  Resolved Problems:    * No resolved hospital problems.  *      Patient Active Problem List   Diagnosis    DM2 (diabetes mellitus, type 2) ([address]    Fibromyalgia    Abdominal pain    Bursitis, hip    Bilateral hand pain    Actinic keratosis    Chondrocalcinosis of hand    Carotid stenosis    Chondrodermatitis nodularis chronica helicis    Chronic frontal sinusitis    Controlled type 2 diabetes mellitus without complication, without long-term current use of insulin (HCC)    Depression    DISH (diffuse idiopathic skeletal hyperostosis)    Fitting and adjustment of orthopedic device    Closed fracture of part of upper end of humerus    Fx phalanx, foot-closed    History of trabeculectomy    Hyperlipidemia    Hypertension    Intracranial arachnoid cyst    Joint replaced by other means    Lens replaced by other means    Liver cirrhosis ([address]    Lumbago without sciatica    Lumbar spondylosis    LVH (left ventricular hypertrophy)    Myalgia and myositis, unspecified    Osteoarthritis of shoulder    Osteoarthritis of multiple joints    Parkinson's disease ([address]    POAG (primary open-angle glaucoma)    Diabetic polyneuropathy (HCC)    Pseudogout of hand    Rotator cuff (capsule) sprain    S/P left unicompartmental knee replacement    Knee joint replacement by other means    Sclerosis of the skin    Seborrheic keratosis    Secondary osteoarthritis of multiple sites    Shoulder impingement    Sprain and strain of unspecified site of shoulder and upper arm    Synovitis of hand    Thrombocytopenia (HCC)    Vitamin D deficiency    Chest pain    RANDOLPH (dyspnea on exertion)    Bilateral carotid artery stenosis    Iron deficiency anemia due to chronic blood loss    Hemorrhoids    Secondary esophageal varices without bleeding (HCC)    Cough variant asthma    Morbidly obese (HCC)    Decompensated hepatic cirrhosis ([address]    ENMA (acute kidney injury) ([address]    Pancytopenia ([address]    Fall    Weakness    Malnutrition (HCC)    Cirrhosis of liver with ascites (HCC)    Ataxic gait    History of myocardial infarction    Rectal prolapse    Stasis dermatitis    Gait abnormality    Moderate malnutrition (HCC)    Impaired mobility    Abnormality of gait and mobility dt exacerbation of Parkinson's disease status post ascites and liver failure acute rehab admission 7/25/2020    Glaucoma    Diabetic gastropathy (HCC)    [place] (hard of hearing)    Acute renal failure (ARF) (HCC)    History of lumbar laminectomy    Obesity (BMI 30-39. 9)    Portal hypertension (HCC)       Assessment/Plan    Leg buckling raises the concern for lumbar radiculopathy. Suspect mild cognitive changes are derived from metabolic and nutritional causes.      Plan:      -TSH elevated, to be watched  Folate deficiency, and thiamine deficiency. - Will add high dose thiamine for wernicke's encephalopathy (500 mg IV TID x 3 days)  - MRI lumbar spine shows a no significant spinal stenosis, has a right foraminal narrowing L4-5  - continue sinement  - continue rehab activities  - Anemia treatment ongoing  - will follow while admitted          Calos Hernandez MD, 7/29/2020 5:51 PM

## 2020-07-29 NOTE — PROGRESS NOTES
Assumed care for this patient at [phone]. Her iv thiamine is  Infusing. Pt has no complaints of pain. TSH  lab on 7/27  Was 9.180 . It was passed on in  Report that this patient needs rest breaks in therapy and the nurse  Rasheed lama is aware. Daughters are worried she is having Lyrica w/d symptoms. The patient can be orthostatic at times. Call light within reach bed alarm on monitoring for safety.  An needs

## 2020-07-29 NOTE — PROGRESS NOTES
[facility]  Facility/Department: [facility]  Speech Language Pathology   Treatment Note          Marty Ewing  1935  R251/[id number]        Rehab Dx/Hx: Impaired mobility [Z74.09]  Abnormality of gait and mobility [R26.9]    Precautions: falls    Medical Dx: Impaired mobility [Z74.09]  Abnormality of gait and mobility [R26.9]  Speech Dx: Cognitive Linguistic Impairment     Date: 7/29/2020    Subjective:  Alert, Cooperative and Confused        Interventions used this date:  Cognitive Skill Development    Objective/Assessment:  Patient progressing towards goals:  Goal 1: Pt will complete abstract verbal reasoning tasks (similarities/differences, divergent naming) with 80% acc given cues as needed in order to promote effective communication of wants and needs with caregivers upon discharge. Pt named pictures with mod cues, 90% accuracy. Pt named category with max cues with 50% accuracy. If cues were not provided, pt would stare at pictures with a confused look and would not initiate verbal expression. Goal 2: Pt will be educated on 3 memory strategies and will state their use in functional activities with 80% acc given cues as needed in order to promote safety with the completion of ADLs upon discharge. Goal 3: Pt will complete mid-level problem solving task related to ADLs with 80% acc given cues as needed in order to promote safety with the completion of ADLs upon discharge. Pt identified problem in picture shown with max cues 80% accuracy. Pt exhibited very delayed response time. Verbalized phrases immediately following seeing picture x 2. Goal 4: Pt will complete delayed recall task with 80% acc given cues as needed in order to promote safety with the completion of ADLs upon discharge. Pt unable to recall breakfast items in consumed or prior events of day. With max cues, pt recalled 2 breakfast items.         Treatment/Activity Tolerance:  Patient tolerated treatment well    Plan:  Continue per POC    Pain Assessment:  Initial Assessment:  Patient c/o: 7/10 back pain. Informed Mavis CARRILLO. Re-assessment:  Patient c/o: 7/10 back pain  Mavis CARRILLO administered pain meds at end of session. Patient/Caregiver Education:  Patient educated on session and progression towards goals. Caregiver education on session and progress towards goals. Caregiver stated verbal understanding of directions. Safety Devices: All fall risk precautions in place      [address] ([facility]):    SWALLOWING  Rating:  n/a    SPOKEN LANGUAGE COMPREHENSION  Rating:  3    SPOKEN LANGUAGE EXPRESSION  Rating:  3    MOTOR SPEECH  Rating:  n/a    PROBLEM SOLVING  Ratin    MEMORY  Ratin          Therapy Time  SLP Individual Minutes  Time In: 1030  Time Out: 1100  Minutes: 30              Signature: Electronically signed by WANDER Garcia on 2020 at 1:49 PM

## 2020-07-29 NOTE — PROGRESS NOTES
Occupational Therapy  Facility/Department: [facility]  Daily Treatment Note  NAME: [place]  : 1935  MRN: [id number]    Date of Service: 2020    Discharge Recommendations:  Continue to assess pending progress  OT Equipment Recommendations  Other: Continue to assess    Assessment   Performance deficits / Impairments: Decreased functional mobility ; Decreased ADL status; Decreased balance;Decreased endurance;Decreased high-level IADLs;Decreased fine motor control;Decreased coordination;Decreased strength  Activity Tolerance  Activity Tolerance: Patient Tolerated treatment well  Activity Tolerance: requires increased time to complete  Safety Devices  Safety Devices in place: Yes  Type of devices: All fall risk precautions in place         Patient Diagnosis(es): There were no encounter diagnoses. has a past medical history of Asthma, Fibromyalgia, Hemorrhoid, Hyperlipidemia, Hypertension, Parkinson's disease ([facility] [address] and Type II or unspecified type diabetes mellitus without mention of complication, not stated as uncontrolled. has a past surgical history that includes Hysterectomy; Cholecystectomy; laminectomy; eye surgery; joint replacement (Right); hernia repair (2002); hernia repair; Hemorrhoid surgery; Upper gastrointestinal endoscopy (N/A, 2020); Paracentesis (Left, 2020); Paracentesis (Left, 06/10/2020); Paracentesis (Left, 2020); and Paracentesis (Left, 2020).     Restrictions  Restrictions/Precautions  Restrictions/Precautions: Fall Risk  Subjective   General  Chart Reviewed: Yes  Patient assessed for rehabilitation services?: Yes  Family / Caregiver Present: Yes  Referring Practitioner: Dr Leny Thomas  Diagnosis: Imp Mob and ADLs 2° Exac of Parkinsons  Pain Assessment  Pain Assessment: 0-10  Pain Level: 0  Pre Treatment Pain Screening  Pain at present: 0  Scale Used: Numeric Score  Intervention List: Patient able to continue with treatment  Vital Signs  Patient Currently in Pain: Denies   Orientation     Objective     Pt completed therapeutic activities table top to increase BUE strength/endurance for functional tasks. Pt completed 7 piece block and bolt design with cues and increased time to complete. Pt demonstrated ability to manipulate all pieces without difficulty however required mod-max cues to follow visual model to complete design correctly. Pt required assist to sequence 4/7 pieces and connect correctly using bolts/wing nuts. Plan   Plan  Times per week: 5-7 times per day  Plan weeks: 1.5 weeks  Current Treatment Recommendations: Strengthening, Balance Training, Functional Mobility Training, Endurance Training, Safety Education & Training, Patient/Caregiver Education & Training, Self-Care / ADL, Equipment Evaluation, Education, & procurement  Plan Comment: Continue OT per POC       Goals  Patient Goals   Patient goals :  \"To get stronger\"       Therapy Time   Individual Concurrent Group Co-treatment   Time In 1430         Time Out 1500         Minutes 30              Therapeutic activities: 15 minutes  Cognitive Retraining: 15 minutes    Jesusita King OT    Electronically signed by Jesusita King OT on 7/29/2020 at 3:46 PM

## 2020-07-30 PROBLEM — R29.898 MUSCLE RIGIDITY: Status: ACTIVE | Noted: 2020-01-01

## 2020-07-30 PROBLEM — M62.838 MUSCLE SPASM OF BOTH LOWER LEGS: Status: ACTIVE | Noted: 2020-01-01

## 2020-07-30 NOTE — PROGRESS NOTES
Physical Therapy Rehab Treatment Note  [facility]Department[facility]  [facility]       NAME: Emily Constantino  : 1935 (82 y.o.)  MRN: [id number]  CODE STATUS: Full Code    Date of Service: 2020     Restrictions:  Restrictions/Precautions: Fall Risk     SUBJECTIVE: Subjective: Pt presenting supine in bed w/ request to be seen in room this AM D/T daughter visiting. Pt c/o 6/10 pain in Lt big toe and upper back. Pain Screening  Patient Currently in Pain: Yes     Post Treatment Pain Screenin  Pain Assessment  Pain Level: 6  Pain Location: Back; Toe (Comment which one)  Pain Orientation: Left;Upper  Pain Descriptors: Sore    OBJECTIVE:      Bed mobility  Supine to Sit: Stand by assistance  Sit to Supine: Supervision    Transfers  Stand to sit: Stand by assistance  Bed to Chair: Stand by assistance  Comment: SBA w/ STS from toilet    Ambulation  Ambulation?: Yes  Ambulation 1  Surface: level tile;carpet  Device: Rollator  Assistance: Stand by assistance  Distance: 100'x2  Comments: Slow/steady pace, standing rest break needed between walking distance D/T SOB, inc time needed for turns    Exercises  Straight Leg Raise: x10  Quad Sets: x10  Gluteal Sets: x10  Hip Flexion: x10  Hip Abduction: Hip ADD into pillow x10  Ankle Pumps: x10     ASSESSMENT/COMMENTS:  Assessment: AM tx performed in room per pt request. Increased distance achieved w/ amb this date. Pt did not verbalize fatigue however mild SOB and frequent stops noted w/ exertion. Pt requesting to return to bed post amb thefore concluded remaining of tx w/ supine ther exs. Frequent tactile cues needed to inc ROM and muscle activition during exs. Pts daughter present during partial tx. PLAN OF CARE/Safety:   Plan Comment: Cont per POC. Safety Devices  Type of devices: Bed alarm in place;Call light within reach; Left in bed    Therapy Time:   Individual   Time In 1100   Time Out 1200   Minutes 60     Minutes:      Transfer/Bed mobility training: 15 min       Gait training: 15 min      Therapeutic ex: [facility], [facility], 07/30/20 at 11:53 AM

## 2020-07-30 NOTE — CONSULTS
as uncontrolled      PAST SURGICAL HISTORY:    Past Surgical History:   Procedure Laterality Date    CHOLECYSTECTOMY      EYE SURGERY      HEMORRHOID SURGERY      HERNIA REPAIR  2/20/2002    ventral hernia    HERNIA REPAIR      LIH repair    HYSTERECTOMY      JOINT REPLACEMENT Right     partial left, full right    LAMINECTOMY      PARACENTESIS Left 03/12/2020    5010cc ascites removed by Dr Darek Linda [phone]    PARACENTESIS Left 06/10/2020    3,850 cc removed per Dr Eileen Estes Left 07/08/2020    total 5450 ml removed per Dr Eileen Estes Left 07/23/2020    Dr Ryan Thapa [address]   [address] [facility] [address] N/A 2/21/2020    EGD WITH BANDING performed by Antelmo Nugent MD at [address] [place] [address] Avenue:    Family History   Problem Relation Age of Onset    High Blood Pressure Neg Hx      SOCIALHISTORY:    Social History     Socioeconomic History    Marital status:      Spouse name: Not on file    Number of children: Not on file    Years of education: 15    Highest education level: High school graduate   Occupational History    Occupation: Homemaker   Social Needs    Financial resource strain: Somewhat hard    Food insecurity     Worry: Never true     Inability: Never true    Transportation needs     Medical: Yes     Non-medical: Yes   Tobacco Use    Smoking status: Never Smoker    Smokeless tobacco: Never Used   Substance and Sexual Activity    Alcohol use: No    Drug use: No    Sexual activity: Not Currently     Partners: Male   Lifestyle    Physical activity     Days per week: 0 days     Minutes per session: 0 min    Stress:  Only a little   Relationships    Social connections     Talks on phone: More than three times a week     Gets together: More than three times a week     Attends Zoroastrian service: 1 to 4 times per year     Active member of club or organization: No     Attends meetings of clubs or organizations: Never     Relationship status:     Intimate partner violence     Fear of current or ex partner: No     Emotionally abused: No     Physically abused: No     Forced sexual activity: No   Other Topics Concern    Not on file   Social History Narrative     Lives With: Alone in [address] she is a     She has a daughter named -----and who lives in [facility] and that is quite helpful and comes to see her almost daily    Type of Home: Apartment  at [address] [facility] [address] [facility] in [place] (7th floor)    Home Layout: One level    Home Access: Elevator, Level entry  (Level entry, elevator to 7th floor)    Bathroom Shower/Tub: Tub/Shower unit    Home Equipment: 4 wheeled walker    ADL Assistance: Independent    Homemaking Assistance: Independent    Ambulation Assistance: Independent(Rollator)    Transfer Assistance: Independent    Active : No    Patient's  Info: daughter    IADL Comments: Daughter helps with driving and groceries PRN    Additional Comments: Reports recent fall- \"my legs gave out\"         PSYCHOLOGICAL HISTORY: No acute suicidal ideation, no history of drug or alcohol abuse     MEDICATIONS:  Medications Prior to Admission: ibuprofen (ADVIL;MOTRIN) 200 MG tablet, Take 200 mg by mouth every 6 hours as needed for Pain (Headache)  topiramate (TOPAMAX) 50 MG tablet, TAKE 1 TABLET BY MOUTH DAILY  irbesartan (AVAPRO) 75 MG tablet, TAKE 1 TABLET BY MOUTH EVERY DAY  sertraline (ZOLOFT) 50 MG tablet, TAKE 1 TABLET BY MOUTH EVERY DAY  traZODone (DESYREL) 100 MG tablet, TAKE 2 TABLETS BY MOUTH EVERY DAY AT BEDTIME  omeprazole (PRILOSEC) 40 MG delayed release capsule, TAKE 1 CAPSULE BY MOUTH EVERY DAY  lovastatin (MEVACOR) 10 MG tablet, TAKE 1 TABLET BY MOUTH EVERY NIGHT  albuterol sulfate HFA (PROAIR HFA) 108 (90 Base) MCG/ACT inhaler, Inhale 2 puffs into the lungs every 6 hours as needed for Wheezing  furosemide (LASIX) 40 MG tablet, Take 1 tablet by mouth daily  spironolactone (ALDACTONE) 100 MG tablet, Take 1 tablet by mouth daily  clotrimazole-betamethasone (LOTRISONE) 1-0.05 % cream, Use two times daily to affected area for 14 days then stop  sucralfate (CARAFATE) 1 GM tablet, TAKE 1 TABLET BY MOUTH FOUR TIMES DAILY  ACCU-CHEK SOFTCLIX LANCETS MISC, Checks 1 time daily. NIDDM--ICD-10 E11.9  blood glucose test strips (ACCU-CHEK ACTIVE STRIPS) strip, Checks 1 time daily. NIDDM--ICD-10 E11.9  aspirin 81 MG tablet, Take 1 tablet by mouth daily With Food  Cinnamon 500 MG TABS, Take by mouth 3 times daily  carbidopa-levodopa (SINEMET)  MG per tablet, Take 1 tablet by mouth 1 po bid per dr robb goldstein neurologist  latanoprost (XALATAN) 0.005 % ophthalmic solution, Use 1 Drop in both eyes daily at bedtime. tiZANidine (ZANAFLEX) 2 MG tablet, Take 2 mg by mouth Take 2 mg by mouth bid prn  pregabalin (LYRICA) 75 MG capsule, Take 1 capsule by mouth 3 times daily for 10 days. Take 75 mg by mouth. furosemide (LASIX) 20 MG tablet, Take 1 tablet by mouth daily for 5 days  [unfilled]    ALLERGIES:  Adhesive tape; Amoxicillin-pot clavulanate; Bactrim [sulfamethoxazole-trimethoprim]; Clarithromycin; Other; and Sulfa antibiotics    COMPLETE REVIEW OF SYSTEMS:  As noted in HPI, 12 point ROS reviewed and otherwise negative. Review of Systems   Constitutional: Positive for activity change, appetite change and fatigue. Negative for chills, diaphoresis, fever and unexpected weight change. HENT: Negative. Eyes: Negative. Respiratory: Negative. Cardiovascular: Negative. Gastrointestinal: Negative. Endocrine: Negative. Genitourinary: Negative. Musculoskeletal: Positive for arthralgias, back pain, gait problem, joint swelling, myalgias, neck pain and neck stiffness. Skin: Negative. Allergic/Immunologic: Negative for environmental allergies, food allergies and immunocompromised state. Neurological: Positive for dizziness, tremors, weakness, light-headedness and numbness.  Negative for seizures, syncope, facial asymmetry, speech difficulty and headaches. Hematological: Negative for adenopathy. Bruises/bleeds easily. Psychiatric/Behavioral: Positive for behavioral problems, confusion, decreased concentration, dysphoric mood and sleep disturbance. Negative for agitation, hallucinations, self-injury and suicidal ideas. The patient is nervous/anxious. The patient is not hyperactive. OBJECTIVE  PHYSICAL EXAM:  BP (!) 97/48   Pulse 75   Temp 98 °F (36.7 °C) (Oral)   Resp 17   Ht 5' 1\" (1.549 m)   Wt 177 lb 0.7 oz (80.3 kg)   SpO2 92%   BMI 33.45 kg/m²   Body mass index is 33.45 kg/m². CONSTITUTIONAL: Patient appears to be generally awake, alert oriented x3, seem to have some baseline cognitive delay during discussion. EYES:  vision intact  ENT:  normocepalic, without obvious abnormality, atraumatic  NECK: Her neck exam seem to be otherwise no abnormalities however she has muscle tenderness across the paraspinal area muscle rigidity with some limitation of range of motion with seem to be chronic in nature  BACK: She has mild to moderate upper tenderness in the upper parathoracic area, mostly muscle tenderness across and behind the shoulders and both areas. Several trigger point felt in the paraspinal posterior shoulder and upper arm and lower back lumbar region area. LUNGS:  no increased work of breathing and good air exchange  CARDIOVASCULAR:  regular rate and rhythm  ABDOMEN: Soft nontender nondistended  MUSCULOSKELETAL: Healed scar of previous knee replacement on the right, healed scar of partial knee flexion on the left, no swelling of the lower extremity, moderately limited range of motion, mildly painful range of motion across several joints of the lower extremities.   NEUROLOGIC: Neurological exam seem to be otherwise intact, baseline cognitive delay, motor sensory appears to be no central deficits  SKIN:  no bruising or bleeding    DATA:   Diagnostic tests reviewed for today's visit:    All labs and imaging results reviewed. Lab Results   Component Value Date    WBC 4.6 07/24/2020    HGB 8.4 07/25/2020    HCT 25.8 07/25/2020    .9 07/24/2020    PLT 85 07/24/2020     07/25/2020    K 3.5 07/25/2020     07/25/2020    CO2 19 07/25/2020    BUN 21 07/25/2020    CREATININE 1.01 07/25/2020    CALCIUM 8.3 07/25/2020    ALKPHOS 53 07/25/2020    ALT 6 07/25/2020    AST 28 07/25/2020    BILITOT 0.7 07/25/2020    BILIDIR 0.4 01/29/2020    LABALBU 2.9 07/25/2020    LABALBU 4.5 03/30/2012     No results for input(s): LABAMPH, BARBSCNU, LABBENZ, CANSU, COCAIMETSCRU, OPIATESCREENURINE, OXYCODONEUR, DSCOMMENT in the last 72 hours. Invalid input(s): PHENCYCLIDINESCREENURINE. LABMETH. PROPOX   Ct Abdomen Pelvis Wo Contrast Additional Contrast? None    Result Date: 7/23/2020  CT of the Abdomen and Pelvis without intravenous contrast medium History:  Fall from standing Technical Factors: CT imaging of the abdomen and pelvis were obtained and formatted as 5 mm contiguous axial images from the domes of the diaphragm to the symphysis pubis. Sagittal and coronal reconstructions were also obtained. Oral contrast medium:  None. Intravenous contrast medium:  None. Comparison:  CT abdomen pelvis, March 10, 2020, March 11, 2013. Findings: Liver: Small in size, and nodular contour, unchanged. Bile Ducts:  Normal in caliber. Gallbladder:  Surgically absent. Pancreas:  Hypoplastic, without masses, cysts, ductal dilatation or calcification. Spleen:  Normal in size without masses or calcifications. No splenules. Kidneys:  Right and left kidneys measure 8.0 cm, and 9.1 cm, respectively. 1 mm calcification mid pole left kidney, unchanged from 2013. No right renal calculi. Adrenals:  Normal. Small bowel:  Normal in caliber. Appendix:  Not visualized. Colon:  Normal in caliber. Surgical clips at level of rectum. Peritoneum:  Diffuse free fluid within the abdomen and pelvis, unchanged. No free air. Vessels:   Aorta normal in course and caliber. Lymph nodes:  Retroperitoneal:  No enlarged retroperitoneal lymph nodes. Mesenteric:  No enlarged mesenteric lymph nodes. Pelvic: No enlarged pelvic lymph nodes. Ureters: Normal in course and caliber. No calcifications. Bladder: Partially decompressed. Reproductive organs: Uterus surgically absent. Dystrophic calcification, left adnexa, unchanged from . Abdominal Wall: Remote abdominal wall defect repair. Edematous change within abdominal pelvic soft tissues diffusely, stable. Bones:  No bone lesions. No degenerative changes. No post operative changes. Cirrhosis. Diffuse ascites, unchanged. Diffuse edematous change, abdominal and pelvic walls, stable. Small right kidney. Left kidney lower limits of normal. Hypoplastic pancreas. Remote hemorrhoid surgery. Cholecystectomy. Hysterectomy. All CT scans at this facility use dose modulation, iterative reconstruction, and/or weight based dosing when appropriate to reduce radiation dose to as low as reasonably achievable. Xr Hip Right (2-3 Views)    Result Date: 2020  Patient MRN: [id number] : 1935 Age:  82 years Gender: Female Order Date: 2020 12:00 PM. Exam: XR HIP RIGHT (2-3 VIEWS) Number of Views: 2 Indication:  Fall at home one week ago with persistent right hip pain since that time Comparison: 2011 radiographs. CT abdomen and pelvis 2020. Findings: Moderate degenerative changes with joint space loss in the bilateral hips. No acute fracture identified. Vascular calcifications. Impression:  1. No acute fracture identified. 2. Moderate bilateral osteoarthritis of the hips. 3. Vascular calcifications. Ct Head Wo Contrast    Result Date: 2020  CT Brain Contrast medium:  Not utilized. History:  Fell from standing. Denies anticoagulation/loss of consciousness. Comparison:  CT brain, 2014 Findings: Extra-axial spaces:  Normal. Intracranial hemorrhage:  None.  Ventricular system: Ventricles mildly enlarged. Sulci mildly prominent. Basal Cisterns:  Normal. Cerebral Parenchyma: Physiologic basal ganglia calcification, unchanged. Midline Shift:  None. Cerebellum:  Normal. Paranasal sinuses and mastoid air cells:  Normal. Visualized Orbits: Remote bilateral ocular surgery. Impression: No acute findings. Mild cerebral atrophy. All CT scans at this facility use dose modulation, iterative reconstruction, and/or weight based dosing when appropriate to reduce radiation dose to as low as reasonably achievable. Ct Chest Wo Contrast    Result Date: 7/23/2020  CT of the Chest without intravenous contrast medium History:  Mechanical fall from standing. Technical Factors: CT imaging of the chest was obtained and formatted as 5 mm contiguous axial images from the thoracic inlet through the adrenal glands. Sagittal and coronal reconstruction obtained during postprocessing. Intravenous contrast medium:  None. Comparison:  Chest radiograph, July 22, 2020. High-resolution CT chest, December 30, 2019. Findings: Right lung shows mild dependent atelectatic change posterior right upper lobe, posterior right middle lobe, and posterior right lower lobe. No nodules or masses. No pleural effusion. Left lung shows dependent subsegmental atelectatic change, left upper lobe. Small peripheral focus of groundglass opacity lateral lower lingula. Dependent subsegmental atelectatic change, small left pleural effusion. No nodules or masses. No hilar, mediastinal, or axillary lymph node enlargement. Cardiac size normal. No pericardial effusion. Calcified mitral annulus. Thoracic aorta normal in course and caliber. Bridging anterior osteophytes mid to lower thoracic spine. No osteoblastic, and no osteolytic lesions. No fracture. Bilateral dependent atelectatic change. Small left effusion.  All CT scans at this facility use dose modulation, iterative reconstruction, and/or weight based dosing when appropriate to reduce radiation dose to as low as reasonably achievable. Ct Cervical Spine Wo Contrast    Result Date: 7/23/2020  EXAMINATION: CT CERVICAL SPINE WO CONTRAST   DATE AND TIME:7/22/2020 10:45 PM CLINICAL HISTORY:ACUTE POSTERIOR NECK PAIN  TRAUMA  COMPARISON: NONE TECHNIQUE:Helical scanning was performed from the skull base through the remainder of the cervical spine without intravenous contrast. Sagittal and coronal reformats were obtained. The lack of contrast limits CT sensitivity of the soft tissues. All CT scans at this facility use dose modulation, iterative reconstruction, and/or weight based dosing when appropriate to reduce radiation dose to as low as reasonably achievable. FINDINGS   Fracture:No acute fracture. Alignment:  Normal cervical lordosis. No subluxation. No canal stenosis. Dense atlas:Dens atlas relationship is normal. Soft tissues:Airway patent. No soft tissue mass or adenopathy. Other findings: There is mild  cervical spondylosis with multiple level disc osteophyte changes. NO ACUTE CERVICAL SPINE ABNORMALITY. Ct Thoracic Spine Wo Contrast    Result Date: 7/23/2020  EXAMINATION: CT THORACIC SPINE WO CONTRAST DATE AND TIME:7/22/2020 10:45 PM CLINICAL HISTORY: SEVERE POSTERIOR THORACIC SPINE PAIN. FALL  COMPARISON: NONE TECHNIQUE:  Axial noncontrast images were performed through the thoracic spine with coronal and sagittal reconstructions. All CT scans at this facility use dose modulation, iterative reconstruction, and/or weight based dosing when appropriate to reduce radiation dose to as low as reasonably achievable. FINDINGS  Alignment/fracture: Normal alignment without subluxation. No evidence of fracture. Perivertebral soft tissues are normal.  Disc spaces: There are multiple levels of disc space narrowing and end plate changes consistent with degenerative change. NEGATIVE CT OF THE THORACIC SPINE.      Ct Lumbar Spine Wo Contrast    Result Date: 7/24/2020  EXAMINATION: CT LUMBAR SPINE WO CONTRAST CLINICAL HISTORY: Fall from standing. Back pain. COMPARISON: CT abdomen pelvis 3/10/2020 TECHNIQUE:  Spiral scans with no contrast. Multiplanar 2-D reconstructions. All CT scans at this facility use dose modulation, iterative reconstruction, and/or weight based dosing when appropriate to reduce radiation dose to as low as reasonably achievable. FINDINGS: No acute fractures identified. There is T11 superior endplate Schmorl's node with dystrophic calcification, unchanged from prior imaging study. There is lumbar spondylosis with multilevel intervertebral disc space narrowing, marginal osteophytosis, and facet hypertrophy. There is L4-5 degenerative disc vacuum phenomenon. There is L3-4 and L4-5 disc bulging which does not appear to impinge on the exiting nerve roots. There is mild L4-5 central canal stenosis. There is disc bulging and  degenerative disc vacuum phenomenon at L1-2. The SI joints are symmetrically maintained. There is iliac sclerosis of the SI joints. Ascites is noted within the field-of-view. There are 2 left renal midpole nonobstructing stones, measuring 2 mm and 4 mm. DEGENERATIVE CHANGES. NO ACUTE FRACTURE. Mri Lumbar Spine Wo Contrast    Result Date: 7/27/2020  EXAMINATION: MRI lumbar spine without contrast CLINICAL HISTORY: Recent fall. Bilateral leg weakness. FINDINGS: Unenhanced scans obtained. T1 and T2-weighted sagittal and axial sequences were acquired. A STIR sagittal sequence are included. Today's study is compared with recent CT dated July 23, 2020. Vertebral bodies are intact and normal in height. Degenerative endplate changes are seen at L4-L5 particularly involving the superior endplate of L5. There is disc desiccation from L1 through L4. There is disc space narrowing at L3-L4 and L4-L5. Conus is  normal in caliber and signal and terminates at T12-L1. T12-L1: Unremarkable.  L1-L2: Small focal disc protrusion centrally and to the left of the midline slightly effaces the thecal sac without narrowing the central canal or compressing transiting or exiting nerve roots. Foramina patent. L2-L3: No disc herniation. No central canal or foraminal stenosis. L3-L4: Generalized bulging of the annulus effaces the thecal sac anteriorly without compromising the transiting or exiting nerve roots. No significant central canal or foraminal stenosis. L4-L5: Generalized bulging of the annulus anteriorly and proliferative changes in the facet joints noted more prominent right side than left. There is narrowing of the right L4 nerve root foramen. No significant central canal stenosis and no definite disc herniation. L5-S1: No disc herniation or central canal or foraminal stenosis. Postlaminectomy changes on the right side noted. Prevertebral soft tissues unremarkable     DIFFUSE DEGENERATIVE DISEASE. NO COMPRESSION FRACTURE OR INFILTRATIVE BONY PROCESS. L4 NERVE ROOT FORAMINAL STENOSIS ON THE RIGHT. NO EVIDENCE OF DISC HERNIATION OR CENTRAL CANAL STENOSIS. Xr Chest Portable    Result Date: 7/23/2020  XR CHEST PORTABLE : 7/22/2020 CLINICAL HISTORY: Pain after fall . COMPARISON: High-resolution chest CT 12/30/2019 and two-view chest 11/18/2019. TECHNIQUE: An upright portable AP radiograph of the chest was obtained. FINDINGS: A shallow inspiration is present without significant infiltrate identified. There is no cardiomegaly, significant pleural effusion, vascular congestion, pneumothorax, or displaced fractures identified. NO EVIDENCE OF ACUTE THORACIC TRAUMA OR ACTIVE CARDIOPULMONARY DISEASE. Ir Us Guided Paracentesis    1. Status post technically successful ultrasound-guided paracentesis. Jatinder Urena is a Female of 82 years age, referred for Ultrasound Guided Paracentesis. PROCEDURE: Survey of the abdomen showed large amount of ascites fluid. After obtaining informed consent, the patient was positioned supine on the sonography table.  Using ultrasound, the skin over the left hemiabdomen was locally anesthetized with 1% lidocaine. Following that, a Yueh needle was advanced into the fluid pocket using ultrasound visualization. 5900cc, of clear yellow fluid were aspirated and sent for cytology, and pathology. The needle was removed, and hemostasis was obtained with pressure. A Band-Aid was placed. Post procedure images did not demonstrate hemorrhage at the target site. The patient tolerated the procedure well. The patient left the department in good condition. A radiology nurse was in presence monitoring vital signs, assisting throughout the procedure. Us Guided Paracentesis    Result Date: 7/23/2020  US GUIDED PARACENTESIS : 7/23/2020 CLINICAL HISTORY:  therapeutic paracentesis . COMPARISON: Chest, abdomen and pelvis CTs from earlier 7/23/2020 PROCEDURE: Informed consent was obtained. Limited transabdominal ultrasound was performed and marking done in the usual fashion. Sterile technique and local lidocaine anesthesia was used to place a 5 [employer] June Yueh catheter within the abdominal fluid collection. Approximately 6.5 L of low viscosity pale yellow-colored fluid was aspirated without evidence of immediate complication. A sample was sent for analysis as requested. The patient was monitored in the radiology holding area for approximately hold minutes without event. She was returned to the medical floor in stable condition with the usual instructions. SUCCESSFUL ULTRASOUND-GUIDED PARACENTESIS APPROXIMATELY 6.5 L OF ASCITES. Us Guided Paracentesis    1. Status post technically successful ultrasound-guided paracentesis. Boubacar Chaves is a Female of 82 years age, referred for Ultrasound Guided Paracentesis. PROCEDURE: Survey of the abdomen showed large amount of ascites fluid. After obtaining informed consent, the patient was positioned supine on the sonography table.  Using ultrasound, the skin over the left hemiabdomen was locally anesthetized with 1% lidocaine. Following that, a Yueh needle was advanced into the fluid pocket using ultrasound visualization. 5450cc, of clear yellow fluid were aspirated and sent for cytology, and pathology. The needle was removed, and hemostasis was obtained with pressure. A Band-Aid was placed. Post procedure images did not demonstrate hemorrhage at the target site. The patient tolerated the procedure well. The patient left the department in good condition. A radiology nurse was in presence monitoring vital signs, assisting throughout the procedure. Us Retroperitoneal Limited    Result Date: 7/23/2020  EXAMINATION: US RETROPERITONEAL LIMITED CLINICAL HISTORY: 82year-old with renal insufficiency and extensive abdominal ascites COMPARISONS: Unenhanced abdominal CT 7/23/2020 FINDINGS: Renal sonography was performed. The study is compromised due to patient body habitus and sonographic window. Kidneys by ultrasound are symmetric borderline small in size. Right kidney measures 9.3 x 5.3 x 4.3 cm left kidney measures 9.2 x 4.7 x  4.8 cm. There are a few punctate echogenic foci in the central sinus echocomplex raising the question of specular reflectors. Bilateral renal echotexture is otherwise unremarkable. No large cortical cysts, solid masses or shadowing calcifications. No ultrasound signs of hydronephrosis. Note is made of upper abdominal ascites     SYMMETRIC KIDNEYS BORDERLINE SMALL IN SIZE. NO ULTRASOUND SIGNS OF HYDRONEPHROSIS.  [address] Problems    Diagnosis Date Noted    Muscle spasm of both lower legs [M62.838] 07/30/2020    Muscle rigidity [R29.898] 07/30/2020    Hypothyroidism [E03.9]     Glaucoma [H40.9] 07/27/2020    Diabetic gastropathy ([address] [E11.69, K31.9] 07/27/2020    Hughes (hard of hearing) [H91.90] 07/27/2020    Acute renal failure (ARF) ([address] [N17.9] 07/27/2020    History of lumbar laminectomy [Z98.890] 07/27/2020    Obesity (BMI 30-39. 9) [E66.9] 07/27/2020    Portal hypertension ([facility] [K76.6] 07/27/2020    Impaired mobility [Z74.09] 07/26/2020    Abnormality of gait and mobility dt exacerbation of Parkinson's disease status post ascites and liver failure acute rehab admission 7/25/2020 [R26.9] 07/26/2020    Stasis dermatitis [I87.2] 07/24/2020    Moderate malnutrition ([facility] [E44.0] 07/24/2020    Gait abnormality [R26.9] 07/24/2020    Weakness generalized [R53.1] 07/23/2020    Morbidly obese ([facility] [E66.01] 03/10/2020    Cough variant asthma [J45.991] 03/10/2020    Decompensated hepatic cirrhosis ([facility] [K72.90] 03/10/2020    Secondary esophageal varices without bleeding (HCC) [I85.10]     Bilateral carotid artery stenosis [I65.23] 01/09/2019    Lumbar spondylosis [M47.816] 10/30/2017    Osteoarthritis of multiple joints [M15.9] 06/17/2014    Vitamin D deficiency [E55.9] 05/02/2014    Lumbago without sciatica [M54.5] 05/01/2014    DISH (diffuse idiopathic skeletal hyperostosis) [M48.10] 05/01/2014    Hyperlipidemia [E78.5] 04/17/2013    Fibromyalgia [M79.7]     DM2 (diabetes mellitus, type 2) ([facility] [address] [E11.9]     Depression [F32.9] 08/28/2012    Hypertension [I10] 08/28/2012    Thrombocytopenia ([facility] [address] [D69.6] 04/24/2012    LVH (left ventricular hypertrophy) [I51.7] 04/05/2012    Parkinson's disease ([facility] [address] [G20] 02/18/2012    Rotator cuff (capsule) sprain [S43.429A] 06/15/2010    Shoulder impingement [M75.40] 05/14/2010    Diabetic polyneuropathy ([facility] [address] [E11.42] 12/26/2007    Generalized muscle ache [M79.10] 12/26/2007       82year-old female who has admission due to fall, mechanical fall suggested possibly due to leg weakness with seem to be chronic in nature.   Patient has several other comorbidity, late age, Parkinson's disease treated by neurological medication, fibromyalgia treated by Lyrica, depression and anxiety could be related to her chronic medical condition, other thrombocytopenia seem to be chronic and vitamin B deficiency. Patient has liver issues with drainage of the ascites and elevated ammonia level on admission, diagnosed by hepatic encephalopathy, cause some mental status confusion. Family is concerned that the cause of the confusion is due to the Lyrica however I clearly explained that Lyrica withdrawal even if appropriate stop will not cause her confusion and inability to seizure but does not cause confusion. I had explained to the family in details/daughter that patient has other comorbidities that may explain including her hepatic encephalopathy, Parkinson disease, dehydration could be other metabolic reason. They have a high concern that the Lyrica should not be stopped due to patient fibromyalgia and the concern that she is becoming increasing pain, I have also explained to them that the patient had a fall and possibly has a lot of muscle bruising and tension in the area that could be the cause of the pain. I may recommend to start low-dose Lyrica 25 g twice daily and observe how the patient reacts to that based on her history that she was on Lyrica for her chronic fibromyalgia also based on the family requests that the Lyrica should not be stopped and should be kept however I discussed my concern and I agreed to restart Lyrica however at the lowest possible dose 25 mg twice daily and observe the patient response in the next few hours. RECOMMENDATION:    SEE ORDERS    Patient long discussion with the family had explained that Lyrica would be start at lower dose 25 mg twice daily and re-observed however my concern that the patient should not be on Lyrica and Topamax together and definitely membrane stabilizer are contribution for mental status changes in presence of other liver encephalopathic condition.     Avoid Tylenol, avoid nonsteroidal anti-inflammatory to borderline kidney disease      Maximize topical analgesic with lidocaine and analgesic cream and possibly add Voltaren gel topical    I agree with supplemental vitamin D, B12    Start low-dose Skelaxin which is peripheral muscle relaxant should not act centrally and does not cause confusion will start for her milligrams twice daily    Agree with low-dose Ultram and oxycodone on a as needed basis and also to observe patient response to treatment    Medical management, family support, other social support and rehabilitation are appropriate for the patient at this time.     SIGNATURE: Dung Olmedo MD PATIENT NAME: Kaila Clemente   DATE: July 30, 2020 MRN: [id number]   TIME: 4:25 PM PAGER/CONTACT #: [phone]

## 2020-07-30 NOTE — CONSULTS
SYSTEMS:  Other than fatigue and weakness, 14-point review  systems were negative. PHYSICAL EXAMINATION:  GENERAL:  The patient is alert, awake, and oriented x3, in no obvious  distress, sitting in wheelchair. VITAL SIGNS:  Blood pressure was 100/62, pulse rate 70, respiratory rate  17, and temperature 97. HEENT:  Normocephalic, atraumatic. Pupils equal, reacting to light. No  jaundice. No exophthalmos. NECK:  Supple. No goiter or thyromegaly was noted. Trachea in midline. CHEST:  Lungs showing bilateral wheezing. No crackles were heard. CARDIOVASCULAR:  Heart sounds were normal.  No murmurs or thrills were  present. ABDOMEN:  Soft, nonobese. Bowel sounds were present. No organomegaly  or tenderness. EXTREMITIES:  Lower extremities reveal 1+ pitting edema, both lower  legs. SKIN:  Intact. MUSCULOSKELETAL:  No joint swelling. Upper extremities reveal some  tremors of bilateral hands. NEUROLOGIC:  Showed nerves I through XII were intact. Other than  tremors, rest of exam was negative. PSYCHIATRIC:  Normal affect, cognition. LABORATORY DATA:  As above. ASSESSMENT:  Mild hypothyroidism, etiology unclear. Prior TSH 8 years  ago was normal.  Parkinson's disease, weakness, type 2 diabetes, and  chronic kidney disease. PLAN:  We will start her on Synthroid 25 mcg daily and thyroid  antibodies. Continue other supportive measures. In the meantime, the  patient to have repeat free T4, TSH in 6-8 weeks' time. Continue other  supportive measures as per physical therapy. Thank you for the consult.         Arianne Esteban MD    D: 07/29/2020 22:41:40       T: 07/29/2020 22:48:12     [email or web address]  Job#: [id number]     Doc#: [id number]    CC:

## 2020-07-30 NOTE — PROGRESS NOTES
Charo Camejo   Facility/Department: [facility]  Speech Language Pathology    Cj Ramsey  1935  [id number]    Date: 7/30/2020      Speech Therapy attempted to see Cj Ramsey on this date for a/an:    Treatment    Pt was unable to be seen due to: Other: Daughter requested time with her mother to clean her dentures.  SLP will attempt at later time         Electronically signed by WANDER Boucher on 7/30/20 at 10:02 AM EDT

## 2020-07-30 NOTE — PROGRESS NOTES
Subjective: The patient complains of severe  acute on chronic confusion rigidity ascites malaise partially relieved by recent paracentesis, PT, OT, speech and language pathology, rest and exacerbated by reaccumulation of ascites. I am concerned about patients drop of H&H and abdominal pain. Again spent a lot of time reviewing her medications and liver toxicity with family patient nursing staff pharmacist make a whole team.  She is better off off the Lyrica for now because she was not able to process it given her liver failure and was causing increased confusion. GI has been consulted regarding her hepatic encephalopathy. Her ascites is still an issue and she needs drained on a frequent basis. ROS x10: The patient also complains of severely impaired mobility and activities of daily living. Otherwise no new problems with vision, hearing, nose, mouth, throat, dermal, cardiovascular, GI, , pulmonary, musculoskeletal, psychiatric or neurological. See Rehab H&P on Rehab chart dated . Vital signs:  BP (!) 97/58   Pulse 75   Temp 98 °F (36.7 °C) (Oral)   Resp 17   Ht 5' 1\" (1.549 m)   Wt 177 lb 0.7 oz (80.3 kg)   SpO2 92%   BMI 33.45 kg/m²   I/O:   PO/Intake:  fair PO intake, no problems observed or reported. Bowel/Bladder:  continent, no problems noted. General:  Patient is well developed, adequately nourished, non-obese and     well kempt. HEENT:    PERRLA, hearing intact to loud voice, external inspection of ear     and nose benign. Inspection of lips, tongue and gums benign  Musculoskeletal: No significant change in strength or tone. All joints stable. Inspection and palpation of digits and nails show no clubbing,       cyanosis or inflammatory conditions. Neuro/Psychiatric: Affect: flat but pleasant. Alert and oriented to  Self . No significant change in deep tendon reflexes or     sensation  Lungs:  Diminished CTA-B.  Respiration effort is normal at rest. Heart:   S1 = S2, RRR. No loud murmurs. Abdomen:  Firm with tenderness due to ascites, non-tender, no enlargement of liver or spleen. Extremities:  No significant lower extremity edema or tenderness. Skin:   Intact to general survey, no visualized or palpated problems. Rehabilitation:  Physical therapy: FIMS:  Bed Mobility: Scooting: Supervision    Transfers: Sit to Stand: Stand by assistance  Stand to sit: Stand by assistance  Bed to Chair: Stand by assistance, Ambulation 1  Surface: level tile, carpet  Device: Rollator  Assistance: Stand by assistance  Quality of Gait: slow cayden with decreased step length. Distances limited by fatigue. Gait Deviations: Slow Cayden  Distance: 100ft  Comments: increased time for turns, fatigued,      FIMS:  ,  ,      Occupational therapy: FIMS:   ,  , Assessment: Pt demonstrates decreased ability to sequence during ADL tasks this AM. Pt continues to benefit from OT services to maximize independence and safety during ADLs. Speech therapy: FIMS:        Lab/X-ray studies reviewed, analyzed and discussed with patient and staff:   No results found for this or any previous visit (from the past 24 hour(s)). Ct Abdomen Pelvis  7/23/2020    Liver: Small in size, and nodular contour, unchanged. Bile Ducts:  Normal in caliber. Gallbladder:  Surgically absent. Pancreas:  Hypoplastic, without masses, cysts, ductal dilatation or calcification. Spleen:  Normal in size without masses or calcifications. No splenules. Kidneys:  Right and left kidneys measure 8.0 cm, and 9.1 cm, respectively. 1 mm calcification mid pole left kidney, unchanged from 2013. No right renal calculi. Adrenals:  Normal. Small bowel:  Normal in caliber. Appendix:  Not visualized. Colon:  Normal in caliber. Surgical clips at level of rectum. Peritoneum:  Diffuse free fluid within the abdomen and pelvis, unchanged. No free air. Vessels: Aorta normal in course and caliber.  Lymph nodes:  Retroperitoneal: No enlarged retroperitoneal lymph nodes. Mesenteric:  No enlarged mesenteric lymph nodes. Pelvic: No enlarged pelvic lymph nodes. Ureters: Normal in course and caliber. No calcifications. Bladder: Partially decompressed. Reproductive organs: Uterus surgically absent. Dystrophic calcification, left adnexa, unchanged from March, 2013. Abdominal Wall: Remote abdominal wall defect repair. Edematous change within abdominal pelvic soft tissues diffusely, stable. Bones:  No bone lesions. No degenerative changes. No post operative changes. Cirrhosis. Diffuse ascites, unchanged. Diffuse edematous change, abdominal and pelvic walls, stable. Small right kidney. Left kidney lower limits of normal. Hypoplastic pancreas. Remote hemorrhoid surgery. Cholecystectomy. Hysterectomy. Xr Hip Right 7/25/2020    Moderate degenerative changes with joint space loss in the bilateral hips. No acute fracture identified. Vascular calcifications. Impression:  1. No acute fracture identified. 2. Moderate bilateral osteoarthritis of the hips. 3. Vascular calcifications. Ct Head   7/23/2020: Extra-axial spaces:  Normal. Intracranial hemorrhage:  None. Ventricular system: Ventricles mildly enlarged. Sulci mildly prominent. Basal Cisterns:  Normal. Cerebral Parenchyma: Physiologic basal ganglia calcification, unchanged. Midline Shift:  None. Cerebellum:  Normal. Paranasal sinuses and mastoid air cells:  Normal. Visualized Orbits: Remote bilateral ocular surgery. Impression: No acute findings. Mild cerebral atrophy. Ct Chest  : 7/23/2020    Right lung shows mild dependent atelectatic change posterior right upper lobe, posterior right middle lobe, and posterior right lower lobe. No nodules or masses. No pleural effusion. Left lung shows dependent subsegmental atelectatic change, left upper lobe. Small peripheral focus of groundglass opacity lateral lower lingula.  Dependent subsegmental atelectatic change, small left pleural effusion. No nodules or masses. No hilar, mediastinal, or axillary lymph node enlargement. Cardiac size normal. No pericardial effusion. Calcified mitral annulus. Thoracic aorta normal in course and caliber. Bridging anterior osteophytes mid to lower thoracic spine. No osteoblastic, and no osteolytic lesions. No fracture. Bilateral dependent atelectatic change. Small left effusion. Ct Cervical Spine   7/23/2020    Fracture:No acute fracture. Alignment:  Normal cervical lordosis. No subluxation. No canal stenosis. Dense atlas:Dens atlas relationship is normal. Soft tissues:Airway patent. No soft tissue mass or adenopathy. Other findings: There is mild  cervical spondylosis with multiple level disc osteophyte changes. NO ACUTE CERVICAL SPINE ABNORMALITY. Ct Thoracic Spine  7/23/2020   Alignment/fracture: Normal alignment without subluxation. No evidence of fracture. Perivertebral soft tissues are normal.  Disc spaces: There are multiple levels of disc space narrowing and end plate changes consistent with degenerative change. NEGATIVE CT OF THE THORACIC SPINE. Ct Lumbar Spine 7/24/2020   : No acute fractures identified. There is T11 superior endplate Schmorl's node with dystrophic calcification, unchanged from prior imaging study. There is lumbar spondylosis with multilevel intervertebral disc space narrowing, marginal osteophytosis, and facet hypertrophy. There is L4-5 degenerative disc vacuum phenomenon. There is L3-4 and L4-5 disc bulging which does not appear to impinge on the exiting nerve roots. There is mild L4-5 central canal stenosis. There is disc bulging and  degenerative disc vacuum phenomenon at L1-2. The SI joints are symmetrically maintained. There is iliac sclerosis of the SI joints. Ascites is noted within the field-of-view. There are 2 left renal midpole nonobstructing stones, measuring 2 mm and 4 mm. DEGENERATIVE CHANGES.  NO ACUTE reassessed regarding patients ability to participate in therapies and patient found to be able to participate in acute intensive comprehensive inpatient rehabilitation program including PT/OT to improve balance, ambulation, ADLs, and to improve the P/AROM. Therapeutic modifications regarding activities in therapies, place, amount of time per day and intensity of therapy made daily. In bed therapies or bedside therapies prn.   2. Bowel constipation due to ascites and Bladder dysfunction:  frequent toileting, ambulate to bathroom with assistance, check post void residuals. Check for C.difficile x1 if >2 loose stools in 24 hours, continue bowel & bladder program.  Monitor bowel and bladder function. Lactinex 2 PO every AC. MOM prn, Brown Bomb prn, Glycerin suppository prn, enema prn. Add scheduled lactulose  3. Severe low back and abdominal pain due to ascites as well as rigid muscle Parkinson's related pain as well as generalized OA pain: reassess pain every shift and prior to and after each therapy session, give prn Tylenol and Roxicodone and Ultram as needed, modalities prn in therapy, Lidoderm, K-pad prn. Tylenol is limited due to liver failure therefore no Tylenol consider Duragesic patch-she was taken off Lyrica because of its potential toxicity especially in light of liver failure. Neurology consulted to clarify and simplify medications. She has been getting  Topamax-- I will consult pain management-as neurologist do not seem comfortable addressing this issue. 4. Skin healing and breakdown risk:  continue pressure relief program.  Daily skin exams and reports from nursing. 5. Severe fatigue due to nutritional and hydration deficiency: Add vitamin B12 vitamin D and CoQ10 continue to monitor I&Os, calorie counts prn, dietary consult prn.  6. Acute episodic insomnia with situational adjustment disorder:  prn Ambien, monitor for day time sedation.   7. Falls risk elevated:  patient to use call light to get nursing assistance to get up, bed and chair alarm. 8. Elevated DVT risk: progressive activities in PT, continue prophylaxis DAWN hose, elevation and no thinners due to liver failure. 9. Complex discharge planning: Discharge 8/8/2020 home alone with help from her daughters who live locally. Weekly team meeting every Monday to assess progress towards goals, discuss and address social, psychological and medical comorbidities and to address difficulties they may be having progressing in therapy. Patient and family education is in progress. The patient is to follow-up with their family physician after discharge. Complex Active General Medical Issues that complicate care Assess & Plan:    1. Whole body pain as well as cervical thoracic and low back pain due to fibromyalgia,   Lumbago without sciatica,   Rotator cuff (capsule) deficits, Shoulder impingement-use lowest effective dose of opiate medication to include Roxicodone and Ultram, titrate Desyrel, add Lidoderm BenGay heat massage-titrate Topamax and avoid use of Tylenol due to liver disease as well as Lyrica due to intolerance  2. Depression and anxiety-emotional support given daily add recreational therapy and rehabilitation psychology when available and support group when available  3. LVH (left ventricular hypertrophy), coronary artery disease-vital signs to shift dose and titrate cardiac locations consult hospitalist for backup medical  4. Parkinson's disease -consult neurology-who have not addressed this issue avoid sedatives such as Lyrica which she does not tolerate well titrate Sinemet. I will consult pain management regarding the use of Lyrica. 5.   Thrombocytopenia with severe anemia and recent blood transfusion-recheck CBC add vitamin B12 vitamin D  6. Vitamin D deficiency-add daily vitamin D and recheck as an outpatient  7.    Secondary esophageal varices without bleeding-monitor stools for blood avoid strong blood thinners if possible  8. Morbidly obese but with  Moderate malnutrition -add high-protein low-carb supplementation if possible however because of her her recent liver disease she may not be able to process proteins very well. 9.   Stasis dermatitis-protect skin add ET mix to the lower extremities add co-Q10  10. Severe progressive and recurrent ascites of the liver-eliminate toxic medications recheck as an outpatient monitor ammonia level-reconsult interventional radiology for repeat paracentesis. 11. Hepatic encephalopathy with Elavil elevated ammonia level-titrate lactulose recheck ammonia-consult GI to discuss this with the family. Pharmacy is now aware and adjusting medications. 12. Severe hypothyroidism-consult endocrinology titrate Synthroid  13. Polypharmacy in the elderly-consult pain management agree with avoiding use of Lyrica-medications adjusted as well patient and family made aware of these concerns.              Gwen Rousseau D.O., PM&R     Attending    [address] [facility]

## 2020-07-30 NOTE — PROGRESS NOTES
Endocrinology Progress Note    Assessment and Plan:   Assessment-  1. Hypothyroidism  2. Parkinson's disease  3. Weakness and deconditioning    Plan-  1. Levothyroxine 25 mcg daily  2. Thyroid antibodies ordered  3. Repeat thyroid labs in 6-8 weeks time  4. Patient may follow-up with us for ongoing thyroid observation or primary care can take over and monitor this as well. 5. No further endocrinology work-up required during this hospital stay. 6. Endocrinology will sign off this patient's care, feel free to contact us to us with any questions or concerns    POC Glucose:   No results for input(s): POCGLU in the last 72 hours. HGBA1C:  Lab Results   Component Value Date    LABA1C 4.5 (L) 03/16/2020    LABA1C 4.9 01/29/2020    LABA1C 6.7 (H) 04/17/2019     Lab Results   Component Value Date    TSH 9.180 (H) 07/27/2020    TSH 2.378 07/05/2012    TSH 2.158 03/30/2012    T4FREE 0.91 07/27/2020    T4FREE 0.98 02/19/2012     CBC:   No results for input(s): WBC, HGB, PLT in the last 72 hours. CMP:  No results for input(s): NA, K, CL, CO2, BUN, CREATININE, GLUCOSE, CALCIUM, LABGLOM in the last 72 hours. CC: No chief complaint on file. Subjective: Interval History: Patient is an 74-year-old female admitted to our rehabilitation unit for strengthening and conditioning she had a recent fall at home and was admitted for her Parkinson's disease, severe arthritis and weakness in her lower extremities. She was found to be hypothyroid with a TSH of 9.3.   She was started on levothyroxine 25 mcg p.o. daily    Review of systems: denies polyuria, polydipsia, ABD pain, flank pain, N/V/D, or diaphoresis  Medications:   Scheduled Meds:   nystatin, stomahesive in petrolatum   Topical 3 times per day    thiamine  100 mg Oral BID    pregabalin  25 mg Oral BID    pantoprazole  20 mg Oral QAM AC    levothyroxine  25 mcg Oral Daily    folic acid  5 mg Oral Daily    topiramate  50 mg Oral Daily    traZODone  150 mg Oral Nightly    budesonide-formoterol  2 puff Inhalation BID    carbidopa-levodopa  1 tablet Oral TID    furosemide  40 mg Oral Daily    spironolactone  50 mg Oral Daily     Continuous Infusions:    Objective:   Vitals: BP (!) 104/49   Pulse 73   Temp 98 °F (36.7 °C) (Oral)   Resp 17   Ht 5' 1\" (1.549 m)   Wt 177 lb 0.7 oz (80.3 kg)   SpO2 97%   BMI 33.45 kg/m²    Wt Readings from Last 3 Encounters:   07/25/20 177 lb 0.7 oz (80.3 kg)   07/24/20 167 lb (75.8 kg)   07/10/20 165 lb (74.8 kg)        General appearance: alert, appears older than stated age, cooperative, fatigued and no distress  Skin: Skin color, texture, turgor normal. No rashes or lesions. Neck: no lymphadenopathy  Lungs: clear to auscultation bilaterally  Heart: regular rate and rhythm, S1, S2 normal, no murmur, click, rub or gallop  Abdomen: soft, non-tender. Bowel sounds normal. No masses,  no organomegaly.   Extremities: extremities normal, atraumatic, no cyanosis or edema    Patient Active Problem List:     DM2 (diabetes mellitus, type 2) ([facility])     Fibromyalgia     Abdominal pain     Bursitis, hip     Bilateral hand pain     Actinic keratosis     Chondrocalcinosis of hand     Carotid stenosis     Chondrodermatitis nodularis chronica helicis     Chronic frontal sinusitis     Controlled type 2 diabetes mellitus without complication, without long-term current use of insulin ([facility])     Depression     DISH (diffuse idiopathic skeletal hyperostosis)     Fitting and adjustment of orthopedic device     Closed fracture of part of upper end of humerus     Fx phalanx, foot-closed     History of trabeculectomy     Hyperlipidemia     Hypertension     Intracranial arachnoid cyst     Joint replaced by other means     Lens replaced by other means     Liver cirrhosis ([facility] [address]     Lumbago without sciatica     Lumbar spondylosis     LVH (left ventricular hypertrophy)     Generalized muscle ache     Osteoarthritis of shoulder     Osteoarthritis of multiple joints

## 2020-07-30 NOTE — PROGRESS NOTES
Spiritual Care Services     Summary of Visit:  PT fell in her kitchen, because her legs could not hold her. She had some injuries, which she is healing from. She is worn out from her therapies. I comforted and encouraged her. We prayed and I anointed her. She also received Holy Communion. Spiritual Assessment/Intervention/Outcomes:    Encounter Summary  Services provided to[de-identified] Patient  Referral/Consult From[de-identified] [facility]  Support System: Children, Family members  Place of Baptism: [facility] Visiting: Yes  Complexity of Encounter: Moderate  Length of Encounter: 30 minutes  Spiritual Assessment Completed: Yes  Advance Care Planning: Yes  Routine  Type:  Follow up  Assessment: Approachable, Calm, Hopeful  Intervention: [place], Nurtured hope, Active listening, Explored feelings, thoughts, concerns, Explored coping resources, Prayer  Outcome: Encouraged, Hopeful, Coping, Comfort, Expressed gratitude     Spiritual/[place]  Type: Spiritual support  Intervention: Anointing, Communion  Outcome: Coping, Expressed gratitude, Expressed feelings of maya, peace, and/or awe  Sacraments  Sacrament of Sick-Anointing: Anointed  Communion: Patient received communion     Advance Directives (For Healthcare)  Pre-existing DNR Comfort Care/DNR Arrest/DNI Order: No  Healthcare Directive: Yes, patient has an advance directive for healthcare treatment  Type of Healthcare Directive: Durable power of  for health care, Living will  Copy in Chart: Yes, copy in chart  Chart Copy Status [de-identified] Active, Current  Date Reviewed and Current[de-identified] 07/27/20  Information on Healthcare Directives Requested: No  Advance Directives: Pt. not interested at this time  Healthcare Agent Appointed: Dwain Wu Agent's Name: Maritaylorshell Agent's Phone Number: [phone]  If you are unable to speak for yourself, does your Healthcare Agent or Legal Spokesperson know your healthcare wishes?: Yes           Values / Beliefs  Do you have any ethnic, cultural, sacramental, or spiritual Buddhism needs you would like us to be aware of while you are in the hospital?: No    Care Plan:        [id number] [address]   Electronically signed by El Mao on 7/30/20 at 4:52 PM EDT     To reach a  for emotional and spiritual support, place an [facility] [place] consult request.   If a  is needed immediately, dial 0 and ask to page the on-call .

## 2020-07-30 NOTE — PROGRESS NOTES
Occupational Therapy  Facility/Department: [facility]  Daily Treatment Note  NAME: Rosalina Verduzco  : 1935  MRN: [id number]    Date of Service: 2020    Discharge Recommendations:  Continue to assess pending progress       Assessment: Pt able to participate in full session, however, required frequent and prolonged rest breaks. Patient's fatigue increases as session progresses. Activity Tolerance  Activity Tolerance: Patient limited by fatigue  Safety Devices  Safety Devices in place: Yes  Type of devices: All fall risk precautions in place         Patient Diagnosis(es): There were no encounter diagnoses. has a past medical history of Asthma, Fibromyalgia, Hemorrhoid, Hyperlipidemia, Hypertension, Parkinson's disease ([facility] [address] and Type II or unspecified type diabetes mellitus without mention of complication, not stated as uncontrolled. has a past surgical history that includes Hysterectomy; Cholecystectomy; laminectomy; eye surgery; joint replacement (Right); hernia repair (2002); hernia repair; Hemorrhoid surgery; Upper gastrointestinal endoscopy (N/A, 2020); Paracentesis (Left, 2020); Paracentesis (Left, 06/10/2020); Paracentesis (Left, 2020); and Paracentesis (Left, 2020).     Restrictions  Restrictions/Precautions  Restrictions/Precautions: Fall Risk     Subjective   General  Chart Reviewed: Yes  Patient assessed for rehabilitation services?: Yes  Response to previous treatment: Patient with no complaints from previous session  Family / Caregiver Present: No  Referring Practitioner: Dr Haydee Rivero  Diagnosis: Imp Mob and ADLs 2° Exac of Parkinsons  Pre Treatment Pain Screening  Pain at present: 0  Scale Used: Numeric Score  Intervention List: Patient able to continue with treatment  Vital Signs  Patient Currently in Pain: No     Objective    Coordination  Fine Motor: Small Nuts and Bolts Pt used bilateral hands to don a wooden washer onto a small bolt then don a nut on the bolt to hold the washer in place (x10). Pt used her left hand to stabilize the bold and her right hand to manipulate bolt and washer. Pt had Min difficulty with activity and worked at a steady pace without rest breaks, however, required extended time d/t slowness of movement. To improve hand fine motor coordination for mgmt of clothing fasteners/ADL containers in a timely manner. Pt used bilateral hands on the handles of a box stone to push it across the tabletop as follows: 2# weight added to box: Forward and Backward: 2 sets x 10 reps   Side to Side: 2 sets x 10 reps   Clockwise Circles: 2 sets x 10 reps   Counterclockwise Circles: 2 sets x 10 reps   Pt had Min/Mod difficulty with activity and required rest breaks throughout. To increase BUE strength and endurance for improved transfers. Sorting/Folding Laundry: Pt used bilateral hands to sort/fold various articles of laundry (towels, wash cloths, and pillow cases). Pt had Min difficulty with activity and worked at a slow and steady pace without rest breaks. To improve functional endurance to complete ADLs as projected. Plan   Plan  Times per week: 5-7 times per day  Plan weeks: 1.5 weeks  Current Treatment Recommendations: Strengthening, Balance Training, Functional Mobility Training, Endurance Training, Safety Education & Training, Patient/Caregiver Education & Training, Self-Care / ADL, Equipment Evaluation, Education, & procurement  Plan Comment: Continue OT per POC    Goals  Patient Goals   Patient goals :  \"To get stronger\"    Therapy Time   Individual Concurrent Group Co-treatment   Time In 1400          Time Out 1450         Minutes 50         Pt seen for extended time to make up missed minutes from this AM (+20 minutes)  Therapeutic activities: 50 minutes    Electronically signed by SOCORRO Li on 7/30/2020 at 2:53 PM    SOCORRO Li

## 2020-07-30 NOTE — PROGRESS NOTES
Patient will be evaluated by podiatry. New orders received. Call to Dr. Jacques Look regarding the Thiamine B-1. 2 doses were note given due to lose of iv access. Waiting for a call back to identify if patient to have these doses. Dr. Eugene Payne states no need to do iv thiamine. She did order po thiamine. Hospitalist aware of the low blood pressure. New orders to hold all BP meds and evaluate in the morning.

## 2020-07-30 NOTE — CONSULTS
PODIATRIC MEDICINE AND SURGERY  CONSULT HISTORY AND PHYSICAL    Consulting Service: Physical medicine  Requesting Provider: Dr. Carlos Caruso  Opinion/advice regarding: L hallucal nail pain  Staff Doctor:  Dr. Duke Cerna:  82 y.o. female with PMH significant for parkinsons disease, mid and low back arthritis, HLD, HTN  for who podiatry was consulted for l hallux nail pain with hematoma likely secondary to trauma. No concern for acute fracture, no concern for infection    PLAN AND RECOMMENDATIONS[de-identified]  Patient's case to be discussed with staff, Dr. Thomas Gonzalez, who will provide final recommendations going forward  Wound care: Betadine to L great toenail  WBAT  Hallucal nail was trimmed, proximal nail is adhered to nail bed, will keep it clean and dry and have the nail fall off  Podiatry will sign off  Patient will need follow up with Dr. Thomas Gonzalez if pain does not resolve or concern of infection    HPI: This 82y.o. year old female was seen today for painful left hallux nail. Patient does nbot recall any trauma. States that she thought it was painful secondary to nail fungus. Denies any falls or stubbing her toe. Does not know how long it has been painful but thinks its been greater than a week. Patient denies nausea, vomiting, diarrhea, fevers, chills, chest pain, shortness of breath, head ache, or calf pain. No other pedal complaints.      Past Medical History:   Diagnosis Date    Asthma     Fibromyalgia     Hemorrhoid     Hyperlipidemia     Hypertension     Parkinson's disease ([facility] [address]     Type II or unspecified type diabetes mellitus without mention of complication, not stated as uncontrolled        Past Surgical History:   Procedure Laterality Date    CHOLECYSTECTOMY      EYE SURGERY      HEMORRHOID SURGERY      HERNIA REPAIR  2/20/2002    ventral hernia    HERNIA REPAIR      LIH repair    HYSTERECTOMY      JOINT REPLACEMENT Right     partial left, full right    LAMINECTOMY      PARACENTESIS Left mouth daily With Food 30 tablet 3    Cinnamon 500 MG TABS Take by mouth 3 times daily      carbidopa-levodopa (SINEMET)  MG per tablet Take 1 tablet by mouth 1 po bid per dr robb goldstein neurologist      latanoprost (XALATAN) 0.005 % ophthalmic solution Use 1 Drop in both eyes daily at bedtime.  tiZANidine (ZANAFLEX) 2 MG tablet Take 2 mg by mouth Take 2 mg by mouth bid prn      pregabalin (LYRICA) 75 MG capsule Take 1 capsule by mouth 3 times daily for 10 days. Take 75 mg by mouth. 30 capsule 0    furosemide (LASIX) 20 MG tablet Take 1 tablet by mouth daily for 5 days 5 tablet 0       Allergies   Allergen Reactions    Adhesive Tape     Amoxicillin-Pot Clavulanate     Bactrim [Sulfamethoxazole-Trimethoprim]     Clarithromycin      Other reaction(s): Other: See Comments  tachycardia    Other     Sulfa Antibiotics        Family History   Problem Relation Age of Onset    High Blood Pressure Neg Hx        Social History     Socioeconomic History    Marital status:      Spouse name: Not on file    Number of children: Not on file    Years of education: 15    Highest education level: High school graduate   Occupational History    Occupation: Homemaker   Social Needs    Financial resource strain: Somewhat hard    Food insecurity     Worry: Never true     Inability: Never true    Transportation needs     Medical: Yes     Non-medical: Yes   Tobacco Use    Smoking status: Never Smoker    Smokeless tobacco: Never Used   Substance and Sexual Activity    Alcohol use: No    Drug use: No    Sexual activity: Not Currently     Partners: Male   Lifestyle    Physical activity     Days per week: 0 days     Minutes per session: 0 min    Stress:  Only a little   Relationships    Social connections     Talks on phone: More than three times a week     Gets together: More than three times a week     Attends Religion service: 1 to 4 times per year     Active member of club or organization: No Attends meetings of clubs or organizations: Never     Relationship status:     Intimate partner violence     Fear of current or ex partner: No     Emotionally abused: No     Physically abused: No     Forced sexual activity: No   Other Topics Concern    Not on file   Social History Narrative     Lives With: Alone in [address] she is a     She has a daughter named -----and who lives in [facility] and that is quite helpful and comes to see her almost daily    Type of Home: Apartment  at [address] in [place] (7th floor)    Home Layout: One level    Home Access: Elevator, Level entry  (Level entry, elevator to 7th floor)    Bathroom Shower/Tub: Tub/Shower unit    Home Equipment: 4 wheeled walker    ADL Assistance: Independent    Homemaking Assistance: Independent    Ambulation Assistance: Independent(Rollator)    Transfer Assistance: Independent    Active : No    Patient's  Info: daughter    IADL Comments: Daughter helps with driving and groceries PRN    Additional Comments: Reports recent fall- \"my legs gave out\"           Review of Systems  CONSTITUTIONAL: No fevers, chills, diaphoresis  HEENT: No epistaxis, tinnitus  EYES: No diplopia, blurry vision.   CARDIOVASCULAR:  No chest pain, palpitations, lower extremity edema  PULM: No dyspnea, tachypnea, wheezing  GI: No nausea, vomiting, constipation, diarrhea  : No dysuria, gross hematuria, or pyuria  NEURO: leg weakness  MSK: L hallux pain  PSY: No concerns regarding depression, anxiety  INTEGUMENTARY:hematoma beneath L hallux nail    OBJECTIVE:  /66   Pulse 76   Temp 98 °F (36.7 °C) (Oral)   Resp 17   Ht 5' 1\" (1.549 m)   Wt 177 lb 0.7 oz (80.3 kg)   SpO2 92%   BMI 33.45 kg/m²   Patient is alert and oriented to person, place, and time    Vascular:   Palpable Dorsalis Pedis and Palpable Posterior Tibial Pulses B/L   Capillary Fill time < 3 seconds to B/L digits  Skin temperature warm to warm tibial tuberosity to the digits B/L  Hair growth absent to digits  no edema  no varicosities     Neurological:   Sensation to light touch intact B/L  Protective sensation via monofilament testing impaired B/L    Musculoskeletal/Orthopaedic:   Structural Deformities: hammertoe deformity 2nd digit b/l  5/5 muscle strength Dorsiflexion, Plantarflexion, Inversion, Eversion B/L   tenderness on palpation of  L hallucal nail. No pain with palpation of the remainder of the hallux    Dermatological:   Skin appears well hydrated and supple with good temperature, texture, turgor. No hyperkeratotic lesions noted  Nails 1-5 B/L appear WNL with the exception of the L hallucal nail which was darkened secondary to hematoma. Nail is well adhered.  Some mild serous drainage from beneath the nail is noted  Interspaces 1-4 B/L are clear and without debris  No open lesions noted B/L    LABS:   Lab Results   Component Value Date    WBC 4.6 (L) 07/24/2020    HGB 8.4 (L) 07/25/2020    HCT 25.8 (L) 07/25/2020    .9 (H) 07/24/2020    PLT 85 (L) 07/24/2020     Lab Results   Component Value Date     07/25/2020    K 3.5 07/25/2020     07/25/2020    CO2 19 07/25/2020    BUN 21 07/25/2020    CREATININE 1.01 07/25/2020    GLUCOSE 124 07/25/2020    GLUCOSE 248 04/06/2012    CALCIUM 8.3 07/25/2020      Lab Results   Component Value Date    LABALBU 2.9 (L) 07/25/2020     Lab Results   Component Value Date    SEDRATE 35 (H) 03/10/2020     Lab Results   Component Value Date    CRP 21.7 (H) 03/10/2020     Lab Results   Component Value Date    LABA1C 4.5 (L) 03/16/2020           Stephanie Aguilar DPM PGY-2  Podiatric Surgery Resident  Podiatry On Call Pager: [phone]  July 30, 2020  1:33 PM

## 2020-07-30 NOTE — PROGRESS NOTES
INPATIENT PROGRESS NOTE    SERVICE DATE:  7/30/2020   SERVICE TIME:  4:46 PM      SUBJECTIVE    INTERVAL HPI: 82year old female who has had several low blood pressures today. The patient systolic blood pressure is ranged from . She is asymptomatic. Stating only that she feels tired. The patient is s/p paracentesis on 7/28/20.  Denies cp sob ha rash n v d f    MEDICATIONS:    Current Facility-Administered Medications   Medication Dose Route Frequency Provider Last Rate Last Dose    nystatin, stomahesive in petrolatum (ET MIX)   Topical 3 times per day Karen Scullin, DO        vitamin B-1 (THIAMINE) tablet 100 mg  100 mg Oral BID Jeannette Vidales MD        pregabalin (LYRICA) capsule 25 mg  25 mg Oral BID Prasanna Alvarado MD        [facility]) tablet 800 mg  800 mg Oral TID PRN Prasanna Alvarado MD        pantoprazole (PROTONIX) tablet 20 mg  20 mg Oral QAM AC Karen Scullin, DO   20 mg at 07/30/20 9280    levothyroxine (SYNTHROID) tablet 25 mcg  25 mcg Oral Daily Luis Sanford MD   25 mcg at 07/30/20 4580    lidocaine 4 % external patch 3 patch  3 patch Transdermal Daily PRN Karen Scullin, DO   3 patch at 07/30/20 1611    analgesic ointment ointment   Topical TID PRN Karen Scullin, DO        folic acid (FOLVITE) tablet 5 mg  5 mg Oral Daily Jeannette Vidales MD   5 mg at 07/30/20 1613    oxyCODONE (ROXICODONE) immediate release tablet 5 mg  5 mg Oral Q4H PRN Karen Scullin, DO   5 mg at 07/30/20 1011    traMADol (ULTRAM) tablet 25 mg  25 mg Oral Q6H PRN Karen Scullin, DO   25 mg at 07/29/20 2126    ondansetron (ZOFRAN) tablet 4 mg  4 mg Oral Q8H PRN Ben Sagastume DO        fleet rectal enema 1 enema  1 enema Rectal Daily PRN Karen Scullin, DO        albuterol (PROVENTIL) nebulizer solution 2.5 mg  2.5 mg Nebulization Q6H PRN Erlinda Cueto MD        topiramate (TOPAMAX) tablet 50 mg  50 mg Oral Daily Erlinda Cueto MD   50 mg at 07/30/20 0910    traZODone (DESYREL) tablet 150 mg 150 mg Oral Nightly Mac Granados MD   150 mg at 07/29/20 2031    ondansetron (ZOFRAN) injection 4 mg  4 mg Intravenous Q6H PRN Mac Granados MD        budesonide-formoterol (SYMBICORT) 160-4.5 MCG/ACT inhaler 2 puff  2 puff Inhalation BID Mac Granados MD   2 puff at 07/30/20 0456    carbidopa-levodopa (SINEMET)  MG per tablet 1 tablet  1 tablet Oral TID Mac Granados MD   1 tablet at 07/30/20 1554    furosemide (LASIX) tablet 40 mg  40 mg Oral Daily Mac Granados MD   40 mg at 07/30/20 0914    spironolactone (ALDACTONE) tablet 50 mg  50 mg Oral Daily Mac Granados MD   50 mg at 07/29/20 1107       OBJECTIVE  PHYSICAL EXAM:   BP (!) 97/48   Pulse 75   Temp 98 °F (36.7 °C) (Oral)   Resp 17   Ht 5' 1\" (1.549 m)   Wt 177 lb 0.7 oz (80.3 kg)   SpO2 92%   BMI 33.45 kg/m²   Body mass index is 33.45 kg/m². CONSTITUTIONAL:  awake, alert, cooperative, no apparent distress, and appears stated age  NECK:  Supple, symmetrical, trachea midline, no adenopathy, thyroid symmetric, not enlarged and no tenderness, skin normal  BACK:  Symmetric, no curvature, spinous processes are non-tender on palpation, paraspinous muscles are non-tender on palpation, no costal vertebral tenderness  LUNGS:  No increased work of breathing, good air exchange, clear to auscultation bilaterally, no crackles or wheezing  CARDIOVASCULAR:  Normal apical impulse, regular rate and rhythm, normal S1 and S2, no S3 or S4, and no murmur noted  ABDOMEN:  No scars, normal bowel sounds, soft, non-distended, non-tender, no masses palpated, no hepatosplenomegally  MUSCULOSKELETAL:  There is no redness, warmth, or swelling of the joints. Full range of motion noted. Motor strength is 5 out of 5 all extremities bilaterally. Tone is normal.  NEUROLOGIC:  Awake, alert, oriented to name, place and time. Cranial nerves II-XII are grossly intact. Motor is 5 out of 5 bilaterally. Cerebellar finger to nose, heel to shin intact.   Sensory is intact. Babinski down going, Romberg negative, and gait is normal.  SKIN:  no bruising or bleeding, no lesions and no jaundice    DATA:     No results found for this or any previous visit (from the past 24 hour(s)). ASSESSMENT AND PLAN   1. Gait instability and immobility secondary to Parkinson's disease-PT/OT  2.  decompensated liver cirrhosis with esophageal varices and ascites-s/p paracentesis per Dr. Georges Fishman  3. Pancytopenia  4. ENMA  5. Mild hypotension-will hold bp meds to 12 hours and monitor. Reassess tomorrow      SIGNATURE: Amy Jones PATIENT NAME: Yomaira Corbett   DATE: July 30, 2020 MRN: [id number]   TIME: 4:46 PM PAGER: [phone]     I personally obtained the key and critical portions of the history and physical exam and made additions where appropriate in the documentation.  I reviewed the mid level documentation and agree with assessment and plan that we come up with together    Wilfrido Navarrete,   Internal Medicine

## 2020-07-30 NOTE — PROGRESS NOTES
Occupational Therapy  Facility/Department: [facility]  Daily Treatment Note  NAME: [place]  : 1935  MRN: [id number]    Date of Service: 2020    Discharge Recommendations:  Continue to assess pending progress       Assessment      Activity Tolerance  Activity Tolerance: Patient limited by pain;Treatment limited secondary to decreased cognition  Activity Tolerance: Pt tolerated 40 minutes of therapy in the room; After completing denture hygiene and standing at the sink approx 30 minutes Pt fatigued and requested to get back in bed. Safety Devices  Safety Devices in place: Yes  Type of devices: All fall risk precautions in place         Patient Diagnosis(es): There were no encounter diagnoses. has a past medical history of Asthma, Fibromyalgia, Hemorrhoid, Hyperlipidemia, Hypertension, Parkinson's disease ([facility] [address] and Type II or unspecified type diabetes mellitus without mention of complication, not stated as uncontrolled. has a past surgical history that includes Hysterectomy; Cholecystectomy; laminectomy; eye surgery; joint replacement (Right); hernia repair (2002); hernia repair; Hemorrhoid surgery; Upper gastrointestinal endoscopy (N/A, 2020); Paracentesis (Left, 2020); Paracentesis (Left, 06/10/2020); Paracentesis (Left, 2020); and Paracentesis (Left, 2020). Restrictions  Restrictions/Precautions  Restrictions/Precautions: Fall Risk  Subjective   General  Chart Reviewed: Yes  Patient assessed for rehabilitation services?: Yes  Family / Caregiver Present: Yes  Referring Practitioner: Dr Haydee Rivero  Diagnosis: Imp Mob and ADLs 2° Exac of Parkinsons  Pain Assessment  Pain Assessment: 0-10  Pain Level: 7  Pain Location: Back  Pre Treatment Pain Screening  Pain at present: 5  Scale Used: Numeric Score  Vital Signs  Patient Currently in Pain: Yes   Orientation     Objective    ADL  Grooming: Stand by assistance(Pt completed denture hygiene;  Pt confused on this date and required several cues to sequence tasks)  Toileting: Stand by assistance        Toilet Transfers  Toilet - Technique: Stand pivot  Toilet Transfer: Stand by assistance     Transfers  Sit to stand: Stand by assistance  Stand to sit: Stand by assistance                       Plan   Plan  Times per week: 5-7 times per day  Plan weeks: 1.5 weeks  Current Treatment Recommendations: Strengthening, Balance Training, Functional Mobility Training, Endurance Training, Safety Education & Training, Patient/Caregiver Education & Training, Self-Care / ADL, Equipment Evaluation, Education, & procurement  Plan Comment: Continue OT per POC    Goals  Patient Goals   Patient goals :  \"To get stronger\"       Therapy Time   Individual Concurrent Group Co-treatment   Time In 1000         Time Out 1040         Minutes 40           OT ADL training 40 minutes for 3 unit(s), CPT 84455     Electronically signed by Sammy Garrett OTR/L on 7/30/2020 at 11:07 AM  Sammy Garrett OTR/L

## 2020-07-30 NOTE — PROGRESS NOTES
Vascular and Interventional Radiology   Nikolas Jackson. Tammi Thorne M.D. [facility]      Chief Complaint: No chief complaint on file. Subjective: Urbano Fitzpatrick is a 82 y.o. female with liver cirrhosis. Post paracentesis yesterday. Doing well today. No new complaints. No ascites at the moment. Objective:   /66   Pulse 76   Temp 98 °F (36.7 °C) (Oral)   Resp 17   Ht 5' 1\" (1.549 m)   Wt 177 lb 0.7 oz (80.3 kg)   SpO2 92%   BMI 33.45 kg/m²     Physical:   alert and  not in acute distress    Normocephalic, without obvious abnormality, atraumatic    Neck supple. No adenopathy. Thyroid symmetric, normal size, and without nodularity    normal rate, regular rhythm and no gallops    chest clear, no wheezing, rales, normal symmetric air entry    Abdomen: Soft, non tender, NBS    Lab:  No results for input(s): WBC, RBC, HGB, HCT, MCV, MCH, MCHC, RDW, PLT, MPV in the last 72 hours. No results for input(s): INR, PROTIME in the last 72 hours. No results for input(s): CREATININE in the last 72 hours. Assessment: Urbano Fitzpatrick is a 82 y.o. female post paracentesis for ascites secondary to liver cirrhosis. No ascites at the moment. Plan: If fluid recurs, will need to consider drain placement. Nikolas Jackson.  Tammi Thorne M.D. [facility]  7/30/2020,  3:10 PM

## 2020-07-31 NOTE — PROGRESS NOTES
Physical Therapy Rehab Treatment Note  Facility/Department: [facility]  Room: [place] [address]       NAME: Carol Conde  : 1935 (82 y.o.)  MRN: [id number]  CODE STATUS: Full Code    Date of Service: 2020  Chart Reviewed: Yes  Family / Caregiver Present: (Pt's daughter present begining of session. Left oonce session started.)  Other (Comment): Pt returned from xray. Results not yet recieved. Spoke with PT and RN. Pt ok to participate in [employer] activities until results recieved. Restrictions:  Restrictions/Precautions: Fall Risk     SUBJECTIVE:  Pt states she is cold and tired. Pain Screening  Patient Currently in Pain: Yes     Post Treatment Pain Screening:  Pain Assessment  Pain Level: 7  Pain Location: Back  Pain Orientation: Mid  Pain Descriptors: Aching  Non-Pharmaceutical Pain Intervention(s): Repositioned; Rest  OBJECTIVE:              Bed mobility  Sit to Supine: Supervision  Transfers  WC to bed and WC to toilet : Supervision               Exercises  Heelslides: x20  Hip Flexion: seated x20  Hip Abduction: seated abd with manual resiatance x20; ADD with pillow x20; supine x15  Knee Long Arc Quad: x20  Neurodevelopmental Techniques: Seated jabs and crosses x10 ea and reaching activities to increase rotation. Comments: Cues to increase ROM with above exercises. Other exercises  Other exercises 3: LTR x10     ASSESSMENT/COMMENTS:  Assessment: Seated and supine activities performed until xray results are recieved. Pt tolerated well with encouragement. PLAN OF CARE/Safety:   Plan Comment: Cont per POC.   Therapy Time:   Individual   Time In 1100   Time Out 1145   Minutes 45     Minutes:      Transfer/Bed mobility trainin      Gait trainin      Neuro re education:10     Therapeutic ex:30    Erendira De Santiago PTA, 20 at 11:46 AM

## 2020-07-31 NOTE — PROGRESS NOTES
Patient seen by Dr. Teodora Sabillon. Xray to left foot ordered. Pending results. Therapy and daughter aware. Per MD, left great toe does not need any ointment or betadine. Left great toenail noted with about 75% bruising, patient does not remember injuring it. Ammonia level ordered, noted 64. Message sen to GI at this time for follow up. Electronically signed by Jasmeet Mcrae RN on 7/31/20 at 10:58 AM EDT      Left foot xray negative. Patient seen by GI. No new orders at this time, continue Lactulose.  Electronically signed by Jasmeet Mcrae RN on 7/31/20 at 2:01 PM EDT

## 2020-07-31 NOTE — PROGRESS NOTES
NEUROLOGY INPATIENT PROGRESS NOTE    Patient- Jesusita Mandujano    MRN -  [id number]   Acct # - [de-identified]      - 1935    82 y.o. Subjective: The patient is seen in follow-up. Patient is alert at this time. Patient is alert  She is moving all the extremities  She feels she is doing good in the rehab  Family had no new concerns or  Thiamine being changed to p.o. Thiamine deficiency, folate deficiency, being addressed  Elevated TSH to be watched  B12 was normal  Tremor minimal only    Result Data:  CBC: No results for input(s): WBC, HGB, PLT in the last 72 hours. BMP:  No results for input(s): NA, K, CL, CO2, BUN, CREATININE, GLUCOSE in the last 72 hours. TSH:   No results for input(s): TSH in the last 72 hours. Folic Acid: No results for input(s): FOLATE in the last 72 hours. B12:    Lab Results   Component Value Date    [id number] 352 2020     Vit. D: No components found for: VITAMIND  Lipids: No results for input(s): CHOL, TRIG, HDL, LDLCALC in the last 72 hours. Ammonia:   Recent Labs     20  0755   AMMONIA 64*     LFT: No results for input(s): AST, ALT, ALB, BILITOT, ALKPHOS in the last 72 hours. Urine: No results for input(s): COLORU, PHUR, LABCAST, WBCUA, RBCUA, MUCUS, YEAST, BACTERIA, CLARITYU, SPECGRAV, LEUKOCYTESUR, UROBILINOGEN, Anh Gentle in the last 72 hours. Invalid input(s): NITRATE, GLUCOSEUKETONESUAMORPHOUS     Imaging    Ct Abdomen Pelvis Wo Contrast Additional Contrast? None    Result Date: 2020  CT of the Abdomen and Pelvis without intravenous contrast medium History:  Fall from standing Technical Factors: CT imaging of the abdomen and pelvis were obtained and formatted as 5 mm contiguous axial images from the domes of the diaphragm to the symphysis pubis. Sagittal and coronal reconstructions were also obtained. Oral contrast medium:  None. Intravenous contrast medium:  None.  Comparison:  CT abdomen pelvis, March 10, 2020, 2013. Findings: Liver: Small in size, and nodular contour, unchanged. Bile Ducts:  Normal in caliber. Gallbladder:  Surgically absent. Pancreas:  Hypoplastic, without masses, cysts, ductal dilatation or calcification. Spleen:  Normal in size without masses or calcifications. No splenules. Kidneys:  Right and left kidneys measure 8.0 cm, and 9.1 cm, respectively. 1 mm calcification mid pole left kidney, unchanged from 2013. No right renal calculi. Adrenals:  Normal. Small bowel:  Normal in caliber. Appendix:  Not visualized. Colon:  Normal in caliber. Surgical clips at level of rectum. Peritoneum:  Diffuse free fluid within the abdomen and pelvis, unchanged. No free air. Vessels: Aorta normal in course and caliber. Lymph nodes:  Retroperitoneal:  No enlarged retroperitoneal lymph nodes. Mesenteric:  No enlarged mesenteric lymph nodes. Pelvic: No enlarged pelvic lymph nodes. Ureters: Normal in course and caliber. No calcifications. Bladder: Partially decompressed. Reproductive organs: Uterus surgically absent. Dystrophic calcification, left adnexa, unchanged from . Abdominal Wall: Remote abdominal wall defect repair. Edematous change within abdominal pelvic soft tissues diffusely, stable. Bones:  No bone lesions. No degenerative changes. No post operative changes. Cirrhosis. Diffuse ascites, unchanged. Diffuse edematous change, abdominal and pelvic walls, stable. Small right kidney. Left kidney lower limits of normal. Hypoplastic pancreas. Remote hemorrhoid surgery. Cholecystectomy. Hysterectomy. All CT scans at this facility use dose modulation, iterative reconstruction, and/or weight based dosing when appropriate to reduce radiation dose to as low as reasonably achievable.      Xr Hip Right (2-3 Views)    Result Date: 2020  Patient MRN: [id number] : 1935 Age:  82 years Gender: Female Order Date: 2020 12:00 PM. Exam: XR HIP RIGHT (2-3 VIEWS) Number of Views: 2 Indication:  Fall at home one week ago with persistent right hip pain since that time Comparison: 12/5/2011 radiographs. CT abdomen and pelvis 7/23/2020. Findings: Moderate degenerative changes with joint space loss in the bilateral hips. No acute fracture identified. Vascular calcifications. Impression:  1. No acute fracture identified. 2. Moderate bilateral osteoarthritis of the hips. 3. Vascular calcifications. Ct Head Wo Contrast    Result Date: 7/23/2020  CT Brain Contrast medium:  Not utilized. History:  Fell from standing. Denies anticoagulation/loss of consciousness. Comparison:  CT brain, July 11, 2014 Findings: Extra-axial spaces:  Normal. Intracranial hemorrhage:  None. Ventricular system: Ventricles mildly enlarged. Sulci mildly prominent. Basal Cisterns:  Normal. Cerebral Parenchyma: Physiologic basal ganglia calcification, unchanged. Midline Shift:  None. Cerebellum:  Normal. Paranasal sinuses and mastoid air cells:  Normal. Visualized Orbits: Remote bilateral ocular surgery. Impression: No acute findings. Mild cerebral atrophy. All CT scans at this facility use dose modulation, iterative reconstruction, and/or weight based dosing when appropriate to reduce radiation dose to as low as reasonably achievable. Ct Chest Wo Contrast    Result Date: 7/23/2020  CT of the Chest without intravenous contrast medium History:  Mechanical fall from standing. Technical Factors: CT imaging of the chest was obtained and formatted as 5 mm contiguous axial images from the thoracic inlet through the adrenal glands. Sagittal and coronal reconstruction obtained during postprocessing. Intravenous contrast medium:  None. Comparison:  Chest radiograph, July 22, 2020. High-resolution CT chest, December 30, 2019. Findings: Right lung shows mild dependent atelectatic change posterior right upper lobe, posterior right middle lobe, and posterior right lower lobe. No nodules or masses. No pleural effusion.  Left lung shows dependent subsegmental atelectatic change, left upper lobe. Small peripheral focus of groundglass opacity lateral lower lingula. Dependent subsegmental atelectatic change, small left pleural effusion. No nodules or masses. No hilar, mediastinal, or axillary lymph node enlargement. Cardiac size normal. No pericardial effusion. Calcified mitral annulus. Thoracic aorta normal in course and caliber. Bridging anterior osteophytes mid to lower thoracic spine. No osteoblastic, and no osteolytic lesions. No fracture. Bilateral dependent atelectatic change. Small left effusion. All CT scans at this facility use dose modulation, iterative reconstruction, and/or weight based dosing when appropriate to reduce radiation dose to as low as reasonably achievable. Ct Cervical Spine Wo Contrast    Result Date: 7/23/2020  EXAMINATION: CT CERVICAL SPINE WO CONTRAST   DATE AND TIME:7/22/2020 10:45 PM CLINICAL HISTORY:ACUTE POSTERIOR NECK PAIN  TRAUMA  COMPARISON: NONE TECHNIQUE:Helical scanning was performed from the skull base through the remainder of the cervical spine without intravenous contrast. Sagittal and coronal reformats were obtained. The lack of contrast limits CT sensitivity of the soft tissues. All CT scans at this facility use dose modulation, iterative reconstruction, and/or weight based dosing when appropriate to reduce radiation dose to as low as reasonably achievable. FINDINGS   Fracture:No acute fracture. Alignment:  Normal cervical lordosis. No subluxation. No canal stenosis. Dense atlas:Dens atlas relationship is normal. Soft tissues:Airway patent. No soft tissue mass or adenopathy. Other findings: There is mild  cervical spondylosis with multiple level disc osteophyte changes. NO ACUTE CERVICAL SPINE ABNORMALITY.        Ct Thoracic Spine Wo Contrast    Result Date: 7/23/2020  EXAMINATION: CT THORACIC SPINE WO CONTRAST DATE AND TIME:7/22/2020 10:45 PM CLINICAL HISTORY: SEVERE POSTERIOR THORACIC SPINE PAIN. FALL  COMPARISON: NONE TECHNIQUE:  Axial noncontrast images were performed through the thoracic spine with coronal and sagittal reconstructions. All CT scans at this facility use dose modulation, iterative reconstruction, and/or weight based dosing when appropriate to reduce radiation dose to as low as reasonably achievable. FINDINGS  Alignment/fracture: Normal alignment without subluxation. No evidence of fracture. Perivertebral soft tissues are normal.  Disc spaces: There are multiple levels of disc space narrowing and end plate changes consistent with degenerative change. NEGATIVE CT OF THE THORACIC SPINE. Ct Lumbar Spine Wo Contrast    Result Date: 7/24/2020  EXAMINATION: CT LUMBAR SPINE WO CONTRAST CLINICAL HISTORY: Fall from standing. Back pain. COMPARISON: CT abdomen pelvis 3/10/2020 TECHNIQUE:  Spiral scans with no contrast. Multiplanar 2-D reconstructions. All CT scans at this facility use dose modulation, iterative reconstruction, and/or weight based dosing when appropriate to reduce radiation dose to as low as reasonably achievable. FINDINGS: No acute fractures identified. There is T11 superior endplate Schmorl's node with dystrophic calcification, unchanged from prior imaging study. There is lumbar spondylosis with multilevel intervertebral disc space narrowing, marginal osteophytosis, and facet hypertrophy. There is L4-5 degenerative disc vacuum phenomenon. There is L3-4 and L4-5 disc bulging which does not appear to impinge on the exiting nerve roots. There is mild L4-5 central canal stenosis. There is disc bulging and  degenerative disc vacuum phenomenon at L1-2. The SI joints are symmetrically maintained. There is iliac sclerosis of the SI joints. Ascites is noted within the field-of-view. There are 2 left renal midpole nonobstructing stones, measuring 2 mm and 4 mm. DEGENERATIVE CHANGES. NO ACUTE FRACTURE.     Mri Lumbar Spine Wo Contrast    Result Date: 7/27/2020  EXAMINATION: MRI lumbar spine without contrast CLINICAL HISTORY: Recent fall. Bilateral leg weakness. FINDINGS: Unenhanced scans obtained. T1 and T2-weighted sagittal and axial sequences were acquired. A STIR sagittal sequence are included. Today's study is compared with recent CT dated July 23, 2020. Vertebral bodies are intact and normal in height. Degenerative endplate changes are seen at L4-L5 particularly involving the superior endplate of L5. There is disc desiccation from L1 through L4. There is disc space narrowing at L3-L4 and L4-L5. Conus is  normal in caliber and signal and terminates at T12-L1. T12-L1: Unremarkable. L1-L2: Small focal disc protrusion centrally and to the left of the midline slightly effaces the thecal sac without narrowing the central canal or compressing transiting or exiting nerve roots. Foramina patent. L2-L3: No disc herniation. No central canal or foraminal stenosis. L3-L4: Generalized bulging of the annulus effaces the thecal sac anteriorly without compromising the transiting or exiting nerve roots. No significant central canal or foraminal stenosis. L4-L5: Generalized bulging of the annulus anteriorly and proliferative changes in the facet joints noted more prominent right side than left. There is narrowing of the right L4 nerve root foramen. No significant central canal stenosis and no definite disc herniation. L5-S1: No disc herniation or central canal or foraminal stenosis. Postlaminectomy changes on the right side noted. Prevertebral soft tissues unremarkable     DIFFUSE DEGENERATIVE DISEASE. NO COMPRESSION FRACTURE OR INFILTRATIVE BONY PROCESS. L4 NERVE ROOT FORAMINAL STENOSIS ON THE RIGHT. NO EVIDENCE OF DISC HERNIATION OR CENTRAL CANAL STENOSIS. Xr Chest Portable    Result Date: 7/23/2020  XR CHEST PORTABLE : 7/22/2020 CLINICAL HISTORY: Pain after fall . COMPARISON: High-resolution chest CT 12/30/2019 and two-view chest 11/18/2019.  TECHNIQUE: An upright portable AP radiograph of the chest was obtained. FINDINGS: A shallow inspiration is present without significant infiltrate identified. There is no cardiomegaly, significant pleural effusion, vascular congestion, pneumothorax, or displaced fractures identified. NO EVIDENCE OF ACUTE THORACIC TRAUMA OR ACTIVE CARDIOPULMONARY DISEASE. Ir Us Guided Paracentesis    1. Status post technically successful ultrasound-guided paracentesis. Frederick Sheets is a Female of 82 years age, referred for Ultrasound Guided Paracentesis. PROCEDURE: Survey of the abdomen showed large amount of ascites fluid. After obtaining informed consent, the patient was positioned supine on the sonography table. Using ultrasound, the skin over the left hemiabdomen was locally anesthetized with 1% lidocaine. Following that, a Yueh needle was advanced into the fluid pocket using ultrasound visualization. 5900cc, of clear yellow fluid were aspirated and sent for cytology, and pathology. The needle was removed, and hemostasis was obtained with pressure. A Band-Aid was placed. Post procedure images did not demonstrate hemorrhage at the target site. The patient tolerated the procedure well. The patient left the department in good condition. A radiology nurse was in presence monitoring vital signs, assisting throughout the procedure. Us Guided Paracentesis    Result Date: 7/23/2020  US GUIDED PARACENTESIS : 7/23/2020 CLINICAL HISTORY:  therapeutic paracentesis . COMPARISON: Chest, abdomen and pelvis CTs from earlier 7/23/2020 PROCEDURE: Informed consent was obtained. Limited transabdominal ultrasound was performed and marking done in the usual fashion. Sterile technique and local lidocaine anesthesia was used to place a 5 [employer] [place] Yueh catheter within the abdominal fluid collection. Approximately 6.5 L of low viscosity pale yellow-colored fluid was aspirated without evidence of immediate complication.  A sample was sent for analysis as requested. The patient was monitored in the radiology holding area for approximately hold minutes without event. She was returned to the medical floor in stable condition with the usual instructions. SUCCESSFUL ULTRASOUND-GUIDED PARACENTESIS APPROXIMATELY 6.5 L OF ASCITES. Us Guided Paracentesis    1. Status post technically successful ultrasound-guided paracentesis. Danny Suarez is a Female of 82 years age, referred for Ultrasound Guided Paracentesis. PROCEDURE: Survey of the abdomen showed large amount of ascites fluid. After obtaining informed consent, the patient was positioned supine on the sonography table. Using ultrasound, the skin over the left hemiabdomen was locally anesthetized with 1% lidocaine. Following that, a Yueh needle was advanced into the fluid pocket using ultrasound visualization. 5450cc, of clear yellow fluid were aspirated and sent for cytology, and pathology. The needle was removed, and hemostasis was obtained with pressure. A Band-Aid was placed. Post procedure images did not demonstrate hemorrhage at the target site. The patient tolerated the procedure well. The patient left the department in good condition. A radiology nurse was in presence monitoring vital signs, assisting throughout the procedure. Us Retroperitoneal Limited    Result Date: 7/23/2020  EXAMINATION: US RETROPERITONEAL LIMITED CLINICAL HISTORY: 82year-old with renal insufficiency and extensive abdominal ascites COMPARISONS: Unenhanced abdominal CT 7/23/2020 FINDINGS: Renal sonography was performed. The study is compromised due to patient body habitus and sonographic window. Kidneys by ultrasound are symmetric borderline small in size. Right kidney measures 9.3 x 5.3 x 4.3 cm left kidney measures 9.2 x 4.7 x  4.8 cm. There are a few punctate echogenic foci in the central sinus echocomplex raising the question of specular reflectors.  Bilateral renal echotexture is otherwise unremarkable. No large cortical cysts, solid masses or shadowing calcifications. No ultrasound signs of hydronephrosis. Note is made of upper abdominal ascites     SYMMETRIC KIDNEYS BORDERLINE SMALL IN SIZE. NO ULTRASOUND SIGNS OF HYDRONEPHROSIS. UPPER ABDOMINAL ASCITES        Objective:   /67   Pulse 65   Temp 97.5 °F (36.4 °C) (Oral)   Resp 18   Ht 5' 1\" (1.549 m)   Wt 177 lb 0.7 oz (80.3 kg)   SpO2 97%   BMI 33.45 kg/m²     No intake or output data in the 24 hours ending 07/31/20 1441         Physical Exam:  MUSCULOSKELETAL:  There is no redness, warmth, or swelling of the joints. Tone is mildly increased. Patient has had full range of motion in all the limbs.         NEUROLOGIC:    Speech Expression: appropriate quality, quantity and organization of sentences  Speech Comprehension: intact, patient has a mild hearing loss    Mental Status Exam: alert, oriented to person, place, and time    Motor Strength:   right upper extremity:   5/5  left upper extremity:  5/5  right lower extremity: 5/5    left lower extremity:  5/5      Medications:     lactulose  20 g Oral Daily    nystatin, stomahesive in petrolatum   Topical 3 times per day    thiamine  100 mg Oral BID    pregabalin  25 mg Oral BID    pantoprazole  20 mg Oral QAM AC    levothyroxine  25 mcg Oral Daily    folic acid  5 mg Oral Daily    topiramate  50 mg Oral Daily    traZODone  150 mg Oral Nightly    budesonide-formoterol  2 puff Inhalation BID    carbidopa-levodopa  1 tablet Oral TID    furosemide  40 mg Oral Daily    spironolactone  50 mg Oral Daily                 Principal Problem:    Abnormality of gait and mobility dt exacerbation of Parkinson's disease status post ascites and liver failure acute rehab admission 7/25/2020  Active Problems:    DM2 (diabetes mellitus, type 2) (HCC)    Fibromyalgia    Depression    DISH (diffuse idiopathic skeletal hyperostosis)    Hyperlipidemia    Hypertension    Lumbago without sciatica    Lumbar spondylosis    LVH (left ventricular hypertrophy)    Generalized muscle ache    Osteoarthritis of multiple joints    Parkinson's disease (HCC)    Diabetic polyneuropathy (HCC)    Rotator cuff (capsule) sprain    Shoulder impingement    Thrombocytopenia (HCC)    Vitamin D deficiency    Bilateral carotid artery stenosis    Secondary esophageal varices without bleeding (HCC)    Cough variant asthma    Morbidly obese (HCC)    Decompensated hepatic cirrhosis (HCC)    Weakness generalized    Stasis dermatitis    Gait abnormality    Moderate malnutrition (HCC)    Impaired mobility    Glaucoma    Diabetic gastropathy (HCC)    [place] (hard of hearing)    Acute renal failure (ARF) (HCC)    History of lumbar laminectomy    Obesity (BMI 30-39. 9)    Portal hypertension (HCC)    Hypothyroidism    Muscle spasm of both lower legs    Muscle rigidity  Resolved Problems:    * No resolved hospital problems.  *      Patient Active Problem List   Diagnosis    DM2 (diabetes mellitus, type 2) ([address]    Fibromyalgia    Abdominal pain    Bursitis, hip    Bilateral hand pain    Actinic keratosis    Chondrocalcinosis of hand    Carotid stenosis    Chondrodermatitis nodularis chronica helicis    Chronic frontal sinusitis    Controlled type 2 diabetes mellitus without complication, without long-term current use of insulin (HCC)    Depression    DISH (diffuse idiopathic skeletal hyperostosis)    Fitting and adjustment of orthopedic device    Closed fracture of part of upper end of humerus    Fx phalanx, foot-closed    History of trabeculectomy    Hyperlipidemia    Hypertension    Intracranial arachnoid cyst    Joint replaced by other means    Lens replaced by other means    Liver cirrhosis ([address]    Lumbago without sciatica    Lumbar spondylosis    LVH (left ventricular hypertrophy)    Generalized muscle ache    Osteoarthritis of shoulder    Osteoarthritis of multiple joints    Parkinson's disease ([address]  POAG (primary open-angle glaucoma)    Diabetic polyneuropathy (HCC)    Pseudogout of hand    Rotator cuff (capsule) sprain    S/P left unicompartmental knee replacement    Knee joint replacement by other means    Sclerosis of the skin    Seborrheic keratosis    Secondary osteoarthritis of multiple sites    Shoulder impingement    Sprain and strain of unspecified site of shoulder and upper arm    Synovitis of hand    Thrombocytopenia (HCC)    Vitamin D deficiency    Chest pain    RANDOLPH (dyspnea on exertion)    Bilateral carotid artery stenosis    Iron deficiency anemia due to chronic blood loss    Hemorrhoids    Secondary esophageal varices without bleeding (HCC)    Cough variant asthma    Morbidly obese (HCC)    Decompensated hepatic cirrhosis ([address]    ENMA (acute kidney injury) ([address]    Pancytopenia ([address]    Fall    Weakness generalized    Malnutrition ([address]    Cirrhosis of liver with ascites ([address]    Ataxic gait    History of myocardial infarction    Rectal prolapse    Stasis dermatitis    Gait abnormality    Moderate malnutrition (HCC)    Impaired mobility    Abnormality of gait and mobility dt exacerbation of Parkinson's disease status post ascites and liver failure acute rehab admission 7/25/2020    Glaucoma    Diabetic gastropathy ([address]    [place] (hard of hearing)    Acute renal failure (ARF) ([address]    History of lumbar laminectomy    Obesity (BMI 30-39. 9)    Portal hypertension (HCC)    Hypothyroidism    Muscle spasm of both lower legs    Muscle rigidity       Assessment/Plan    Leg buckling raises the concern for lumbar radiculopathy. Suspect mild cognitive changes are derived from metabolic and nutritional causes. Tumor is a minimal  Mild bradykinesia  No problem with the Sinemet, blood pressure is somewhat low, I shall not increase the dose of Sinemet     Plan:      -TSH elevated, to be watched  Folate deficiency, and thiamine deficiency. - Will add high dose thiamine for wernicke's encephalopathy (500 mg IV TID x 3 days)  - MRI lumbar spine shows a no significant spinal stenosis, has a right foraminal narrowing L4-5  - continue sinement  - continue rehab activities  - Anemia treatment ongoing  - will follow while admitted          Bruna Arriaga MD, 7/31/2020 2:41 PM

## 2020-07-31 NOTE — PROGRESS NOTES
Subjective: The patient complains of severe  acute on chronic confusion rigidity ascites malaise partially relieved by recent paracentesis, PT, OT, speech and language pathology, rest and exacerbated by reaccumulation of ascites. I am concerned about patients drop of H&H and abdominal pain. Lynn to recent nursing note, \"Patient assessment was completed earlier this shift. The patient denies any nausea or vomiting. She is alert and oriented to person and place. She has some stress incontinence occasionally. She has been continent of urine so far tonight. She denies constipation as her last BM was 7/30/2020. \"    I again reviewed her potential increased confusion with Lyrica with the pain management physician and her family at length. They still want to trial it at a low dose I will defer to them however we will monitor closely for increased confusion. Monitoring closely for recurrent ascites she may need drains in a day. ROS x10: The patient also complains of severely impaired mobility and activities of daily living. Otherwise no new problems with vision, hearing, nose, mouth, throat, dermal, cardiovascular, GI, , pulmonary, musculoskeletal, psychiatric or neurological. See Rehab H&P on Rehab chart dated . Vital signs:  /68   Pulse 73   Temp 98 °F (36.7 °C) (Oral)   Resp 16   Ht 5' 1\" (1.549 m)   Wt 177 lb 0.7 oz (80.3 kg)   SpO2 97%   BMI 33.45 kg/m²   I/O:   PO/Intake:  fair PO intake, no problems observed or reported. Bowel/Bladder:  continent, no problems noted. General:  Patient is well developed, adequately nourished, non-obese and     well kempt. HEENT:    PERRLA, hearing intact to loud voice, external inspection of ear     and nose benign. Inspection of lips, tongue and gums benign  Musculoskeletal: No significant change in strength or tone. All joints stable.       Inspection and palpation of digits and nails show no clubbing,       cyanosis or inflammatory conditions. Neuro/Psychiatric: Affect: flat but pleasant. Alert and oriented to  Self . No significant change in deep tendon reflexes or     sensation  Lungs:  Diminished CTA-B. Respiration effort is normal at rest.     Heart:   S1 = S2, RRR. No loud murmurs. Abdomen:  Firm with tenderness due to ascites, non-tender, no enlargement of liver or spleen. Extremities:  No significant lower extremity edema or tenderness. Skin:   Intact to general survey, no visualized or palpated problems. Rehabilitation:  Physical therapy: FIMS:  Bed Mobility: Scooting: Supervision    Transfers: Sit to Stand: Supervision  Stand to sit: Supervision  Bed to Chair: Stand by assistance, Ambulation 1  Surface: carpet  Device: Rollator  Assistance: Stand by assistance  Quality of Gait: slow cayden with decreased step length. Distances limited by fatigue. Gait Deviations: Slow Cayden  Distance: 100'  Comments: Slow/steady pace, standing rest break needed between walking distance D/T SOB, inc time needed for turns,      FIMS:  ,  ,      Occupational therapy: FIMS:   ,  , Assessment: Pt demonstrates decreased ability to sequence during ADL tasks this AM. Pt continues to benefit from OT services to maximize independence and safety during ADLs. Speech therapy: FIMS:        Lab/X-ray studies reviewed, analyzed and discussed with patient and staff:   No results found for this or any previous visit (from the past 24 hour(s)). Ct Abdomen Pelvis  7/23/2020    Liver: Small in size, and nodular contour, unchanged. Bile Ducts:  Normal in caliber. Gallbladder:  Surgically absent. Pancreas:  Hypoplastic, without masses, cysts, ductal dilatation or calcification. Spleen:  Normal in size without masses or calcifications. No splenules. Kidneys:  Right and left kidneys measure 8.0 cm, and 9.1 cm, respectively. 1 mm calcification mid pole left kidney, unchanged from 2013. No right renal calculi.  Adrenals:  Normal. Small bowel:  Normal in caliber. Appendix:  Not visualized. Colon:  Normal in caliber. Surgical clips at level of rectum. Peritoneum:  Diffuse free fluid within the abdomen and pelvis, unchanged. No free air. Vessels: Aorta normal in course and caliber. Lymph nodes:  Retroperitoneal:  No enlarged retroperitoneal lymph nodes. Mesenteric:  No enlarged mesenteric lymph nodes. Pelvic: No enlarged pelvic lymph nodes. Ureters: Normal in course and caliber. No calcifications. Bladder: Partially decompressed. Reproductive organs: Uterus surgically absent. Dystrophic calcification, left adnexa, unchanged from March, 2013. Abdominal Wall: Remote abdominal wall defect repair. Edematous change within abdominal pelvic soft tissues diffusely, stable. Bones:  No bone lesions. No degenerative changes. No post operative changes. Cirrhosis. Diffuse ascites, unchanged. Diffuse edematous change, abdominal and pelvic walls, stable. Small right kidney. Left kidney lower limits of normal. Hypoplastic pancreas. Remote hemorrhoid surgery. Cholecystectomy. Hysterectomy. Xr Hip Right 7/25/2020    Moderate degenerative changes with joint space loss in the bilateral hips. No acute fracture identified. Vascular calcifications. Impression:  1. No acute fracture identified. 2. Moderate bilateral osteoarthritis of the hips. 3. Vascular calcifications. Ct Head   7/23/2020: Extra-axial spaces:  Normal. Intracranial hemorrhage:  None. Ventricular system: Ventricles mildly enlarged. Sulci mildly prominent. Basal Cisterns:  Normal. Cerebral Parenchyma: Physiologic basal ganglia calcification, unchanged. Midline Shift:  None. Cerebellum:  Normal. Paranasal sinuses and mastoid air cells:  Normal. Visualized Orbits: Remote bilateral ocular surgery. Impression: No acute findings. Mild cerebral atrophy.           Ct Chest  : 7/23/2020    Right lung shows mild dependent atelectatic change posterior right upper lobe, posterior right middle lobe, and posterior right lower lobe. No nodules or masses. No pleural effusion. Left lung shows dependent subsegmental atelectatic change, left upper lobe. Small peripheral focus of groundglass opacity lateral lower lingula. Dependent subsegmental atelectatic change, small left pleural effusion. No nodules or masses. No hilar, mediastinal, or axillary lymph node enlargement. Cardiac size normal. No pericardial effusion. Calcified mitral annulus. Thoracic aorta normal in course and caliber. Bridging anterior osteophytes mid to lower thoracic spine. No osteoblastic, and no osteolytic lesions. No fracture. Bilateral dependent atelectatic change. Small left effusion. Ct Cervical Spine   7/23/2020    Fracture:No acute fracture. Alignment:  Normal cervical lordosis. No subluxation. No canal stenosis. Dense atlas:Dens atlas relationship is normal. Soft tissues:Airway patent. No soft tissue mass or adenopathy. Other findings: There is mild  cervical spondylosis with multiple level disc osteophyte changes. NO ACUTE CERVICAL SPINE ABNORMALITY. Ct Thoracic Spine  7/23/2020   Alignment/fracture: Normal alignment without subluxation. No evidence of fracture. Perivertebral soft tissues are normal.  Disc spaces: There are multiple levels of disc space narrowing and end plate changes consistent with degenerative change. NEGATIVE CT OF THE THORACIC SPINE. Ct Lumbar Spine 7/24/2020   : No acute fractures identified. There is T11 superior endplate Schmorl's node with dystrophic calcification, unchanged from prior imaging study. There is lumbar spondylosis with multilevel intervertebral disc space narrowing, marginal osteophytosis, and facet hypertrophy. There is L4-5 degenerative disc vacuum phenomenon. There is L3-4 and L4-5 disc bulging which does not appear to impinge on the exiting nerve roots. There is mild L4-5 central canal stenosis.  There is disc bulging and  degenerative disc vacuum phenomenon at L1-2. The SI joints are symmetrically maintained. There is iliac sclerosis of the SI joints. Ascites is noted within the field-of-view. There are 2 left renal midpole nonobstructing stones, measuring 2 mm and 4 mm. DEGENERATIVE CHANGES. NO ACUTE FRACTURE. Xr Chest   7/23/2020  NO EVIDENCE OF ACUTE THORACIC TRAUMA OR ACTIVE CARDIOPULMONARY DISEASE. Us Guided  : 7/23/2020  SUCCESSFUL ULTRASOUND-GUIDED PARACENTESIS APPROXIMATELY 6.5 L OF ASCITES. Us Retroperitoneal   7/23/2020    Renal sonography was performed. The study is compromised due to patient body habitus and sonographic window. Kidneys by ultrasound are symmetric borderline small in size. Right kidney measures 9.3 x 5.3 x 4.3 cm left kidney measures 9.2 x 4.7 x  4.8 cm. There are a few punctate echogenic foci in the central sinus echocomplex raising the question of specular reflectors. Bilateral renal echotexture is otherwise unremarkable. No large cortical cysts, solid masses or shadowing calcifications. No ultrasound signs of hydronephrosis. Note is made of upper abdominal ascites     SYMMETRIC KIDNEYS BORDERLINE SMALL IN SIZE. NO ULTRASOUND SIGNS OF HYDRONEPHROSIS. UPPER ABDOMINAL ASCITES       Previous extensive, complex labs, notes and diagnostics reviewed and analyzed. ALLERGIES:    Allergies as of 07/25/2020 - Review Complete 07/25/2020   Allergen Reaction Noted    Adhesive tape  11/22/2004    Amoxicillin-pot clavulanate  03/07/2013    Bactrim [sulfamethoxazole-trimethoprim]  03/07/2013    Clarithromycin  06/23/2015    Other  08/30/2004    Sulfa antibiotics  08/30/2004      (please also verify by checking MAR)       I reviewed her [facility] prescription monitoring service data sheets in hopes of eliminating polypharmacy and weaning to the lowest effective dose of pain medications and eliminating the concomitant use of benzodiazepines. I see no medications of concern.   I see no habits of B12 vitamin D and CoQ10 continue to monitor I&Os, calorie counts prn, dietary consult prn. Lactulose for high ammonia levels. 6. Acute episodic insomnia with situational adjustment disorder:  prn Ambien, monitor for day time sedation. 7. Falls risk elevated:  patient to use call light to get nursing assistance to get up, bed and chair alarm. 8. Elevated DVT risk: progressive activities in PT, continue prophylaxis DAWN hose, elevation and no thinners due to liver failure. 9. Complex discharge planning: Discharge 8/8/2020 home alone with help from her daughters who live locally. Weekly team meeting every Monday to assess progress towards goals, discuss and address social, psychological and medical comorbidities and to address difficulties they may be having progressing in therapy. Patient and family education is in progress. The patient is to follow-up with their family physician after discharge. Complex Active General Medical Issues that complicate care Assess & Plan:    1. Whole body pain as well as cervical thoracic and low back pain due to fibromyalgia,   Lumbago without sciatica,   Rotator cuff (capsule) deficits, Shoulder impingement-use lowest effective dose of opiate medication to include Roxicodone and Ultram, titrate Desyrel, add Lidoderm BenGay heat massage-titrate Topamax and avoid use of Tylenol due to liver disease as well as Lyrica due to intolerance  2. Depression and anxiety-emotional support given daily add recreational therapy and rehabilitation psychology when available and support group when available  3. LVH (left ventricular hypertrophy), coronary artery disease-vital signs to shift dose and titrate cardiac locations consult hospitalist for backup medical  4. Parkinson's disease -consult neurology-who have not addressed this issue avoid sedatives such as Lyrica which she does not tolerate well titrate Sinemet. I will consult pain management regarding the use of Lyrica.   5. Thrombocytopenia with severe anemia and recent blood transfusion-recheck CBC add vitamin B12 vitamin D  6. Vitamin D deficiency-add daily vitamin D and recheck as an outpatient  7. Secondary esophageal varices without bleeding-monitor stools for blood avoid strong blood thinners if possible  8. Morbidly obese but with  Moderate malnutrition -add high-protein low-carb supplementation if possible however because of her her recent liver disease she may not be able to process proteins very well. 9.   Stasis dermatitis-protect skin add ET mix to the lower extremities add co-Q10  10. Severe progressive and recurrent ascites of the liver-eliminate toxic medications recheck as an outpatient monitor ammonia level-reconsult interventional radiology for repeat paracentesis. 11. Hepatic encephalopathy with Elavil elevated ammonia level-titrate lactulose recheck ammonia-consult GI to discuss this with the family. Pharmacy is now aware and adjusting medications. 12. Severe hypothyroidism-consult endocrinology titrate Synthroid  13. Polypharmacy in the elderly-consult pain management agree with avoiding use of Lyrica-medications adjusted as well patient and family made aware of these concerns.              Ana Escamilla D.O., PM&R     Attending    [address]

## 2020-07-31 NOTE — PLAN OF CARE
Problem: Falls - Risk of:  Goal: Will remain free from falls  Description: Will remain free from falls  Outcome: Ongoing  Goal: Absence of physical injury  Description: Absence of physical injury  Outcome: Ongoing     Problem: Skin Integrity:  Goal: Will show no infection signs and symptoms  Description: Will show no infection signs and symptoms  Outcome: Ongoing  Goal: Absence of new skin breakdown  Description: Absence of new skin breakdown  Outcome: Ongoing     Problem: Infection:  Goal: Will remain free from infection  Description: Will remain free from infection  Outcome: Ongoing     Problem: Safety:  Goal: Free from accidental physical injury  Description: Free from accidental physical injury  Outcome: Ongoing  Goal: Free from intentional harm  Description: Free from intentional harm  Outcome: Ongoing     Problem: Daily Care:  Goal: Daily care needs are met  Description: Daily care needs are met  Outcome: Ongoing     Problem: Pain:  Goal: Patient's pain/discomfort is manageable  Description: Patient's pain/discomfort is manageable  Outcome: Ongoing  Goal: Pain level will decrease  Description: Pain level will decrease  Outcome: Ongoing  Goal: Control of acute pain  Description: Control of acute pain  Outcome: Ongoing  Goal: Control of chronic pain  Description: Control of chronic pain  Outcome: Ongoing     Problem: Skin Integrity:  Goal: Skin integrity will stabilize  Description: Skin integrity will stabilize  Outcome: Ongoing     Problem: Discharge Planning:  Goal: Patients continuum of care needs are met  Description: Patients continuum of care needs are met  Outcome: Ongoing     Problem: IP COMMUNICATION/DYSARTHRIA  Goal: LTG - patient will improve expressive language skills to allow for communication of wants and needs in daily activities  Outcome: Ongoing     Problem: IP SWALLOWING  Goal: LTG - patient will tolerate the least restrictive diet consistency to allow for safe consumption of daily meals  Outcome: Ongoing

## 2020-07-31 NOTE — PROGRESS NOTES
Occupational Therapy  Facility/Department: [facility]  Daily Treatment Note  NAME: [place]  : 1935  MRN: [id number]    Date of Service: 2020    Discharge Recommendations:  Continue to assess pending progress  OT Equipment Recommendations  Other: Continue to assess    Assessment   Performance deficits / Impairments: Decreased functional mobility ; Decreased ADL status; Decreased balance;Decreased endurance;Decreased high-level IADLs;Decreased fine motor control;Decreased coordination;Decreased strength  Assessment: Pt demonstrated fair problem solving this date requiring cues for simple puzzle. Pt continues to benefit from OT services to maximize independence and safety with ADLs. Activity Tolerance  Activity Tolerance: Patient Tolerated treatment well         Patient Diagnosis(es): There were no encounter diagnoses. has a past medical history of Asthma, Fibromyalgia, Hemorrhoid, Hyperlipidemia, Hypertension, Parkinson's disease ([facility] [address] and Type II or unspecified type diabetes mellitus without mention of complication, not stated as uncontrolled. has a past surgical history that includes Hysterectomy; Cholecystectomy; laminectomy; eye surgery; joint replacement (Right); hernia repair (2002); hernia repair; Hemorrhoid surgery; Upper gastrointestinal endoscopy (N/A, 2020); Paracentesis (Left, 2020); Paracentesis (Left, 06/10/2020); Paracentesis (Left, 2020); and Paracentesis (Left, 2020).     Restrictions  Restrictions/Precautions  Restrictions/Precautions: Fall Risk  Required Braces or Orthoses?: No  Subjective   General  Chart Reviewed: Yes  Patient assessed for rehabilitation services?: Yes  Response to previous treatment: Patient with no complaints from previous session  Family / Caregiver Present: Yes(Daughter arrived partway through session)  Referring Practitioner: Dr Felicita Bernal  Diagnosis: Imp Mob and ADLs 2° Exac of Parkinsons  Subjective  Subjective: Pt sitting

## 2020-07-31 NOTE — PROGRESS NOTES
Currently in Pain: Yes     Objective\" Pt scheduled for a full ADL session this morning, however, she declined bathing reporting that her daughter gave her a shower last night. Patient's daughter confirmed that she did shower last night. ADL  Grooming: Setup (To perform hair care)  UE Dressing: Setup (To don t-shirt)  LE Dressing: Stand by assistance (To don pants. Increased time and effort)  Toileting: Stand by assistance     Toilet Transfers  Toilet - Technique: Stand pivot  Equipment Used: Grab bars  Toilet Transfer: Contact guard assistance     Transfers  Sit to stand: Stand by assistance  Stand to sit: Stand by assistance  Transfer Comments: Increased time and effort     Cognition  Overall Cognitive Status: Exceptions  Arousal/Alertness: Delayed responses to stimuli  Following Commands: Follows one step commands with repetition; Follows multistep commands with repitition  Attention Span: Appears intact  Safety Judgement: Decreased awareness of need for safety  Problem Solving: Assistance required to implement solutions;Assistance required to generate solutions  Insights: Fully aware of deficits  Initiation: Requires cues for some  Sequencing: Requires cues for some  Cognition Comment: Comprehension: Sup., Expression: Sup., Social Interaction: MI., Problem Solving: Min A., Memory: Sup. Patient's daughter in room venting to therapist about visitation hours. Patient's daughter reports being unhappy about morning therapies interrupting visitation hours and mentions wanting the patient to have therapies in her room. Discussed daughter's request with  Christofer Simmons.      Plan  Plan  Times per week: 5-7 times per day  Plan weeks: 1.5 weeks  Current Treatment Recommendations: Strengthening, Balance Training, Functional Mobility Training, Endurance Training, Safety Education & Training, Patient/Caregiver Education & Training, Self-Care / ADL, Equipment Evaluation, Education, & procurement  Plan Comment: Continue OT per POC    Goals  Patient Goals   Patient goals : \"To get stronger\"    Therapy Time   Individual Concurrent Group Co-treatment   Time In 830         Time Out 0915         Minutes 39         Session ended early d/t pt going for an x-ray.  (-15 minutes)     ADL trainin minutes    Electronically signed by SOCORRO Odell on 20 at 10:45 AM EDT    SOCORRO Odell

## 2020-07-31 NOTE — PROGRESS NOTES
Gastroenterology Progress Note    Gloria  is a 82 y.o. female patient. Hospitalization Day:6    Chief C/O: cirrhosis    SUBJECTIVE: Patient was seen and examined on the rehabilitation floor, she was previously seen during this hospitalization for a history of decompensated cirrhosis patient is known to our service she sees Dr. Nataliia López as an outpatient, GI was asked to reevaluate her ascites and concern for hepatic encephalopathy. The last time I saw this patient was on , she is alert and oriented, was able to recite my name and the fact that I work with GI and Dr Nataliia López. since that time she is undergone 1 additional paracentesis and had greater than 5 L removed, she has no appreciated ascites at this time, no hematemesis, melena, or hematochezia and has been tolerating a regular diet without difficulty. ROS:  Gastrointestinal ROS: , no change in bowel habits, or black or bloody stools    Physical    VITALS:  /67   Pulse 65   Temp 97.5 °F (36.4 °C) (Oral)   Resp 18   Ht 5' 1\" (1.549 m)   Wt 177 lb 0.7 oz (80.3 kg)   SpO2 97%   BMI 33.45 kg/m²   TEMPERATURE:  Current - Temp: 97.5 °F (36.4 °C); Max - Temp  Av.8 °F (36.6 °C)  Min: 97.5 °F (36.4 °C)  Max: 98 °F (36.7 °C)    General:  Alert and oriented,  No apparent distress  Skin- without jaundice  Eyes: anicteric sclera  Cardiac: RRR, Nl s1s2, without murmurs  Lungs CTA Bilaterally, normal effort  Abdomen soft, ND, RUQ tenderness, no HSM, Bowel sounds normal  Ext: without edema  Neuro: no asterixis     Data    Data Review:    No results for input(s): WBC, HGB, HCT, MCV, PLT in the last 72 hours. No results for input(s): NA, K, CL, CO2, PHOS, BUN, CREATININE in the last 72 hours. Invalid input(s): CA  No results for input(s): AST, ALT, ALB, BILIDIR, BILITOT, ALKPHOS in the last 72 hours. No results for input(s): LIPASE, AMYLASE in the last 72 hours.   No results for input(s): PROTIME, INR in the last 72 hours.        ASSESSMENT:  82-year-old female with history of decompensated cirrhosis, with refractory ascites and EV, recent paracentesis 5.2L ascitic fluid removed, no SBP noted. Recent EGD 2/20 noted grade III EV with EVL and portal HTN gastropathy, hgb 8.4,  no overt GIB noted. PLAN :  1-  Decompensated Cirrhosis:  MELD-Na Score 15 base on most recent blood work  Estimated 90-Day Mortality 6%  2 - No appreciated Ascites:   Restarted on  dual diuretics, tolerating well  Recent Paracentesis  for 5.2 L removed, no appreciated ascites on exam. Ongoing evaluation for therapeutic paracentesis, may give Albumin (6 to 8 g/L of fluid removed) should  be administered ( if removed more than 5 L)  \" No added salt diet\"/ 2 gm NA diet   - No Hx of SBP noted  3- EGD for Variceal surveillance:    Had EGD S/P banding on 2/20 noted grade III EV post EVL and portal htn gastropathy  Recent Hgb 8.4 no overt GIB  Will need repeat EGD and banding as outpatient  4-  No Hepatic encephalopathy: ongoing evaluation for Lactulose - will titrate if needed to 2-3 soft bm's per day. Thank you for allowing me to participate in the care of your patient. Please feel free to contact me with any concerns.     SHARI Lisa - CNP

## 2020-08-01 NOTE — PROGRESS NOTES
Physical Therapy Rehab Treatment Note  Facility/Department: [facility]  Room: R251/R251-01       NAME: Mario Alberto Rothman  : 1935 (82 y.o.)  MRN: [id number]  CODE STATUS: Full Code    Date of Service: 2020  Chart Reviewed: Yes  Family / Caregiver Present: No  General Comment  Comments: reports having visit from sister in law and niece    Restrictions:  Restrictions/Precautions: (No orders from podiatry in chart for wb status to left foot)       SUBJECTIVE: Subjective: pt states she doesn't have pain  Pain Screening  Patient Currently in Pain: Denies       Post Treatment Pain Screenin  Pain Assessment  Pain Assessment: 0-10  Pain Level: 0    OBJECTIVE:   Overall Orientation Status: Within Functional Limits(slow to respond)   pt initially didn't seem to recall having foot xray. Exercises  Hip Flexion: x 20  Knee Short Arc Quad: x 20  Physio/Swiss Ball: longseated lateral motions and knee flexion   Stretches to bilateral lower extremities while seated. ASSESSMENT/COMMENTS:still no notes in chart from podiatry about right foot wb. Madiha RN to perfect serve Dr. Tika Olsen to see if we can get clarification. Treated pt at [facility] to left this session. seated therex performed for safety. PLAN OF CARE/Safety: ongoing.          Therapy Time:   Individual   Time In 1030   Time Out 1100   Minutes 30     Minutes:30      Transfer/Bed mobility trainin      Gait trainin      Neuro re education:0     Therapeutic ex:30      Roopa Loza PTA, 20 at 10:55 AM

## 2020-08-01 NOTE — PROGRESS NOTES
PODIATRIC MEDICINE AND SURGERY  CONSULT PROGRESS NOTE            Follow up of: Left hallux hematoma   Staff Doctor:  Dr. Sonya Hughes:  82 y. o. female with PMH significant for parkinsons disease, mid and low back arthritis, HLD, HTN  for who podiatry was consulted for l hallux nail pain with hematoma likely secondary to trauma. No concern for acute fracture, no concern for infection. Xrays negative for fracture    PLAN AND RECOMMENDATIONS[de-identified]  Patient's case to be discussed with staff, Dr. Fuentes, who will provide final recommendations going forward  Wound care: Betadine to L great toenail  WBAT  X-rays of the left foot negative for fracture  Podiatry will sign off    Interval HPI: This 82y.o.  year old patient was seen today for Left hallux hematoma. Patient states that she is feeling better and denies any trauma. Patient denies nausea, vomiting, diarrhea, fevers, chills, chest pain, shortness of breath, head ache, or calf pain. No other pedal complaints. No current facility-administered medications on file prior to encounter.       Current Outpatient Medications on File Prior to Encounter   Medication Sig Dispense Refill    ibuprofen (ADVIL;MOTRIN) 200 MG tablet Take 200 mg by mouth every 6 hours as needed for Pain (Headache)      topiramate (TOPAMAX) 50 MG tablet TAKE 1 TABLET BY MOUTH DAILY 90 tablet 0    irbesartan (AVAPRO) 75 MG tablet TAKE 1 TABLET BY MOUTH EVERY DAY 90 tablet 0    sertraline (ZOLOFT) 50 MG tablet TAKE 1 TABLET BY MOUTH EVERY DAY 90 tablet 0    traZODone (DESYREL) 100 MG tablet TAKE 2 TABLETS BY MOUTH EVERY DAY AT BEDTIME 60 tablet 1    omeprazole (PRILOSEC) 40 MG delayed release capsule TAKE 1 CAPSULE BY MOUTH EVERY DAY 90 capsule 0    lovastatin (MEVACOR) 10 MG tablet TAKE 1 TABLET BY MOUTH EVERY NIGHT 30 tablet 3    albuterol sulfate HFA (PROAIR HFA) 108 (90 Base) MCG/ACT inhaler Inhale 2 puffs into the lungs every 6 hours as needed for Wheezing      furosemide (LASIX) 40 MG tablet Take 1 tablet by mouth daily 60 tablet 3    spironolactone (ALDACTONE) 100 MG tablet Take 1 tablet by mouth daily 30 tablet 0    clotrimazole-betamethasone (LOTRISONE) 1-0.05 % cream Use two times daily to affected area for 14 days then stop 30 g 0    sucralfate (CARAFATE) 1 GM tablet TAKE 1 TABLET BY MOUTH FOUR TIMES DAILY 360 tablet 1    ACCU-CHEK SOFTCLIX LANCETS MISC Checks 1 time daily. NIDDM--ICD-10 E11.9 100 each 3    blood glucose test strips (ACCU-CHEK ACTIVE STRIPS) strip Checks 1 time daily. NIDDM--ICD-10 E11.9 100 each 3    aspirin 81 MG tablet Take 1 tablet by mouth daily With Food 30 tablet 3    Cinnamon 500 MG TABS Take by mouth 3 times daily      carbidopa-levodopa (SINEMET)  MG per tablet Take 1 tablet by mouth 1 po bid per dr robb goldstein neurologist      latanoprost (XALATAN) 0.005 % ophthalmic solution Use 1 Drop in both eyes daily at bedtime.  tiZANidine (ZANAFLEX) 2 MG tablet Take 2 mg by mouth Take 2 mg by mouth bid prn      pregabalin (LYRICA) 75 MG capsule Take 1 capsule by mouth 3 times daily for 10 days. Take 75 mg by mouth.  30 capsule 0    furosemide (LASIX) 20 MG tablet Take 1 tablet by mouth daily for 5 days 5 tablet 0         LABS:   Lab Results   Component Value Date     07/25/2020    K 3.5 07/25/2020     07/25/2020    CO2 19 07/25/2020    BUN 21 07/25/2020    CREATININE 1.01 07/25/2020    GLUCOSE 124 07/25/2020    GLUCOSE 248 04/06/2012    CALCIUM 8.3 07/25/2020     Lab Results   Component Value Date    WBC 4.6 (L) 07/24/2020    HGB 8.4 (L) 07/25/2020    HCT 25.8 (L) 07/25/2020    .9 (H) 07/24/2020    PLT 85 (L) 07/24/2020     Lab Results   Component Value Date    LABALBU 2.9 (L) 07/25/2020     Lab Results   Component Value Date    SEDRATE 35 (H) 03/10/2020     Lab Results   Component Value Date    CRP 21.7 (H) 03/10/2020     Lab Results   Component Value Date    LABA1C 4.5 (L) 03/16/2020           OBJECTIVE:  /63 Pulse 55   Temp 98.1 °F (36.7 °C) (Oral)   Resp 18   Ht 6' 1\" (1.854 m)   Wt 220 lb (99.8 kg)   SpO2 97%   BMI 29.03 kg/m²     Patient is alert and oriented to person, place, and time     Vascular:   Palpable Dorsalis Pedis and Palpable Posterior Tibial Pulses B/L   Capillary Fill time < 3 seconds to B/L digits  Skin temperature warm to warm tibial tuberosity to the digits B/L  Hair growth absent to digits  no edema  no varicosities      Neurological:   Sensation to light touch intact B/L  Protective sensation via monofilament testing impaired B/L     Musculoskeletal/Orthopaedic:   Structural Deformities: hammertoe deformity 2nd digit b/l  5/5 muscle strength Dorsiflexion, Plantarflexion, Inversion, Eversion B/L   tenderness on palpation of  L hallucal nail. No pain with palpation of the remainder of the hallux     Dermatological:   Skin appears well hydrated and supple with good temperature, texture, turgor. No hyperkeratotic lesions noted  Nails 1-5 B/L appear WNL with the exception of the L hallucal nail which was darkened secondary to hematoma. Nail is well adhered. Some mild serous drainage from beneath the nail is noted  Interspaces 1-4 B/L are clear and without debris  No open lesions noted B/L       IMAGING:   XR FOOT L    FINDINGS:    Three views of the left foot are submitted. Diffuse generalized osteopenia    No acute fractures. No dislocations.     A calcaneal enthesophyte and calcaneal spur is noted              Saida Fuentes DPM PGY-2  Podiatric Surgery Resident  Podiatry On Call Pager: [phone]  08/01/20  1:38 PM

## 2020-08-01 NOTE — PROGRESS NOTES
Subjective: The patient complains of severe  acute on chronic confusion rigidity ascites malaise partially relieved by recent paracentesis, PT, OT, speech and language pathology, rest and exacerbated by reaccumulation of ascites. I am concerned about patients drop of H&H and abdominal pain. ROS x10: The patient also complains of severely impaired mobility and activities of daily living. Otherwise no new problems with vision, hearing, nose, mouth, throat, dermal, cardiovascular, GI, , pulmonary, musculoskeletal, psychiatric or neurological. See Rehab H&P on Rehab chart dated . Vital signs:  /69   Pulse 78   Temp 98 °F (36.7 °C) (Oral)   Resp 17   Ht 5' 1\" (1.549 m)   Wt 177 lb 0.7 oz (80.3 kg)   SpO2 94%   BMI 33.45 kg/m²   I/O:   PO/Intake:  fair PO intake, no problems observed or reported. Bowel/Bladder:  continent, no problems noted. General:  Patient is well developed, adequately nourished, non-obese and     well kempt. HEENT:    PERRLA, hearing intact to loud voice, external inspection of ear     and nose benign. Inspection of lips, tongue and gums benign  Musculoskeletal: No significant change in strength or tone. All joints stable. Inspection and palpation of digits and nails show no clubbing,       cyanosis or inflammatory conditions. Neuro/Psychiatric: Affect: flat but pleasant. Alert and oriented to  Self . No significant change in deep tendon reflexes or     sensation  Lungs:  Diminished CTA-B. Respiration effort is normal at rest.     Heart:   S1 = S2, RRR. No loud murmurs. Abdomen:  Firm with tenderness due to ascites, non-tender, no enlargement of liver or spleen. Extremities:  No significant lower extremity edema or tenderness. Skin:   Intact to general survey, no visualized or palpated problems.     Rehabilitation:  Physical therapy: FIMS:  Bed Mobility: Scooting: Supervision    Transfers: Sit to Stand: Supervision  Stand to sit: Supervision  Bed to Chair: Supervision, Ambulation 1  Surface: carpet  Device: Rollator  Assistance: Stand by assistance  Quality of Gait: slow cayden with decreased step length. Distances limited by fatigue. Gait Deviations: Slow Cayden  Distance: 100'  Comments: Slow/steady pace, standing rest break needed between walking distance D/T SOB, inc time needed for turns,      FIMS:  ,  ,      Occupational therapy: FIMS:   ,  , Assessment: Pt demonstrated fair problem solving this date requiring cues for simple puzzle. Pt continues to benefit from OT services to maximize independence and safety with ADLs. Speech therapy: FIMS:        Lab/X-ray studies reviewed, analyzed and discussed with patient and staff:   No results found for this or any previous visit (from the past 24 hour(s)). Ct Abdomen Pelvis  7/23/2020    Liver: Small in size, and nodular contour, unchanged. Bile Ducts:  Normal in caliber. Gallbladder:  Surgically absent. Pancreas:  Hypoplastic, without masses, cysts, ductal dilatation or calcification. Spleen:  Normal in size without masses or calcifications. No splenules. Kidneys:  Right and left kidneys measure 8.0 cm, and 9.1 cm, respectively. 1 mm calcification mid pole left kidney, unchanged from 2013. No right renal calculi. Adrenals:  Normal. Small bowel:  Normal in caliber. Appendix:  Not visualized. Colon:  Normal in caliber. Surgical clips at level of rectum. Peritoneum:  Diffuse free fluid within the abdomen and pelvis, unchanged. No free air. Vessels: Aorta normal in course and caliber. Lymph nodes:  Retroperitoneal:  No enlarged retroperitoneal lymph nodes. Mesenteric:  No enlarged mesenteric lymph nodes. Pelvic: No enlarged pelvic lymph nodes. Ureters: Normal in course and caliber. No calcifications. Bladder: Partially decompressed. Reproductive organs: Uterus surgically absent. Dystrophic calcification, left adnexa, unchanged from March, 2013.  Abdominal Wall: Remote abdominal wall defect repair. Edematous change within abdominal pelvic soft tissues diffusely, stable. Bones:  No bone lesions. No degenerative changes. No post operative changes. Cirrhosis. Diffuse ascites, unchanged. Diffuse edematous change, abdominal and pelvic walls, stable. Small right kidney. Left kidney lower limits of normal. Hypoplastic pancreas. Remote hemorrhoid surgery. Cholecystectomy. Hysterectomy. Xr Hip Right 7/25/2020    Moderate degenerative changes with joint space loss in the bilateral hips. No acute fracture identified. Vascular calcifications. Impression:  1. No acute fracture identified. 2. Moderate bilateral osteoarthritis of the hips. 3. Vascular calcifications. Ct Head   7/23/2020: Extra-axial spaces:  Normal. Intracranial hemorrhage:  None. Ventricular system: Ventricles mildly enlarged. Sulci mildly prominent. Basal Cisterns:  Normal. Cerebral Parenchyma: Physiologic basal ganglia calcification, unchanged. Midline Shift:  None. Cerebellum:  Normal. Paranasal sinuses and mastoid air cells:  Normal. Visualized Orbits: Remote bilateral ocular surgery. Impression: No acute findings. Mild cerebral atrophy. Ct Chest  : 7/23/2020    Right lung shows mild dependent atelectatic change posterior right upper lobe, posterior right middle lobe, and posterior right lower lobe. No nodules or masses. No pleural effusion. Left lung shows dependent subsegmental atelectatic change, left upper lobe. Small peripheral focus of groundglass opacity lateral lower lingula. Dependent subsegmental atelectatic change, small left pleural effusion. No nodules or masses. No hilar, mediastinal, or axillary lymph node enlargement. Cardiac size normal. No pericardial effusion. Calcified mitral annulus. Thoracic aorta normal in course and caliber. Bridging anterior osteophytes mid to lower thoracic spine. No osteoblastic, and no osteolytic lesions. No fracture.      Bilateral dependent atelectatic ultrasound are symmetric borderline small in size. Right kidney measures 9.3 x 5.3 x 4.3 cm left kidney measures 9.2 x 4.7 x  4.8 cm. There are a few punctate echogenic foci in the central sinus echocomplex raising the question of specular reflectors. Bilateral renal echotexture is otherwise unremarkable. No large cortical cysts, solid masses or shadowing calcifications. No ultrasound signs of hydronephrosis. Note is made of upper abdominal ascites     SYMMETRIC KIDNEYS BORDERLINE SMALL IN SIZE. NO ULTRASOUND SIGNS OF HYDRONEPHROSIS. UPPER ABDOMINAL ASCITES       Previous extensive, complex labs, notes and diagnostics reviewed and analyzed. ALLERGIES:    Allergies as of 07/25/2020 - Review Complete 07/25/2020   Allergen Reaction Noted    Adhesive tape  11/22/2004    Amoxicillin-pot clavulanate  03/07/2013    Bactrim [sulfamethoxazole-trimethoprim]  03/07/2013    Clarithromycin  06/23/2015    Other  08/30/2004    Sulfa antibiotics  08/30/2004      (please also verify by checking MAR)       I reviewed her [facility] prescription monitoring service data sheets in hopes of eliminating polypharmacy and weaning to the lowest effective dose of pain medications and eliminating the concomitant use of benzodiazepines. I see no medications of concern. I see no habits of combining sedatives and narcotics. Complex Physical Medicine & Rehab Issues Assess & Plan:   1. Severe abnormality of gait and mobility and impaired self-care and ADL's secondary to progressive Parkinson's disease with liver failure and ascites. Functional and medical status reassessed regarding patients ability to participate in therapies and patient found to be able to participate in acute intensive comprehensive inpatient rehabilitation program including PT/OT to improve balance, ambulation, ADLs, and to improve the P/AROM.   Therapeutic modifications regarding activities in therapies, place, amount of time per day and intensity of therapy made daily. In bed therapies or bedside therapies prn.   2. Bowel constipation due to ascites and Bladder dysfunction:  frequent toileting, ambulate to bathroom with assistance, check post void residuals. Check for C.difficile x1 if >2 loose stools in 24 hours, continue bowel & bladder program.  Monitor bowel and bladder function. Lactinex 2 PO every AC. MOM prn, Brown Bomb prn, Glycerin suppository prn, enema prn. Add scheduled lactulose  3. Severe low back and abdominal pain due to ascites as well as rigid muscle Parkinson's related pain as well as generalized OA pain: reassess pain every shift and prior to and after each therapy session, give prn Tylenol and Roxicodone and Ultram as needed, modalities prn in therapy, Lidoderm, K-pad prn. Tylenol is limited due to liver failure therefore no Tylenol consider Duragesic patch-she was taken off Lyrica because of its potential toxicity especially in light of liver failure. Per family's insistence she will be trialed on low-dose Lyrica however I am hesitant to allow this as she has ascites from liver failure and confusion already. 4. Skin healing and breakdown risk:  continue pressure relief program.  Daily skin exams and reports from nursing. 5. Severe fatigue due to nutritional and hydration deficiency: Add vitamin B12 vitamin D and CoQ10 continue to monitor I&Os, calorie counts prn, dietary consult prn. Lactulose for high ammonia levels. 6. Acute episodic insomnia with situational adjustment disorder:  prn Ambien, monitor for day time sedation. 7. Falls risk elevated:  patient to use call light to get nursing assistance to get up, bed and chair alarm. 8. Elevated DVT risk: progressive activities in PT, continue prophylaxis DAWN hose, elevation and no thinners due to liver failure. 9. Complex discharge planning: Discharge 8/8/2020 home alone with help from her daughters who live locally.   Weekly team meeting every Monday to assess progress towards goals, discuss and address social, psychological and medical comorbidities and to address difficulties they may be having progressing in therapy. Patient and family education is in progress. The patient is to follow-up with their family physician after discharge. Complex Active General Medical Issues that complicate care Assess & Plan:    1. Whole body pain as well as cervical thoracic and low back pain due to fibromyalgia,   Lumbago without sciatica,   Rotator cuff (capsule) deficits, Shoulder impingement-use lowest effective dose of opiate medication to include Roxicodone and Ultram, titrate Desyrel, add Lidoderm BenGay heat massage-titrate Topamax and avoid use of Tylenol due to liver disease as well as Lyrica due to intolerance  2. Depression and anxiety-emotional support given daily add recreational therapy and rehabilitation psychology when available and support group when available  3. LVH (left ventricular hypertrophy), coronary artery disease-vital signs to shift dose and titrate cardiac locations consult hospitalist for backup medical  4. Parkinson's disease -consult neurology-who have not addressed this issue avoid sedatives such as Lyrica which she does not tolerate well titrate Sinemet. I will consult pain management regarding the use of Lyrica. 5.   Thrombocytopenia with severe anemia and recent blood transfusion-recheck CBC add vitamin B12 vitamin D  6. Vitamin D deficiency-add daily vitamin D and recheck as an outpatient  7. Secondary esophageal varices without bleeding-monitor stools for blood avoid strong blood thinners if possible  8. Morbidly obese but with  Moderate malnutrition -add high-protein low-carb supplementation if possible however because of her her recent liver disease she may not be able to process proteins very well. 9.   Stasis dermatitis-protect skin add ET mix to the lower extremities add co-Q10  10.  Severe progressive and recurrent ascites of the liver-eliminate toxic medications recheck as an outpatient monitor ammonia level-reconsult interventional radiology for repeat paracentesis. 11. Hepatic encephalopathy with Elavil elevated ammonia level-titrate lactulose recheck ammonia-consult GI to discuss this with the family. Pharmacy is now aware and adjusting medications. 12. Severe hypothyroidism-consult endocrinology titrate Synthroid  13. Polypharmacy in the elderly-consult pain management agree with avoiding use of Lyrica-medications adjusted as well patient and family made aware of these concerns.            SHARONDA Meza MD.SIMRAN., PM&R     Attending    [address]

## 2020-08-01 NOTE — FLOWSHEET NOTE
Patient assessment completed earlier this shift. Patient had a BM 7/31/2020 she was continent. She does have stress incontinence and wears her own pads in her panties. No distress noted. Patient sleeping well.

## 2020-08-01 NOTE — PROGRESS NOTES
PROGRESS NOTE -PAIN MANAGEMENT   [facility]    SERVICE DATE:  8/1/2020   SERVICE TIME:  12:52 PM    CHIEFCOMPLAINT: Generalized pain, difficulty walking      SUBJECTIVE:  Ms. Madan Newton is a 82 y.o. female who presentedfor physical therapy and rehabilitation to receive therapy after patient initial admission with balance issue and gait problem. Patient has history of Parkinson's disease, multilevel arthritis, fibromyalgia, liver disease and ascites drained. I had seen the patient initially on 7/31/2020, discussion with family regarding the Lyrica, started on low-dose Lyrica, seem to be tolerating well. Patient states  pain is better controlled, seem to be mentally more awake, cooperative.     PAIN  ASSESSMENT:    intermittent    aching    pain is perceived as moderate (4-6 pain scale)      MEDICATIONS:    Current Facility-Administered Medications   Medication Dose Route Frequency Provider Last Rate Last Dose    lactulose (CHRONULAC) 10 GM/15ML solution 20 g  20 g Oral Daily Karen Scullin, DO   20 g at 07/31/20 1156    nystatin, stomahesive in petrolatum (ET MIX)   Topical 3 times per day Karen Scullin, DO        vitamin B-1 (THIAMINE) tablet 100 mg  100 mg Oral BID Galileo Anne MD   100 mg at 08/01/20 0933    pregabalin (LYRICA) capsule 25 mg  25 mg Oral BID Elina Richard MD   25 mg at 08/01/20 0933    metaxalone (SKELAXIN) tablet 800 mg  800 mg Oral TID PRN Elina Richard MD        pantoprazole (PROTONIX) tablet 20 mg  20 mg Oral QAM AC Karen Scullin, DO   20 mg at 08/01/20 1843    levothyroxine (SYNTHROID) tablet 25 mcg  25 mcg Oral Daily Luis Sanford MD   25 mcg at 08/01/20 0720    lidocaine 4 % external patch 3 patch  3 patch Transdermal Daily PRN Karen Scullin, DO   3 patch at 07/30/20 1611    analgesic ointment ointment   Topical TID PRN Karen Scullin, DO        folic acid (FOLVITE) tablet 5 mg  5 mg Oral Daily Galileo Anne MD   5 mg at 08/01/20 0934    oxyCODONE (ROXICODONE) immediate release tablet 5 mg  5 mg Oral Q4H PRN Karen Scullin, DO   5 mg at 07/31/20 0858    traMADol (ULTRAM) tablet 25 mg  25 mg Oral Q6H PRN Karen Scullin, DO   25 mg at 07/29/20 2126    ondansetron (ZOFRAN) tablet 4 mg  4 mg Oral Q8H PRN Drew Banerjee DO        fleet rectal enema 1 enema  1 enema Rectal Daily PRN Karen Scullin, DO        albuterol (PROVENTIL) nebulizer solution 2.5 mg  2.5 mg Nebulization Q6H PRN Vipul Herrera MD        topiramate (TOPAMAX) tablet 50 mg  50 mg Oral Daily Vipul Herrera MD   50 mg at 08/01/20 0934    traZODone (DESYREL) tablet 150 mg  150 mg Oral Nightly Vipul Herrera MD   150 mg at 07/31/20 2214    ondansetron (ZOFRAN) injection 4 mg  4 mg Intravenous Q6H PRN Vipul Herrera MD        budesonide-formoterol (SYMBICORT) 160-4.5 MCG/ACT inhaler 2 puff  2 puff Inhalation BID Vipul Herrera MD   2 puff at 07/31/20 0501    carbidopa-levodopa (SINEMET)  MG per tablet 1 tablet  1 tablet Oral TID Vipul Herrera MD   1 tablet at 08/01/20 0933    furosemide (LASIX) tablet 40 mg  40 mg Oral Daily Vipul Herrera MD   40 mg at 08/01/20 0933    spironolactone (ALDACTONE) tablet 50 mg  50 mg Oral Daily Vipul Herrera MD   50 mg at 08/01/20 0933         ALLERGIES:  Adhesive tape; Amoxicillin-pot clavulanate; Bactrim [sulfamethoxazole-trimethoprim]; Clarithromycin; Other; and Sulfa antibiotics    Review of Systems   Constitutional: Positive for activity change and fatigue. Negative for appetite change, chills, diaphoresis, fever and unexpected weight change. HENT: Negative. Eyes: Negative. Respiratory: Negative. Cardiovascular: Negative. Gastrointestinal: Negative. Endocrine: Negative. Genitourinary: Negative. Musculoskeletal: Positive for arthralgias, back pain, gait problem, joint swelling, myalgias, neck pain and neck stiffness. Skin: Negative. Allergic/Immunologic: Negative. Neurological: Positive for weakness. MPV in the last 72 hours. No results for input(s): NA, K, CL, CO2, BUN, CREATININE, GLUCOSE, CALCIUM in the last 72 hours. No results for input(s): MG in the last 72 hours. No results for input(s): Theodore Earnest, LABBENZ, CANSU, COCAIMETSCRU, OPIATESCREENURINE, OXYCODONEUR, DSCOMMENT in the last 72 hours. Invalid input(s): PHENCYCLIDINESCREENURINE. LABMETH. PROPOX     Mri Lumbar Spine Wo Contrast    Result Date: 7/27/2020  EXAMINATION: MRI lumbar spine without contrast CLINICAL HISTORY: Recent fall. Bilateral leg weakness. FINDINGS: Unenhanced scans obtained. T1 and T2-weighted sagittal and axial sequences were acquired. A STIR sagittal sequence are included. Today's study is compared with recent CT dated July 23, 2020. Vertebral bodies are intact and normal in height. Degenerative endplate changes are seen at L4-L5 particularly involving the superior endplate of L5. There is disc desiccation from L1 through L4. There is disc space narrowing at L3-L4 and L4-L5. Conus is  normal in caliber and signal and terminates at T12-L1. T12-L1: Unremarkable. L1-L2: Small focal disc protrusion centrally and to the left of the midline slightly effaces the thecal sac without narrowing the central canal or compressing transiting or exiting nerve roots. Foramina patent. L2-L3: No disc herniation. No central canal or foraminal stenosis. L3-L4: Generalized bulging of the annulus effaces the thecal sac anteriorly without compromising the transiting or exiting nerve roots. No significant central canal or foraminal stenosis. L4-L5: Generalized bulging of the annulus anteriorly and proliferative changes in the facet joints noted more prominent right side than left. There is narrowing of the right L4 nerve root foramen. No significant central canal stenosis and no definite disc herniation. L5-S1: No disc herniation or central canal or foraminal stenosis. Postlaminectomy changes on the right side noted.  Prevertebral soft tissues unremarkable     DIFFUSE DEGENERATIVE DISEASE. NO COMPRESSION FRACTURE OR INFILTRATIVE BONY PROCESS. L4 NERVE ROOT FORAMINAL STENOSIS ON THE RIGHT. NO EVIDENCE OF DISC HERNIATION OR CENTRAL CANAL STENOSIS. Ir Us Guided Paracentesis    1. Status post technically successful ultrasound-guided paracentesis. Adarsh Garcia is a Female of 82 years age, referred for Ultrasound Guided Paracentesis. PROCEDURE: Survey of the abdomen showed large amount of ascites fluid. After obtaining informed consent, the patient was positioned supine on the sonography table. Using ultrasound, the skin over the left hemiabdomen was locally anesthetized with 1% lidocaine. Following that, a Yueh needle was advanced into the fluid pocket using ultrasound visualization. 5900cc, of clear yellow fluid were aspirated and sent for cytology, and pathology. The needle was removed, and hemostasis was obtained with pressure. A Band-Aid was placed. Post procedure images did not demonstrate hemorrhage at the target site. The patient tolerated the procedure well. The patient left the department in good condition. A radiology nurse was in presence monitoring vital signs, assisting throughout the procedure. Jamison Baumann 40 Problems    Diagnosis Date Noted    Muscle spasm of both lower legs [M62.838] 07/30/2020    Muscle rigidity [R29.898] 07/30/2020    Hypothyroidism [E03.9]     Glaucoma [H40.9] 07/27/2020    Diabetic gastropathy ([address] [E11.69, K31.9] 07/27/2020    [place] (hard of hearing) [H91.90] 07/27/2020    Acute renal failure (ARF) ([address] [N17.9] 07/27/2020    History of lumbar laminectomy [Z98.890] 07/27/2020    Obesity (BMI 30-39. 9) [E66.9] 07/27/2020    Portal hypertension ([address] [K76.6] 07/27/2020    Impaired mobility [Z74.09] 07/26/2020    Abnormality of gait and mobility dt exacerbation of Parkinson's disease status post ascites and liver failure acute rehab admission 7/25/2020 [R26.9] 07/26/2020    Stasis dermatitis [I87.2] 07/24/2020    Moderate malnutrition ([facility] [E44.0] 07/24/2020    Gait abnormality [R26.9] 07/24/2020    Weakness generalized [R53.1] 07/23/2020    Morbidly obese (HCC) [E66.01] 03/10/2020    Cough variant asthma [J45.991] 03/10/2020    Decompensated hepatic cirrhosis ([facility] [K72.90] 03/10/2020    Secondary esophageal varices without bleeding (HCC) [I85.10]     Bilateral carotid artery stenosis [I65.23] 01/09/2019    Lumbar spondylosis [M47.816] 10/30/2017    Osteoarthritis of multiple joints [M15.9] 06/17/2014    Vitamin D deficiency [E55.9] 05/02/2014    Lumbago without sciatica [M54.5] 05/01/2014    DISH (diffuse idiopathic skeletal hyperostosis) [M48.10] 05/01/2014    Hyperlipidemia [E78.5] 04/17/2013    Fibromyalgia [M79.7]     DM2 (diabetes mellitus, type 2) ([facility] [E11.9]     Depression [F32.9] 08/28/2012    Hypertension [I10] 08/28/2012    Thrombocytopenia ([facility] [D69.6] 04/24/2012    LVH (left ventricular hypertrophy) [I51.7] 04/05/2012    Parkinson's disease ([facility] [G20] 02/18/2012    Rotator cuff (capsule) sprain [S43.429A] 06/15/2010    Shoulder impingement [M75.40] 05/14/2010    Diabetic polyneuropathy ([facility] [E11.42] 12/26/2007    Generalized muscle ache [M79.10] 12/26/2007            RECOMMENDATION:  SEE ORDERS    Seem to be tolerating well lower dose Lyrica 25 mg twice daily I would keep it at that dose,  Continue with the as needed tramadol,  Continue participation therapy and adjust medication for her pain accordingly, her pain does not seem to be constant so may not require any long-acting regimen at this time  Continue maximize topical analgesic and non-opioid therapy, nonpharmacological methods. Communicated plan of care with nursing team, also with patient who has family member on bedside, and asking daughters had any questions she can call me.     SIGNATURE: Bernie Maldonado MD PATIENT NAME: Unique Boone   DATE:

## 2020-08-02 NOTE — PROGRESS NOTES
Supervision  Bed to Chair: Supervision, Ambulation 1  Surface: carpet  Device: Rollator  Assistance: Stand by assistance  Quality of Gait: slow cayden with decreased step length. Distances limited by fatigue. Gait Deviations: Slow Cayden  Distance: 100'  Comments: Slow/steady pace, standing rest break needed between walking distance D/T SOB, inc time needed for turns,      FIMS:  ,  ,      Occupational therapy: FIMS:   ,  , Assessment: Pt demonstrated fair problem solving this date requiring cues for simple puzzle. Pt continues to benefit from OT services to maximize independence and safety with ADLs. Speech therapy: FIMS:        Lab/X-ray studies reviewed, analyzed and discussed with patient and staff:   No results found for this or any previous visit (from the past 24 hour(s)). Ct Abdomen Pelvis  7/23/2020    Liver: Small in size, and nodular contour, unchanged. Bile Ducts:  Normal in caliber. Gallbladder:  Surgically absent. Pancreas:  Hypoplastic, without masses, cysts, ductal dilatation or calcification. Spleen:  Normal in size without masses or calcifications. No splenules. Kidneys:  Right and left kidneys measure 8.0 cm, and 9.1 cm, respectively. 1 mm calcification mid pole left kidney, unchanged from 2013. No right renal calculi. Adrenals:  Normal. Small bowel:  Normal in caliber. Appendix:  Not visualized. Colon:  Normal in caliber. Surgical clips at level of rectum. Peritoneum:  Diffuse free fluid within the abdomen and pelvis, unchanged. No free air. Vessels: Aorta normal in course and caliber. Lymph nodes:  Retroperitoneal:  No enlarged retroperitoneal lymph nodes. Mesenteric:  No enlarged mesenteric lymph nodes. Pelvic: No enlarged pelvic lymph nodes. Ureters: Normal in course and caliber. No calcifications. Bladder: Partially decompressed. Reproductive organs: Uterus surgically absent. Dystrophic calcification, left adnexa, unchanged from March, 2013.  Abdominal Wall: Remote abdominal wall defect repair. Edematous change within abdominal pelvic soft tissues diffusely, stable. Bones:  No bone lesions. No degenerative changes. No post operative changes. Cirrhosis. Diffuse ascites, unchanged. Diffuse edematous change, abdominal and pelvic walls, stable. Small right kidney. Left kidney lower limits of normal. Hypoplastic pancreas. Remote hemorrhoid surgery. Cholecystectomy. Hysterectomy. Xr Hip Right 7/25/2020    Moderate degenerative changes with joint space loss in the bilateral hips. No acute fracture identified. Vascular calcifications. Impression:  1. No acute fracture identified. 2. Moderate bilateral osteoarthritis of the hips. 3. Vascular calcifications. Ct Head   7/23/2020: Extra-axial spaces:  Normal. Intracranial hemorrhage:  None. Ventricular system: Ventricles mildly enlarged. Sulci mildly prominent. Basal Cisterns:  Normal. Cerebral Parenchyma: Physiologic basal ganglia calcification, unchanged. Midline Shift:  None. Cerebellum:  Normal. Paranasal sinuses and mastoid air cells:  Normal. Visualized Orbits: Remote bilateral ocular surgery. Impression: No acute findings. Mild cerebral atrophy. Ct Chest  : 7/23/2020    Right lung shows mild dependent atelectatic change posterior right upper lobe, posterior right middle lobe, and posterior right lower lobe. No nodules or masses. No pleural effusion. Left lung shows dependent subsegmental atelectatic change, left upper lobe. Small peripheral focus of groundglass opacity lateral lower lingula. Dependent subsegmental atelectatic change, small left pleural effusion. No nodules or masses. No hilar, mediastinal, or axillary lymph node enlargement. Cardiac size normal. No pericardial effusion. Calcified mitral annulus. Thoracic aorta normal in course and caliber. Bridging anterior osteophytes mid to lower thoracic spine. No osteoblastic, and no osteolytic lesions. No fracture.      Bilateral dependent atelectatic change. Small left effusion. Ct Cervical Spine   7/23/2020    Fracture:No acute fracture. Alignment:  Normal cervical lordosis. No subluxation. No canal stenosis. Dense atlas:Dens atlas relationship is normal. Soft tissues:Airway patent. No soft tissue mass or adenopathy. Other findings: There is mild  cervical spondylosis with multiple level disc osteophyte changes. NO ACUTE CERVICAL SPINE ABNORMALITY. Ct Thoracic Spine  7/23/2020   Alignment/fracture: Normal alignment without subluxation. No evidence of fracture. Perivertebral soft tissues are normal.  Disc spaces: There are multiple levels of disc space narrowing and end plate changes consistent with degenerative change. NEGATIVE CT OF THE THORACIC SPINE. Ct Lumbar Spine 7/24/2020   : No acute fractures identified. There is T11 superior endplate Schmorl's node with dystrophic calcification, unchanged from prior imaging study. There is lumbar spondylosis with multilevel intervertebral disc space narrowing, marginal osteophytosis, and facet hypertrophy. There is L4-5 degenerative disc vacuum phenomenon. There is L3-4 and L4-5 disc bulging which does not appear to impinge on the exiting nerve roots. There is mild L4-5 central canal stenosis. There is disc bulging and  degenerative disc vacuum phenomenon at L1-2. The SI joints are symmetrically maintained. There is iliac sclerosis of the SI joints. Ascites is noted within the field-of-view. There are 2 left renal midpole nonobstructing stones, measuring 2 mm and 4 mm. DEGENERATIVE CHANGES. NO ACUTE FRACTURE. Xr Chest   7/23/2020  NO EVIDENCE OF ACUTE THORACIC TRAUMA OR ACTIVE CARDIOPULMONARY DISEASE. Us Guided  : 7/23/2020  SUCCESSFUL ULTRASOUND-GUIDED PARACENTESIS APPROXIMATELY 6.5 L OF ASCITES. Us Retroperitoneal   7/23/2020    Renal sonography was performed. The study is compromised due to patient body habitus and sonographic window.  Kidneys by ultrasound are symmetric borderline small in size. Right kidney measures 9.3 x 5.3 x 4.3 cm left kidney measures 9.2 x 4.7 x  4.8 cm. There are a few punctate echogenic foci in the central sinus echocomplex raising the question of specular reflectors. Bilateral renal echotexture is otherwise unremarkable. No large cortical cysts, solid masses or shadowing calcifications. No ultrasound signs of hydronephrosis. Note is made of upper abdominal ascites     SYMMETRIC KIDNEYS BORDERLINE SMALL IN SIZE. NO ULTRASOUND SIGNS OF HYDRONEPHROSIS. UPPER ABDOMINAL ASCITES       Previous extensive, complex labs, notes and diagnostics reviewed and analyzed. ALLERGIES:    Allergies as of 07/25/2020 - Review Complete 07/25/2020   Allergen Reaction Noted    Adhesive tape  11/22/2004    Amoxicillin-pot clavulanate  03/07/2013    Bactrim [sulfamethoxazole-trimethoprim]  03/07/2013    Clarithromycin  06/23/2015    Other  08/30/2004    Sulfa antibiotics  08/30/2004      (please also verify by checking MAR)       I reviewed her [facility] prescription monitoring service data sheets in hopes of eliminating polypharmacy and weaning to the lowest effective dose of pain medications and eliminating the concomitant use of benzodiazepines. I see no medications of concern. I see no habits of combining sedatives and narcotics. Complex Physical Medicine & Rehab Issues Assess & Plan:   1. Severe abnormality of gait and mobility and impaired self-care and ADL's secondary to progressive Parkinson's disease with liver failure and ascites. Functional and medical status reassessed regarding patients ability to participate in therapies and patient found to be able to participate in acute intensive comprehensive inpatient rehabilitation program including PT/OT to improve balance, ambulation, ADLs, and to improve the P/AROM.   Therapeutic modifications regarding activities in therapies, place, amount of time per day and intensity of therapy made daily. In bed therapies or bedside therapies prn.   2. Bowel constipation due to ascites and Bladder dysfunction:  frequent toileting, ambulate to bathroom with assistance, check post void residuals. Check for C.difficile x1 if >2 loose stools in 24 hours, continue bowel & bladder program.  Monitor bowel and bladder function. Lactinex 2 PO every AC. MOM prn, Brown Bomb prn, Glycerin suppository prn, enema prn. Add scheduled lactulose  3. Severe low back and abdominal pain due to ascites as well as rigid muscle Parkinson's related pain as well as generalized OA pain: reassess pain every shift and prior to and after each therapy session, give prn Tylenol and Roxicodone and Ultram as needed, modalities prn in therapy, Lidoderm, K-pad prn. Tylenol is limited due to liver failure therefore no Tylenol consider Duragesic patch-she was taken off Lyrica because of its potential toxicity especially in light of liver failure. Per family's insistence she will be trialed on low-dose Lyrica however I am hesitant to allow this as she has ascites from liver failure and confusion already. 4. Skin healing and breakdown risk:  continue pressure relief program.  Daily skin exams and reports from nursing. 5. Severe fatigue due to nutritional and hydration deficiency: Add vitamin B12 vitamin D and CoQ10 continue to monitor I&Os, calorie counts prn, dietary consult prn. Lactulose for high ammonia levels. 6. Acute episodic insomnia with situational adjustment disorder:  prn Ambien, monitor for day time sedation. 7. Falls risk elevated:  patient to use call light to get nursing assistance to get up, bed and chair alarm. 8. Elevated DVT risk: progressive activities in PT, continue prophylaxis DAWN hose, elevation and no thinners due to liver failure. 9. Complex discharge planning: Discharge 8/8/2020 home alone with help from her daughters who live locally.   Weekly team meeting every Monday to assess progress towards goals, discuss and address social, psychological and medical comorbidities and to address difficulties they may be having progressing in therapy. Patient and family education is in progress. The patient is to follow-up with their family physician after discharge. Complex Active General Medical Issues that complicate care Assess & Plan:    1. Whole body pain as well as cervical thoracic and low back pain due to fibromyalgia,   Lumbago without sciatica,   Rotator cuff (capsule) deficits, Shoulder impingement-use lowest effective dose of opiate medication to include Roxicodone and Ultram, titrate Desyrel, add Lidoderm BenGay heat massage-titrate Topamax and avoid use of Tylenol due to liver disease as well as Lyrica due to intolerance  2. Depression and anxiety-emotional support given daily add recreational therapy and rehabilitation psychology when available and support group when available  3. LVH (left ventricular hypertrophy), coronary artery disease-vital signs to shift dose and titrate cardiac locations consult hospitalist for backup medical  4. Parkinson's disease -consult neurology-who have not addressed this issue avoid sedatives such as Lyrica which she does not tolerate well titrate Sinemet. I will consult pain management regarding the use of Lyrica. 5.   Thrombocytopenia with severe anemia and recent blood transfusion-recheck CBC add vitamin B12 vitamin D  6. Vitamin D deficiency-add daily vitamin D and recheck as an outpatient  7. Secondary esophageal varices without bleeding-monitor stools for blood avoid strong blood thinners if possible  8. Morbidly obese but with  Moderate malnutrition -add high-protein low-carb supplementation if possible however because of her her recent liver disease she may not be able to process proteins very well. 9.   Stasis dermatitis-protect skin add ET mix to the lower extremities add co-Q10  10.  Severe progressive and recurrent ascites of the liver-eliminate

## 2020-08-02 NOTE — PLAN OF CARE
Problem: Falls - Risk of:  Goal: Will remain free from falls  Description: Will remain free from falls  Outcome: Ongoing  Goal: Absence of physical injury  Description: Absence of physical injury  Outcome: Ongoing     Problem: Skin Integrity:  Goal: Will show no infection signs and symptoms  Description: Will show no infection signs and symptoms  Outcome: Ongoing  Goal: Absence of new skin breakdown  Description: Absence of new skin breakdown  Outcome: Ongoing     Problem: Infection:  Goal: Will remain free from infection  Description: Will remain free from infection  Outcome: Ongoing     Problem: Safety:  Goal: Free from accidental physical injury  Description: Free from accidental physical injury  Outcome: Ongoing  Goal: Free from intentional harm  Description: Free from intentional harm  Outcome: Ongoing     Problem: Daily Care:  Goal: Daily care needs are met  Description: Daily care needs are met  Outcome: Ongoing     Problem: Pain:  Goal: Patient's pain/discomfort is manageable  Description: Patient's pain/discomfort is manageable  Outcome: Ongoing  Goal: Pain level will decrease  Description: Pain level will decrease  Outcome: Ongoing  Goal: Control of acute pain  Description: Control of acute pain  Outcome: Ongoing  Goal: Control of chronic pain  Description: Control of chronic pain  Outcome: Ongoing     Problem: Skin Integrity:  Goal: Skin integrity will stabilize  Description: Skin integrity will stabilize  Outcome: Ongoing     Problem: Discharge Planning:  Goal: Patients continuum of care needs are met  Description: Patients continuum of care needs are met  Outcome: Ongoing     Problem: IP COMMUNICATION/DYSARTHRIA  Goal: LTG - patient will improve expressive language skills to allow for communication of wants and needs in daily activities  Outcome: Ongoing     Problem: IP SWALLOWING  Goal: LTG - patient will tolerate the least restrictive diet consistency to allow for safe consumption of daily

## 2020-08-02 NOTE — PROGRESS NOTES
NEUROLOGY INPATIENT PROGRESS NOTE    Patient- Luz Maria Newton    MRN -  [id number]   Acct # - [de-identified]      - 1935    82 y.o. Subjective: The patient is seen in follow-up. Patient is alert at this time. Patient is alert  She is moving all the extremities  She feels she is doing good in the rehab  Family had no new concerns or  Thiamine being changed to p.o. Thiamine deficiency, folate deficiency, being addressed  Elevated TSH to be watched  B12 was normal  Tremor minimal only  Bradykinesia stable and mild only    Result Data:  CBC: No results for input(s): WBC, HGB, PLT in the last 72 hours. BMP:  No results for input(s): NA, K, CL, CO2, BUN, CREATININE, GLUCOSE in the last 72 hours. TSH:   No results for input(s): TSH in the last 72 hours. Folic Acid: No results for input(s): FOLATE in the last 72 hours. B12:    Lab Results   Component Value Date    [id number] 2020     Vit. D: No components found for: VITAMIND  Lipids: No results for input(s): CHOL, TRIG, HDL, LDLCALC in the last 72 hours. Ammonia:   Recent Labs     20  0755   AMMONIA 64*     LFT: No results for input(s): AST, ALT, ALB, BILITOT, ALKPHOS in the last 72 hours. Urine: No results for input(s): COLORU, PHUR, LABCAST, WBCUA, RBCUA, MUCUS, YEAST, BACTERIA, CLARITYU, SPECGRAV, LEUKOCYTESUR, UROBILINOGEN, Clydene Deaner in the last 72 hours. Invalid input(s): NITRATE, GLUCOSEUKETONESUAMORPHOUS     Imaging    Ct Abdomen Pelvis Wo Contrast Additional Contrast? None    Result Date: 2020  CT of the Abdomen and Pelvis without intravenous contrast medium History:  Fall from standing Technical Factors: CT imaging of the abdomen and pelvis were obtained and formatted as 5 mm contiguous axial images from the domes of the diaphragm to the symphysis pubis. Sagittal and coronal reconstructions were also obtained. Oral contrast medium:  None. Intravenous contrast medium:  None. Comparison:  CT abdomen pelvis, March 10, 2020, 2013. Findings: Liver: Small in size, and nodular contour, unchanged. Bile Ducts:  Normal in caliber. Gallbladder:  Surgically absent. Pancreas:  Hypoplastic, without masses, cysts, ductal dilatation or calcification. Spleen:  Normal in size without masses or calcifications. No splenules. Kidneys:  Right and left kidneys measure 8.0 cm, and 9.1 cm, respectively. 1 mm calcification mid pole left kidney, unchanged from . No right renal calculi. Adrenals:  Normal. Small bowel:  Normal in caliber. Appendix:  Not visualized. Colon:  Normal in caliber. Surgical clips at level of rectum. Peritoneum:  Diffuse free fluid within the abdomen and pelvis, unchanged. No free air. Vessels: Aorta normal in course and caliber. Lymph nodes:  Retroperitoneal:  No enlarged retroperitoneal lymph nodes. Mesenteric:  No enlarged mesenteric lymph nodes. Pelvic: No enlarged pelvic lymph nodes. Ureters: Normal in course and caliber. No calcifications. Bladder: Partially decompressed. Reproductive organs: Uterus surgically absent. Dystrophic calcification, left adnexa, unchanged from . Abdominal Wall: Remote abdominal wall defect repair. Edematous change within abdominal pelvic soft tissues diffusely, stable. Bones:  No bone lesions. No degenerative changes. No post operative changes. Cirrhosis. Diffuse ascites, unchanged. Diffuse edematous change, abdominal and pelvic walls, stable. Small right kidney. Left kidney lower limits of normal. Hypoplastic pancreas. Remote hemorrhoid surgery. Cholecystectomy. Hysterectomy. All CT scans at this facility use dose modulation, iterative reconstruction, and/or weight based dosing when appropriate to reduce radiation dose to as low as reasonably achievable.      Xr Hip Right (2-3 Views)    Result Date: 2020  Patient MRN: [id number] : 1935 Age:  82 years Gender: Female Order Date: 2020 12:00 PM. Exam: XR HIP RIGHT (2-3 VIEWS) Number of Views: 2 Indication:  Fall at home one week ago with persistent right hip pain since that time Comparison: 12/5/2011 radiographs. CT abdomen and pelvis 7/23/2020. Findings: Moderate degenerative changes with joint space loss in the bilateral hips. No acute fracture identified. Vascular calcifications. Impression:  1. No acute fracture identified. 2. Moderate bilateral osteoarthritis of the hips. 3. Vascular calcifications. Ct Head Wo Contrast    Result Date: 7/23/2020  CT Brain Contrast medium:  Not utilized. History:  Fell from standing. Denies anticoagulation/loss of consciousness. Comparison:  CT brain, July 11, 2014 Findings: Extra-axial spaces:  Normal. Intracranial hemorrhage:  None. Ventricular system: Ventricles mildly enlarged. Sulci mildly prominent. Basal Cisterns:  Normal. Cerebral Parenchyma: Physiologic basal ganglia calcification, unchanged. Midline Shift:  None. Cerebellum:  Normal. Paranasal sinuses and mastoid air cells:  Normal. Visualized Orbits: Remote bilateral ocular surgery. Impression: No acute findings. Mild cerebral atrophy. All CT scans at this facility use dose modulation, iterative reconstruction, and/or weight based dosing when appropriate to reduce radiation dose to as low as reasonably achievable. Ct Chest Wo Contrast    Result Date: 7/23/2020  CT of the Chest without intravenous contrast medium History:  Mechanical fall from standing. Technical Factors: CT imaging of the chest was obtained and formatted as 5 mm contiguous axial images from the thoracic inlet through the adrenal glands. Sagittal and coronal reconstruction obtained during postprocessing. Intravenous contrast medium:  None. Comparison:  Chest radiograph, July 22, 2020. High-resolution CT chest, December 30, 2019. Findings: Right lung shows mild dependent atelectatic change posterior right upper lobe, posterior right middle lobe, and posterior right lower lobe. No nodules or masses. HISTORY: SEVERE POSTERIOR THORACIC SPINE PAIN. FALL  COMPARISON: NONE TECHNIQUE:  Axial noncontrast images were performed through the thoracic spine with coronal and sagittal reconstructions. All CT scans at this facility use dose modulation, iterative reconstruction, and/or weight based dosing when appropriate to reduce radiation dose to as low as reasonably achievable. FINDINGS  Alignment/fracture: Normal alignment without subluxation. No evidence of fracture. Perivertebral soft tissues are normal.  Disc spaces: There are multiple levels of disc space narrowing and end plate changes consistent with degenerative change. NEGATIVE CT OF THE THORACIC SPINE. Ct Lumbar Spine Wo Contrast    Result Date: 7/24/2020  EXAMINATION: CT LUMBAR SPINE WO CONTRAST CLINICAL HISTORY: Fall from standing. Back pain. COMPARISON: CT abdomen pelvis 3/10/2020 TECHNIQUE:  Spiral scans with no contrast. Multiplanar 2-D reconstructions. All CT scans at this facility use dose modulation, iterative reconstruction, and/or weight based dosing when appropriate to reduce radiation dose to as low as reasonably achievable. FINDINGS: No acute fractures identified. There is T11 superior endplate Schmorl's node with dystrophic calcification, unchanged from prior imaging study. There is lumbar spondylosis with multilevel intervertebral disc space narrowing, marginal osteophytosis, and facet hypertrophy. There is L4-5 degenerative disc vacuum phenomenon. There is L3-4 and L4-5 disc bulging which does not appear to impinge on the exiting nerve roots. There is mild L4-5 central canal stenosis. There is disc bulging and  degenerative disc vacuum phenomenon at L1-2. The SI joints are symmetrically maintained. There is iliac sclerosis of the SI joints. Ascites is noted within the field-of-view. There are 2 left renal midpole nonobstructing stones, measuring 2 mm and 4 mm. DEGENERATIVE CHANGES. NO ACUTE FRACTURE.     Mri Lumbar Spine Wo Contrast    Result Date: 7/27/2020  EXAMINATION: MRI lumbar spine without contrast CLINICAL HISTORY: Recent fall. Bilateral leg weakness. FINDINGS: Unenhanced scans obtained. T1 and T2-weighted sagittal and axial sequences were acquired. A STIR sagittal sequence are included. Today's study is compared with recent CT dated July 23, 2020. Vertebral bodies are intact and normal in height. Degenerative endplate changes are seen at L4-L5 particularly involving the superior endplate of L5. There is disc desiccation from L1 through L4. There is disc space narrowing at L3-L4 and L4-L5. Conus is  normal in caliber and signal and terminates at T12-L1. T12-L1: Unremarkable. L1-L2: Small focal disc protrusion centrally and to the left of the midline slightly effaces the thecal sac without narrowing the central canal or compressing transiting or exiting nerve roots. Foramina patent. L2-L3: No disc herniation. No central canal or foraminal stenosis. L3-L4: Generalized bulging of the annulus effaces the thecal sac anteriorly without compromising the transiting or exiting nerve roots. No significant central canal or foraminal stenosis. L4-L5: Generalized bulging of the annulus anteriorly and proliferative changes in the facet joints noted more prominent right side than left. There is narrowing of the right L4 nerve root foramen. No significant central canal stenosis and no definite disc herniation. L5-S1: No disc herniation or central canal or foraminal stenosis. Postlaminectomy changes on the right side noted. Prevertebral soft tissues unremarkable     DIFFUSE DEGENERATIVE DISEASE. NO COMPRESSION FRACTURE OR INFILTRATIVE BONY PROCESS. L4 NERVE ROOT FORAMINAL STENOSIS ON THE RIGHT. NO EVIDENCE OF DISC HERNIATION OR CENTRAL CANAL STENOSIS. Xr Chest Portable    Result Date: 7/23/2020  XR CHEST PORTABLE : 7/22/2020 CLINICAL HISTORY: Pain after fall . COMPARISON: High-resolution chest CT 12/30/2019 and two-view chest 11/18/2019. TECHNIQUE: An upright portable AP radiograph of the chest was obtained. FINDINGS: A shallow inspiration is present without significant infiltrate identified. There is no cardiomegaly, significant pleural effusion, vascular congestion, pneumothorax, or displaced fractures identified. NO EVIDENCE OF ACUTE THORACIC TRAUMA OR ACTIVE CARDIOPULMONARY DISEASE. Ir Us Guided Paracentesis    1. Status post technically successful ultrasound-guided paracentesis. Rajesh Bueno is a Female of 82 years age, referred for Ultrasound Guided Paracentesis. PROCEDURE: Survey of the abdomen showed large amount of ascites fluid. After obtaining informed consent, the patient was positioned supine on the sonography table. Using ultrasound, the skin over the left hemiabdomen was locally anesthetized with 1% lidocaine. Following that, a Yueh needle was advanced into the fluid pocket using ultrasound visualization. 5900cc, of clear yellow fluid were aspirated and sent for cytology, and pathology. The needle was removed, and hemostasis was obtained with pressure. A Band-Aid was placed. Post procedure images did not demonstrate hemorrhage at the target site. The patient tolerated the procedure well. The patient left the department in good condition. A radiology nurse was in presence monitoring vital signs, assisting throughout the procedure. Us Guided Paracentesis    Result Date: 7/23/2020  US GUIDED PARACENTESIS : 7/23/2020 CLINICAL HISTORY:  therapeutic paracentesis . COMPARISON: Chest, abdomen and pelvis CTs from earlier 7/23/2020 PROCEDURE: Informed consent was obtained. Limited transabdominal ultrasound was performed and marking done in the usual fashion. Sterile technique and local lidocaine anesthesia was used to place a 5 [employer] June Yueh catheter within the abdominal fluid collection.  Approximately 6.5 L of low viscosity pale yellow-colored fluid was aspirated without evidence of immediate Bilateral renal echotexture is otherwise unremarkable. No large cortical cysts, solid masses or shadowing calcifications. No ultrasound signs of hydronephrosis. Note is made of upper abdominal ascites     SYMMETRIC KIDNEYS BORDERLINE SMALL IN SIZE. NO ULTRASOUND SIGNS OF HYDRONEPHROSIS. UPPER ABDOMINAL ASCITES        Objective:   BP (!) 111/53   Pulse 74   Temp 98 °F (36.7 °C) (Oral)   Resp 18   Ht 5' 1\" (1.549 m)   Wt 177 lb 0.7 oz (80.3 kg)   SpO2 95%   BMI 33.45 kg/m²     No intake or output data in the 24 hours ending 08/02/20 1950         Physical Exam:  MUSCULOSKELETAL:  There is no redness, warmth, or swelling of the joints. Tone is mildly increased. Patient has had full range of motion in all the limbs.         NEUROLOGIC:    Speech Expression: appropriate quality, quantity and organization of sentences  Speech Comprehension: intact, patient has a mild hearing loss    Mental Status Exam: alert, oriented to person, place, and time    Motor Strength:   right upper extremity:   5/5  left upper extremity:  5/5  right lower extremity: 5/5    left lower extremity:  5/5      Medications:     lactulose  20 g Oral Daily    nystatin, stomahesive in petrolatum   Topical 3 times per day    thiamine  100 mg Oral BID    pregabalin  25 mg Oral BID    pantoprazole  20 mg Oral QAM AC    levothyroxine  25 mcg Oral Daily    folic acid  5 mg Oral Daily    topiramate  50 mg Oral Daily    traZODone  150 mg Oral Nightly    budesonide-formoterol  2 puff Inhalation BID    carbidopa-levodopa  1 tablet Oral TID    furosemide  40 mg Oral Daily    spironolactone  50 mg Oral Daily                 Principal Problem:    Abnormality of gait and mobility dt exacerbation of Parkinson's disease status post ascites and liver failure acute rehab admission 7/25/2020  Active Problems:    DM2 (diabetes mellitus, type 2) (HCC)    Fibromyalgia    Depression    DISH (diffuse idiopathic skeletal hyperostosis)    Hyperlipidemia Hypertension    Lumbago without sciatica    Lumbar spondylosis    LVH (left ventricular hypertrophy)    Generalized muscle ache    Osteoarthritis of multiple joints    Parkinson's disease (HCC)    Diabetic polyneuropathy (HCC)    Rotator cuff (capsule) sprain    Shoulder impingement    Thrombocytopenia (HCC)    Vitamin D deficiency    Bilateral carotid artery stenosis    Secondary esophageal varices without bleeding (HCC)    Cough variant asthma    Morbidly obese (HCC)    Decompensated hepatic cirrhosis (HCC)    Weakness generalized    Stasis dermatitis    Gait abnormality    Moderate malnutrition (HCC)    Impaired mobility    Glaucoma    Diabetic gastropathy (HCC)    [place] (hard of hearing)    Acute renal failure (ARF) (HCC)    History of lumbar laminectomy    Obesity (BMI 30-39. 9)    Portal hypertension (HCC)    Hypothyroidism    Muscle spasm of both lower legs    Muscle rigidity  Resolved Problems:    * No resolved hospital problems.  *      Patient Active Problem List   Diagnosis    DM2 (diabetes mellitus, type 2) ([address]    Fibromyalgia    Abdominal pain    Bursitis, hip    Bilateral hand pain    Actinic keratosis    Chondrocalcinosis of hand    Carotid stenosis    Chondrodermatitis nodularis chronica helicis    Chronic frontal sinusitis    Controlled type 2 diabetes mellitus without complication, without long-term current use of insulin (HCC)    Depression    DISH (diffuse idiopathic skeletal hyperostosis)    Fitting and adjustment of orthopedic device    Closed fracture of part of upper end of humerus    Fx phalanx, foot-closed    History of trabeculectomy    Hyperlipidemia    Hypertension    Intracranial arachnoid cyst    Joint replaced by other means    Lens replaced by other means    Liver cirrhosis [facility]    Lumbago without sciatica    Lumbar spondylosis    LVH (left ventricular hypertrophy)    Generalized muscle ache    Osteoarthritis of shoulder    Osteoarthritis of multiple joints    Parkinson's disease ([address]    POAG (primary open-angle glaucoma)    Diabetic polyneuropathy (HCC)    Pseudogout of hand    Rotator cuff (capsule) sprain    S/P left unicompartmental knee replacement    Knee joint replacement by other means    Sclerosis of the skin    Seborrheic keratosis    Secondary osteoarthritis of multiple sites    Shoulder impingement    Sprain and strain of unspecified site of shoulder and upper arm    Synovitis of hand    Thrombocytopenia (HCC)    Vitamin D deficiency    Chest pain    RANDOLPH (dyspnea on exertion)    Bilateral carotid artery stenosis    Iron deficiency anemia due to chronic blood loss    Hemorrhoids    Secondary esophageal varices without bleeding (HCC)    Cough variant asthma    Morbidly obese (HCC)    Decompensated hepatic cirrhosis ([address]    ENMA (acute kidney injury) ([address]    Pancytopenia ([address]    Fall    Weakness generalized    Malnutrition ([address]    Cirrhosis of liver with ascites ([address]    Ataxic gait    History of myocardial infarction    Rectal prolapse    Stasis dermatitis    Gait abnormality    Moderate malnutrition (HCC)    Impaired mobility    Abnormality of gait and mobility dt exacerbation of Parkinson's disease status post ascites and liver failure acute rehab admission 7/25/2020    Glaucoma    Diabetic gastropathy (HCC)    [place] (hard of hearing)    Acute renal failure (ARF) (HCC)    History of lumbar laminectomy    Obesity (BMI 30-39. 9)    Portal hypertension (HCC)    Hypothyroidism    Muscle spasm of both lower legs    Muscle rigidity       Assessment/Plan    Leg buckling raises the concern for lumbar radiculopathy. Suspect mild cognitive changes are derived from metabolic and nutritional causes.   Tumor is a minimal  Mild bradykinesia  No problem with the Sinemet, blood pressure is somewhat low, I shall not increase the dose of Sinemet     Plan:      -TSH elevated, to be watched  Folate deficiency, and thiamine deficiency. - Will add high dose thiamine for wernicke's encephalopathy (500 mg IV TID x 3 days)  - MRI lumbar spine shows a no significant spinal stenosis, has a right foraminal narrowing L4-5  - continue sinement  - continue rehab activities  - Anemia treatment ongoing  Rehab working with the patient and patient is improving          Kulwant Ohara MD, 8/2/2020 7:50 PM

## 2020-08-03 NOTE — PROGRESS NOTES
Head  Pain Radiating Towards: headache  Pain Descriptors: Aching  Pain Frequency: Intermittent  Pre Treatment Pain Screening  Pain at present: 7  Scale Used: Numeric Score  Intervention List: Patient able to continue with treatment;Nurse/Physician notified  Vital Signs  Patient Currently in Pain: Yes   Orientation  Orientation  Overall Orientation Status: Within Functional Limits  Objective    ADL  Feeding: Independent  Grooming: Supervision;Verbal cueing(pt left with PCA to continue working on denture care)  UE Bathing: Supervision;Verbal cueing  LE Bathing: Supervision;Verbal cueing  UE Dressing: Stand by assistance  LE Dressing: Stand by assistance  Toileting: Stand by assistance  Additional Comments: pt requires verbal cues for sequencing during bathing        Balance  Sitting Balance: Modified independent   Standing Balance: Stand by assistance  Functional Mobility  Functional - Mobility Device: Rolling Walker  Activity: To/from bathroom  Assist Level: Stand by assistance  Toilet Transfers  Toilet - Technique: Ambulating  Equipment Used: Grab bars  Toilet Transfer: Stand by assistance  Shower Transfers  Shower Transfers Comments: pt completed sponge bath per request     Transfers  Sit to stand: Stand by assistance  Stand to sit: Stand by assistance        Cognition  Cognition Comment: Comprehension: Sup., Expression: Sup., Social Interaction: MI., Problem Solving: Min A., Memory: Sup. Plan   Plan  Times per week: 5-7 times per day  Plan weeks: 1.5 weeks  Current Treatment Recommendations: Strengthening, Balance Training, Functional Mobility Training, Endurance Training, Safety Education & Training, Patient/Caregiver Education & Training, Self-Care / ADL, Equipment Evaluation, Education, & procurement  Plan Comment: Continue OT per POC  G-Code     OutComes Score                                                  AM-PAC Score             Goals  Patient Goals   Patient goals :  \"To get stronger\"       Therapy Time   Individual Concurrent Group Co-treatment   Time In 830         Time Out 0930         Minutes 60           ADL trainin minutes     Lissette Shaikh OT   Electronically signed by Lissette Shaikh OT on 8/3/2020 at 10:49 AM

## 2020-08-03 NOTE — PROGRESS NOTES
Physical Therapy Rehab Treatment Note  Facility/Department: [facility]  Room: [place] [address]       NAME: Lyudmila Ball  : 1935 (82 y.o.)  MRN: [id number]  CODE STATUS: Full Code    Date of Service: 8/3/2020  Family / Caregiver Present: No  General Comment  Comments: xray (-) for fx  Restrictions:  Restrictions/Precautions: Fall Risk     SUBJECTIVE: Subjective: Pt states she is tired and stiff. Pain Screening  Patient Currently in Pain: No     Post Treatment Pain Screenin/10     OBJECTIVE:              Bed mobility  Rolling to Left: Modified independent  Rolling to Right: Modified independent  Supine to Sit: Modified independent  Sit to Supine: Modified independent  Comment: Increased time to complete. Transfers  Sit to Stand: Supervision  Stand to sit: Supervision  Bed to Chair: Supervision  Comment: slow to initiate and complete. Ambulation  Ambulation?: Yes  Ambulation 1  Surface: level tile;carpet  Device: Rollator  Assistance: Supervision  Quality of Gait: slow cayden  Distance: 150ft  Stairs/Curb  Stairs?: No(Pt declines. States she does not have to do steps.)        Exercises  Neurodevelopmental Techniques: Standing reaching activities to increase rotation and balance. Other exercises  Other exercises 3: LTR stretching  Other exercises 5: seated shld shrugs, scap retraction, and retro rolls x10 ea to improve posture and decrreased rigidity  Other exercises 6: posterior capsule stretch to decrease mid back pain and stiffness   ASSESSMENT/COMMENTS:  Assessment: Pt tolerated increased gait distance this session. Pt cont to require increased time to initiate and complete tasks. PLAN OF CARE/Safety:   Plan Comment: Cont per POC.   Therapy Time:   Individual   Time In 45   Time Out 1015   Minutes 30     Minutes:      Transfer/Bed mobility trainin      Gait training:10      Neuro re education:5     Therapeutic ex:10    Rubi Gonzalez PTA, 20 at 11:51 AM

## 2020-08-03 NOTE — PROGRESS NOTES
Occupational Therapy  Facility/Department: [facility]  Daily Treatment Note  NAME: [place]  : 1935  MRN: [id number]    Date of Service: 8/3/2020    Discharge Recommendations:  Continue to assess pending progress  OT Equipment Recommendations  Other: Continue to assess    Assessment   Performance deficits / Impairments: Decreased functional mobility ; Decreased ADL status; Decreased balance;Decreased endurance;Decreased high-level IADLs;Decreased fine motor control;Decreased coordination;Decreased strength  Assessment: Pt requires verbal cues to sequence bathing and grooming tasks. Pt continues to benefit from OT services to maximize independence and safety during ADLs and IADLs. REQUIRES OT FOLLOW UP: Yes  Activity Tolerance  Activity Tolerance: Patient Tolerated treatment well  Safety Devices  Safety Devices in place: Yes  Type of devices: All fall risk precautions in place         Patient Diagnosis(es): There were no encounter diagnoses. has a past medical history of Asthma, Fibromyalgia, Hemorrhoid, Hyperlipidemia, Hypertension, Parkinson's disease ([facility] [address] and Type II or unspecified type diabetes mellitus without mention of complication, not stated as uncontrolled. has a past surgical history that includes Hysterectomy; Cholecystectomy; laminectomy; eye surgery; joint replacement (Right); hernia repair (2002); hernia repair; Hemorrhoid surgery; Upper gastrointestinal endoscopy (N/A, 2020); Paracentesis (Left, 2020); Paracentesis (Left, 06/10/2020); Paracentesis (Left, 2020); and Paracentesis (Left, 2020).     Restrictions  Restrictions/Precautions  Restrictions/Precautions: (No orders from podiatry in chart for wb status to right foot)  Required Braces or Orthoses?: No  Subjective   General  Chart Reviewed: Yes  Patient assessed for rehabilitation services?: Yes  Response to previous treatment: Patient with no complaints from previous session  Family / Caregiver Present: No  Referring Practitioner: Dr Kailyn Ratliff  Diagnosis: Imp Mob and ADLs 2° Exac of Parkinsons  Subjective  Subjective: Pt sitting in W/C in therapy gym. Pt agreeable to OT session. Orientation     Objective    Pt toileted in room at below level. Pt required verbal cues to change underwear and pad due to noted BM. Pt doffed/donned clothing at below level. Pt agreeable to OT in therapy gym. Pt placed/removed wooden pegs on vertical dowel to promote repetitive forward functional reach. Pt returned to room at end of session and requested to lay in bed. Bed mobility completed at below level. ADL  LE Dressing: Stand by assistance(verbal cues to change underwear d/t noted BM on clothing)  Toileting: Stand by assistance(verbal cues to change pad/underwear after noted BM on clothing/pad)        Toilet Transfers  Toilet - Technique: Stand pivot  Equipment Used: Grab bars  Toilet Transfer: Stand by assistance  Bed mobility  Sit to Supine: Modified independent  Comment: HOB elevated                          Cognition  Cognition Comment: Comprehension: Sup., Expression: Sup., Social Interaction: MI., Problem Solving: Min A., Memory: Sup. Plan   Plan  Times per week: 5-7 times per day  Plan weeks: 1.5 weeks  Current Treatment Recommendations: Strengthening, Balance Training, Functional Mobility Training, Endurance Training, Safety Education & Training, Patient/Caregiver Education & Training, Self-Care / ADL, Equipment Evaluation, Education, & procurement  Plan Comment: Continue OT per POC    Goals  Patient Goals   Patient goals :  \"To get stronger\"       Therapy Time   Individual Concurrent Group Co-treatment   Time In 2562         Time Out 1520         Minutes 35           ADL trainin minutes  Therapeutic activities: 15 minutes       Alberto Schwartz OT    Electronically signed by Alberto Schwartz OT on 8/3/2020 at 4:23 PM

## 2020-08-03 NOTE — PROGRESS NOTES
Physical Therapy Rehab Treatment Note  Facility/Department: [facility]  Room: [place] [address]       NAME: Mindy Hughes  : 1935 (82 y.o.)  MRN: [id number]  CODE STATUS: Full Code    Date of Service: 8/3/2020  Chart Reviewed: Yes  Family / Caregiver Present: No  General Comment  Comments: xray (-) for fx    Restrictions:  Restrictions/Precautions: Fall Risk       SUBJECTIVE: Subjective: Pt states she is tired and stiff. Pain Screening  Patient Currently in Pain: No       Post Treatment Pain Screenin/10       OBJECTIVE:              Transfers  Sit to Stand: Supervision  Stand to sit: Supervision  Bed to Chair: Supervision  Comment: Practiced locking and transfering to sit on rollator. Vcs for safety. Ambulation  Ambulation?: Yes  Ambulation 1  Surface: level tile;carpet  Device: Rollator  Assistance: Supervision  Quality of Gait: slow cayden, decreased step length  Distance: 150ft    Stairs/Curb  Stairs?: No(Pt declines. States she does not have to do steps.)        Exercises  Hip Flexion: x 20  Knee Long Arc Quad: x20  Neurodevelopmental Techniques: TUG 23.3sec; Gait drills F/R/L with 1 UE support at galvan rail to improve balance and step length. External cues to increase step length. Other exercises  Other exercises 3: LTR stretching  Other exercises 4: 30sec STS= 6    ASSESSMENT/COMMENTS:  Assessment: Pt demo'd increased endurance today by consistantly tolerating increased distance with gait training. Pt also demo'd improved gait speed with TUG score improving from 44.3sec last measure to 23.3sec. Improved 30sec STS from 3 to 6 reps. PLAN OF CARE/Safety:   Plan Comment: Cont per POC.       Therapy Time:   Individual   Time In 1330   Time Out 1430   Minutes 60     Minutes:        Transfer/Bed mobility trainin      Gait training:10      Neuro re education:35     Therapeutic ex:10      Isabel Ryan PTA, 20 at 2:24 PM

## 2020-08-03 NOTE — PROGRESS NOTES
Pt was in a good mood this afternoon. Call light within reach. Cleared bedside table for her. Pt went to PT/OT in the afternoon.

## 2020-08-03 NOTE — PROGRESS NOTES
Subjective: The patient complains of severe  acute on chronic confusion rigidity ascites malaise partially relieved by recent paracentesis, PT, OT, speech and language pathology, rest and exacerbated by reaccumulation of ascites. I am concerned about patients drop of H&H and abdominal pain. Reviewed recent neurology notes he felt that her leg buckling was from the lumbar radiculopathy with metabolic cognitive decline advised monitoring the TSH and adding of the thiamine for Warnicke's encephalopathy. Creatinine is following regarding starting back on the low-dose Lyrica which she seems to be tolerating. ROS x10: The patient also complains of severely impaired mobility and activities of daily living. Otherwise no new problems with vision, hearing, nose, mouth, throat, dermal, cardiovascular, GI, , pulmonary, musculoskeletal, psychiatric or neurological. See Rehab H&P on Rehab chart dated . Vital signs:  BP (!) 122/58 Comment: rn notifie  Pulse 68   Temp 98 °F (36.7 °C) (Oral)   Resp 18   Ht 5' 1\" (1.549 m)   Wt 145 lb 15.1 oz (66.2 kg)   SpO2 96%   BMI 27.58 kg/m²   I/O:   PO/Intake:  fair PO intake, no problems observed or reported. Bowel/Bladder:  continent, no problems noted. General:  Patient is well developed, adequately nourished, non-obese and     well kempt. HEENT:    PERRLA, hearing intact to loud voice, external inspection of ear     and nose benign. Inspection of lips, tongue and gums benign  Musculoskeletal: No significant change in strength or tone. All joints stable. Inspection and palpation of digits and nails show no clubbing,       cyanosis or inflammatory conditions. Neuro/Psychiatric: Affect: flat but pleasant. Alert and oriented to  Self . No significant change in deep tendon reflexes or     sensation  Lungs:  Diminished CTA-B. Respiration effort is normal at rest.     Heart:   S1 = S2, RRR. No loud murmurs.     Abdomen:  Firm with tenderness due to ascites, non-tender, no enlargement of liver or spleen. Extremities:  No significant lower extremity edema or tenderness. Skin:   Intact to general survey, no visualized or palpated problems. Rehabilitation:  Physical therapy: FIMS:  Bed Mobility: Scooting: Supervision    Transfers: Sit to Stand: Supervision  Stand to sit: Supervision  Bed to Chair: Supervision, Ambulation 1  Surface: carpet  Device: Rollator  Assistance: Stand by assistance  Quality of Gait: slow cayden with decreased step length. Distances limited by fatigue. Gait Deviations: Slow Cayden  Distance: 100'  Comments: Slow/steady pace, standing rest break needed between walking distance D/T SOB, inc time needed for turns,      FIMS:  ,  ,      Occupational therapy: FIMS:   ,  , Assessment: Pt demonstrated fair problem solving this date requiring cues for simple puzzle. Pt continues to benefit from OT services to maximize independence and safety with ADLs. Speech therapy: FIMS:        Lab/X-ray studies reviewed, analyzed and discussed with patient and staff:   No results found for this or any previous visit (from the past 24 hour(s)). Ct Abdomen Pelvis  7/23/2020    Liver: Small in size, and nodular contour, unchanged. Bile Ducts:  Normal in caliber. Gallbladder:  Surgically absent. Pancreas:  Hypoplastic, without masses, cysts, ductal dilatation or calcification. Spleen:  Normal in size without masses or calcifications. No splenules. Kidneys:  Right and left kidneys measure 8.0 cm, and 9.1 cm, respectively. 1 mm calcification mid pole left kidney, unchanged from 2013. No right renal calculi. Adrenals:  Normal. Small bowel:  Normal in caliber. Appendix:  Not visualized. Colon:  Normal in caliber. Surgical clips at level of rectum. Peritoneum:  Diffuse free fluid within the abdomen and pelvis, unchanged. No free air. Vessels: Aorta normal in course and caliber.  Lymph nodes:  Retroperitoneal:  No enlarged retroperitoneal lymph nodes. Mesenteric:  No enlarged mesenteric lymph nodes. Pelvic: No enlarged pelvic lymph nodes. Ureters: Normal in course and caliber. No calcifications. Bladder: Partially decompressed. Reproductive organs: Uterus surgically absent. Dystrophic calcification, left adnexa, unchanged from March, 2013. Abdominal Wall: Remote abdominal wall defect repair. Edematous change within abdominal pelvic soft tissues diffusely, stable. Bones:  No bone lesions. No degenerative changes. No post operative changes. Cirrhosis. Diffuse ascites, unchanged. Diffuse edematous change, abdominal and pelvic walls, stable. Small right kidney. Left kidney lower limits of normal. Hypoplastic pancreas. Remote hemorrhoid surgery. Cholecystectomy. Hysterectomy. Xr Hip Right 7/25/2020    Moderate degenerative changes with joint space loss in the bilateral hips. No acute fracture identified. Vascular calcifications. Impression:  1. No acute fracture identified. 2. Moderate bilateral osteoarthritis of the hips. 3. Vascular calcifications. Ct Head   7/23/2020: Extra-axial spaces:  Normal. Intracranial hemorrhage:  None. Ventricular system: Ventricles mildly enlarged. Sulci mildly prominent. Basal Cisterns:  Normal. Cerebral Parenchyma: Physiologic basal ganglia calcification, unchanged. Midline Shift:  None. Cerebellum:  Normal. Paranasal sinuses and mastoid air cells:  Normal. Visualized Orbits: Remote bilateral ocular surgery. Impression: No acute findings. Mild cerebral atrophy. Ct Chest  : 7/23/2020    Right lung shows mild dependent atelectatic change posterior right upper lobe, posterior right middle lobe, and posterior right lower lobe. No nodules or masses. No pleural effusion. Left lung shows dependent subsegmental atelectatic change, left upper lobe. Small peripheral focus of groundglass opacity lateral lower lingula. Dependent subsegmental atelectatic change, small left pleural effusion.  No nodules or masses. No hilar, mediastinal, or axillary lymph node enlargement. Cardiac size normal. No pericardial effusion. Calcified mitral annulus. Thoracic aorta normal in course and caliber. Bridging anterior osteophytes mid to lower thoracic spine. No osteoblastic, and no osteolytic lesions. No fracture. Bilateral dependent atelectatic change. Small left effusion. Ct Cervical Spine   7/23/2020    Fracture:No acute fracture. Alignment:  Normal cervical lordosis. No subluxation. No canal stenosis. Dense atlas:Dens atlas relationship is normal. Soft tissues:Airway patent. No soft tissue mass or adenopathy. Other findings: There is mild  cervical spondylosis with multiple level disc osteophyte changes. NO ACUTE CERVICAL SPINE ABNORMALITY. Ct Thoracic Spine  7/23/2020   Alignment/fracture: Normal alignment without subluxation. No evidence of fracture. Perivertebral soft tissues are normal.  Disc spaces: There are multiple levels of disc space narrowing and end plate changes consistent with degenerative change. NEGATIVE CT OF THE THORACIC SPINE. Ct Lumbar Spine 7/24/2020   : No acute fractures identified. There is T11 superior endplate Schmorl's node with dystrophic calcification, unchanged from prior imaging study. There is lumbar spondylosis with multilevel intervertebral disc space narrowing, marginal osteophytosis, and facet hypertrophy. There is L4-5 degenerative disc vacuum phenomenon. There is L3-4 and L4-5 disc bulging which does not appear to impinge on the exiting nerve roots. There is mild L4-5 central canal stenosis. There is disc bulging and  degenerative disc vacuum phenomenon at L1-2. The SI joints are symmetrically maintained. There is iliac sclerosis of the SI joints. Ascites is noted within the field-of-view. There are 2 left renal midpole nonobstructing stones, measuring 2 mm and 4 mm. DEGENERATIVE CHANGES. NO ACUTE FRACTURE.         Xr Chest   7/23/2020  NO EVIDENCE OF ACUTE THORACIC TRAUMA OR ACTIVE CARDIOPULMONARY DISEASE. Us Guided  : 7/23/2020  SUCCESSFUL ULTRASOUND-GUIDED PARACENTESIS APPROXIMATELY 6.5 L OF ASCITES. Us Retroperitoneal   7/23/2020    Renal sonography was performed. The study is compromised due to patient body habitus and sonographic window. Kidneys by ultrasound are symmetric borderline small in size. Right kidney measures 9.3 x 5.3 x 4.3 cm left kidney measures 9.2 x 4.7 x  4.8 cm. There are a few punctate echogenic foci in the central sinus echocomplex raising the question of specular reflectors. Bilateral renal echotexture is otherwise unremarkable. No large cortical cysts, solid masses or shadowing calcifications. No ultrasound signs of hydronephrosis. Note is made of upper abdominal ascites     SYMMETRIC KIDNEYS BORDERLINE SMALL IN SIZE. NO ULTRASOUND SIGNS OF HYDRONEPHROSIS. UPPER ABDOMINAL ASCITES       Previous extensive, complex labs, notes and diagnostics reviewed and analyzed. ALLERGIES:    Allergies as of 07/25/2020 - Review Complete 07/25/2020   Allergen Reaction Noted    Adhesive tape  11/22/2004    Amoxicillin-pot clavulanate  03/07/2013    Bactrim [sulfamethoxazole-trimethoprim]  03/07/2013    Clarithromycin  06/23/2015    Other  08/30/2004    Sulfa antibiotics  08/30/2004      (please also verify by checking MAR)      Today I evaluated this patient for periodic reassessment of medical and functional status. The patient was discussed in detail at the treatment team meeting focusing on current medical issues, progress in therapies, social issues, psychological issues, barriers to progress and strategies to address these barriers, and discharge planning. See the addendum to rehab progress note-as a second progress note in the chart.   The patient continues to be high risk for future disability and their medical and rehabilitation prognosis continue to be good and therefore, we will continue the patient's rehabilitation course as planned. The patient's tentative discharge date was set. Patient and family education was discussed. The patient was made aware of the team discussion regarding their progress. Complex Physical Medicine & Rehab Issues Assess & Plan:   1. Severe abnormality of gait and mobility and impaired self-care and ADL's secondary to progressive Parkinson's disease with liver failure and ascites. Functional and medical status reassessed regarding patients ability to participate in therapies and patient found to be able to participate in acute intensive comprehensive inpatient rehabilitation program including PT/OT to improve balance, ambulation, ADLs, and to improve the P/AROM. Therapeutic modifications regarding activities in therapies, place, amount of time per day and intensity of therapy made daily. In bed therapies or bedside therapies prn.   2. Bowel constipation due to ascites and Bladder dysfunction:  frequent toileting, ambulate to bathroom with assistance, check post void residuals. Check for C.difficile x1 if >2 loose stools in 24 hours, continue bowel & bladder program.  Monitor bowel and bladder function. Lactinex 2 PO every AC. MOM prn, Brown Bomb prn, Glycerin suppository prn, enema prn. Add scheduled lactulose  3. Severe low back and abdominal pain due to ascites as well as rigid muscle Parkinson's related pain as well as generalized OA pain: reassess pain every shift and prior to and after each therapy session, give prn Tylenol and Roxicodone and Ultram as needed, modalities prn in therapy, Lidoderm, K-pad prn. Tylenol is limited due to liver failure therefore no Tylenol consider Duragesic patch-she was taken off Lyrica because of its potential toxicity especially in light of liver failure. Per family's insistence she will be trialed on low-dose Lyrica however I am hesitant to allow this as she has ascites from liver failure and confusion already.   4. Skin healing and breakdown risk:  continue pressure relief program.  Daily skin exams and reports from nursing. 5. Severe fatigue due to nutritional and hydration deficiency: Add vitamin B12 vitamin D and CoQ10 continue to monitor I&Os, calorie counts prn, dietary consult prn. Lactulose for high ammonia levels. 6. Acute episodic insomnia with situational adjustment disorder:  prn Ambien, monitor for day time sedation. 7. Falls risk elevated:  patient to use call light to get nursing assistance to get up, bed and chair alarm. 8. Elevated DVT risk: progressive activities in PT, continue prophylaxis DAWN hose, elevation and no thinners due to liver failure. 9. Complex discharge planning: Discharge 8/8/2020 home 24-hour support versus long-term care with help from her daughters who live locally. Weekly team meeting every Monday to assess progress towards goals, discuss and address social, psychological and medical comorbidities and to address difficulties they may be having progressing in therapy. Patient and family education is in progress. The patient is to follow-up with their family physician after discharge. Complex Active General Medical Issues that complicate care Assess & Plan:    1. Whole body pain as well as cervical thoracic and low back pain due to fibromyalgia,   Lumbago without sciatica,   Rotator cuff (capsule) deficits, Shoulder impingement-use lowest effective dose of opiate medication to include Roxicodone and Ultram, titrate Desyrel, add Lidoderm BenGay heat massage-titrate Topamax and avoid use of Tylenol due to liver disease as well as Lyrica due to intolerance  2. Depression and anxiety-emotional support given daily add recreational therapy and rehabilitation psychology when available and support group when available  3. LVH (left ventricular hypertrophy), coronary artery disease-vital signs to shift dose and titrate cardiac locations consult hospitalist for backup medical  4.    Parkinson's disease -consult neurology-who have not addressed this issue avoid sedatives such as Lyrica which she does not tolerate well titrate Sinemet. I will consult pain management regarding the use of Lyrica. 5.   Thrombocytopenia with severe anemia and recent blood transfusion-recheck CBC add vitamin B12 vitamin D  6. Vitamin D deficiency-add daily vitamin D and recheck as an outpatient  7. Secondary esophageal varices without bleeding-monitor stools for blood avoid strong blood thinners if possible  8. Morbidly obese but with  Moderate malnutrition -add high-protein low-carb supplementation if possible however because of her her recent liver disease she may not be able to process proteins very well. 9.   Stasis dermatitis-protect skin add ET mix to the lower extremities add co-Q10  10. Severe progressive and recurrent ascites of the liver-eliminate toxic medications recheck as an outpatient monitor ammonia level-reconsult interventional radiology for repeat paracentesis. 11. Hepatic encephalopathy with Elavil elevated ammonia level-titrate lactulose recheck ammonia-consult GI to discuss this with the family. Pharmacy is now aware and adjusting medications. 12. Severe hypothyroidism-consult endocrinology titrate Synthroid  13. Polypharmacy in the elderly-consult pain management agree with avoiding use of Lyrica-medications adjusted as well patient and family made aware of these concerns.              Cher Shaver D.O., PM&R     Attending    [address]

## 2020-08-03 NOTE — PROGRESS NOTES
Vascular and Interventional Radiology   Mayda Anna. Savannah Rocha M.D. [facility]      Chief Complaint: Ascites secondary to liver cirrhosis     Subjective: Jesusita Mandujano is a 82 y.o. female patient with liver cirrhosis and previously recurring ascites. She is currently feeling well and has not had any ascites recurring since last paracentesis. She is hoping to be discharged home. She feels well and has no complaints. Objective:   BP (!) 122/58 Comment: rn notifie  Pulse 68   Temp 98 °F (36.7 °C) (Oral)   Resp 18   Ht 5' 1\" (1.549 m)   Wt 145 lb 15.1 oz (66.2 kg)   SpO2 96%   BMI 27.58 kg/m²     Physical:  thin,  alert and  not in acute distress    Normocephalic, without obvious abnormality, atraumatic    Neck supple. No adenopathy. Thyroid symmetric, normal size, and without nodularity    normal rate and regular rhythm    chest clear, no wheezing, rales, normal symmetric air entry    Abdomen: soft, non tender, non distended, NBS    Lab:  No results for input(s): WBC, RBC, HGB, HCT, MCV, MCH, MCHC, RDW, PLT, MPV in the last 72 hours. No results for input(s): INR, PROTIME in the last 72 hours. No results for input(s): CREATININE in the last 72 hours. Assessment: Jesusita Mandujano is a 82 y.o. female with liver cirrhosis and ascites which has not recurred    Plan: Nothing from IR at the moment. Patient will follow up with us as outpatient for ascites paracentesis if discharged soon. Patient will be managed by medical team. Thank you for the opportunity to care for Mrs. Murphy Farley. Signing off. Mayda Rocha M.D. [facility]  8/3/2020,  2:16 PM

## 2020-08-03 NOTE — PROGRESS NOTES
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 Goal 2: Pt will be educated on 3 memory strategies and will state their use in functional activities with 80% acc given cues as needed in order to promote safety with the completion of ADLs upon discharge. SLP provided patient with memory book. Pt filled out patient information sheets with SLP and was able to recall al personal information including address and family members names. Pt required cues from daughter to recall how many grandchildren she had. Pt recalled physical therapist name from previous therapies and recalled 1-2 activities from OT and PT completed this AM.   Goal 3: Pt will complete mid-level problem solving task related to ADLs with 80% acc given cues as needed in order to promote safety with the completion of ADLs upon discharge. Goal 4: Pt will complete delayed recall task with 80% acc given cues as needed in order to promote safety with the completion of ADLs upon discharge.   Long-term Goals  Timeframe for Long-term Goals: 2-3 weeks  Goal 1: Pt will increase cognitive-linguistic skills in order to promote effective communication of wants and needs and to promote safety with the completion of ADLs upon discharge. Compensatory Swallowing Strategies: Upright as possible for all oral intake      Treatment/Activity Tolerance:  Patient tolerated treatment well    Plan:  Continue per POC    Pain Assessment:  Initial Assessment:  Patient c/o: 7/10 headache  RN informed and administered medication    Re-assessment:  Patient c/o: 7/10 headache  RN informed. Patient/Caregiver Education:  Patient educated on session and progression towards goals. Caregiver education on session and progress towards goals. Caregiver stated verbal understanding of directions. Education needs reinforcement. with patient    Safety Devices:  Call light within reach and Chair alarm in place      [id number] Diego Murphy ([facility]):    SWALLOWING  Rating:  DNT    SPOKEN LANGUAGE COMPREHENSION  Rating:  DNT    SPOKEN LANGUAGE EXPRESSION  Rating:  DNT    MOTOR SPEECH  Rating:  DNT    PROBLEM SOLVING  Rating:  DNT    MEMORY  Ratin          Therapy Time   Time in: 1030  Time out: 1100  Minutes: 30              Signature: Electronically signed by WANDER Bender on 8/3/2020 at 11:11 AM

## 2020-08-03 NOTE — CARE COORDINATION
[address] [place] [address] NOTE  Room: R251/R251-01  Admit Date: 2020       Date: 8/3/2020  Patient Name: Luz Maria Newton        MRN: [id number]    : 1935  (82 y.o.)  Gender: female      Diagnosis: Impaired mobility/ ADLs secondary to exacerbation of Parkinson's Disease    REHAB DIAGNOSIS:   Diagnosis: Impaired mobility/ ADLs secondary to exacerbation of Parkinson's Disease    CO MORBIDITIES:  Morbid obesity.       Past Medical History:   Diagnosis Date    Asthma     Fibromyalgia     Hemorrhoid     Hyperlipidemia     Hypertension     Parkinson's disease ([address]     Type II or unspecified type diabetes mellitus without mention of complication, not stated as uncontrolled      Past Surgical History:   Procedure Laterality Date    CHOLECYSTECTOMY      EYE SURGERY      HEMORRHOID SURGERY      HERNIA REPAIR  2002    ventral hernia    HERNIA REPAIR      LIH repair    HYSTERECTOMY      JOINT REPLACEMENT Right     partial left, full right    LAMINECTOMY      PARACENTESIS Left 2020    5010cc ascites removed by Dr Danyel Hagen [phone]    PARACENTESIS Left 06/10/2020    3,850 cc removed per Dr Humberto Longoria Left 2020    total 5450 ml removed per Dr Humberto Longoria Left 2020    Dr Yuko Amezcua 7925 off   Critical access hospital ENDOSCOPY N/A 2020    EGD WITH BANDING performed by Hussein Koehler MD at [facility]     Patient assessed for rehabilitation services?: Yes  Family / Caregiver Present: No  General Comment  Comments: xray (-) for fx  Restrictions  Restrictions/Precautions: (No orders from podiatry in chart for wb status to right foot)  CASE MANAGEMENT    Social/Functional History  Social/Functional History  Lives With: Alone  Type of Home: Apartment(7th floor)  Home Layout: One level  Home Access: Elevator, Level entry  Bathroom Shower/Tub: Tub/Shower unit, Curtain  Bathroom Equipment: Grab bars in 1133)  Stairs:     W/C mobility:     LTG:  Long term goal 1: Pt to be indep with bed mobility  Long term goal 2: Pt to be indep with transfers  Long term goal 3: pt to ambulate >150ft with rollator and indep  Long term goal 4: 4 steps with handrail and indep to demonstrate improved LE strength and functional endurance  PT Treatment Time:  1.5 hrs      OCCUPATIONAL THERAPY  Hand Dominance: Left  ADL  Feeding: Independent (08/03/20 1043)  Grooming: Supervision;Verbal cueing(pt left with PCA to continue working on denture care) (08/03/20 1043)  UE Bathing: Supervision;Verbal cueing (08/03/20 1043)  LE Bathing: Supervision;Verbal cueing (08/03/20 1043)  UE Dressing: Stand by assistance (08/03/20 1043)  LE Dressing: Stand by assistance (08/03/20 1043)  Toileting: Stand by assistance (08/03/20 1043)  Additional Comments: pt requires verbal cues for sequencing during bathing (08/03/20 1043)  Toilet Transfers  Toilet - Technique: Ambulating (08/03/20 1045)  Equipment Used: Grab bars (08/03/20 1045)  Toilet Transfer: Stand by assistance (08/03/20 1045)  Tub Transfers  Tub Transfers Comments: patient declined a shower today due to fatigue (07/26/20 1058)  Shower Transfers  Shower Transfers: Not tested (07/28/20 1030)  Shower Transfers Comments: pt completed sponge bath per request (08/03/20 1045)  LTG:  Eating  Assistance Needed: Independent  CARE Score: 6  Discharge Goal: Independent, Oral Hygiene  Reason if not Attempted: Patient refused(reported she wanted to wait for her dtr)  CARE Score: 7  Discharge Goal: Independent, [address] Needed: Supervision or touching assistance  CARE Score: 4  Discharge Goal: Independent, Shower/Bathe Self  Assistance Needed: Supervision or touching assistance  CARE Score: 4  Discharge Goal: Supervision or touching assistance  Upper Body Dressing  Reason if not Attempted: Not attempted due to environmental limitations  CARE Score: 10  Discharge Goal: Independent, Lower Body Dressing  Assistance Needed: Supervision or touching assistance  CARE Score: 4  Discharge Goal: Independent, Putting On/Taking Off Footwear  Reason if not Attempted: Patient refused  CARE Score: 7  Discharge Goal: Independent, Toilet Transfer  Assistance Needed: Supervision or touching assistance  CARE Score: 4  Discharge Goal: Independent  OT Treatment Time: 1.5 hrs      SPEECH THERAPY    Motor Speech: [facility])  Comprehension: (Impaired comprehension at the moderate to complex question level and requires increased processing time. Has decreased hearing)  Verbal Expression: (mild-moderate decreased divergent and convergent naming and requires increased time to communicate complex information)      Diet/Swallow:  Diet Solids Recommendation: Regular  Liquid Consistency Recommendation: Thin  Dysphagia Outcome Severity Scale: Level 7: Normal in all situations    Compensatory Swallowing Strategies: Upright as possible for all oral intake         LTG:  Long-term Goals  Timeframe for Long-term Goals: 2-3 weeks  Goal 1: Pt will increase cognitive-linguistic skills in order to promote effective communication of wants and needs and to promote safety with the completion of ADLs upon discharge. Additional Comments: Please use memory book in therapy to write in daily activity log to aid recall     COGNITION  OT: Cognition Comment: Comprehension: Sup., Expression: Sup., Social Interaction: MI., Problem Solving: Min A., Memory: Sup. SP:Memory: (Mod decreased short term memory. Pt benefits from environmental aids. SLP provided memory book to use in therapy to recall activities.)  Problem Solving: (Mild-mod decreased simple problem solving and increased difficulty with more complex tasks.  SLP recommends assistance with all IADLs currently)    RECREATIONAL THERAPY  Attendance to recreational therapy programs:    []  Pet Therapy  [] Music Therapy  [] Art Therapy    [] Recreation Therapy Group [] Support Group           Patient social interaction (mood, participation): good      Patient strengths: good family     Patients goal:  \"To get stronger\"    Problems/Barriers: possible need for supervision        1. Safety:          - Intervention / Plan:    [x]  falls protocol     [x]  PT/OT    [x]  SP        - Results:         2. Potential DME needs:         - Intervention / Plan:  [x]  PT/OT     [x]  Assess equipment needs/access       - Results:         3. Weakness:          - Intervention / Plan:  [x]  PT/OT      []  Other:         - Results:         4. Discharge planning needs:          - Intervention / Plan:  [x]  Weekly team conference      [x]  family training        - Results:         5.            - Intervention / Plan:          - Results:         6.            - Intervention / Plan:         - Results:         7.            - Intervention / Plan:         - Results:           Discharge Plan   Estimated Length of Stay: 13 days     Tentative Discharge date: 8/8/20      Anticipated Discharge Destination:  Home      Team recommendations:    1. Follow up Therapy :    PT  OT  RN  [facility]    2. Home Health    Other:     Equipment needed at Discharge:  Other: TBD      Team Members Present at Conference:    Physician: Dr. Isabel Trevino  : Robert Matthews RN  RN: Debbie Mendez RN  Physical Therapist: Renetta Lopez, PT  Occupational Therapist: Marsha Jaimes OTR  Speech Therapist: Sky Lopez, SLP  Nurse Manager: Yaw Berg RN    Electronically signed by Christine Kumar RN on 8/6/2020 at 11:55 AM

## 2020-08-03 NOTE — PROGRESS NOTES
Left  ADL  Feeding: Modified independent  (07/29/20 1249)  Grooming: Setup(To perform hair care) (07/31/20 0924)  UE Bathing: Supervision (07/29/20 1249)  LE Bathing: Minimal assistance(assist for feet) (07/29/20 1249)  UE Dressing: Setup(To don t-shirt) (07/31/20 0924)  LE Dressing: Stand by assistance(To don pants. Increased time and effort) (07/31/20 0924)  Toileting: Stand by assistance (07/31/20 2685)  Additional Comments: pt completed sponge bath ADL per request. Pt required increased time to complete all tasks with with verbal cues for safety. (07/29/20 1249)  Toilet Transfers  Toilet - Technique: Stand pivot (07/31/20 6406)  Equipment Used: Grab bars (07/31/20 0924)  Toilet Transfer: Contact guard assistance (07/31/20 0924)  Tub Transfers  Tub Transfers Comments: patient declined a shower today due to fatigue (07/26/20 1058)  Shower Transfers  Shower Transfers: Not tested (07/28/20 1030)  Shower Transfers Comments: pt completed sponge bath per request (07/29/20 1252)      SPEECH THERAPY  Motor Speech: [facility])  Comprehension: (Impaired comprehension at the complex question level and requires increased processing time. Has decreased hearing)  Verbal Expression: (mild-moderate decreased divergent naming)      Diet/Swallow:  Diet Solids Recommendation: Regular  Liquid Consistency Recommendation: Thin  Dysphagia Outcome Severity Scale: Level 7: Normal in all situations    Compensatory Swallowing Strategies: Upright as possible for all oral intake             COGNITION  OT: Cognition Comment: Comprehension: Sup., Expression: Sup., Social Interaction: MI., Problem Solving: Min A., Memory: Sup. SP:Memory: (Unable to recall 3 words after 5 minute delay.  Mod decreased short term memory)  Problem Solving: (Mild-mod decreased simple problem solving and increased difficulty with more complex tasks)        THERAPY, MEDICAL AND NURSING COORDINATION:    []  Pain medication before therapies     []  Check orthostatic BP [x]  Ambulate to the bathroom in room    [x]  Add scheduled rest beaks     []  In room therapies      Discharge date set for:               8/8/2020      Home with:   24-hour supervision from daughters with help from   24-hour supervision from daughters versus long-term care          And:     [address]:     [x]  PT    []  OT    []  ST   [x]  Aide   []  SW    [x]  RN                    Outpatient Therapy:  []  PT    []  OT    []  ST   []  Rehab Psych                 Equipment:  tarpipe      At D/C their function is goaled at:   PT:Long term goal 1: Pt to be indep with bed mobility  Long term goal 2: Pt to be indep with transfers  Long term goal 3: pt to ambulate >150ft with rollator and indep  Long term goal 4: 4 steps with handrail and indep to demonstrate improved LE strength and functional endurance  OT:Eating  Assistance Needed: Independent  CARE Score: 6  Discharge Goal: Independent, Oral Hygiene  Reason if not Attempted: Patient refused(reported she wanted to wait for her dtr)  CARE Score: 7  Discharge Goal: Independent, [address] Needed: Supervision or touching assistance  CARE Score: 4  Discharge Goal: Independent, Shower/Bathe Self  Assistance Needed: Supervision or touching assistance  CARE Score: 4  Discharge Goal: Supervision or touching assistance  Upper Body Dressing  Reason if not Attempted: Not attempted due to environmental limitations  CARE Score: 10  Discharge Goal: Independent, Lower Body Dressing  Assistance Needed: Supervision or touching assistance  CARE Score: 4  Discharge Goal: Independent, Putting On/Taking Off Footwear  Reason if not Attempted: Patient refused  CARE Score: 7  Discharge Goal: Independent, Toilet Transfer  Assistance Needed: Supervision or touching assistance  CARE Score: 4  Discharge Goal: Independent  SP:Long-term Goals  Timeframe for Long-term Goals: 2-3 weeks  Goal 1: Pt will increase cognitive-linguistic skills in order to promote effective communication of wants and needs and to promote safety with the completion of ADLs upon discharge. From a cognitive standpoint they will need:        24 hr supervision  --progress to occasional           Significant problems/ barriers to functional progress include: Pt is at a high risk for functional loss,    [x]  Acute infection/UTI    []  Low BP's     []  COPD flare-up   []  Uncontrolled blood sugar     []  Progressive anemia         []  Severe pain exacerbation     [x]  Impaired mental status    [x]  Urinary incontinence    [x]  Bowel incontinence           Plan to correct barriers to functional progress: Add scheduled rest breaks, control pain by using ice Lidoderm rest and massage as well as pain medications prior to therapy. Based on a comprehensive evaluation of the above, the individualized therapy and Discharge plan will be:    -Times stated are an average that will be varied based on the patient's daily need. PT ____2__hrs/day 5-7 days per week           OT ___1___hrs per day 5-7 days per week ST ____1/2___hrs /day 3-5 days per week       Estimated LOS 2 week(s)    - Overall functional prognosis:     [x]  Good    []  Fair    []  Poor -Medical Prognosis:   [x]  Good    []  Fair    []  Poor    This patient was made aware of the discussion of Plan of Care, their projected dicharge date and their projected function at discharge.        Lisy Marc DO

## 2020-08-04 NOTE — PROGRESS NOTES
Occupational Therapy  Facility/Department: [facility]  Daily Treatment Note  NAME: [place]  : 1935  MRN: [id number]    Date of Service: 2020    Discharge Recommendations:  Continue to assess pending progress       Assessment      REQUIRES OT FOLLOW UP: Yes  Activity Tolerance  Activity Tolerance: Patient Tolerated treatment well  Safety Devices  Safety Devices in place: Yes  Type of devices: All fall risk precautions in place         Patient Diagnosis(es): There were no encounter diagnoses. has a past medical history of Asthma, Fibromyalgia, Hemorrhoid, Hyperlipidemia, Hypertension, Parkinson's disease ([facility] [address] and Type II or unspecified type diabetes mellitus without mention of complication, not stated as uncontrolled. has a past surgical history that includes Hysterectomy; Cholecystectomy; laminectomy; eye surgery; joint replacement (Right); hernia repair (2002); hernia repair; Hemorrhoid surgery; Upper gastrointestinal endoscopy (N/A, 2020); Paracentesis (Left, 2020); Paracentesis (Left, 06/10/2020); Paracentesis (Left, 2020); and Paracentesis (Left, 2020). Restrictions  Restrictions/Precautions  Restrictions/Precautions: (No orders from podiatry in chart for wb status to right foot)  Required Braces or Orthoses?: No  Subjective   General  Chart Reviewed: Yes  Patient assessed for rehabilitation services?: Yes  Response to previous treatment: Patient with no complaints from previous session  Family / Caregiver Present: No  Referring Practitioner: Dr Susy Sargent  Diagnosis: Imp Mob and ADLs 2° Exac of Parkinsons  Subjective  Subjective: Pt sitting in W/C in therapy gym. Pt agreeable to OT session.   Pain Assessment  Pain Assessment: 0-10  Pain Level: 0  Pre Treatment Pain Screening  Pain at present: 0  Scale Used: Numeric Score  Intervention List: Patient able to continue with treatment  Vital Signs  Patient Currently in Pain: Denies Orientation  Orientation  Overall Orientation Status: Within Functional Limits  Objective    ADL  Grooming: Supervision  Toileting: Stand by assistance   At start of OT session, pt's daughter present in therapy gym and informed therapist that pt needs to get a shower because she \"has not gotten one since Thursday\". Therapist informed pt's daughter that pt completed full ADL yesterday morning however, pt completed a sponge bath per pt request and said that she normally completes sponge baths vs. Showers. She reports only taking showers when her daughters make her. Therapist informed pt's daughter of the information that was given to her per pt and reassured pt's daughter that therapist will attempt to perform shower tomorrow. Therapist also informed pt's daughter that pt requires supervision during ADL's d/t pt has difficulty sequencing tasks and will likely need someone when pt discharges. Pt's daughter expresses appreciation for the information. While seated at tabletop, pt assembled large wooden beads of various shapes and sizes onto string using B UEs to improve fine motor coordination during self care. Pt demonstrates slowed movements while assembling beads onto string but dropped 0 beads. Pt assembled beads in pattern of her choice. Pt able to fill 3/4 of string before therapist requested pt to remove beads d/t pt requires increased time. Pt returned all beads to the container without difficulty. While seated at tabletop, pt sorted rubber washers onto graded vertical dowels using B UEs and 1lb wrist weights to improve problem solving during ADLs and IADLs. Pt with 1 error while sorting washers and pt able to self correct. Otherwise, pt demonstrates good ability to problem solve during task. Pt sorted washers with increased time. Pt then removed washers from dowels and returned them to the designated slots. Pt reports the urge to use the restroom before end of OT session.  Pt completed functional mobility, transfers, toileting, and grooming tasks at the levels below. ADL  Grooming: Supervision  Toileting: Stand by assistance  Balance  Standing Balance: Stand by assistance  Functional Mobility  Functional - Mobility Device: Rolling Walker  Activity: To/from bathroom  Assist Level: Stand by assistance  Toilet Transfers  Toilet - Technique: Ambulating  Equipment Used: Grab bars  Toilet Transfer: Stand by assistance     Transfers  Sit to stand: Stand by assistance  Stand to sit: Stand by assistance        Plan   Plan  Times per week: 5-7 times per day  Plan weeks: 1.5 weeks  Current Treatment Recommendations: Strengthening, Balance Training, Functional Mobility Training, Endurance Training, Safety Education & Training, Patient/Caregiver Education & Training, Self-Care / ADL, Equipment Evaluation, Education, & procurement  Plan Comment: Continue OT per POC  G-Code     OutComes Score                                                  AM-PAC Score             Goals  Patient Goals   Patient goals :  \"To get stronger\"       Therapy Time   Individual Concurrent Group Co-treatment   Time In 1000         Time Out 1100         Minutes 60           ADL training: 10 minutes  Therapeutic activities: 30 minutes  Cognitive Retrainin minutes     Neeta Mancia OT   Electronically signed by Neeta Mancia OT on 2020 at 12:10 PM

## 2020-08-04 NOTE — PROGRESS NOTES
Physical Therapy Rehab Treatment Note  [facility]  [facility]       NAME: Mario Alberto Rothman  : 1935 (82 y.o.)  MRN: [id number]  CODE STATUS: Full Code    Date of Service: 2020  Chart Reviewed: Yes  Family / Caregiver Present: No    Restrictions:  Restrictions/Precautions: Fall Risk    SUBJECTIVE:       Pre Treatment Pain Screening  Pain at present: 0    Post Treatment Pain Screenin     OBJECTIVE:   Follows Commands: Within Functional Limits             Transfers  Sit to Stand: Supervision  Stand to sit: Supervision    Ambulation  Ambulation?: Yes  Ambulation 1  Surface: level tile;carpet  Device: Rollator  Assistance: Supervision  Quality of Gait: slow cayden, decreased step length  Distance: 150ft  Comments: . Exercises  Neurodevelopmental Techniques: Ambulating around room with rollator locating and retieving cones to challange balance, maneuverability, and environmental scanning. ASSESSMENT/COMMENTS:  Assessment: Pt tolerated increased gait distance. Pt able locate and retrieve 100% of cones without cuing and no LOB with rollator. PLAN OF CARE/Safety:   Plan Comment: Cont per POC.     Therapy Time:   Individual   Time In 1400   Time Out 1430   Minutes 30     Minutes:        Transfer/Bed mobility trainin      Gait training:10      Neuro re education:15     Therapeutic ex:0    Radha Middleton PTA, 20 at 4:44 PM

## 2020-08-04 NOTE — PROGRESS NOTES
Karen Scullin, DO   5 mg at 08/03/20 1533    traMADol (ULTRAM) tablet 25 mg  25 mg Oral Q6H PRN Karen Scullin, DO   25 mg at 07/29/20 2126    ondansetron (ZOFRAN) tablet 4 mg  4 mg Oral Q8H PRN Guilherme Means DO        fleet rectal enema 1 enema  1 enema Rectal Daily PRN Karen Scullin, DO        albuterol (PROVENTIL) nebulizer solution 2.5 mg  2.5 mg Nebulization Q6H PRN Nikita Vela MD        topiramate (TOPAMAX) tablet 50 mg  50 mg Oral Daily Nikita Vela MD   50 mg at 08/03/20 0812    traZODone (DESYREL) tablet 150 mg  150 mg Oral Nightly Nikita Vela MD   150 mg at 08/02/20 2113    ondansetron (ZOFRAN) injection 4 mg  4 mg Intravenous Q6H PRN Nikita Vela MD        budesonide-formoterol (SYMBICORT) 160-4.5 MCG/ACT inhaler 2 puff  2 puff Inhalation BID Nikita Vela MD   2 puff at 07/31/20 0501    carbidopa-levodopa (SINEMET)  MG per tablet 1 tablet  1 tablet Oral TID Nikita Vela MD   1 tablet at 08/03/20 1533    furosemide (LASIX) tablet 40 mg  40 mg Oral Daily Nikita Vela MD   40 mg at 08/03/20 0811    spironolactone (ALDACTONE) tablet 50 mg  50 mg Oral Daily Nikita Vela MD   50 mg at 08/03/20 0925         ALLERGIES:  Adhesive tape; Amoxicillin-pot clavulanate; Bactrim [sulfamethoxazole-trimethoprim]; Clarithromycin; Other; and Sulfa antibiotics    Review of Systems  Constitutional: Positive for activity change and fatigue. Negative for appetite change, chills, diaphoresis, fever and unexpected weight change. HENT: Negative. Eyes: Negative. Respiratory: Negative. Cardiovascular: Negative. Gastrointestinal: Negative. Endocrine: Negative. Genitourinary: Negative. Musculoskeletal: Positive for arthralgias, back pain, gait problem, joint swelling, myalgias, neck pain and neck stiffness. Skin: Negative. Allergic/Immunologic: Negative. Neurological: Positive for weakness.  Negative for dizziness, tremors, seizures, syncope, facial asymmetry, speech difficulty, light-headedness, numbness and headaches. Hematological: Negative. Psychiatric/Behavioral: Positive for behavioral problems, confusion, decreased concentration and dysphoric mood. Negative for agitation, hallucinations, self-injury, sleep disturbance and suicidal ideas. The patient is nervous/anxious. The patient is not hyperactive.                 OBJECTIVE  PHYSICAL EXAM:  BP (!) 114/58   Pulse 71   Temp 98 °F (36.7 °C) (Oral)   Resp 18   Ht 5' 1\" (1.549 m)   Wt 145 lb 15.1 oz (66.2 kg)   SpO2 97%   BMI 27.58 kg/m²   Body mass index is 27.58 kg/m². CONSTITUTIONAL:  Patient is awake, alert oriented x3 eYES:  vision intact  ENT:  normocepalic, without obvious abnormality, atraumatic  NECK:  Neck exam seem to be otherwise unremarkable, mild paraspinal achiness. BACK:  G mildly tender across the paraspinal area. LUNGS:  no increased work of breathing and good air exchange  CARDIOVASCULAR:  regular rate and rhythm  ABDOMEN: Soft nontender nondistended  MUSCULOSKELETAL:  Negative for any abnormalities. NEUROLOGIC:   Neurologically stable   SKIN:  no bruising or bleeding  DATA: tests reviewed for today's visit:    All labs and imaging results reviewed. Lab Results   Component Value Date    WBC 4.6 07/24/2020    HGB 8.4 07/25/2020    HCT 25.8 07/25/2020    .9 07/24/2020    PLT 85 07/24/2020     07/25/2020    K 3.5 07/25/2020     07/25/2020    CO2 19 07/25/2020    BUN 21 07/25/2020    CREATININE 1.01 07/25/2020    CALCIUM 8.3 07/25/2020    ALKPHOS 53 07/25/2020    ALT 6 07/25/2020    AST 28 07/25/2020    BILITOT 0.7 07/25/2020    BILIDIR 0.4 01/29/2020    LABALBU 2.9 07/25/2020    LABALBU 4.5 03/30/2012   LABS  No results for input(s): WBC, RBC, HGB, HCT, MCV, MCH, MCHC, RDW, PLT, MPV in the last 72 hours. No results for input(s): NA, K, CL, CO2, BUN, CREATININE, GLUCOSE, CALCIUM in the last 72 hours.     No results for input(s): MG in the last 72 hours. No results for input(s): Bo Kiss, LABBENZ, CANSU, COCAIMETSCRU, OPIATESCREENURINE, OXYCODONEUR, DSCOMMENT in the last 72 hours. Invalid input(s): PHENCYCLIDINESCREENURINE. LABMETH. PROPOX     Mri Lumbar Spine Wo Contrast    Result Date: 7/27/2020  EXAMINATION: MRI lumbar spine without contrast CLINICAL HISTORY: Recent fall. Bilateral leg weakness. FINDINGS: Unenhanced scans obtained. T1 and T2-weighted sagittal and axial sequences were acquired. A STIR sagittal sequence are included. Today's study is compared with recent CT dated July 23, 2020. Vertebral bodies are intact and normal in height. Degenerative endplate changes are seen at L4-L5 particularly involving the superior endplate of L5. There is disc desiccation from L1 through L4. There is disc space narrowing at L3-L4 and L4-L5. Conus is  normal in caliber and signal and terminates at T12-L1. T12-L1: Unremarkable. L1-L2: Small focal disc protrusion centrally and to the left of the midline slightly effaces the thecal sac without narrowing the central canal or compressing transiting or exiting nerve roots. Foramina patent. L2-L3: No disc herniation. No central canal or foraminal stenosis. L3-L4: Generalized bulging of the annulus effaces the thecal sac anteriorly without compromising the transiting or exiting nerve roots. No significant central canal or foraminal stenosis. L4-L5: Generalized bulging of the annulus anteriorly and proliferative changes in the facet joints noted more prominent right side than left. There is narrowing of the right L4 nerve root foramen. No significant central canal stenosis and no definite disc herniation. L5-S1: No disc herniation or central canal or foraminal stenosis. Postlaminectomy changes on the right side noted. Prevertebral soft tissues unremarkable     DIFFUSE DEGENERATIVE DISEASE. NO COMPRESSION FRACTURE OR INFILTRATIVE BONY PROCESS. L4 NERVE ROOT FORAMINAL STENOSIS ON THE RIGHT.  NO EVIDENCE OF DISC HERNIATION OR CENTRAL CANAL STENOSIS. Ir Us Guided Paracentesis    1. Status post technically successful ultrasound-guided paracentesis. Evelyn Diggs is a Female of 82 years age, referred for Ultrasound Guided Paracentesis. PROCEDURE: Survey of the abdomen showed large amount of ascites fluid. After obtaining informed consent, the patient was positioned supine on the sonography table. Using ultrasound, the skin over the left hemiabdomen was locally anesthetized with 1% lidocaine. Following that, a Yueh needle was advanced into the fluid pocket using ultrasound visualization. 5900cc, of clear yellow fluid were aspirated and sent for cytology, and pathology. The needle was removed, and hemostasis was obtained with pressure. A Band-Aid was placed. Post procedure images did not demonstrate hemorrhage at the target site. The patient tolerated the procedure well. The patient left the department in good condition. A radiology nurse was in presence monitoring vital signs, assisting throughout the procedure. Jamison Baumann 40 Problems    Diagnosis Date Noted    Muscle spasm of both lower legs [M62.838] 07/30/2020    Muscle rigidity [R29.898] 07/30/2020    Hypothyroidism [E03.9]     Glaucoma [H40.9] 07/27/2020    Diabetic gastropathy ([address] [E11.69, K31.9] 07/27/2020    [place] (hard of hearing) [H91.90] 07/27/2020    Acute renal failure (ARF) ([address] [N17.9] 07/27/2020    History of lumbar laminectomy [Z98.890] 07/27/2020    Obesity (BMI 30-39. 9) [E66.9] 07/27/2020    Portal hypertension ([address] [K76.6] 07/27/2020    Impaired mobility [Z74.09] 07/26/2020    Abnormality of gait and mobility dt exacerbation of Parkinson's disease status post ascites and liver failure acute rehab admission 7/25/2020 [R26.9] 07/26/2020    Stasis dermatitis [I87.2] 07/24/2020    Moderate malnutrition ([address] [E44.0] 07/24/2020    Gait abnormality [R26.9] 07/24/2020    Weakness generalized [R53.1] 07/23/2020    Morbidly obese ([facility] [E66.01] 03/10/2020    Cough variant asthma [J45.991] 03/10/2020    Decompensated hepatic cirrhosis ([facility] [K72.90] 03/10/2020    Secondary esophageal varices without bleeding (HCC) [I85.10]     Bilateral carotid artery stenosis [I65.23] 01/09/2019    Lumbar spondylosis [M47.816] 10/30/2017    Osteoarthritis of multiple joints [M15.9] 06/17/2014    Vitamin D deficiency [E55.9] 05/02/2014    Lumbago without sciatica [M54.5] 05/01/2014    DISH (diffuse idiopathic skeletal hyperostosis) [M48.10] 05/01/2014    Hyperlipidemia [E78.5] 04/17/2013    Fibromyalgia [M79.7]     DM2 (diabetes mellitus, type 2) ([facility] [E11.9]     Depression [F32.9] 08/28/2012    Hypertension [I10] 08/28/2012    Thrombocytopenia ([facility] [D69.6] 04/24/2012    LVH (left ventricular hypertrophy) [I51.7] 04/05/2012    Parkinson's disease ([facility] [G20] 02/18/2012    Rotator cuff (capsule) sprain [S43.429A] 06/15/2010    Shoulder impingement [M75.40] 05/14/2010    Diabetic polyneuropathy ([facility] [E11.42] 12/26/2007    Generalized muscle ache [M79.10] 12/26/2007      Overall patient seem to be having good pain relief, also improving physically and participating in therapy, she stated that her fibromyalgia pain is much improved. No issue with her cognitive function at this point. RECOMMENDATION:  SEE ORDERS  Maintain on Lyrica 25 g twice daily, she seem to be tolerating well lower dose, no affecting her cognitive function however I do not recommend further increase. His dose would be appropriate for her age. Also is appropriate for managing her fibromyalgia. Continue with other Ultram as needed      Optimize topical non-opioid analgesia.   SIGNATURE: Rain Brumfield MD PATIENT NAME: Ana Daniel   DATE: August 3, 2020 MRN: [id number]   TIME: 8:26 PM PAGER/CONTACT #: [phone]

## 2020-08-04 NOTE — PROGRESS NOTES
Physical Therapy Rehab Treatment Note  [facility]       NAME: Zachary Farley  : 1935 (82 y.o.)  MRN: [id number]  CODE STATUS: Full Code    Date of Service: 2020  Chart Reviewed: Yes  Family / Caregiver Present: No    Restrictions:  Restrictions/Precautions: Fall Risk     SUBJECTIVE:  Pt states she is cold. Pre Treatment Pain Screening  Pain at present: 0    Post Treatment Pain Screenin/10     OBJECTIVE:                 Transfers  Sit to Stand: Supervision  Stand to sit: Supervision    Ambulation  Ambulation?: Yes  Ambulation 1  Surface: level tile;carpet  Device: Rollator  Assistance: Stand by assistance  Quality of Gait: slow cayden, decreased step length  Distance: 50ft  Comments: distance limited by c/o feeling lightheaded. VCs to stop, lock breaks, and take seated rest on rollator. Sx decreased after sitting. /69 HR 86BPM after seated. Exercises  Neurodevelopmental Techniques: Gait drills F/R/L with 1 UE support at rail to improve balance and step length. External cues to increase step length. ASSESSMENT/COMMENTS:  Assessment: Pt with c/o feeling lightheaded during ambulation this AM.  Vitals WNL. Sx decreased after seated rest then able to continue on with treatment. PLAN OF CARE/Safety:   Plan Comment: Cont per POC.   Therapy Time:   Individual   Time In 930   Time Out 1000   Minutes 30     Minutes:      Transfer/Bed mobility trainin      Gait training:10      Neuro re education:15     Therapeutic ex:0    Fletcher Terrazas PTA, 20 at 9:57 AM

## 2020-08-04 NOTE — PROGRESS NOTES
Subjective: The patient complains of severe  acute on chronic confusion rigidity ascites malaise partially relieved by recent paracentesis, PT, OT, speech and language pathology, rest and exacerbated by reaccumulation of ascites. I am concerned about patients drop of H&H and abdominal pain. Patient's encephalopathy secondary to liver failure and Parkinson's seems to be improving she is tolerating the low-dose Lyrica so far. Pain is under fair to good control. Ammonia levels and recheck H&H are pending. So far her thyroid peroxidase is less than 10. ROS x10: The patient also complains of severely impaired mobility and activities of daily living. Otherwise no new problems with vision, hearing, nose, mouth, throat, dermal, cardiovascular, GI, , pulmonary, musculoskeletal, psychiatric or neurological. See Rehab H&P on Rehab chart dated . Vital signs:  BP (!) 107/54   Pulse 84   Temp 97 °F (36.1 °C) (Oral)   Resp 16   Ht 5' 1\" (1.549 m)   Wt 145 lb 15.1 oz (66.2 kg)   SpO2 97%   BMI 27.58 kg/m²   I/O:   PO/Intake:  fair PO intake, no problems observed or reported. Bowel/Bladder:  continent, no problems noted. General:  Patient is well developed, adequately nourished, non-obese and     well kempt. HEENT:    PERRLA, hearing intact to loud voice, external inspection of ear     and nose benign. Inspection of lips, tongue and gums benign  Musculoskeletal: No significant change in strength or tone. All joints stable. Inspection and palpation of digits and nails show no clubbing,       cyanosis or inflammatory conditions. Neuro/Psychiatric: Affect: flat but pleasant. Alert and oriented to  Self . No significant change in deep tendon reflexes or     sensation  Lungs:  Diminished CTA-B. Respiration effort is normal at rest.     Heart:   S1 = S2, RRR. No loud murmurs.     Abdomen:  Firm with tenderness due to ascites, non-tender, no enlargement of liver or spleen. Extremities:  No significant lower extremity edema or tenderness. Skin:   Intact to general survey, no visualized or palpated problems. Rehabilitation:  Physical therapy: FIMS:  Bed Mobility: Scooting: Supervision    Transfers: Sit to Stand: Supervision  Stand to sit: Supervision  Bed to Chair: Supervision, Ambulation 1  Surface: level tile, carpet  Device: Rollator  Assistance: Supervision  Quality of Gait: slow cayden, decreased step length  Gait Deviations: Slow Cayden  Distance: 150ft  Comments: Slow/steady pace, standing rest break needed between walking distance D/T SOB, inc time needed for turns,      FIMS:  ,  ,      Occupational therapy: FIMS:   ,  , Assessment: Pt requires verbal cues to sequence bathing and grooming tasks. Pt continues to benefit from OT services to maximize independence and safety during ADLs and IADLs. Speech therapy: FIMS:        Lab/X-ray studies reviewed, analyzed and discussed with patient and staff:   Recent Results (from the past 24 hour(s))   Ammonia    Collection Time: 08/04/20  5:46 AM   Result Value Ref Range    Ammonia 75 (H) 11 - 51 umol/L   Hemoglobin and Hematocrit, Blood    Collection Time: 08/04/20  5:46 AM   Result Value Ref Range    Hemoglobin 7.9 (L) 12.0 - 16.0 g/dL    Hematocrit 24.2 (L) 37.0 - 47.0 %       Ct Abdomen Pelvis  7/23/2020    Liver: Small in size, and nodular contour, unchanged. Bile Ducts:  Normal in caliber. Gallbladder:  Surgically absent. Pancreas:  Hypoplastic, without masses, cysts, ductal dilatation or calcification. Spleen:  Normal in size without masses or calcifications. No splenules. Kidneys:  Right and left kidneys measure 8.0 cm, and 9.1 cm, respectively. 1 mm calcification mid pole left kidney, unchanged from 2013. No right renal calculi. Adrenals:  Normal. Small bowel:  Normal in caliber. Appendix:  Not visualized. Colon:  Normal in caliber. Surgical clips at level of rectum.  Peritoneum:  Diffuse free fluid within the abdomen and pelvis, unchanged. No free air. Vessels: Aorta normal in course and caliber. Lymph nodes:  Retroperitoneal:  No enlarged retroperitoneal lymph nodes. Mesenteric:  No enlarged mesenteric lymph nodes. Pelvic: No enlarged pelvic lymph nodes. Ureters: Normal in course and caliber. No calcifications. Bladder: Partially decompressed. Reproductive organs: Uterus surgically absent. Dystrophic calcification, left adnexa, unchanged from March, 2013. Abdominal Wall: Remote abdominal wall defect repair. Edematous change within abdominal pelvic soft tissues diffusely, stable. Bones:  No bone lesions. No degenerative changes. No post operative changes. Cirrhosis. Diffuse ascites, unchanged. Diffuse edematous change, abdominal and pelvic walls, stable. Small right kidney. Left kidney lower limits of normal. Hypoplastic pancreas. Remote hemorrhoid surgery. Cholecystectomy. Hysterectomy. Xr Hip Right 7/25/2020    Moderate degenerative changes with joint space loss in the bilateral hips. No acute fracture identified. Vascular calcifications. Impression:  1. No acute fracture identified. 2. Moderate bilateral osteoarthritis of the hips. 3. Vascular calcifications. Ct Head   7/23/2020: Extra-axial spaces:  Normal. Intracranial hemorrhage:  None. Ventricular system: Ventricles mildly enlarged. Sulci mildly prominent. Basal Cisterns:  Normal. Cerebral Parenchyma: Physiologic basal ganglia calcification, unchanged. Midline Shift:  None. Cerebellum:  Normal. Paranasal sinuses and mastoid air cells:  Normal. Visualized Orbits: Remote bilateral ocular surgery. Impression: No acute findings. Mild cerebral atrophy. Ct Chest  : 7/23/2020    Right lung shows mild dependent atelectatic change posterior right upper lobe, posterior right middle lobe, and posterior right lower lobe. No nodules or masses. No pleural effusion.  Left lung shows dependent subsegmental atelectatic change, left upper lobe. Small peripheral focus of groundglass opacity lateral lower lingula. Dependent subsegmental atelectatic change, small left pleural effusion. No nodules or masses. No hilar, mediastinal, or axillary lymph node enlargement. Cardiac size normal. No pericardial effusion. Calcified mitral annulus. Thoracic aorta normal in course and caliber. Bridging anterior osteophytes mid to lower thoracic spine. No osteoblastic, and no osteolytic lesions. No fracture. Bilateral dependent atelectatic change. Small left effusion. Ct Cervical Spine   7/23/2020    Fracture:No acute fracture. Alignment:  Normal cervical lordosis. No subluxation. No canal stenosis. Dense atlas:Dens atlas relationship is normal. Soft tissues:Airway patent. No soft tissue mass or adenopathy. Other findings: There is mild  cervical spondylosis with multiple level disc osteophyte changes. NO ACUTE CERVICAL SPINE ABNORMALITY. Ct Thoracic Spine  7/23/2020   Alignment/fracture: Normal alignment without subluxation. No evidence of fracture. Perivertebral soft tissues are normal.  Disc spaces: There are multiple levels of disc space narrowing and end plate changes consistent with degenerative change. NEGATIVE CT OF THE THORACIC SPINE. Ct Lumbar Spine 7/24/2020   : No acute fractures identified. There is T11 superior endplate Schmorl's node with dystrophic calcification, unchanged from prior imaging study. There is lumbar spondylosis with multilevel intervertebral disc space narrowing, marginal osteophytosis, and facet hypertrophy. There is L4-5 degenerative disc vacuum phenomenon. There is L3-4 and L4-5 disc bulging which does not appear to impinge on the exiting nerve roots. There is mild L4-5 central canal stenosis. There is disc bulging and  degenerative disc vacuum phenomenon at L1-2. The SI joints are symmetrically maintained. There is iliac sclerosis of the SI joints.  Ascites is noted within the field-of-view. There are 2 left renal midpole nonobstructing stones, measuring 2 mm and 4 mm. DEGENERATIVE CHANGES. NO ACUTE FRACTURE. Xr Chest   7/23/2020  NO EVIDENCE OF ACUTE THORACIC TRAUMA OR ACTIVE CARDIOPULMONARY DISEASE. Us Guided  : 7/23/2020  SUCCESSFUL ULTRASOUND-GUIDED PARACENTESIS APPROXIMATELY 6.5 L OF ASCITES. Us Retroperitoneal   7/23/2020    Renal sonography was performed. The study is compromised due to patient body habitus and sonographic window. Kidneys by ultrasound are symmetric borderline small in size. Right kidney measures 9.3 x 5.3 x 4.3 cm left kidney measures 9.2 x 4.7 x  4.8 cm. There are a few punctate echogenic foci in the central sinus echocomplex raising the question of specular reflectors. Bilateral renal echotexture is otherwise unremarkable. No large cortical cysts, solid masses or shadowing calcifications. No ultrasound signs of hydronephrosis. Note is made of upper abdominal ascites     SYMMETRIC KIDNEYS BORDERLINE SMALL IN SIZE. NO ULTRASOUND SIGNS OF HYDRONEPHROSIS. UPPER ABDOMINAL ASCITES       Previous extensive, complex labs, notes and diagnostics reviewed and analyzed. ALLERGIES:    Allergies as of 07/25/2020 - Review Complete 07/25/2020   Allergen Reaction Noted    Adhesive tape  11/22/2004    Amoxicillin-pot clavulanate  03/07/2013    Bactrim [sulfamethoxazole-trimethoprim]  03/07/2013    Clarithromycin  06/23/2015    Other  08/30/2004    Sulfa antibiotics  08/30/2004      (please also verify by checking MAR)      Yesterday I evaluated this patient for periodic reassessment of medical and functional status. The patient was discussed in detail at the treatment team meeting focusing on current medical issues, progress in therapies, social issues, psychological issues, barriers to progress and strategies to address these barriers, and discharge planning. See the hand written addendum to rehab progress note.   The patient continues to be high risk for future disability and their medical and rehabilitation prognosis continue to be good and therefore, we will continue the patient's rehabilitation course as planned. The patient's tentative discharge date was set. Patient and family education was discussed. The patient was made aware of the team discussion regarding their progress. Discharge plans were discussed along with barriers to progress and strategies to address these barriers, patient encouraged to continue to discuss discharge plans with . Complex Physical Medicine & Rehab Issues Assess & Plan:   1. Severe abnormality of gait and mobility and impaired self-care and ADL's secondary to progressive Parkinson's disease with liver failure and ascites. Functional and medical status reassessed regarding patients ability to participate in therapies and patient found to be able to participate in acute intensive comprehensive inpatient rehabilitation program including PT/OT to improve balance, ambulation, ADLs, and to improve the P/AROM. Therapeutic modifications regarding activities in therapies, place, amount of time per day and intensity of therapy made daily. In bed therapies or bedside therapies prn.   2. Bowel constipation due to ascites and Bladder dysfunction:  frequent toileting, ambulate to bathroom with assistance, check post void residuals. Check for C.difficile x1 if >2 loose stools in 24 hours, continue bowel & bladder program.  Monitor bowel and bladder function. Lactinex 2 PO every AC. MOM prn, Brown Bomb prn, Glycerin suppository prn, enema prn. Add scheduled lactulose  3. Severe low back and abdominal pain due to ascites as well as rigid muscle Parkinson's related pain as well as generalized OA pain: reassess pain every shift and prior to and after each therapy session, give prn Tylenol and Roxicodone and Ultram as needed, modalities prn in therapy, Lidoderm, K-pad prn.  Tylenol is limited due to liver failure therefore no Tylenol consider Duragesic patch-she was taken off Lyrica because of its potential toxicity especially in light of liver failure. Per family's insistence she will be trialed on low-dose Lyrica however I am hesitant to allow this as she has ascites from liver failure and confusion already. 4. Skin healing and breakdown risk:  continue pressure relief program.  Daily skin exams and reports from nursing. 5. Severe fatigue due to nutritional and hydration deficiency: Add vitamin B12 vitamin D and CoQ10 continue to monitor I&Os, calorie counts prn, dietary consult prn. Lactulose for high ammonia levels. 6. Acute episodic insomnia with situational adjustment disorder:  prn Ambien, monitor for day time sedation. 7. Falls risk elevated:  patient to use call light to get nursing assistance to get up, bed and chair alarm. 8. Elevated DVT risk: progressive activities in PT, continue prophylaxis DAWN hose, elevation and no thinners due to liver failure. 9. Complex discharge planning: Discharge 8/8/2020 home 24-hour support versus long-term care with help from her daughters who live locally. Weekly team meeting every Monday to assess progress towards goals, discuss and address social, psychological and medical comorbidities and to address difficulties they may be having progressing in therapy. Patient and family education is in progress. The patient is to follow-up with their family physician after discharge. Complex Active General Medical Issues that complicate care Assess & Plan:    1. Whole body pain as well as cervical thoracic and low back pain due to fibromyalgia,   Lumbago without sciatica,   Rotator cuff (capsule) deficits, Shoulder impingement-use lowest effective dose of opiate medication to include Roxicodone and Ultram, titrate Desyrel, add Lidoderm BenGay heat massage-titrate Topamax and avoid use of Tylenol due to liver disease as well as Lyrica due to intolerance  2. Depression and anxiety-emotional support given daily add recreational therapy and rehabilitation psychology when available and support group when available  3. LVH (left ventricular hypertrophy), coronary artery disease-vital signs to shift dose and titrate cardiac locations consult hospitalist for backup medical  4. Parkinson's disease -consult neurology-who have not addressed this issue avoid sedatives such as Lyrica which she does not tolerate well titrate Sinemet. I will consult pain management regarding the use of Lyrica. 5.   Thrombocytopenia with severe anemia and recent blood transfusion-recheck CBC add vitamin B12 vitamin D  6. Vitamin D deficiency-add daily vitamin D and recheck as an outpatient  7. Secondary esophageal varices without bleeding-monitor stools for blood avoid strong blood thinners if possible  8. Morbidly obese but with  Moderate malnutrition -add high-protein low-carb supplementation if possible however because of her her recent liver disease she may not be able to process proteins very well. 9.   Stasis dermatitis-protect skin add ET mix to the lower extremities add co-Q10  10. Severe progressive and recurrent ascites of the liver-eliminate toxic medications recheck as an outpatient monitor ammonia level-reconsult interventional radiology for repeat paracentesis. 11. Hepatic encephalopathy with Elavil elevated ammonia level-titrate lactulose recheck ammonia-consult GI to discuss this with the family. Pharmacy is now aware and adjusting medications. 12. Severe hypothyroidism-consult endocrinology titrate Synthroid  13. Polypharmacy in the elderly-consult pain management agree with avoiding use of Lyrica-medications adjusted as well patient and family made aware of these concerns.              Dk Crooks D.O., PM&R     Attending    [address] [facility]

## 2020-08-04 NOTE — PROGRESS NOTES
Physical Therapy Rehab Treatment Note  [facility]  [facility]       NAME: Urbano Fitzpatrick  : 1935 (82 y.o.)  MRN: [id number]  CODE STATUS: Full Code    Date of Service: 2020  Chart Reviewed: Yes  Patient assessed for rehabilitation services?: Yes  Family / Caregiver Present: No    Restrictions:  Restrictions/Precautions: Fall Risk       SUBJECTIVE: Subjective: I am doing pretty good  Response To Previous Treatment: Patient with no complaints from previous session. Pain Screening  Patient Currently in Pain: Denies  Pre Treatment Pain Screening  Pain at present: 0  Scale Used: Numeric Score  Intervention List: Patient able to continue with treatment    Post Treatment Pain Screening:  Pain Assessment  Pain Assessment: 0-10  Pain Level: 0    OBJECTIVE:   Follows Commands: Within Functional Limits    Neuromuscular Education  NDT Treatment: Gait   Neuromuscular Comments: Stepping over small red bolster to improve foot clearance/ step length with forward weight shift minimal BUE support on // bars, Side stepping on // bars amd retro walking in // bars    Transfers  Sit to Stand: Supervision  Stand to sit: Supervision    Ambulation  Ambulation?: Yes  More Ambulation?: No  Ambulation 1  Surface: level tile;uneven;carpet  Device: Rollator  Assistance: Supervision  Quality of Gait: slow cayden, decreased step length  Distance: 110' x 2    Stairs/Curb  Stairs?: No    ASSESSMENT/COMMENTS:  Body structures, Functions, Activity limitations: Decreased functional mobility ; Decreased balance;Decreased endurance;Decreased strength  Assessment: Patient shows increased fatigue in p.m. limiting gait tolerance to 110' Patient with good safety with side stepping retro walking and stepping over obstacles with UE support    PLAN OF CARE/Safety:   Plan Comment: Cont per POC.       Therapy Time:   Individual   Time In 1500   Time Out 1530   Minutes 30     Minutes: 30      Transfer/Bed mobility trainin      Gait training: 15      Neuro re education: [place], [place], 20 at 4:09 PM

## 2020-08-04 NOTE — PROGRESS NOTES
NEUROLOGY INPATIENT PROGRESS NOTE    Patient- Luz Maria Newton    MRN -  [id number]   Acct # - [de-identified]      - 1935    82 y.o. Subjective: The patient is seen in follow-up. Patient is alert at this time. Daughter Abilio Bueno is concerned about cognitive changes that are new for this hospitalization. Unclear what the goal was with topamax, perhaps this is a side effect of the medication. She is moving all the extremities  She feels she is doing good in the rehab  Thiamine changed to p.o. Thiamine deficiency, folate deficiency, being addressed  Elevated TSH to be watched  B12 was normal  Tremor minimal only  Bradykinesia stable and mild only    Result Data:  CBC:   Recent Labs     20  0546   HGB 7.9*     BMP:  No results for input(s): NA, K, CL, CO2, BUN, CREATININE, GLUCOSE in the last 72 hours. TSH:   No results for input(s): TSH in the last 72 hours. Folic Acid: No results for input(s): FOLATE in the last 72 hours. B12:    Lab Results   Component Value Date    [id number] 352 2020     Vit. D: No components found for: VITAMIND  Lipids: No results for input(s): CHOL, TRIG, HDL, LDLCALC in the last 72 hours. Ammonia:   Recent Labs     20  0546   AMMONIA 75*     LFT: No results for input(s): AST, ALT, ALB, BILITOT, ALKPHOS in the last 72 hours. Urine: No results for input(s): COLORU, PHUR, LABCAST, WBCUA, RBCUA, MUCUS, YEAST, BACTERIA, CLARITYU, SPECGRAV, LEUKOCYTESUR, UROBILINOGEN, Clydene Deaner in the last 72 hours. Invalid input(s): NITRATE, GLUCOSEUKETONESUAMORPHOUS     Imaging    Ct Abdomen Pelvis Wo Contrast Additional Contrast? None    Result Date: 2020  CT of the Abdomen and Pelvis without intravenous contrast medium History:  Fall from standing Technical Factors: CT imaging of the abdomen and pelvis were obtained and formatted as 5 mm contiguous axial images from the domes of the diaphragm to the symphysis pubis.   Sagittal and coronal reconstructions were also obtained. Oral contrast medium:  None. Intravenous contrast medium:  None. Comparison:  CT abdomen pelvis, March 10, 2020, March 11, 2013. Findings: Liver: Small in size, and nodular contour, unchanged. Bile Ducts:  Normal in caliber. Gallbladder:  Surgically absent. Pancreas:  Hypoplastic, without masses, cysts, ductal dilatation or calcification. Spleen:  Normal in size without masses or calcifications. No splenules. Kidneys:  Right and left kidneys measure 8.0 cm, and 9.1 cm, respectively. 1 mm calcification mid pole left kidney, unchanged from 2013. No right renal calculi. Adrenals:  Normal. Small bowel:  Normal in caliber. Appendix:  Not visualized. Colon:  Normal in caliber. Surgical clips at level of rectum. Peritoneum:  Diffuse free fluid within the abdomen and pelvis, unchanged. No free air. Vessels: Aorta normal in course and caliber. Lymph nodes:  Retroperitoneal:  No enlarged retroperitoneal lymph nodes. Mesenteric:  No enlarged mesenteric lymph nodes. Pelvic: No enlarged pelvic lymph nodes. Ureters: Normal in course and caliber. No calcifications. Bladder: Partially decompressed. Reproductive organs: Uterus surgically absent. Dystrophic calcification, left adnexa, unchanged from March, 2013. Abdominal Wall: Remote abdominal wall defect repair. Edematous change within abdominal pelvic soft tissues diffusely, stable. Bones:  No bone lesions. No degenerative changes. No post operative changes. Cirrhosis. Diffuse ascites, unchanged. Diffuse edematous change, abdominal and pelvic walls, stable. Small right kidney. Left kidney lower limits of normal. Hypoplastic pancreas. Remote hemorrhoid surgery. Cholecystectomy. Hysterectomy. All CT scans at this facility use dose modulation, iterative reconstruction, and/or weight based dosing when appropriate to reduce radiation dose to as low as reasonably achievable.      Xr Hip Right (2-3 Views)    Result Date: atelectatic change posterior right upper lobe, posterior right middle lobe, and posterior right lower lobe. No nodules or masses. No pleural effusion. Left lung shows dependent subsegmental atelectatic change, left upper lobe. Small peripheral focus of groundglass opacity lateral lower lingula. Dependent subsegmental atelectatic change, small left pleural effusion. No nodules or masses. No hilar, mediastinal, or axillary lymph node enlargement. Cardiac size normal. No pericardial effusion. Calcified mitral annulus. Thoracic aorta normal in course and caliber. Bridging anterior osteophytes mid to lower thoracic spine. No osteoblastic, and no osteolytic lesions. No fracture. Bilateral dependent atelectatic change. Small left effusion. All CT scans at this facility use dose modulation, iterative reconstruction, and/or weight based dosing when appropriate to reduce radiation dose to as low as reasonably achievable. Ct Cervical Spine Wo Contrast    Result Date: 7/23/2020  EXAMINATION: CT CERVICAL SPINE WO CONTRAST   DATE AND TIME:7/22/2020 10:45 PM CLINICAL HISTORY:ACUTE POSTERIOR NECK PAIN  TRAUMA  COMPARISON: NONE TECHNIQUE:Helical scanning was performed from the skull base through the remainder of the cervical spine without intravenous contrast. Sagittal and coronal reformats were obtained. The lack of contrast limits CT sensitivity of the soft tissues. All CT scans at this facility use dose modulation, iterative reconstruction, and/or weight based dosing when appropriate to reduce radiation dose to as low as reasonably achievable. FINDINGS   Fracture:No acute fracture. Alignment:  Normal cervical lordosis. No subluxation. No canal stenosis. Dense atlas:Dens atlas relationship is normal. Soft tissues:Airway patent. No soft tissue mass or adenopathy. Other findings: There is mild  cervical spondylosis with multiple level disc osteophyte changes. NO ACUTE CERVICAL SPINE ABNORMALITY.        Ct Thoracic Spine Wo Contrast    Result Date: 7/23/2020  EXAMINATION: CT THORACIC SPINE WO CONTRAST DATE AND TIME:7/22/2020 10:45 PM CLINICAL HISTORY: SEVERE POSTERIOR THORACIC SPINE PAIN. FALL  COMPARISON: NONE TECHNIQUE:  Axial noncontrast images were performed through the thoracic spine with coronal and sagittal reconstructions. All CT scans at this facility use dose modulation, iterative reconstruction, and/or weight based dosing when appropriate to reduce radiation dose to as low as reasonably achievable. FINDINGS  Alignment/fracture: Normal alignment without subluxation. No evidence of fracture. Perivertebral soft tissues are normal.  Disc spaces: There are multiple levels of disc space narrowing and end plate changes consistent with degenerative change. NEGATIVE CT OF THE THORACIC SPINE. Ct Lumbar Spine Wo Contrast    Result Date: 7/24/2020  EXAMINATION: CT LUMBAR SPINE WO CONTRAST CLINICAL HISTORY: Fall from standing. Back pain. COMPARISON: CT abdomen pelvis 3/10/2020 TECHNIQUE:  Spiral scans with no contrast. Multiplanar 2-D reconstructions. All CT scans at this facility use dose modulation, iterative reconstruction, and/or weight based dosing when appropriate to reduce radiation dose to as low as reasonably achievable. FINDINGS: No acute fractures identified. There is T11 superior endplate Schmorl's node with dystrophic calcification, unchanged from prior imaging study. There is lumbar spondylosis with multilevel intervertebral disc space narrowing, marginal osteophytosis, and facet hypertrophy. There is L4-5 degenerative disc vacuum phenomenon. There is L3-4 and L4-5 disc bulging which does not appear to impinge on the exiting nerve roots. There is mild L4-5 central canal stenosis. There is disc bulging and  degenerative disc vacuum phenomenon at L1-2. The SI joints are symmetrically maintained. There is iliac sclerosis of the SI joints. Ascites is noted within the field-of-view.  There are 2 left renal midpole nonobstructing stones, measuring 2 mm and 4 mm. DEGENERATIVE CHANGES. NO ACUTE FRACTURE. Mri Lumbar Spine Wo Contrast    Result Date: 7/27/2020  EXAMINATION: MRI lumbar spine without contrast CLINICAL HISTORY: Recent fall. Bilateral leg weakness. FINDINGS: Unenhanced scans obtained. T1 and T2-weighted sagittal and axial sequences were acquired. A STIR sagittal sequence are included. Today's study is compared with recent CT dated July 23, 2020. Vertebral bodies are intact and normal in height. Degenerative endplate changes are seen at L4-L5 particularly involving the superior endplate of L5. There is disc desiccation from L1 through L4. There is disc space narrowing at L3-L4 and L4-L5. Conus is  normal in caliber and signal and terminates at T12-L1. T12-L1: Unremarkable. L1-L2: Small focal disc protrusion centrally and to the left of the midline slightly effaces the thecal sac without narrowing the central canal or compressing transiting or exiting nerve roots. Foramina patent. L2-L3: No disc herniation. No central canal or foraminal stenosis. L3-L4: Generalized bulging of the annulus effaces the thecal sac anteriorly without compromising the transiting or exiting nerve roots. No significant central canal or foraminal stenosis. L4-L5: Generalized bulging of the annulus anteriorly and proliferative changes in the facet joints noted more prominent right side than left. There is narrowing of the right L4 nerve root foramen. No significant central canal stenosis and no definite disc herniation. L5-S1: No disc herniation or central canal or foraminal stenosis. Postlaminectomy changes on the right side noted. Prevertebral soft tissues unremarkable     DIFFUSE DEGENERATIVE DISEASE. NO COMPRESSION FRACTURE OR INFILTRATIVE BONY PROCESS. L4 NERVE ROOT FORAMINAL STENOSIS ON THE RIGHT. NO EVIDENCE OF DISC HERNIATION OR CENTRAL CANAL STENOSIS.     Xr Chest Portable    Result Date: 7/23/2020  XR CHEST PORTABLE : 7/22/2020 CLINICAL HISTORY: Pain after fall . COMPARISON: High-resolution chest CT 12/30/2019 and two-view chest 11/18/2019. TECHNIQUE: An upright portable AP radiograph of the chest was obtained. FINDINGS: A shallow inspiration is present without significant infiltrate identified. There is no cardiomegaly, significant pleural effusion, vascular congestion, pneumothorax, or displaced fractures identified. NO EVIDENCE OF ACUTE THORACIC TRAUMA OR ACTIVE CARDIOPULMONARY DISEASE. Ir Us Guided Paracentesis    1. Status post technically successful ultrasound-guided paracentesis. Ceci Frank is a Female of 82 years age, referred for Ultrasound Guided Paracentesis. PROCEDURE: Survey of the abdomen showed large amount of ascites fluid. After obtaining informed consent, the patient was positioned supine on the sonography table. Using ultrasound, the skin over the left hemiabdomen was locally anesthetized with 1% lidocaine. Following that, a Yueh needle was advanced into the fluid pocket using ultrasound visualization. 5900cc, of clear yellow fluid were aspirated and sent for cytology, and pathology. The needle was removed, and hemostasis was obtained with pressure. A Band-Aid was placed. Post procedure images did not demonstrate hemorrhage at the target site. The patient tolerated the procedure well. The patient left the department in good condition. A radiology nurse was in presence monitoring vital signs, assisting throughout the procedure. Us Guided Paracentesis    Result Date: 7/23/2020  US GUIDED PARACENTESIS : 7/23/2020 CLINICAL HISTORY:  therapeutic paracentesis . COMPARISON: Chest, abdomen and pelvis CTs from earlier 7/23/2020 PROCEDURE: Informed consent was obtained. Limited transabdominal ultrasound was performed and marking done in the usual fashion.  Sterile technique and local lidocaine anesthesia was used to place a 5 [employer] Yueh catheter within the abdominal fluid collection. Approximately 6.5 L of low viscosity pale yellow-colored fluid was aspirated without evidence of immediate complication. A sample was sent for analysis as requested. The patient was monitored in the radiology holding area for approximately hold minutes without event. She was returned to the medical floor in stable condition with the usual instructions. SUCCESSFUL ULTRASOUND-GUIDED PARACENTESIS APPROXIMATELY 6.5 L OF ASCITES. Us Guided Paracentesis    1. Status post technically successful ultrasound-guided paracentesis. Ramesh Izaguirre is a Female of 82 years age, referred for Ultrasound Guided Paracentesis. PROCEDURE: Survey of the abdomen showed large amount of ascites fluid. After obtaining informed consent, the patient was positioned supine on the sonography table. Using ultrasound, the skin over the left hemiabdomen was locally anesthetized with 1% lidocaine. Following that, a [facility] needle was advanced into the fluid pocket using ultrasound visualization. 5450cc, of clear yellow fluid were aspirated and sent for cytology, and pathology. The needle was removed, and hemostasis was obtained with pressure. A Band-Aid was placed. Post procedure images did not demonstrate hemorrhage at the target site. The patient tolerated the procedure well. The patient left the department in good condition. A radiology nurse was in presence monitoring vital signs, assisting throughout the procedure. Us Retroperitoneal Limited    Result Date: 7/23/2020  EXAMINATION: US RETROPERITONEAL LIMITED CLINICAL HISTORY: 82year-old with renal insufficiency and extensive abdominal ascites COMPARISONS: Unenhanced abdominal CT 7/23/2020 FINDINGS: Renal sonography was performed. The study is compromised due to patient body habitus and sonographic window. Kidneys by ultrasound are symmetric borderline small in size. Right kidney measures 9.3 x 5.3 x 4.3 cm left kidney measures 9.2 x 4.7 x  4.8 cm. There are a few punctate echogenic foci in the central sinus echocomplex raising the question of specular reflectors. Bilateral renal echotexture is otherwise unremarkable. No large cortical cysts, solid masses or shadowing calcifications. No ultrasound signs of hydronephrosis. Note is made of upper abdominal ascites     SYMMETRIC KIDNEYS BORDERLINE SMALL IN SIZE. NO ULTRASOUND SIGNS OF HYDRONEPHROSIS. UPPER ABDOMINAL ASCITES        Objective:   /84   Pulse 81   Temp 97 °F (36.1 °C) (Oral)   Resp 16   Ht 5' 1\" (1.549 m)   Wt 145 lb 15.1 oz (66.2 kg)   SpO2 97%   BMI 27.58 kg/m²     No intake or output data in the 24 hours ending 08/04/20 1713         Physical Exam:  MUSCULOSKELETAL:  There is no redness, warmth, or swelling of the joints. Tone is mildly increased. Patient has had full range of motion in all the limbs.         NEUROLOGIC:    Speech Expression: appropriate quality, quantity and organization of sentences  Speech Comprehension: intact, patient has a mild hearing loss    Mental Status Exam: alert, oriented to person, place, and time    Motor Strength:   right upper extremity:   5/5  left upper extremity:  5/5  right lower extremity: 5/5    left lower extremity:  5/5      Medications:     lactulose  20 g Oral Daily    nystatin, stomahesive in petrolatum   Topical 3 times per day    thiamine  100 mg Oral BID    pregabalin  25 mg Oral BID    pantoprazole  20 mg Oral QAM AC    levothyroxine  25 mcg Oral Daily    folic acid  5 mg Oral Daily    traZODone  150 mg Oral Nightly    budesonide-formoterol  2 puff Inhalation BID    carbidopa-levodopa  1 tablet Oral TID    furosemide  40 mg Oral Daily    spironolactone  50 mg Oral Daily                 Principal Problem:    Abnormality of gait and mobility dt exacerbation of Parkinson's disease status post ascites and liver failure acute rehab admission 7/25/2020  Active Problems:    DM2 (diabetes mellitus, type 2) (HCC)    Fibromyalgia of Sinemet     Plan:      - 8/4 stopped topamax d/t cognitive side effects, will monitor   -TSH elevated, to be watched  Folate deficiency, and thiamine deficiency. - Will add high dose thiamine for wernicke's encephalopathy (500 mg IV TID x 3 days)  - MRI lumbar spine shows a no significant spinal stenosis, has a right foraminal narrowing L4-5  - continue sinement  - continue rehab activities  - Anemia treatment ongoing  Rehab working with the patient and patient is improving          Tamara Martinez MD, 8/4/2020 5:13 PM

## 2020-08-04 NOTE — PROGRESS NOTES
Pt is here for Parkinson exacerbation, ENMA and S/P fall. Continued monitoring for abdominal distention r/t liver cirrhosis, consulted IR Dr. Valentina Primrose. RN agrees w/LPN assessment.  Electronically signed by Adelaida Gama RN on 8/4/2020 at 3:07 PM

## 2020-08-04 NOTE — PROGRESS NOTES
Occupational Therapy  Facility/Department: [facility]  Daily Treatment Note  NAME: [place]  : 1935  MRN: [id number]    Date of Service: 2020    Discharge Recommendations:  Continue to assess pending progress       Assessment      REQUIRES OT FOLLOW UP: Yes  Activity Tolerance  Activity Tolerance: Patient Tolerated treatment well  Safety Devices  Safety Devices in place: Yes  Type of devices: All fall risk precautions in place         Patient Diagnosis(es): There were no encounter diagnoses. has a past medical history of Asthma, Fibromyalgia, Hemorrhoid, Hyperlipidemia, Hypertension, Parkinson's disease ([facility] [address] and Type II or unspecified type diabetes mellitus without mention of complication, not stated as uncontrolled. has a past surgical history that includes Hysterectomy; Cholecystectomy; laminectomy; eye surgery; joint replacement (Right); hernia repair (2002); hernia repair; Hemorrhoid surgery; Upper gastrointestinal endoscopy (N/A, 2020); Paracentesis (Left, 2020); Paracentesis (Left, 06/10/2020); Paracentesis (Left, 2020); and Paracentesis (Left, 2020). Restrictions  Restrictions/Precautions  Restrictions/Precautions: (No orders from podiatry in chart for wb status to right foot)  Required Braces or Orthoses?: No  Subjective   General  Chart Reviewed: Yes  Patient assessed for rehabilitation services?: Yes  Response to previous treatment: Patient with no complaints from previous session  Family / Caregiver Present: No  Referring Practitioner: Dr Frederica Nyhan  Diagnosis: Imp Mob and ADLs 2° Exac of Parkinsons  Subjective  Subjective: Pt sitting in W/C in therapy gym. Pt agreeable to OT session.   Pain Assessment  Pain Assessment: 0-10  Pain Level: 0  Pre Treatment Pain Screening  Pain at present: 0  Scale Used: Numeric Score  Intervention List: Patient able to continue with treatment;Patient declined any intervention  Vital Signs  Patient Currently in Pain: Denies   Orientation  Orientation  Overall Orientation Status: Within Functional Limits  Objective      While seated at tabletop, pt retrieved plastic beads from orange theraputty (medium resistance) using B UEs to improve fine motor coordination and pinch strength during self care. Pt worked at slow pace to retrieve beads. Pt able to retrieve 15 beads with increased time. Pt demonstrates good attention to task. While seated at tabletop, pt assembled 12-piece jigsaw puzzle of familiar object such as a dog using B UEs to improve problem solving during ADLs and IADLs. Pt demonstrates mod difficulty to assemble and requires increased time. Pt unable to complete puzzle d/t end of therapy time. Plan   Plan  Times per week: 5-7 times per day  Plan weeks: 1.5 weeks  Current Treatment Recommendations: Strengthening, Balance Training, Functional Mobility Training, Endurance Training, Safety Education & Training, Patient/Caregiver Education & Training, Self-Care / ADL, Equipment Evaluation, Education, & procurement  Plan Comment: Continue OT per POC  G-Code     OutComes Score                                                  AM-PAC Score             Goals  Patient Goals   Patient goals :  \"To get stronger\"       Therapy Time   Individual Concurrent Group Co-treatment   Time In 1430         Time Out 1500         Minutes 30           Therapeutic activities: 18 minutes  Cognitive Retrainin minutes     Lisa Campos OT   Electronically signed by Lisa Campos OT on 2020 at 3:08 PM

## 2020-08-05 NOTE — PROGRESS NOTES
Pt's bp at this time is 105/52, 82. Lasix and aldactone retimed for noon. Pt complains of 8/10 to left arm. Roxycodone given. Electronically signed by Manjula Tucker LPN on 3/7/8703 at 3:05 AM    Pt left unit for paracentesis. Electronically signed by Manjula Tucker LPN on 9/5/2534 at 47:82 AM    Pt returned at [phone] from procedure. 2880 cc's taken off and no albumin was reported to this nurse.  Electronically signed by Manjula Tucker LPN on 4/4/9593 at 8:43 PM

## 2020-08-05 NOTE — SEDATION DOCUMENTATION
NO SEDATION      1135 Pt arrived to  room 6 via stretcher. U/S images obtained. Pt offered reassurance and VSS. Pt denies pain at this time. Consent signed. 1150 Timeout completed. 1151 Using U/S, Dr. Carlos Beckford marked left lower abdomen and area cleansed with large tinted chloraprep. Once dry, using sterile technique, Dr. Carlos Beckford prepped and draped area. 1152 Using U/S guidance, Dr. Cross Bound site with 2% lidocaine, see eMar.     1153 Using U/S guidance, access obtained with [id number] centesis 5F catheter with return of dannielle colored fluid. Catheter tubing connected to Keith machine with suction at 200  mmHG and draining well. Pt tolerating well. . VSS.     1213 Drainage complete. Total 2880 ml removed. Centesis catheter removed and digital pressure held to site for two minutes. 1221 Left lower abdominal site soft, no drainage, large bandaid applied. 1225 Report called to nurse on floor.

## 2020-08-05 NOTE — PROGRESS NOTES
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 Pt completed abstract convergent naming task with 43% acc independently, increasing to 100% acc when given mod-max verbal cues from SLP. It should be noted that pt presented with significantly increased processing time and decreased hearing with the need for repetitions and facial cues. Goal 2: Pt will be educated on 3 memory strategies and will state their use in functional activities with 80% acc given cues as needed in order to promote safety with the completion of ADLs upon discharge. Pt was educated on memory strategies (calendar, [employer], lists, central location). Pt responded well to education and stated verbal understanding. Goal 3: Pt will complete mid-level problem solving task related to ADLs with 80% acc given cues as needed in order to promote safety with the completion of ADLs upon discharge. Not assessed. Goal 4: Pt will complete delayed recall task with 80% acc given cues as needed in order to promote safety with the completion of ADLs upon discharge.   Pt was educated on grouping strategy in order to remember 3 words after 5-minute delay, however, education needs reinforcement as pt was unsuccessfully implemented strategy. Pt was able to recall 2/3 words after 5-minute delay, but was not able to recall the grouping strategy put in place prior to exercise. Treatment/Activity Tolerance:  Patient tolerated treatment well    Plan:  Continue per POC    Pain Assessment:  Initial Assessment:  Patient c/o: 8 level in hand  Patient reported an acceptable level for treatment. GERARDO Piña in room when pt c/o pain. Pt given pain pill at beginning of session. Re-assessment:  Patient c/o: 8 in hand  Patient reported an acceptable level for treatment. GERARDO Piña already aware of pt's pain and had given pain med with SLP in room at beginning of session. Patient/Caregiver Education:  Patient educated on session and progression towards goals. Patient stated verbal understanding of directions. Safety Devices:   All fall risk precautions in place, Call light within reach and Chair alarm in place      [address] [address] ([facility]):    SWALLOWING  Rating:  DNT    SPOKEN LANGUAGE COMPREHENSION  Rating:  3    SPOKEN LANGUAGE EXPRESSION  Rating:  3    MOTOR SPEECH  Rating:  DNT    PROBLEM SOLVING  Rating:  DNT    MEMORY  Rating:  3      Therapy Time   Time in: 0900   Time out: 0928   Total Minutes: 28 Minutes      Signature: Electronically signed by KATHERIN Zaman-SLP on 8/5/2020 at 11:28 AM

## 2020-08-05 NOTE — PROGRESS NOTES
.   Patient evaluated by Dr. Veatrice Scheuermann patient transported to [facility] for procedure  Paracentesis. Electronically signed by Seema Sanders RN on 8/5/2020 at 11:28 AM   Spoke with CNP regarding low blood pressure and medications on board. We also reviewed abnormal labs patient did take her lactulose as prescribed with education to the need to take. CNP will evaluate the cardiac medications. Patient returned from unit from [facility] 2880 ml  Fluid removed. No albumin given.

## 2020-08-05 NOTE — PROGRESS NOTES
Physical Therapy Rehab Treatment Note  Facility/Department: [facility]  Room: [facility]/R251-01       NAME: Cheryl Valverde  : 1935 (82 y.o.)  MRN: [id number]  CODE STATUS: Full Code    Date of Service: 2020  Chart Reviewed: Yes  Family / Caregiver Present: No    Restrictions:  Restrictions/Precautions: Fall Risk    SUBJECTIVE: Subjective: Pt states she is doing ok. Pt reports she feels she will be ready to go home on Sat and is not worried about anything. Pain Screening  Patient Currently in Pain: 8/10 neck ache     Post Treatment Pain Screening:neck 4/10 after hot pack     OBJECTIVE:                 Transfers  Sit to Stand: Modified independent  Stand to sit: Modified independent  Car Transfer: Stand by assistance(Instructed pt on safest technique.)    Ambulation  Ambulation?: Yes  Ambulation 1  Surface: level tile;carpet  Device: Rollator  Assistance: Supervision  Quality of Gait: slow cayden, decreased step length  Distance: [id number]  Comments: Pt faitgued after 100ft. Pt able to recognize and safely transfer to sit on rollator. Stairs/Curb  Stairs? : (Pt declines. States she does not have to do stairs.)        Exercises  Hip Flexion: x 20  Knee Long Arc Quad: x20  Neurodevelopmental Techniques: Functional ambulatory tasks with rollator managing doors and lights. Box steps with 1 HHA. Standing alt toe taps on 4in block with 1 HHA. Standing stepping to targets with 1 UE support progressing to without UE support to challange balance and increase step length. Comments: Cervical HP applied to decrease neck pain x10 minutes during seated LE therex. ASSESSMENT/COMMENTS:  Assessment: Pt requires 1 UE support to maintain balance with dynamic stepping activities. Pt reports decreased neck pain after HP. PLAN OF CARE/Safety:   Plan Comment: Cont per POC.   Therapy Time:   Individual   Time In 1400   Time Out 1430   Minutes 30     Therapy Time   Individual      Time In 1500      Time Out 1532 Minutes 32          Minutes:62      Transfer/Bed mobility training:10      Gait trainin      Neuro re education:25     Therapeutic ex:10    Radha Tovar PTA, 20 at 4:28 PM

## 2020-08-05 NOTE — FLOWSHEET NOTE
Patient assessment completed earlier this shift. The patient refused to take anything for her constipation. She denied any pain upon assessment. She has been continent of urine, but has a history of stress incontinence.  She ambulates with a walker with assist.

## 2020-08-05 NOTE — PROGRESS NOTES
Occupational Therapy  Facility/Department: [facility]  Daily Treatment Note  NAME: [place]  : 1935  MRN: [id number]    Date of Service: 2020    Discharge Recommendations:  Continue to assess pending progress       Assessment   Assessment: Pt requires less verbal cues to sequence bathing and grooming tasks. Pt continues to benefit from OT services to maximize independence and safety during ADLs and IADLs. REQUIRES OT FOLLOW UP: Yes  Activity Tolerance  Activity Tolerance: Patient Tolerated treatment well  Safety Devices  Safety Devices in place: Yes  Type of devices: All fall risk precautions in place         Patient Diagnosis(es): There were no encounter diagnoses. has a past medical history of Asthma, Fibromyalgia, Hemorrhoid, Hyperlipidemia, Hypertension, Parkinson's disease ([facility] [address] and Type II or unspecified type diabetes mellitus without mention of complication, not stated as uncontrolled. has a past surgical history that includes Hysterectomy; Cholecystectomy; laminectomy; eye surgery; joint replacement (Right); hernia repair (2002); hernia repair; Hemorrhoid surgery; Upper gastrointestinal endoscopy (N/A, 2020); Paracentesis (Left, 2020); Paracentesis (Left, 06/10/2020); Paracentesis (Left, 2020); and Paracentesis (Left, 2020). Restrictions  Restrictions/Precautions  Restrictions/Precautions: (No orders from podiatry in chart for wb status to right foot)  Required Braces or Orthoses?: No  Subjective   General  Chart Reviewed: Yes  Patient assessed for rehabilitation services?: Yes  Response to previous treatment: Patient with no complaints from previous session  Family / Caregiver Present: No  Referring Practitioner: Dr Alcon Alexander  Diagnosis: Imp Mob and ADLs 2° Exac of Parkinsons  Subjective  Subjective: Pt sitting in W/C in therapy gym. Pt agreeable to OT session.   Pain Assessment  Pain Assessment: 0-10  Pain Level: 0  Pre Treatment Pain Screening  Pain AM-PAC Score             Goals  Patient Goals   Patient goals :  \"To get stronger\"       Therapy Time   Individual Concurrent Group Co-treatment   Time In 930         Time Out 1030         Minutes 60           ADL trainin minutes     Lance Iraheta OT   Electronically signed by Lance Iraheta OT on 2020 at 11:26 AM

## 2020-08-05 NOTE — PROGRESS NOTES
Hospitalist Consult/Progress Note  8/5/2020 2:22 PM    Assessment and Plan:   1. Mild Hypotension: In the last 48 hours patient's blood pressure has ranged from 748-45 systolic. We will continue diuretic regimen as recommended by GI. Continue to monitor BP    2. Wernicke's encephalopathy: Patient refusing lactulose. High-dose thiamine per neurology recommendations. Continue to monitor ammonia level and encourage lactulose. 3. Gait instability and Decreased Functional Status secondary to Parkinson's: Continue Sinemet, fall precautions. PT OT to evaluate. Maximize nutrition status. Assessing if needs DME at home. SW on board    4. Decompensated liver cirrhosis with esophageal varices and ascites, s/p u/s guided therapeutic paracentesis with removal of 6.5 liters of fluid per IR, fluid analysis was negative for SB, restarted on diuretic regimen by GI     4. Pancytopenia:  due to advanced liver cirrhosis, monitor counts closely     5. Anemia:  baseline. no active bleeding, follow H/H      6. Bowel Regimen and GI PPx: stool softners PRN ordered with hold parameters for loose stools or diarrhea. On antiacid    7. Discharge planning: SW on board. Will discharge once medically stable and cleared by all consultants. 8. High Risk Readmission Screening Tool Score Noted.      Additionally, the following hospital problems were addressed:  Principal Problem:    Abnormality of gait and mobility dt exacerbation of Parkinson's disease status post ascites and liver failure acute rehab admission 7/25/2020  Active Problems:    DM2 (diabetes mellitus, type 2) (HCC)    Fibromyalgia    Depression    DISH (diffuse idiopathic skeletal hyperostosis)    Hyperlipidemia    Hypertension    Lumbago without sciatica    Lumbar spondylosis    LVH (left ventricular hypertrophy)    Generalized muscle ache    Osteoarthritis of multiple joints    Parkinson's disease ([address]    Diabetic polyneuropathy (HCC)    Rotator cuff (capsule) sprain    Shoulder impingement    Thrombocytopenia (HCC)    Vitamin D deficiency    Bilateral carotid artery stenosis    Secondary esophageal varices without bleeding (HCC)    Cough variant asthma    Morbidly obese (HCC)    Decompensated hepatic cirrhosis (HCC)    Weakness generalized    Stasis dermatitis    Gait abnormality    Moderate malnutrition (HCC)    Impaired mobility    Glaucoma    Diabetic gastropathy (HCC)    [place] (hard of hearing)    Acute renal failure (ARF) (HCC)    History of lumbar laminectomy    Obesity (BMI 30-39. 9)    Portal hypertension (HCC)    Hypothyroidism    Muscle spasm of both lower legs    Muscle rigidity  Resolved Problems:    * No resolved hospital problems. *      ** Total time spent reviewing medical records, evaluating patient, speaking with RN's and consultants where I was focused exclusively on this patient: 35 minutes. This time is excluding time spent performing procedures or significant events occurring earlier or later in the day requiring my attention and focus. Subjective:   Admit Date: 7/25/2020  PCP: Denver Wilson MD    No acute events overnight. Afebrile  Vitals reviewed. No new complaints. Pt denies chest pain, SOB, N/V, fevers or chills. Objective:     Vitals:    08/05/20 0900 08/05/20 1148 08/05/20 1200 08/05/20 1222   BP: (!) 105/52 116/72 112/68 106/67   Pulse: 82 74 71 69   Resp:  16 16 16   Temp:       TempSrc:       SpO2:  97% 97% 99%   Weight:       Height:         General appearance: No acute distress, A + O x 2-3. No conversational dyspnea noted. Dentition intact. Answers questions appropriately  Lungs: CTAB Diminished  no exp wheezes, No rales No retractions; No use of accessory muscles  Heart:  S1, S2 normal, RRR, no MRG appreciated  Abdomen: (+) BS, soft, non-tender; non distended no guarding or rigidity. Extremities:  no cyanosis, no edema bilat lower exts, no calf tenderness bilaterally.  Dry skin noted       Medications:      lactulose 20 g Oral Daily    nystatin, stomahesive in petrolatum   Topical 3 times per day    thiamine  100 mg Oral BID    pregabalin  25 mg Oral BID    pantoprazole  20 mg Oral QAM AC    levothyroxine  25 mcg Oral Daily    folic acid  5 mg Oral Daily    traZODone  150 mg Oral Nightly    budesonide-formoterol  2 puff Inhalation BID    carbidopa-levodopa  1 tablet Oral TID    furosemide  40 mg Oral Daily    spironolactone  50 mg Oral Daily       LABS Reviewed    IMAGING Reviewed    Dany Rhodes CNP  Rounding Hospitalist

## 2020-08-05 NOTE — PROGRESS NOTES
Subjective: The patient complains of severe  acute on chronic confusion rigidity ascites malaise partially relieved by recent paracentesis, PT, OT, speech and language pathology, rest and exacerbated by reaccumulation of ascites. I am concerned about patients drop of H&H and abdominal pain. Pt's bp at this time is 105/52, 82. Lasix and aldactone retimed for noon. Pt complains of 8/10 to left arm. Roxycodone given. Electronically signed by Marlin Kahn LPN on 7/1/3552 at 5:28 AM- according to recent nursing    I have been concerned about her repeat ascites and have reconsult interventional radiology for another tap prior to her discharge. ROS x10: The patient also complains of severely impaired mobility and activities of daily living. Otherwise no new problems with vision, hearing, nose, mouth, throat, dermal, cardiovascular, GI, , pulmonary, musculoskeletal, psychiatric or neurological. See Rehab H&P on Rehab chart dated . Vital signs:  BP (!) 105/52   Pulse 82   Temp 98 °F (36.7 °C) (Oral)   Resp 17   Ht 5' 1\" (1.549 m)   Wt 145 lb 15.1 oz (66.2 kg)   SpO2 92%   BMI 27.58 kg/m²   I/O:   PO/Intake:  fair PO intake, no problems observed or reported. Bowel/Bladder:  continent, no problems noted. General:  Patient is well developed, adequately nourished, non-obese and     well kempt. HEENT:    PERRLA, hearing intact to loud voice, external inspection of ear     and nose benign. Inspection of lips, tongue and gums benign  Musculoskeletal: No significant change in strength or tone. All joints stable. Inspection and palpation of digits and nails show no clubbing,       cyanosis or inflammatory conditions. Neuro/Psychiatric: Affect: flat but pleasant. Alert and oriented to  Self . No significant change in deep tendon reflexes or     sensation  Lungs:  Diminished CTA-B. Respiration effort is normal at rest.     Heart:   S1 = S2, RRR. No loud murmurs.     Abdomen:  Firm with tenderness due to ascites, non-tender, no enlargement of liver or spleen. Extremities:  No significant lower extremity edema or tenderness. Skin:   Intact to general survey, no visualized or palpated problems. Rehabilitation:  Physical therapy: FIMS:  Bed Mobility: Scooting: Supervision    Transfers: Sit to Stand: Supervision  Stand to sit: Supervision  Bed to Chair: Supervision, Ambulation 1  Surface: level tile, uneven, carpet  Device: Rollator  Assistance: Supervision  Quality of Gait: slow cayden, decreased step length  Gait Deviations: Slow Cayden  Distance: 110' x 2  Comments: .,      FIMS:  ,  ,      Occupational therapy: FIMS:   ,  , Assessment: Pt requires verbal cues to sequence bathing and grooming tasks. Pt continues to benefit from OT services to maximize independence and safety during ADLs and IADLs. Speech therapy: FIMS:        Lab/X-ray studies reviewed, analyzed and discussed with patient and staff:   No results found for this or any previous visit (from the past 24 hour(s)). Ct Abdomen Pelvis  7/23/2020    Liver: Small in size, and nodular contour, unchanged. Bile Ducts:  Normal in caliber. Gallbladder:  Surgically absent. Pancreas:  Hypoplastic, without masses, cysts, ductal dilatation or calcification. Spleen:  Normal in size without masses or calcifications. No splenules. Kidneys:  Right and left kidneys measure 8.0 cm, and 9.1 cm, respectively. 1 mm calcification mid pole left kidney, unchanged from 2013. No right renal calculi. Adrenals:  Normal. Small bowel:  Normal in caliber. Appendix:  Not visualized. Colon:  Normal in caliber. Surgical clips at level of rectum. Peritoneum:  Diffuse free fluid within the abdomen and pelvis, unchanged. No free air. Vessels: Aorta normal in course and caliber. Lymph nodes:  Retroperitoneal:  No enlarged retroperitoneal lymph nodes. Mesenteric:  No enlarged mesenteric lymph nodes. Pelvic: No enlarged pelvic lymph nodes.  Ureters: Normal in course and caliber. No calcifications. Bladder: Partially decompressed. Reproductive organs: Uterus surgically absent. Dystrophic calcification, left adnexa, unchanged from March, 2013. Abdominal Wall: Remote abdominal wall defect repair. Edematous change within abdominal pelvic soft tissues diffusely, stable. Bones:  No bone lesions. No degenerative changes. No post operative changes. Cirrhosis. Diffuse ascites, unchanged. Diffuse edematous change, abdominal and pelvic walls, stable. Small right kidney. Left kidney lower limits of normal. Hypoplastic pancreas. Remote hemorrhoid surgery. Cholecystectomy. Hysterectomy. Xr Hip Right 7/25/2020    Moderate degenerative changes with joint space loss in the bilateral hips. No acute fracture identified. Vascular calcifications. Impression:  1. No acute fracture identified. 2. Moderate bilateral osteoarthritis of the hips. 3. Vascular calcifications. Ct Head   7/23/2020: Extra-axial spaces:  Normal. Intracranial hemorrhage:  None. Ventricular system: Ventricles mildly enlarged. Sulci mildly prominent. Basal Cisterns:  Normal. Cerebral Parenchyma: Physiologic basal ganglia calcification, unchanged. Midline Shift:  None. Cerebellum:  Normal. Paranasal sinuses and mastoid air cells:  Normal. Visualized Orbits: Remote bilateral ocular surgery. Impression: No acute findings. Mild cerebral atrophy. Ct Chest  : 7/23/2020    Right lung shows mild dependent atelectatic change posterior right upper lobe, posterior right middle lobe, and posterior right lower lobe. No nodules or masses. No pleural effusion. Left lung shows dependent subsegmental atelectatic change, left upper lobe. Small peripheral focus of groundglass opacity lateral lower lingula. Dependent subsegmental atelectatic change, small left pleural effusion. No nodules or masses. No hilar, mediastinal, or axillary lymph node enlargement. Cardiac size normal. No pericardial effusion. Calcified mitral annulus. Thoracic aorta normal in course and caliber. Bridging anterior osteophytes mid to lower thoracic spine. No osteoblastic, and no osteolytic lesions. No fracture. Bilateral dependent atelectatic change. Small left effusion. Ct Cervical Spine   7/23/2020    Fracture:No acute fracture. Alignment:  Normal cervical lordosis. No subluxation. No canal stenosis. Dense atlas:Dens atlas relationship is normal. Soft tissues:Airway patent. No soft tissue mass or adenopathy. Other findings: There is mild  cervical spondylosis with multiple level disc osteophyte changes. NO ACUTE CERVICAL SPINE ABNORMALITY. Ct Thoracic Spine  7/23/2020   Alignment/fracture: Normal alignment without subluxation. No evidence of fracture. Perivertebral soft tissues are normal.  Disc spaces: There are multiple levels of disc space narrowing and end plate changes consistent with degenerative change. NEGATIVE CT OF THE THORACIC SPINE. Ct Lumbar Spine 7/24/2020   : No acute fractures identified. There is T11 superior endplate Schmorl's node with dystrophic calcification, unchanged from prior imaging study. There is lumbar spondylosis with multilevel intervertebral disc space narrowing, marginal osteophytosis, and facet hypertrophy. There is L4-5 degenerative disc vacuum phenomenon. There is L3-4 and L4-5 disc bulging which does not appear to impinge on the exiting nerve roots. There is mild L4-5 central canal stenosis. There is disc bulging and  degenerative disc vacuum phenomenon at L1-2. The SI joints are symmetrically maintained. There is iliac sclerosis of the SI joints. Ascites is noted within the field-of-view. There are 2 left renal midpole nonobstructing stones, measuring 2 mm and 4 mm. DEGENERATIVE CHANGES. NO ACUTE FRACTURE. Xr Chest   7/23/2020  NO EVIDENCE OF ACUTE THORACIC TRAUMA OR ACTIVE CARDIOPULMONARY DISEASE.          Us Guided  : 7/23/2020  SUCCESSFUL rehabilitation program including PT/OT to improve balance, ambulation, ADLs, and to improve the P/AROM. Therapeutic modifications regarding activities in therapies, place, amount of time per day and intensity of therapy made daily. In bed therapies or bedside therapies prn.   2. Bowel constipation due to ascites and Bladder dysfunction:  frequent toileting, ambulate to bathroom with assistance, check post void residuals. Check for C.difficile x1 if >2 loose stools in 24 hours, continue bowel & bladder program.  Monitor bowel and bladder function. Lactinex 2 PO every AC. MOM prn, Brown Bomb prn, Glycerin suppository prn, enema prn. Add scheduled lactulose  3. Severe low back and abdominal pain due to ascites as well as rigid muscle Parkinson's related pain as well as generalized OA pain: reassess pain every shift and prior to and after each therapy session, give prn Tylenol and Roxicodone and Ultram as needed, modalities prn in therapy, Lidoderm, K-pad prn. Tylenol is limited due to liver failure therefore no Tylenol consider Duragesic patch-she was taken off Lyrica because of its potential toxicity especially in light of liver failure. Per family's insistence she will be trialed on low-dose Lyrica however I am hesitant to allow this as she has ascites from liver failure and confusion already. 4. Skin healing and breakdown risk:  continue pressure relief program.  Daily skin exams and reports from nursing. 5. Severe fatigue due to nutritional and hydration deficiency: Add vitamin B12 vitamin D and CoQ10 continue to monitor I&Os, calorie counts prn, dietary consult prn. Lactulose for high ammonia levels. 6. Acute episodic insomnia with situational adjustment disorder:  prn Ambien, monitor for day time sedation. 7. Falls risk elevated:  patient to use call light to get nursing assistance to get up, bed and chair alarm.   8. Elevated DVT risk: progressive activities in PT, continue prophylaxis DAWN hose, elevation and no thinners due to liver failure. 9. Complex discharge planning: I will progress toward and prepare for toward discharge plan on Saturday discharge 8/8/2020 home 24-hour support versus long-term care with help from her daughters who live locally. She is status post weekly team meeting every Monday to assess progress towards goals, discuss and address social, psychological and medical comorbidities and to address difficulties they may be having progressing in therapy. Patient and family education is in progress. The patient is to follow-up with their family physician after discharge. Complex Active General Medical Issues that complicate care Assess & Plan:    1. Whole body pain as well as cervical thoracic and low back pain due to fibromyalgia,   Lumbago without sciatica,   Rotator cuff (capsule) deficits, Shoulder impingement-use lowest effective dose of opiate medication to include Roxicodone and Ultram, titrate Desyrel, add Lidoderm BenGay heat massage-titrate Topamax and avoid use of Tylenol due to liver disease as well as Lyrica due to intolerance  2. Depression and anxiety-emotional support given daily add recreational therapy and rehabilitation psychology when available and support group when available  3. LVH (left ventricular hypertrophy), coronary artery disease-vital signs to shift dose and titrate cardiac locations consult hospitalist for backup medical  4. Parkinson's disease -consult neurology-who have not addressed this issue avoid sedatives such as Lyrica which she does not tolerate well titrate Sinemet. I will consult pain management regarding the use of Lyrica. 5.   Thrombocytopenia with severe anemia and recent blood transfusion-recheck CBC add vitamin B12 vitamin D  6. Vitamin D deficiency-add daily vitamin D and recheck as an outpatient  7. Secondary esophageal varices without bleeding-monitor stools for blood avoid strong blood thinners if possible  8. Morbidly obese but with  Moderate malnutrition -add high-protein low-carb supplementation if possible however because of her her recent liver disease she may not be able to process proteins very well. 9.   Stasis dermatitis-protect skin add ET mix to the lower extremities add co-Q10  10. Severe progressive and recurrent ascites of the liver-eliminate toxic medications recheck as an outpatient monitor ammonia level-reconsult interventional radiology for repeat paracentesis. 11. Hepatic encephalopathy with Elavil elevated ammonia level-titrate lactulose recheck ammonia-consult GI to discuss this with the family. Pharmacy is now aware and adjusting medications. 12. Severe hypothyroidism-consult endocrinology titrate Synthroid  13. Polypharmacy in the elderly-consult pain management agree with avoiding use of Lyrica-medications adjusted as well patient and family made aware of these concerns.              Geraldine Duval D.O., PM&R     Attending    [address]

## 2020-08-05 NOTE — PROGRESS NOTES
Physical Therapy Missed Treatment   Facility/Department: [address] [place] [address] [id number]    NAME: Kaila Clemente    : 1935 (82 y.o.)  MRN: [id number]    Account: [de-identified]  Gender: female    Pt off floor for paracentesis.       Thea Urena PTA, 20 at 11:32 AM

## 2020-08-05 NOTE — PROGRESS NOTES
Occupational Therapy  Facility/Department: [facility]  Daily Treatment Note  NAME: [place]  : 1935  MRN: [id number]    Date of Service: 2020    Discharge Recommendations:  Continue to assess pending progress       Assessment   Assessment: Pt demonstrates decreased  strength during fine motor task. Pt continues to benefit from OT services to maximize independence during ADLs and IADLs. REQUIRES OT FOLLOW UP: Yes  Activity Tolerance  Activity Tolerance: Patient Tolerated treatment well  Safety Devices  Safety Devices in place: Yes  Type of devices: All fall risk precautions in place         Patient Diagnosis(es): There were no encounter diagnoses. has a past medical history of Asthma, Fibromyalgia, Hemorrhoid, Hyperlipidemia, Hypertension, Parkinson's disease ([facility] [address] and Type II or unspecified type diabetes mellitus without mention of complication, not stated as uncontrolled. has a past surgical history that includes Hysterectomy; Cholecystectomy; laminectomy; eye surgery; joint replacement (Right); hernia repair (2002); hernia repair; Hemorrhoid surgery; Upper gastrointestinal endoscopy (N/A, 2020); Paracentesis (Left, 2020); Paracentesis (Left, 06/10/2020); Paracentesis (Left, 2020); Paracentesis (Left, 2020); and Paracentesis (Left, 2020). Restrictions  Restrictions/Precautions  Restrictions/Precautions: (No orders from podiatry in chart for wb status to right foot)  Required Braces or Orthoses?: No  Subjective   General  Chart Reviewed: Yes  Patient assessed for rehabilitation services?: Yes  Response to previous treatment: Patient with no complaints from previous session  Family / Caregiver Present: No  Referring Practitioner: Dr Frederica Nyhan  Diagnosis: Imp Mob and ADLs 2° Exac of Parkinsons  Subjective  Subjective: Pt sitting in W/C in therapy gym. Pt agreeable to OT session.   Pain Assessment  Pain Assessment: 0-10  Pain Level: 0  Pre Treatment Pain Screening  Pain at present: 0  Scale Used: Numeric Score  Intervention List: Patient able to continue with treatment;Patient declined any intervention  Vital Signs  Patient Currently in Pain: Denies   Orientation  Orientation  Overall Orientation Status: Within Functional Limits  Objective      While seated at tabletop, pt assembled pop beads into long strand using B UEs to improve fine motor coordination during self care. Pt demonstrates min-mod difficulty to assemble and requires increased effort occasionally. Pt created strand in pattern of her choice. Pt then disassembled beads one at a time and returned them to the container. Pt with mod-max difficulty to disassemble beads. While seated at tabletop, pt sorted wooden cylinders of various sizes into wooden board with various sized holes using B UEs to improve problem solving during ADLs. Pt with min difficulty to sort cylinders but pt with 0 errors once pt completed task. Pt demonstrates good attention to task. Plan   Plan  Times per week: 5-7 times per day  Plan weeks: 1.5 weeks  Current Treatment Recommendations: Strengthening, Balance Training, Functional Mobility Training, Endurance Training, Safety Education & Training, Patient/Caregiver Education & Training, Self-Care / ADL, Equipment Evaluation, Education, & procurement  Plan Comment: Continue OT per POC  G-Code     OutComes Score                                                  AM-PAC Score             Goals  Patient Goals   Patient goals :  \"To get stronger\"       Therapy Time   Individual Concurrent Group Co-treatment   Time In 1430         Time Out 1500         Minutes 30           Therapeutic activities: 25 minutes  Cognitive Retrainin minutes     David Coon OT   Electronically signed by David Coon OT on 2020 at 3:13 PM

## 2020-08-06 NOTE — PROGRESS NOTES
Physical Therapy Rehab Treatment Note  [facility]       NAME: Georgiana Lund  : 1935 (82 y.o.)  MRN: [id number]  CODE STATUS: Full Code    Date of Service: 2020  Chart Reviewed: Yes  Family / Caregiver Present: No    Restrictions:          SUBJECTIVE: Subjective: I don't have any pain right now, I'm confused on what day it is, isn't tomorrow Saturday? Pain Screening  Patient Currently in Pain: Denies       Post Treatment Pain Screenin/10       OBJECTIVE:               Neuromuscular Education  PNF: Object find in clinic with pt able scanning enviroment looking for objects to prolong standing tolerance and ambualtion endurance. Ambulation in room around objects in close enviroment. NDT Treatment: Standing;Gait   Neuromuscular Comments: Standing toe taps on # box for balance and improved foot clearance. Lateral stepping over red band performed HHA to challenge pt's balance, pt able to perform with CGA. Transfers  Sit to Stand: Modified independent  Stand to sit: Modified independent    Ambulation  Ambulation?: Yes  Ambulation 1  Surface: level tile;carpet  Device: Rollator  Assistance: Modified Independent  Quality of Gait: slow reciprocal gait pattern with fwd flexed posture, decreased step length with minimal foot clearance. Gait Deviations: Slow Vivian  Distance: Functional distance the focus of this tx session with safe management of Rollator around objects the focus of this tx session. Stairs/Curb  Stairs?: No         Activity Tolerance  Activity Tolerance: Patient Tolerated treatment well    Exercises  Hip Flexion: x 20  Hip Extension/Leg Presses: Seated into ball x 20  Hip Abduction: Seated Hip ABD/ADD with RTB / Ball x 20  Knee Long Arc Quad: x15  Ankle Pumps: x20     ASSESSMENT/COMMENTS:  Assessment: Pt completed multiple functional gait tasks with good safety awareness of self limitations.     PLAN OF CARE/Safety:   Safety Devices  Type of devices: Left in bed;Call light within reach; Bed alarm in place      Therapy Time:   Individual   Time In 1500   Time Out 1602   Minutes 62   Restroom Break 2 min.    Minutes:60      Transfer/Bed mobility trainin      Gait trainin      Neuro re education:15     Therapeutic ex:15      Annajosue Farnsworth  20 at 4:19 PM

## 2020-08-06 NOTE — PROGRESS NOTES
Facility/Department: [facility]  Daily Treatment Note  NAME: [place]  : 1935  MRN: [id number]    Date of Service: 2020    Discharge Recommendations:  Continue to assess pending progress       Assessment   Assessment: Pt. demonstrates improving  strength but continues to require excessive amount of time for all mobility and activities. Pt. continues to benefit from OT to maximize independence and safety with ADL tasks. Activity Tolerance  Activity Tolerance: Patient Tolerated treatment well  Safety Devices  Safety Devices in place: Yes  Type of devices: All fall risk precautions in place         Patient Diagnosis(es): There were no encounter diagnoses. has a past medical history of Asthma, Fibromyalgia, Hemorrhoid, Hyperlipidemia, Hypertension, Parkinson's disease ([facility] [address] and Type II or unspecified type diabetes mellitus without mention of complication, not stated as uncontrolled. has a past surgical history that includes Hysterectomy; Cholecystectomy; laminectomy; eye surgery; joint replacement (Right); hernia repair (2002); hernia repair; Hemorrhoid surgery; Upper gastrointestinal endoscopy (N/A, 2020); Paracentesis (Left, 2020); Paracentesis (Left, 06/10/2020); Paracentesis (Left, 2020); Paracentesis (Left, 2020); and Paracentesis (Left, 2020). Restrictions  Restrictions/Precautions  Restrictions/Precautions: (No orders from podiatry in chart for wb status to right foot)  Required Braces or Orthoses?: No  Subjective   General  Chart Reviewed: Yes  Patient assessed for rehabilitation services?: Yes  Response to previous treatment: Patient with no complaints from previous session  Family / Caregiver Present: No  Referring Practitioner: Dr Scot Cisse  Diagnosis: Imp Mob and ADLs 2° Exac of Parkinsons  Subjective  Subjective: Pt sitting in W/C in therapy gym. Pt agreeable to OT session.   Pain Assessment  Pain Assessment: 0-10  Pain Level: 0  Pre Treatment Pain Screening  Pain at present: 0  Scale Used: Numeric Score  Intervention List: Patient able to continue with treatment;Patient declined any intervention  Vital Signs  Patient Currently in Pain: No   Orientation     Objective       Coordination  Gross Motor: Pt. completed gross motor reaching task and placed 20 rings on all 4 levels of ring tree; pt. reports that reaching top layer of tree hurts her shoulders/back so she completed remaining rings at lower levels of tree. Fine Motor: Pt. completed fine motor/hand strengthening task with green theraputty. Pt. required extended time to manipulate putty however she was able to squeeze the putty with B hands and locate 15 beads from inside putty. Pt. required 1 verbal cue not to let the putty just drip down onto her pants. Plan   Plan  Times per week: 5-7 times per day  Plan weeks: 1.5 weeks  Current Treatment Recommendations: Strengthening, Balance Training, Functional Mobility Training, Endurance Training, Safety Education & Training, Patient/Caregiver Education & Training, Self-Care / ADL, Equipment Evaluation, Education, & procurement  Plan Comment: Continue OT per POC  G-Code     OutComes Score  AM-PAC Score  Goals  Patient Goals   Patient goals :  \"To get stronger\"       Therapy Time   Individual Concurrent Group Co-treatment   Time In 1030         Time Out 1100         Minutes 30              Therapeutic activities: 30 minutes    Electronically signed by CARLENE Gonzales/L on 8/6/2020 at 11:16 AM    CARLENE Gonzales/NORMA

## 2020-08-06 NOTE — PROGRESS NOTES
0     Orientation  Orientation  Overall Orientation Status: Within Functional Limits     Objective      Patient engaged in cognitive activity to increase I with IADL's. Patient provided with a take out menu for various items and a questionnaire. Patient instructed to scan the take out menu and answer the questions appropriately. Patient able to correctly answer 70% of the questions. Patient with 100 legibility with R FM handwriting and 0 spelling errors. Patient required increased time to complete activity. Plan   Plan  Times per week: 5-7 times per day  Plan weeks: 1.5 weeks  Current Treatment Recommendations: Strengthening, Balance Training, Functional Mobility Training, Endurance Training, Safety Education & Training, Patient/Caregiver Education & Training, Self-Care / ADL, Equipment Evaluation, Education, & procurement  Plan Comment: Continue OT per POC       Goals  Patient Goals   Patient goals :  \"To get stronger\"       Therapy Time   Individual Concurrent Group Co-treatment   Time In 1400         Time Out 1430         Minutes 30           Cognitive Retrainin minutes       Electronically signed by Jonathan Oliver on 20 at 2:53 PM SOCORRO Burgos

## 2020-08-06 NOTE — PROGRESS NOTES
Hospitalist Consult/Progress Note  8/6/2020 2:42 PM    Assessment and Plan:   1. Mild Hypotension: 2880cc fluid removed from paracentesis yesterday. Blood pressure labile. We will continue diuretic regimen as recommended by GI, however changed time to afternoon when blood pressure seems to stabilize. Continue to monitor BP. 2. Wernicke's encephalopathy:  Continue to monitor ammonia level and encourage lactulose. Topamax discontinued per neurology. Patient is more alert and oriented. 3. Gait instability and Decreased Functional Status secondary to Parkinson's: Continue Sinemet, fall precautions. PT OT to evaluate. Maximize nutrition status. Assessing if needs DME at home. SW on board    4. Decompensated liver cirrhosis with esophageal varices and ascites, s/p u/s guided therapeutic paracentesis with removal of 6.5 liters of fluid per IR, fluid analysis was negative for SB, restarted on diuretic regimen by GI     4. Pancytopenia:  due to advanced liver cirrhosis, monitor counts closely     5. Anemia:  baseline. no active bleeding, follow H/H      6. Bowel Regimen and GI PPx: stool softners PRN ordered with hold parameters for loose stools or diarrhea. On antiacid    7. Discharge planning: SW on board. Will discharge once medically stable and cleared by all consultants. 8. High Risk Readmission Screening Tool Score Noted.      Additionally, the following hospital problems were addressed:  Principal Problem:    Abnormality of gait and mobility dt exacerbation of Parkinson's disease status post ascites and liver failure acute rehab admission 7/25/2020  Active Problems:    DM2 (diabetes mellitus, type 2) (HCC)    Fibromyalgia    Depression    DISH (diffuse idiopathic skeletal hyperostosis)    Hyperlipidemia    Hypertension    Lumbago without sciatica    Lumbar spondylosis    LVH (left ventricular hypertrophy)    Generalized muscle ache    Osteoarthritis of multiple joints    Parkinson's disease lower exts, no calf tenderness bilaterally.  Dry skin noted       Medications:      [START ON 8/7/2020] furosemide  40 mg Oral Daily    [START ON 8/7/2020] spironolactone  50 mg Oral Daily    lactulose  20 g Oral Daily    nystatin, stomahesive in petrolatum   Topical 3 times per day    thiamine  100 mg Oral BID    pregabalin  25 mg Oral BID    pantoprazole  20 mg Oral QAM AC    levothyroxine  25 mcg Oral Daily    folic acid  5 mg Oral Daily    traZODone  150 mg Oral Nightly    budesonide-formoterol  2 puff Inhalation BID    carbidopa-levodopa  1 tablet Oral TID       LABS Reviewed    IMAGING Reviewed    Zari Francois CNP  [facility] Hospitalist

## 2020-08-06 NOTE — PROGRESS NOTES
Occupational Therapy  Facility/Department: [facility]  Daily Treatment Note  NAME: [place]  : 1935  MRN: [id number]    Date of Service: 2020    Discharge Recommendations:  Continue to assess pending progress       Assessment   Assessment: Pt. demonstrates improving  strength but continues to require excessive amount of time for all mobility and activities. Pt. continues to benefit from OT to maximize independence and safety with ADL tasks. REQUIRES OT FOLLOW UP: Yes  Activity Tolerance  Activity Tolerance: Patient Tolerated treatment well  Safety Devices  Safety Devices in place: Yes  Type of devices: All fall risk precautions in place         Patient Diagnosis(es): There were no encounter diagnoses. has a past medical history of Asthma, Fibromyalgia, Hemorrhoid, Hyperlipidemia, Hypertension, Parkinson's disease ([facility] [address] and Type II or unspecified type diabetes mellitus without mention of complication, not stated as uncontrolled. has a past surgical history that includes Hysterectomy; Cholecystectomy; laminectomy; eye surgery; joint replacement (Right); hernia repair (2002); hernia repair; Hemorrhoid surgery; Upper gastrointestinal endoscopy (N/A, 2020); Paracentesis (Left, 2020); Paracentesis (Left, 06/10/2020); Paracentesis (Left, 2020); Paracentesis (Left, 2020); and Paracentesis (Left, 2020). Restrictions  Restrictions/Precautions  Restrictions/Precautions: (No orders from podiatry in chart for wb status to right foot)  Required Braces or Orthoses?: No  Subjective   General  Chart Reviewed: Yes  Patient assessed for rehabilitation services?: Yes  Response to previous treatment: Patient with no complaints from previous session  Family / Caregiver Present: No  Referring Practitioner: Dr Andrew Armendariz  Diagnosis: Imp Mob and ADLs 2° Exac of Parkinsons  Subjective  Subjective: Nice to meet you.   Pain Assessment  Pain Assessment: 0-10  Pain Level: 0  Pre Treatment Pain Screening  Pain at present: 0  Scale Used: Numeric Score  Intervention List: Patient able to continue with treatment;Patient declined any intervention  Vital Signs  Patient Currently in Pain: Denies   Orientation  Orientation  Overall Orientation Status: Within Functional Limits  Objective        While seated at tabletop, pt replicated mildly complex graphic design using colored blocks, wing nuts, bolts and B UEs to improve fine motor coordination during self care. Pt with min difficulty to replicate design and requires verbal and tactile cues to complete. Pt mildly confused during task but demonstrates good attention to task. Pt requires significant increased time to complete. Pt demonstrates min difficulty to assemble bolts and wing nuts together d/t decreased coordination. Plan   Plan  Times per week: 5-7 times per day  Plan weeks: 1.5 weeks  Current Treatment Recommendations: Strengthening, Balance Training, Functional Mobility Training, Endurance Training, Safety Education & Training, Patient/Caregiver Education & Training, Self-Care / ADL, Equipment Evaluation, Education, & procurement  Plan Comment: Continue OT per POC  G-Code     OutComes Score                                                  AM-PAC Score             Goals  Patient Goals   Patient goals :  \"To get stronger\"       Therapy Time   Individual Concurrent Group Co-treatment   Time In 1400         Time Out 1430         Minutes 30           Therapeutic activities: 30 minutes     Emeline Boeck, OT   Electronically signed by Emeline Boeck, OT on 8/6/2020 at 3:15 PM

## 2020-08-06 NOTE — PLAN OF CARE
Problem: Falls - Risk of:  Goal: Will remain free from falls  Description: Will remain free from falls  Outcome: Ongoing  Goal: Absence of physical injury  Description: Absence of physical injury  Outcome: Ongoing     Problem: Skin Integrity:  Goal: Will show no infection signs and symptoms  Description: Will show no infection signs and symptoms  Outcome: Ongoing  Goal: Absence of new skin breakdown  Description: Absence of new skin breakdown  Outcome: Ongoing     Problem: Infection:  Goal: Will remain free from infection  Description: Will remain free from infection  Outcome: Ongoing     Problem: Safety:  Goal: Free from accidental physical injury  Description: Free from accidental physical injury  Outcome: Ongoing  Goal: Free from intentional harm  Description: Free from intentional harm  Outcome: Ongoing     Problem: Daily Care:  Goal: Daily care needs are met  Description: Daily care needs are met  Outcome: Ongoing     Problem: Pain:  Goal: Patient's pain/discomfort is manageable  Description: Patient's pain/discomfort is manageable  Outcome: Ongoing  Goal: Pain level will decrease  Description: Pain level will decrease  Outcome: Ongoing  Goal: Control of acute pain  Description: Control of acute pain  Outcome: Ongoing  Goal: Control of chronic pain  Description: Control of chronic pain  Outcome: Ongoing     Problem: Skin Integrity:  Goal: Skin integrity will stabilize  Description: Skin integrity will stabilize  Outcome: Ongoing     Problem: Discharge Planning:  Goal: Patients continuum of care needs are met  Description: Patients continuum of care needs are met  Outcome: Ongoing     Problem: IP COMMUNICATION/DYSARTHRIA  Goal: LTG - patient will improve expressive language skills to allow for communication of wants and needs in daily activities  Outcome: Ongoing     Problem: IP SWALLOWING  Goal: LTG - patient will tolerate the least restrictive diet consistency to allow for safe consumption of daily meals  Outcome: Ongoing     Problem: Fluid Volume:  Goal: Hemodynamic stability will improve  Description: Hemodynamic stability will improve  Outcome: Ongoing  Goal: Ability to maintain a balanced intake and output will improve  Description: Ability to maintain a balanced intake and output will improve  Outcome: Ongoing

## 2020-08-06 NOTE — PROGRESS NOTES
Subjective: The patient complains of severe  acute on chronic confusion rigidity ascites malaise partially relieved by recent paracentesis, PT, OT, speech and language pathology, rest and exacerbated by reaccumulation of ascites. I am concerned about patients drop of H&H and abdominal pain. I reviewed recent nursing note \"  Patient denied having any c/o pain and or discomfort noted at this time. No c/o dizziness noted. Patient was continent through out the night. Ambulated with SBA X 1. Gait is slow and steady. I have been concerned about her recurrent ascites. Interventional radiology again consulted for tapping her abdomen paracentesis. ROS x10: The patient also complains of severely impaired mobility and activities of daily living. Otherwise no new problems with vision, hearing, nose, mouth, throat, dermal, cardiovascular, GI, , pulmonary, musculoskeletal, psychiatric or neurological. See Rehab H&P on Rehab chart dated . Vital signs:  BP 92/62   Pulse 79   Temp 99 °F (37.2 °C) (Oral)   Resp 16   Ht 5' 1\" (1.549 m)   Wt 145 lb 15.1 oz (66.2 kg)   SpO2 92%   BMI 27.58 kg/m²   I/O:   PO/Intake:  fair PO intake, no problems observed or reported. Bowel/Bladder:  continent, no problems noted. General:  Patient is well developed, adequately nourished, non-obese and     well kempt. HEENT:    PERRLA, hearing intact to loud voice, external inspection of ear     and nose benign. Inspection of lips, tongue and gums benign  Musculoskeletal: No significant change in strength or tone. All joints stable. Inspection and palpation of digits and nails show no clubbing,       cyanosis or inflammatory conditions. Neuro/Psychiatric: Affect: flat but pleasant. Alert and oriented to  Self . No significant change in deep tendon reflexes or     sensation  Lungs:  Diminished CTA-B. Respiration effort is normal at rest.     Heart:   S1 = S2, RRR.   No loud murmurs. Abdomen:  Firm with tenderness due to ascites, non-tender, no enlargement of liver or spleen. Extremities:  No significant lower extremity edema or tenderness. Skin:   Intact to general survey, no visualized or palpated problems. Rehabilitation:  Physical therapy: FIMS:  Bed Mobility: Scooting: Supervision    Transfers: Sit to Stand: Modified independent  Stand to sit: Modified independent  Bed to Chair: Supervision, Ambulation 1  Surface: level tile, carpet  Device: Rollator  Assistance: Supervision  Quality of Gait: slow cayden, decreased step length  Gait Deviations: Slow Cayden  Distance: [id number]  Comments: Pt faitgued after 100ft. Pt able to recognize and safely transfer to sit on rollator.,      FIMS:  ,  ,      Occupational therapy: FIMS:   ,  , Assessment: Pt demonstrates decreased  strength during fine motor task. Pt continues to benefit from OT services to maximize independence during ADLs and IADLs. Speech therapy: FIMS:        Lab/X-ray studies reviewed, analyzed and discussed with patient and staff:   No results found for this or any previous visit (from the past 24 hour(s)). Ct Abdomen Pelvis  7/23/2020    Liver: Small in size, and nodular contour, unchanged. Bile Ducts:  Normal in caliber. Gallbladder:  Surgically absent. Pancreas:  Hypoplastic, without masses, cysts, ductal dilatation or calcification. Spleen:  Normal in size without masses or calcifications. No splenules. Kidneys:  Right and left kidneys measure 8.0 cm, and 9.1 cm, respectively. 1 mm calcification mid pole left kidney, unchanged from 2013. No right renal calculi. Adrenals:  Normal. Small bowel:  Normal in caliber. Appendix:  Not visualized. Colon:  Normal in caliber. Surgical clips at level of rectum. Peritoneum:  Diffuse free fluid within the abdomen and pelvis, unchanged. No free air. Vessels: Aorta normal in course and caliber.  Lymph nodes:  Retroperitoneal:  No enlarged retroperitoneal lymph nodes. Mesenteric:  No enlarged mesenteric lymph nodes. Pelvic: No enlarged pelvic lymph nodes. Ureters: Normal in course and caliber. No calcifications. Bladder: Partially decompressed. Reproductive organs: Uterus surgically absent. Dystrophic calcification, left adnexa, unchanged from March, 2013. Abdominal Wall: Remote abdominal wall defect repair. Edematous change within abdominal pelvic soft tissues diffusely, stable. Bones:  No bone lesions. No degenerative changes. No post operative changes. Cirrhosis. Diffuse ascites, unchanged. Diffuse edematous change, abdominal and pelvic walls, stable. Small right kidney. Left kidney lower limits of normal. Hypoplastic pancreas. Remote hemorrhoid surgery. Cholecystectomy. Hysterectomy. Xr Hip Right 7/25/2020    Moderate degenerative changes with joint space loss in the bilateral hips. No acute fracture identified. Vascular calcifications. Impression:  1. No acute fracture identified. 2. Moderate bilateral osteoarthritis of the hips. 3. Vascular calcifications. Ct Head   7/23/2020: Extra-axial spaces:  Normal. Intracranial hemorrhage:  None. Ventricular system: Ventricles mildly enlarged. Sulci mildly prominent. Basal Cisterns:  Normal. Cerebral Parenchyma: Physiologic basal ganglia calcification, unchanged. Midline Shift:  None. Cerebellum:  Normal. Paranasal sinuses and mastoid air cells:  Normal. Visualized Orbits: Remote bilateral ocular surgery. Impression: No acute findings. Mild cerebral atrophy. Ct Chest  : 7/23/2020    Right lung shows mild dependent atelectatic change posterior right upper lobe, posterior right middle lobe, and posterior right lower lobe. No nodules or masses. No pleural effusion. Left lung shows dependent subsegmental atelectatic change, left upper lobe. Small peripheral focus of groundglass opacity lateral lower lingula. Dependent subsegmental atelectatic change, small left pleural effusion.  No nodules EVIDENCE OF ACUTE THORACIC TRAUMA OR ACTIVE CARDIOPULMONARY DISEASE. Us Guided  : 7/23/2020  SUCCESSFUL ULTRASOUND-GUIDED PARACENTESIS APPROXIMATELY 6.5 L OF ASCITES. Us Retroperitoneal   7/23/2020    Renal sonography was performed. The study is compromised due to patient body habitus and sonographic window. Kidneys by ultrasound are symmetric borderline small in size. Right kidney measures 9.3 x 5.3 x 4.3 cm left kidney measures 9.2 x 4.7 x  4.8 cm. There are a few punctate echogenic foci in the central sinus echocomplex raising the question of specular reflectors. Bilateral renal echotexture is otherwise unremarkable. No large cortical cysts, solid masses or shadowing calcifications. No ultrasound signs of hydronephrosis. Note is made of upper abdominal ascites     SYMMETRIC KIDNEYS BORDERLINE SMALL IN SIZE. NO ULTRASOUND SIGNS OF HYDRONEPHROSIS. UPPER ABDOMINAL ASCITES       Previous extensive, complex labs, notes and diagnostics reviewed and analyzed. ALLERGIES:    Allergies as of 07/25/2020 - Review Complete 07/25/2020   Allergen Reaction Noted    Adhesive tape  11/22/2004    Amoxicillin-pot clavulanate  03/07/2013    Bactrim [sulfamethoxazole-trimethoprim]  03/07/2013    Clarithromycin  06/23/2015    Other  08/30/2004    Sulfa antibiotics  08/30/2004      (please also verify by checking [facility])     Complex Physical Medicine & Rehab Issues Assess & Plan:   1. Severe abnormality of gait and mobility and impaired self-care and ADL's secondary to progressive Parkinson's disease with liver failure and ascites. Functional and medical status reassessed regarding patients ability to participate in therapies and patient found to be able to participate in acute intensive comprehensive inpatient rehabilitation program including PT/OT to improve balance, ambulation, ADLs, and to improve the P/AROM.   Therapeutic modifications regarding activities in therapies, place, amount of time per day and intensity of therapy made daily. In bed therapies or bedside therapies prn.   2. Bowel constipation due to ascites and Bladder dysfunction:  frequent toileting, ambulate to bathroom with assistance, check post void residuals. Check for C.difficile x1 if >2 loose stools in 24 hours, continue bowel & bladder program.  Monitor bowel and bladder function. Lactinex 2 PO every AC. MOM prn, Brown Bomb prn, Glycerin suppository prn, enema prn. Add scheduled lactulose  3. Severe low back and abdominal pain due to ascites as well as rigid muscle Parkinson's related pain as well as generalized OA pain: reassess pain every shift and prior to and after each therapy session, give prn Tylenol and Roxicodone and Ultram as needed, modalities prn in therapy, Lidoderm, K-pad prn. Tylenol is limited due to liver failure therefore no Tylenol consider Duragesic patch-she was taken off Lyrica because of its potential toxicity especially in light of liver failure. Per family's insistence she will be trialed on low-dose Lyrica however I am hesitant to allow this as she has ascites from liver failure and confusion already. 4. Skin healing and breakdown risk:  continue pressure relief program.  Daily skin exams and reports from nursing. 5. Severe fatigue due to nutritional and hydration deficiency: Add vitamin B12 vitamin D and CoQ10 continue to monitor I&Os, calorie counts prn, dietary consult prn. Lactulose for high ammonia levels. 6. Acute episodic insomnia with situational adjustment disorder:  prn Ambien, monitor for day time sedation. 7. Falls risk elevated:  patient to use call light to get nursing assistance to get up, bed and chair alarm. 8. Elevated DVT risk: progressive activities in PT, continue prophylaxis DAWN hose, elevation and no thinners due to liver failure.   9. Complex discharge planning: I will progress toward and prepare for toward discharge plan on Saturday discharge 8/8/2020 home 24-hour support versus long-term care with help from her daughters who live locally. She is status post weekly team meeting every Monday to assess progress towards goals, discuss and address social, psychological and medical comorbidities and to address difficulties they may be having progressing in therapy. Patient and family education is in progress. The patient is to follow-up with their family physician after discharge. Complex Active General Medical Issues that complicate care Assess & Plan:    1. Whole body pain as well as cervical thoracic and low back pain due to fibromyalgia,   Lumbago without sciatica,   Rotator cuff (capsule) deficits, Shoulder impingement-use lowest effective dose of opiate medication to include Roxicodone and Ultram, titrate Desyrel, add Lidoderm BenGay heat massage-titrate Topamax and avoid use of Tylenol due to liver disease as well as Lyrica due to intolerance  2. Depression and anxiety-emotional support given daily add recreational therapy and rehabilitation psychology when available and support group when available  3. LVH (left ventricular hypertrophy), coronary artery disease-vital signs to shift dose and titrate cardiac locations consult hospitalist for backup medical  4. Parkinson's disease -consult neurology-who have not addressed this issue avoid sedatives such as Lyrica which she does not tolerate well titrate Sinemet. I will consult pain management regarding the use of Lyrica. 5.   Thrombocytopenia with severe anemia and recent blood transfusion-recheck CBC add vitamin B12 vitamin D  6. Vitamin D deficiency-add daily vitamin D and recheck as an outpatient  7. Secondary esophageal varices without bleeding-monitor stools for blood avoid strong blood thinners if possible  8.    Morbidly obese but with  Moderate malnutrition -add high-protein low-carb supplementation if possible however because of her her recent liver disease she may not be able to process proteins very well.  9.   Stasis dermatitis-protect skin add ET mix to the lower extremities add co-Q10  10. Severe progressive and recurrent ascites of the liver-eliminate toxic medications recheck as an outpatient monitor ammonia level-reconsult interventional radiology for repeat paracentesis. 11. Hepatic encephalopathy with Elavil elevated ammonia level-titrate lactulose recheck ammonia-consult GI to discuss this with the family. Pharmacy is now aware and adjusting medications. 12. Severe hypothyroidism-consult endocrinology titrate Synthroid  13. Polypharmacy in the elderly-consult pain management agree with avoiding use of Lyrica-medications adjusted as well patient and family made aware of these concerns.   Patient is restarted on Lyrica lower dose and we are monitoring her cognition closely             Dominga Salazar D.O., PM&R     Attending    [address]

## 2020-08-06 NOTE — PROGRESS NOTES
Patient denied having any c/o pain and or discomfort noted at this time. No c/o dizziness noted. Patient was continent through out the night. Ambulated with SBA X 1. Gait is slow and steady.

## 2020-08-06 NOTE — PROGRESS NOTES
Patient's BP remains low. Lasix and Aldactone held. Rechecked BP, 96/64. Message sent to MD. Electronically signed by Steph Sigala RN on 8/6/20 at 11:33 AM EDT      BP rechecked this afternoon, systolic in the 661'K. Spoke with MD, lasix and Aldactone given. Times changed to afternoon.  Electronically signed by Steph Sigala RN on 8/6/20 at 3:38 PM EDT

## 2020-08-06 NOTE — PROGRESS NOTES
[facility]  Facility/Department: [facility]  Speech Language Pathology   Treatment Note          Samra Fraga  1935  R251/R251-01        Rehab Dx/Hx: Impaired mobility [Z74.09]  Abnormality of gait and mobility [R26.9]    Precautions: falls    Medical Dx: Impaired mobility [Z74.09]  Abnormality of gait and mobility [R26.9]  Speech Dx: Cognitive Linguistic Impairment     Date: 8/6/2020    Subjective:  Alert and Cooperative        Interventions used this date:  Cognitive Skill Development    Objective/Assessment:  Patient progressing towards goals:  Short-term Goals  Timeframe for Short-term Goals: 2-3 weeks  Goal 1: Pt will complete abstract verbal reasoning tasks (similarities/differences, divergent naming) with 80% acc given cues as needed in order to promote effective communication of wants and needs with caregivers upon discharge. Goal 2: Pt will be educated on 3 memory strategies and will state their use in functional activities with 80% acc given cues as needed in order to promote safety with the completion of ADLs upon discharge. Goal 3: Pt will complete mid-level problem solving task related to ADLs with 80% acc given cues as needed in order to promote safety with the completion of ADLs upon discharge. Upon arrival pt was in bathroom and set up for ADL. SLP completed verbal sequencing task with patient about morning routine. Pt was able to verbally sequence morning routine with min verbal cues and increased processing time. Pt required min verbal cues for problem solving of putting soap on washcloth to wash. Goal 4: Pt will complete delayed recall task with 80% acc given cues as needed in order to promote safety with the completion of ADLs upon discharge.   Long-term Goals  Timeframe for Long-term Goals: 2-3 weeks  Goal 1: Pt will increase cognitive-linguistic skills in order to promote effective communication of wants and needs and to promote safety with the completion of ADLs upon discharge. Compensatory Swallowing Strategies: Upright as possible for all oral intake      Treatment/Activity Tolerance:  Patient tolerated treatment well    Plan:  Continue per POC    Pain Assessment:  Initial Assessment:  Patient denies pain. Re-assessment:  Patient denies pain. Patient/Caregiver Education:  Patient educated on session and progression towards goals. Education needs reinforcement.     Safety Devices:  Call light within reach and Other: Pt was left in bathroom with [address] ([facility]):    SWALLOWING  Rating:  DNT    SPOKEN LANGUAGE COMPREHENSION  Ratin    SPOKEN LANGUAGE EXPRESSION  Rating:  DNT    MOTOR SPEECH  Rating:  DNT    PROBLEM SOLVING  Ratin    MEMORY  Rating:  DNT          Therapy Time   Time in: 0900  Time out: 0915  Minutes: 15  ** Pt completed ADL with PCA**              Signature: Electronically signed by WANDER Leach on 2020 at 9:23 AM

## 2020-08-06 NOTE — PROGRESS NOTES
Physical Therapy Rehab Treatment Note  [facility]/Department[facility]  [facility]       NAME: Mindy Hughes  : 1935 (82 y.o.)  MRN: [id number]  CODE STATUS: Full Code    Date of Service: 2020  Chart Reviewed: Yes  Family / Caregiver Present: No    Restrictions:          SUBJECTIVE: Subjective: I'm tired today, I'm looking forward to going home. I don't think I will have any problems once I go home, my daughter is going to live with me in a new apartment. Pain Screening  Patient Currently in Pain: Denies       Post Treatment Pain Screenin/10       OBJECTIVE:                         Transfers  Sit to Stand: Modified independent  Stand to sit: Modified independent    Ambulation  Ambulation?: Yes  Ambulation 1  Surface: level tile;carpet  Device: Rollator  Assistance: Modified Independent  Quality of Gait: slow reciprocal gait pattern with fwd flexed posture, decreased step length with minimal foot clearance. Gait Deviations: Slow Vivian  Distance: 100ft x 2    Stairs/Curb  Stairs?: No              Exercises  Hip Flexion: x 20  Hip Abduction: Seated Hip ABD/ADD with RTB / Ball x 20  Knee Long Arc Quad: x15  Ankle Pumps: x15     ASSESSMENT/COMMENTS:  Assessment: Pt displayd good safety awarness with ambualtion and distance tolerance as pt able to safely stop lock Rollator and sit at current distance of 100ft limited by fatigue.     PLAN OF CARE/Safety:   Safety Devices  Type of devices: Left in chair;Chair alarm in place      Therapy Time:   Individual   Time In 1000   Time Out 1030   Minutes 30     Minutes:30      Transfer/Bed mobility trainin     Gait: 15     Therapeutic ex:Emily Rodriguez  20 at 12:14 PM

## 2020-08-06 NOTE — PROGRESS NOTES
NEUROLOGY INPATIENT PROGRESS NOTE    Patient- Izzy Lopez    MRN -  [id number]   [facility]      - 1935    82 y.o. Subjective: The patient is seen in follow-up. Cognition has improved in the patient's opinion however no collateral history available. Appears sharper, faster in answering questions for me on 20. Denies tremor or increased stiffness in the morning. Notices improvements since working with physical therapy. Blood pressure on the low side. She is moving all the extremities  She feels she is doing good in the rehab  Thiamine changed to p.o. Thiamine deficiency, folate deficiency, being addressed  Elevated TSH to be watched  B12 was normal  Tremor minimal only  Bradykinesia stable and mild only    Result Data:  CBC:   Recent Labs     20  0546   HGB 7.9*     BMP:  No results for input(s): NA, K, CL, CO2, BUN, CREATININE, GLUCOSE in the last 72 hours. TSH:   No results for input(s): TSH in the last 72 hours. Folic Acid: No results for input(s): FOLATE in the last 72 hours. B12:    Lab Results   Component Value Date    [id number] 352 2020     Vit. D: No components found for: VITAMIND  Lipids: No results for input(s): CHOL, TRIG, HDL, LDLCALC in the last 72 hours. Ammonia:   Recent Labs     20  0546   AMMONIA 75*     LFT: No results for input(s): AST, ALT, ALB, BILITOT, ALKPHOS in the last 72 hours. Urine: No results for input(s): COLORU, PHUR, LABCAST, WBCUA, RBCUA, MUCUS, YEAST, BACTERIA, CLARITYU, SPECGRAV, LEUKOCYTESUR, UROBILINOGEN, Michael Meigs in the last 72 hours.     Invalid input(s): NITRATE, GLUCOSEUKETONESUAMORPHOUS     Imaging    Ct Abdomen Pelvis Wo Contrast Additional Contrast? None    Result Date: 2020  CT of the Abdomen and Pelvis without intravenous contrast medium History:  Fall from standing Technical Factors: CT imaging of the abdomen and pelvis were obtained and formatted as 5 mm contiguous axial images from the domes of the diaphragm to the symphysis pubis. Sagittal and coronal reconstructions were also obtained. Oral contrast medium:  None. Intravenous contrast medium:  None. Comparison:  CT abdomen pelvis, March 10, 2020, March 11, 2013. Findings: Liver: Small in size, and nodular contour, unchanged. Bile Ducts:  Normal in caliber. Gallbladder:  Surgically absent. Pancreas:  Hypoplastic, without masses, cysts, ductal dilatation or calcification. Spleen:  Normal in size without masses or calcifications. No splenules. Kidneys:  Right and left kidneys measure 8.0 cm, and 9.1 cm, respectively. 1 mm calcification mid pole left kidney, unchanged from 2013. No right renal calculi. Adrenals:  Normal. Small bowel:  Normal in caliber. Appendix:  Not visualized. Colon:  Normal in caliber. Surgical clips at level of rectum. Peritoneum:  Diffuse free fluid within the abdomen and pelvis, unchanged. No free air. Vessels: Aorta normal in course and caliber. Lymph nodes:  Retroperitoneal:  No enlarged retroperitoneal lymph nodes. Mesenteric:  No enlarged mesenteric lymph nodes. Pelvic: No enlarged pelvic lymph nodes. Ureters: Normal in course and caliber. No calcifications. Bladder: Partially decompressed. Reproductive organs: Uterus surgically absent. Dystrophic calcification, left adnexa, unchanged from March, 2013. Abdominal Wall: Remote abdominal wall defect repair. Edematous change within abdominal pelvic soft tissues diffusely, stable. Bones:  No bone lesions. No degenerative changes. No post operative changes. Cirrhosis. Diffuse ascites, unchanged. Diffuse edematous change, abdominal and pelvic walls, stable. Small right kidney. Left kidney lower limits of normal. Hypoplastic pancreas. Remote hemorrhoid surgery. Cholecystectomy. Hysterectomy.  All CT scans at this facility use dose modulation, iterative reconstruction, and/or weight based dosing when appropriate to reduce radiation dose to as low as reasonably achievable. Xr Hip Right (2-3 Views)    Result Date: 2020  Patient MRN: [id number] : 1935 Age:  82 years Gender: Female Order Date: 2020 12:00 PM. Exam: XR HIP RIGHT (2-3 VIEWS) Number of Views: 2 Indication:  Fall at home one week ago with persistent right hip pain since that time Comparison: 2011 radiographs. CT abdomen and pelvis 2020. Findings: Moderate degenerative changes with joint space loss in the bilateral hips. No acute fracture identified. Vascular calcifications. Impression:  1. No acute fracture identified. 2. Moderate bilateral osteoarthritis of the hips. 3. Vascular calcifications. Ct Head Wo Contrast    Result Date: 2020  CT Brain Contrast medium:  Not utilized. History:  Fell from standing. Denies anticoagulation/loss of consciousness. Comparison:  CT brain, 2014 Findings: Extra-axial spaces:  Normal. Intracranial hemorrhage:  None. Ventricular system: Ventricles mildly enlarged. Sulci mildly prominent. Basal Cisterns:  Normal. Cerebral Parenchyma: Physiologic basal ganglia calcification, unchanged. Midline Shift:  None. Cerebellum:  Normal. Paranasal sinuses and mastoid air cells:  Normal. Visualized Orbits: Remote bilateral ocular surgery. Impression: No acute findings. Mild cerebral atrophy. All CT scans at this facility use dose modulation, iterative reconstruction, and/or weight based dosing when appropriate to reduce radiation dose to as low as reasonably achievable. Ct Chest Wo Contrast    Result Date: 2020  CT of the Chest without intravenous contrast medium History:  Mechanical fall from standing. Technical Factors: CT imaging of the chest was obtained and formatted as 5 mm contiguous axial images from the thoracic inlet through the adrenal glands. Sagittal and coronal reconstruction obtained during postprocessing. Intravenous contrast medium:  None. Comparison:  Chest radiograph, 2020. disc osteophyte changes. NO ACUTE CERVICAL SPINE ABNORMALITY. Ct Thoracic Spine Wo Contrast    Result Date: 7/23/2020  EXAMINATION: CT THORACIC SPINE WO CONTRAST DATE AND TIME:7/22/2020 10:45 PM CLINICAL HISTORY: SEVERE POSTERIOR THORACIC SPINE PAIN. FALL  COMPARISON: NONE TECHNIQUE:  Axial noncontrast images were performed through the thoracic spine with coronal and sagittal reconstructions. All CT scans at this facility use dose modulation, iterative reconstruction, and/or weight based dosing when appropriate to reduce radiation dose to as low as reasonably achievable. FINDINGS  Alignment/fracture: Normal alignment without subluxation. No evidence of fracture. Perivertebral soft tissues are normal.  Disc spaces: There are multiple levels of disc space narrowing and end plate changes consistent with degenerative change. NEGATIVE CT OF THE THORACIC SPINE. Ct Lumbar Spine Wo Contrast    Result Date: 7/24/2020  EXAMINATION: CT LUMBAR SPINE WO CONTRAST CLINICAL HISTORY: Fall from standing. Back pain. COMPARISON: CT abdomen pelvis 3/10/2020 TECHNIQUE:  Spiral scans with no contrast. Multiplanar 2-D reconstructions. All CT scans at this facility use dose modulation, iterative reconstruction, and/or weight based dosing when appropriate to reduce radiation dose to as low as reasonably achievable. FINDINGS: No acute fractures identified. There is T11 superior endplate Schmorl's node with dystrophic calcification, unchanged from prior imaging study. There is lumbar spondylosis with multilevel intervertebral disc space narrowing, marginal osteophytosis, and facet hypertrophy. There is L4-5 degenerative disc vacuum phenomenon. There is L3-4 and L4-5 disc bulging which does not appear to impinge on the exiting nerve roots. There is mild L4-5 central canal stenosis. There is disc bulging and  degenerative disc vacuum phenomenon at L1-2. The SI joints are symmetrically maintained.  There is iliac sclerosis of the SI joints. Ascites is noted within the field-of-view. There are 2 left renal midpole nonobstructing stones, measuring 2 mm and 4 mm. DEGENERATIVE CHANGES. NO ACUTE FRACTURE. Mri Lumbar Spine Wo Contrast    Result Date: 7/27/2020  EXAMINATION: MRI lumbar spine without contrast CLINICAL HISTORY: Recent fall. Bilateral leg weakness. FINDINGS: Unenhanced scans obtained. T1 and T2-weighted sagittal and axial sequences were acquired. A STIR sagittal sequence are included. Today's study is compared with recent CT dated July 23, 2020. Vertebral bodies are intact and normal in height. Degenerative endplate changes are seen at L4-L5 particularly involving the superior endplate of L5. There is disc desiccation from L1 through L4. There is disc space narrowing at L3-L4 and L4-L5. Conus is  normal in caliber and signal and terminates at T12-L1. T12-L1: Unremarkable. L1-L2: Small focal disc protrusion centrally and to the left of the midline slightly effaces the thecal sac without narrowing the central canal or compressing transiting or exiting nerve roots. Foramina patent. L2-L3: No disc herniation. No central canal or foraminal stenosis. L3-L4: Generalized bulging of the annulus effaces the thecal sac anteriorly without compromising the transiting or exiting nerve roots. No significant central canal or foraminal stenosis. L4-L5: Generalized bulging of the annulus anteriorly and proliferative changes in the facet joints noted more prominent right side than left. There is narrowing of the right L4 nerve root foramen. No significant central canal stenosis and no definite disc herniation. L5-S1: No disc herniation or central canal or foraminal stenosis. Postlaminectomy changes on the right side noted. Prevertebral soft tissues unremarkable     DIFFUSE DEGENERATIVE DISEASE. NO COMPRESSION FRACTURE OR INFILTRATIVE BONY PROCESS. L4 NERVE ROOT FORAMINAL STENOSIS ON THE RIGHT.  NO EVIDENCE OF DISC HERNIATION OR CENTRAL CANAL anesthesia was used to place a 5 Hunt Reveal Yueh catheter within the abdominal fluid collection. Approximately 6.5 L of low viscosity pale yellow-colored fluid was aspirated without evidence of immediate complication. A sample was sent for analysis as requested. The patient was monitored in the radiology holding area for approximately hold minutes without event. She was returned to the medical floor in stable condition with the usual instructions. SUCCESSFUL ULTRASOUND-GUIDED PARACENTESIS APPROXIMATELY 6.5 L OF ASCITES. Us Guided Paracentesis    1. Status post technically successful ultrasound-guided paracentesis. Cheli Parry is a Female of 82 years age, referred for Ultrasound Guided Paracentesis. PROCEDURE: Survey of the abdomen showed large amount of ascites fluid. After obtaining informed consent, the patient was positioned supine on the sonography table. Using ultrasound, the skin over the left hemiabdomen was locally anesthetized with 1% lidocaine. Following that, a Yueh needle was advanced into the fluid pocket using ultrasound visualization. 5450cc, of clear yellow fluid were aspirated and sent for cytology, and pathology. The needle was removed, and hemostasis was obtained with pressure. A Band-Aid was placed. Post procedure images did not demonstrate hemorrhage at the target site. The patient tolerated the procedure well. The patient left the department in good condition. A radiology nurse was in presence monitoring vital signs, assisting throughout the procedure. Us Retroperitoneal Limited    Result Date: 7/23/2020  EXAMINATION: US RETROPERITONEAL LIMITED CLINICAL HISTORY: 82year-old with renal insufficiency and extensive abdominal ascites COMPARISONS: Unenhanced abdominal CT 7/23/2020 FINDINGS: Renal sonography was performed. The study is compromised due to patient body habitus and sonographic window. Kidneys by ultrasound are symmetric borderline small in size. tablet Oral TID    furosemide  40 mg Oral Daily    spironolactone  50 mg Oral Daily                 Principal Problem:    Abnormality of gait and mobility dt exacerbation of Parkinson's disease status post ascites and liver failure acute rehab admission 7/25/2020  Active Problems:    DM2 (diabetes mellitus, type 2) (HCC)    Fibromyalgia    Depression    DISH (diffuse idiopathic skeletal hyperostosis)    Hyperlipidemia    Hypertension    Lumbago without sciatica    Lumbar spondylosis    LVH (left ventricular hypertrophy)    Generalized muscle ache    Osteoarthritis of multiple joints    Parkinson's disease (HCC)    Diabetic polyneuropathy (HCC)    Rotator cuff (capsule) sprain    Shoulder impingement    Thrombocytopenia (HCC)    Vitamin D deficiency    Bilateral carotid artery stenosis    Secondary esophageal varices without bleeding (HCC)    Cough variant asthma    Morbidly obese (HCC)    Decompensated hepatic cirrhosis (HCC)    Weakness generalized    Stasis dermatitis    Gait abnormality    Moderate malnutrition (HCC)    Impaired mobility    Glaucoma    Diabetic gastropathy (HCC)    [place] (hard of hearing)    Acute renal failure (ARF) ([address]    History of lumbar laminectomy    Obesity (BMI 30-39. 9)    Portal hypertension (HCC)    Hypothyroidism    Muscle spasm of both lower legs    Muscle rigidity  Resolved Problems:    * No resolved hospital problems.  *      Patient Active Problem List   Diagnosis    DM2 (diabetes mellitus, type 2) ([address]    Fibromyalgia    Abdominal pain    Bursitis, hip    Bilateral hand pain    Actinic keratosis    Chondrocalcinosis of hand    Carotid stenosis    Chondrodermatitis nodularis chronica helicis    Chronic frontal sinusitis    Controlled type 2 diabetes mellitus without complication, without long-term current use of insulin (HCC)    Depression    DISH (diffuse idiopathic skeletal hyperostosis)    Fitting and adjustment of orthopedic device    Closed fracture of part of upper end of humerus    Fx phalanx, foot-closed    History of trabeculectomy    Hyperlipidemia    Hypertension    Intracranial arachnoid cyst    Joint replaced by other means    Lens replaced by other means    Liver cirrhosis ([address]    Lumbago without sciatica    Lumbar spondylosis    LVH (left ventricular hypertrophy)    Generalized muscle ache    Osteoarthritis of shoulder    Osteoarthritis of multiple joints    Parkinson's disease ([address]    POAG (primary open-angle glaucoma)    Diabetic polyneuropathy (HCC)    Pseudogout of hand    Rotator cuff (capsule) sprain    S/P left unicompartmental knee replacement    Knee joint replacement by other means    Sclerosis of the skin    Seborrheic keratosis    Secondary osteoarthritis of multiple sites    Shoulder impingement    Sprain and strain of unspecified site of shoulder and upper arm    Synovitis of hand    Thrombocytopenia (HCC)    Vitamin D deficiency    Chest pain    RANDOLPH (dyspnea on exertion)    Bilateral carotid artery stenosis    Iron deficiency anemia due to chronic blood loss    Hemorrhoids    Secondary esophageal varices without bleeding (HCC)    Cough variant asthma    Morbidly obese ([address]    Decompensated hepatic cirrhosis ([address]    ENMA (acute kidney injury) ([address]    Pancytopenia ([address]    Fall    Weakness generalized    Malnutrition ([address]    Cirrhosis of liver with ascites ([address]    Ataxic gait    History of myocardial infarction    Rectal prolapse    Stasis dermatitis    Gait abnormality    Moderate malnutrition (HCC)    Impaired mobility    Abnormality of gait and mobility dt exacerbation of Parkinson's disease status post ascites and liver failure acute rehab admission 7/25/2020    Glaucoma    Diabetic gastropathy ([address]    [place] (hard of hearing)    Acute renal failure (ARF) ([address]    History of lumbar laminectomy    Obesity (BMI 30-39. 9)    Portal hypertension (HCC)    Hypothyroidism    Muscle spasm of both lower legs    Muscle rigidity       Assessment/Plan    Leg buckling raises the concern for lumbar radiculopathy. Suspect mild cognitive changes are derived from metabolic and nutritional causes. Tumor is a minimal  Mild bradykinesia  No problem with the Sinemet, blood pressure is somewhat low, I shall not increase the dose of Sinemet     Plan:      - 8/4 stopped topamax d/t cognitive side effects, will monitor. Seems cognition may be improving.   - blood pressure not likely to tolerate increases in sinement however if motor symptoms worsen we may need to pursue further, cautious, increases. -TSH elevated, to be watched  Folate deficiency, and thiamine deficiency. - Will add high dose thiamine for wernicke's encephalopathy (500 mg IV TID x 3 days)  - MRI lumbar spine shows a no significant spinal stenosis, has a right foraminal narrowing L4-5  - continue sinement  - continue rehab activities  - Anemia treatment ongoing  Rehab working with the patient and patient is improving          Lorenza Lemus MD, 8/6/2020 12:44 PM

## 2020-08-06 NOTE — CONSULTS
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 Patient has been admitted to the hospital and now in rehabilitation. She is scheduled to be discharged on Saturday. Since we last drained her, she has had some increased abdominal girth and interventional radiology was consulted for assessment of whether the patient needs a paracentesis. Patient does feel some increase abdominal distention, though no significant pain or discomfort. Family History   Problem Relation Age of Onset    High Blood Pressure Neg Hx        Past Surgical History:   Procedure Laterality Date    CHOLECYSTECTOMY      EYE SURGERY      HEMORRHOID SURGERY      HERNIA REPAIR  2/20/2002    ventral hernia    HERNIA REPAIR      LIH repair    HYSTERECTOMY      JOINT REPLACEMENT Right     partial left, full right    LAMINECTOMY      PARACENTESIS Left 03/12/2020    5010cc ascites removed by Dr Irais Blakely [phone]    PARACENTESIS Left 06/10/2020    3,850 cc removed per Dr Alejandra Montoya Left 07/08/2020    total 5450 ml removed per Dr Alejandra Montoya Left 07/23/2020    Dr Loretha Pallas 6475 off    PARACENTESIS Left 08/05/2020    2880 ml removed by Dr. Tara Valladares N/A 2/21/2020    EGD WITH BANDING performed by Shaina Hobson MD at [facility]        Past Medical History:   Diagnosis Date    Asthma     Fibromyalgia     Hemorrhoid     Hyperlipidemia     Hypertension     Parkinson's disease ([facility] [address]     Type II or unspecified type diabetes mellitus without mention of complication, not stated as uncontrolled        Social History     Socioeconomic History    Marital status:       Spouse name: None    Number of children: None    Years of education: 15    Highest education level: High school graduate   Occupational History    Occupation: Homemaker   Social Needs    Financial resource strain: Somewhat hard   D[place]-Kennedy insecurity Worry: Never true     Inability: Never true    Transportation needs     Medical: Yes     Non-medical: Yes   Tobacco Use    Smoking status: Never Smoker    Smokeless tobacco: Never Used   Substance and Sexual Activity    Alcohol use: No    Drug use: No    Sexual activity: Not Currently     Partners: Male   Lifestyle    Physical activity     Days per week: 0 days     Minutes per session: 0 min    Stress: Only a little   Relationships    Social connections     Talks on phone: More than three times a week     Gets together: More than three times a week     Attends Denominational service: 1 to 4 times per year     Active member of club or organization: No     Attends meetings of clubs or organizations: Never     Relationship status:     Intimate partner violence     Fear of current or ex partner: No     Emotionally abused: No     Physically abused: No     Forced sexual activity: No   Other Topics Concern    None   Social History Narrative     Lives With: Alone in [address] [place] [address] Rd she is a     She has a daughter named -----and who lives in [facility] and that is quite helpful and comes to see her almost daily    Type of Home: Apartment  at [address] [facility] in [place] (7th floor)    Home Layout: One level    Home Access: Elevator, Level entry  (Level entry, elevator to 7th floor)    Bathroom Shower/Tub: Tub/Shower unit    Home Equipment: 4 wheeled walker    ADL Assistance: Independent    Homemaking Assistance: Independent    Ambulation Assistance: Independent(Rollator)    Transfer Assistance: Independent    Active : No    Patient's  Info: daughter    IADL Comments: Daughter helps with driving and groceries PRN    Additional Comments: Reports recent fall- \"my legs gave out\"           Allergies   Allergen Reactions    Adhesive Tape     Amoxicillin-Pot Clavulanate     Bactrim [Sulfamethoxazole-Trimethoprim]     Clarithromycin      Other reaction(s):  Other: See Comments  tachycardia    Other     Sulfa Antibiotics        No current facility-administered medications on file prior to encounter. Current Outpatient Medications on File Prior to Encounter   Medication Sig Dispense Refill    ibuprofen (ADVIL;MOTRIN) 200 MG tablet Take 200 mg by mouth every 6 hours as needed for Pain (Headache)      topiramate (TOPAMAX) 50 MG tablet TAKE 1 TABLET BY MOUTH DAILY 90 tablet 0    irbesartan (AVAPRO) 75 MG tablet TAKE 1 TABLET BY MOUTH EVERY DAY 90 tablet 0    sertraline (ZOLOFT) 50 MG tablet TAKE 1 TABLET BY MOUTH EVERY DAY 90 tablet 0    omeprazole (PRILOSEC) 40 MG delayed release capsule TAKE 1 CAPSULE BY MOUTH EVERY DAY 90 capsule 0    lovastatin (MEVACOR) 10 MG tablet TAKE 1 TABLET BY MOUTH EVERY NIGHT 30 tablet 3    albuterol sulfate HFA (PROAIR HFA) 108 (90 Base) MCG/ACT inhaler Inhale 2 puffs into the lungs every 6 hours as needed for Wheezing      furosemide (LASIX) 40 MG tablet Take 1 tablet by mouth daily 60 tablet 3    spironolactone (ALDACTONE) 100 MG tablet Take 1 tablet by mouth daily 30 tablet 0    clotrimazole-betamethasone (LOTRISONE) 1-0.05 % cream Use two times daily to affected area for 14 days then stop 30 g 0    sucralfate (CARAFATE) 1 GM tablet TAKE 1 TABLET BY MOUTH FOUR TIMES DAILY 360 tablet 1    ACCU-CHEK SOFTCLIX LANCETS MISC Checks 1 time daily. NIDDM--ICD-10 E11.9 100 each 3    blood glucose test strips (ACCU-CHEK ACTIVE STRIPS) strip Checks 1 time daily. NIDDM--ICD-10 E11.9 100 each 3    aspirin 81 MG tablet Take 1 tablet by mouth daily With Food 30 tablet 3    Cinnamon 500 MG TABS Take by mouth 3 times daily      carbidopa-levodopa (SINEMET)  MG per tablet Take 1 tablet by mouth 1 po bid per dr robb goldstein neurologist      latanoprost (XALATAN) 0.005 % ophthalmic solution Use 1 Drop in both eyes daily at bedtime.       tiZANidine (ZANAFLEX) 2 MG tablet Take 2 mg by mouth Take 2 mg by mouth bid prn      pregabalin (LYRICA) 75 MG capsule Take 1 capsule Rate and Rhythm: Normal rate and regular rhythm. Heart sounds: Normal heart sounds. No murmur. No friction rub. No gallop. Pulmonary:      Effort: No respiratory distress. Breath sounds: No stridor. No wheezing or rales. Chest:      Chest wall: No tenderness. Abdominal:      General: Bowel sounds are normal. There is distension. Palpations: Abdomen is soft. There is no mass. Tenderness: There is no abdominal tenderness. There is no guarding or rebound. Hernia: No hernia is present. Musculoskeletal:         General: No tenderness or deformity. Skin:     General: Skin is dry. Coloration: Skin is not pale. Findings: No erythema or rash. Neurological:      Mental Status: She is alert and oriented to person, place, and time. Cranial Nerves: No cranial nerve deficit. Psychiatric:         Behavior: Behavior normal.         Thought Content:  Thought content normal.         Judgment: Judgment normal.         ASSESSMENT ANDPLAN:    ASSESSMENT: Recurrent ascites, patient would like to have the fluid removed so that it does not cause any discomfort soon    PLAN: Ultrasound-guided therapeutic paracentesis      Sal Dodson MD, Camilo Carrillo

## 2020-08-07 NOTE — PROGRESS NOTES
[facility]   Facility/Department: [facility]  Speech Language Pathology  Discharge Report        Patient: Samra Fraga  : 1935    Date: 2020    NATIONAL OUTCOMES MEASUREMENT SYSTEM (NOMS):    STATUS AT INITIATION OF THERAPY:  DIET: Regular diet with thin liquids      SWALLOWING  Ratin    SPOKEN LANGUAGE COMPREHENSION  Ratin    SPOKEN LANGUAGE EXPRESSION  Ratin    MOTOR SPEECH  Ratin    PROBLEM SOLVING  Ratin    MEMORY  Ratin        Treatment Area(s):  Cognition    Progress made: Pt demonstrated decline in cognitive functioning since initial evaluation, however pt showed improved cognitive abilities last 2 treatment sessions. Pt showed improvement with response time. Short-term Goals  Goal 1: Pt will complete abstract verbal reasoning tasks (similarities/differences, divergent naming) with 80% acc given cues as needed in order to promote effective communication of wants and needs with caregivers upon discharge. Pt needed moderate cues for abstract reasoning tasks. Goal 2: Pt will be educated on 3 memory strategies and will state their use in functional activities with 80% acc given cues as needed in order to promote safety with the completion of ADLs upon discharge. Pt demonstrated association strategy during memory tasks with cues. Goal 3: Pt will complete mid-level problem solving task related to ADLs with 80% acc given cues as needed in order to promote safety with the completion of ADLs upon discharge. Goal met  Goal 4: Pt will complete delayed recall task with 80% acc given cues as needed in order to promote safety with the completion of ADLs upon discharge.  Goal met      STATUS AT DISCHARGE:  DIET: Regular with thin    SPOKEN LANGUAGE COMPREHENSION  Ratin    SPOKEN LANGUAGE EXPRESSION  Ratin    MOTOR SPEECH  Ratin    PROBLEM SOLVING  Ratin    MEMORY  Ratin      Functional Status at time of Discharge:    · Cognition: Patient demonstrates minimal-moderate cognitive deficits. · Communication: Patient demonstrates minimal communication deficits. · Language: Patient demonstrates minimal language deficits. · Motor Speech: Patient demonstrates no motor speech deficits. · Swallow: Patient demonstrates no dysphagia. Patient is discharged to Home with family. [] Recommend continued speech therapy   [x] Speech Therapy is no longer warranted      Signature:  Kaushik Warren, Date: 8/7/2020, Time: 11:45 AM

## 2020-08-07 NOTE — PROGRESS NOTES
Perfect serve to Dr. Charles Felipe   In anticipation of patient discharge home 08/08/20 do you want to consider doing another H&H ? daughter questioned. last one drawn 08/04/20. low side   CBC drawn H&H improved. However blood pressure was low held Lasix and spirolactone. Hospitalist made aware. daughter concerned patient will need pain medication at home. Dr. Zaid Sharp made aware. Dr. Hung Mari is on the case as well.

## 2020-08-07 NOTE — PROGRESS NOTES
Pt resting quietly, bed alarm on,call light within reach, Pt transfer up with walker and one assist continent of bowel and bladder LBM 8/4/2020. Electronically signed by Anne Vasquez LPN on 2/7/5035 at 0:36 AM

## 2020-08-07 NOTE — PROGRESS NOTES
Physical Therapy Rehab Treatment Note  [facility]       NAME: Mi Guillen  : 1935 (82 y.o.)  MRN: [id number]  CODE STATUS: Full Code    Date of Service: 2020  Chart Reviewed: Yes  Family / Caregiver Present: No    Restrictions:  Restrictions/Precautions: Fall Risk    SUBJECTIVE: Subjective: Pt states her wrist pain is from when she fell at home. States she is ready to go home tomorrow. Pain Screening  Patient Currently in Pain: Yes  Pre Treatment Pain Screening  Pain at present: 8  Intervention List: Patient able to continue with treatment;Patient declined any intervention  Comments / Details: L hand and wrist    Post Treatment Pain Screening:Same as above. OBJECTIVE:                 Transfers  Sit to Stand: Modified independent  Stand to sit: Modified independent  Bed to Chair: Modified independent  Car Transfer: Modified independent    Ambulation  Ambulation?: Yes  Ambulation 1  Surface: level tile;carpet;uneven  Device: Rollator  Assistance: Modified Independent;Supervision  Quality of Gait: slow reciprocal gait pattern with fwd flexed posture, decreased step length with decreased heel strike  Distance: 150ft; 50ft  Comments: Indep household distances 50ft; Supervision 150ft. Pt indep transfers to sit on rollator. Exercises  Hip Flexion: x 20  Knee Long Arc Quad: x15  Ankle Pumps: x20  Comments: TUG 22.2sec  Initiated HEP for seated LE therex. Will complete PM.  Other exercises  Other exercises 4: 30sec STS= 7     ASSESSMENT/COMMENTS:  Assessment: Goals met. Improved TUG time and 30sec STS since last measure. PLAN OF CARE/Safety:   Plan Comment: Pt scheduled to D/C home tomorrow 2020.   Complete HEP this PM.      Therapy Time:   Individual   Time In 1000   Time Out 1030   Minutes 30     Minutes:      Transfer/Bed mobility trainin      Gait training:10      Neuro re education:10     Therapeutic ex:5    Albert Olmstead PTA, 08/07/20 at 10:29 AM

## 2020-08-07 NOTE — PROGRESS NOTES
Physical Therapy Rehab Treatment Note  [facility]Department[facility]  [facility]       NAME: Cj Chaves  : 1935 (82 y.o.)  MRN: [id number]  CODE STATUS: Full Code    Date of Service: 2020  Chart Reviewed: Yes  Family / Caregiver Present: No    Restrictions:  Restrictions/Precautions: Fall Risk     SUBJECTIVE: Subjective:  States she is ready to go home tomorrow. States her daughter is going to stay with her. Pain Screening  Patient Currently in Pain: No  Pre Treatment Pain Screening  Pain at present: 0/10    Post Treatment Pain Screenin/10     OBJECTIVE:              Bed mobility  Rolling to Left: Modified independent  Rolling to Right: Modified independent  Supine to Sit: Modified independent  Sit to Supine: Modified independent  Transfers  Sit to Stand: Modified independent  Stand to sit: Modified independent  Bed to Chair: Modified independent  Car Transfer: Modified independent    Ambulation  Ambulation?: Yes  Ambulation 1  Surface: level tile;carpet;uneven  Device: Rollator  Assistance: Supervision  Quality of Gait: slow reciprocal gait pattern, decreased step length with decreased heel strike  Distance: 150ft        Exercises  Hip Flexion: x 20  Hip Abduction: Seated side kicks x10; hip add with pillow x20  Knee Long Arc Quad: x15  Knee Short Arc Quad: x 20  Ankle Pumps: x20  Neurodevelopmental Techniques: Box step with 1 HHA; Static standing EO/EC/blue foam without UE support  Comments: Reviewed seated LE therex for HEP. Handout issued. ASSESSMENT/COMMENTS:  Assessment: Goals met. Static standing challenged on foam surface and dynamically without UE support. Requires rollator for safest ambulation.     Patient Education: HEP    PLAN OF CARE/Safety:   Plan Comment: Pt scheduled to D/C home tomorrow 2020    Therapy Time:   Individual   Time In 1330   Time Out 1400   Minutes 30   Therapy Time   Individual      Time In 1430      Time Out 1500      Minutes 30 Minutes:60      Transfer/Bed mobility training:10      Gait training:15      Neuro re education:10     Therapeutic ex:25    Verónica Gray PTA, 08/07/20 at 3:01 PM

## 2020-08-07 NOTE — PROGRESS NOTES
Wrist  Pain Orientation: Left  Pain Descriptors: Sore;Aching  Pain Frequency: Intermittent  Pre Treatment Pain Screening  Pain at present: 8  Intervention List: Patient able to continue with treatment;Nurse/Physician notified  Vital Signs  Patient Currently in Pain: Yes   Orientation  Orientation  Overall Orientation Status: Within Functional Limits  Objective    ADL  Feeding: Independent  Grooming: Modified independent   UE Bathing: Modified independent   LE Bathing: Modified independent   UE Dressing: Modified independent   LE Dressing: Modified independent ; Increased time to complete  Toileting: Modified independent         Balance  Sitting Balance: Modified independent   Standing Balance: Modified independent   Functional Mobility  Functional - Mobility Device: Rolling Walker  Activity: To/from bathroom  Assist Level: Modified independent   Toilet Transfers  Toilet - Technique: Ambulating  Equipment Used: Grab bars  Toilet Transfer: Modified independent  Shower Transfers  Shower Transfers: Not tested  Shower Transfers Comments: pt completed sponge bath per request     Transfers  Sit to stand: Modified independent  Stand to sit: Modified independent        Cognition  Cognition Comment: Comprehension: Sup., Expression: Sup., Social Interaction: MI., Problem Solving: Sup., Memory: Sup. Plan   Plan  Times per week: 5-7 times per day  Plan weeks: 1.5 weeks  Current Treatment Recommendations: Strengthening, Balance Training, Functional Mobility Training, Endurance Training, Safety Education & Training, Patient/Caregiver Education & Training, Self-Care / ADL, Equipment Evaluation, Education, & procurement  Plan Comment: Continue OT per POC  G-Code     OutComes Score                                                  AM-PAC Score             Goals  Patient Goals   Patient goals :  \"To get stronger\"       Therapy Time   Individual Concurrent Group Co-treatment   Time In 1030         Time Out 1130         Minutes 60 ADL trainin minutes     Zeny Mensah OT   Electronically signed by Zeny Mensah OT on 2020 at 12:09 PM

## 2020-08-07 NOTE — PROGRESS NOTES
Subjective: The patient complains of severe  acute on chronic confusion rigidity ascites malaise partially relieved by recent paracentesis, PT, OT, speech and language pathology, rest and exacerbated by reaccumulation of ascites. I am concerned about patients drop of H&H and abdominal pain. I am preparing for her discharge tomorrow her vital signs of stabilized her ascites is now stable she will be going home alone with the daughters help as her cognition is clearing her pain is much better. ROS x10: The patient also complains of severely impaired mobility and activities of daily living. Otherwise no new problems with vision, hearing, nose, mouth, throat, dermal, cardiovascular, GI, , pulmonary, musculoskeletal, psychiatric or neurological. See Rehab H&P on Rehab chart dated . Vital signs:  /69   Pulse 76   Temp 98 °F (36.7 °C) (Oral)   Resp 16   Ht 5' 1\" (1.549 m)   Wt 145 lb 15.1 oz (66.2 kg)   SpO2 93%   BMI 27.58 kg/m²   I/O:   PO/Intake:  fair PO intake, no problems observed or reported. Bowel/Bladder:  continent, no problems noted. General:  Patient is well developed, adequately nourished, non-obese and     well kempt. HEENT:    PERRLA, hearing intact to loud voice, external inspection of ear     and nose benign. Inspection of lips, tongue and gums benign  Musculoskeletal: No significant change in strength or tone. All joints stable. Inspection and palpation of digits and nails show no clubbing,       cyanosis or inflammatory conditions. Neuro/Psychiatric: Affect: flat but pleasant. Alert and oriented to  Self . No significant change in deep tendon reflexes or     sensation  Lungs:  Diminished CTA-B. Respiration effort is normal at rest.     Heart:   S1 = S2, RRR. No loud murmurs. Abdomen:  Firm with tenderness due to ascites, non-tender, no enlargement of liver or spleen.   Extremities:  No significant lower extremity edema or tenderness. Skin:   Intact to general survey, no visualized or palpated problems. Rehabilitation:  Physical therapy: FIMS:  Bed Mobility: Scooting: Supervision    Transfers: Sit to Stand: Modified independent  Stand to sit: Modified independent  Bed to Chair: Supervision, Ambulation 1  Surface: level tile, carpet  Device: Rollator  Assistance: Modified Independent  Quality of Gait: slow reciprocal gait pattern with fwd flexed posture, decreased step length with minimal foot clearance. Gait Deviations: Slow Vivian  Distance: Functional distance the focus of this tx session with safe management of Rollator around objects the focus of this tx session. Comments: Pt faitgued after 100ft. Pt able to recognize and safely transfer to sit on rollator.,      FIMS:  ,  ,      Occupational therapy: FIMS:   ,  , Assessment: Pt. demonstrates improving  strength but continues to require excessive amount of time for all mobility and activities. Pt. continues to benefit from OT to maximize independence and safety with ADL tasks. Speech therapy: FIMS:        Lab/X-ray studies reviewed, analyzed and discussed with patient and staff:   No results found for this or any previous visit (from the past 24 hour(s)). Ct Abdomen Pelvis  7/23/2020    Liver: Small in size, and nodular contour, unchanged. Bile Ducts:  Normal in caliber. Gallbladder:  Surgically absent. Pancreas:  Hypoplastic, without masses, cysts, ductal dilatation or calcification. Spleen:  Normal in size without masses or calcifications. No splenules. Kidneys:  Right and left kidneys measure 8.0 cm, and 9.1 cm, respectively. 1 mm calcification mid pole left kidney, unchanged from 2013. No right renal calculi. Adrenals:  Normal. Small bowel:  Normal in caliber. Appendix:  Not visualized. Colon:  Normal in caliber. Surgical clips at level of rectum. Peritoneum:  Diffuse free fluid within the abdomen and pelvis, unchanged. No free air. Vessels:   Aorta normal in course and caliber. Lymph nodes:  Retroperitoneal:  No enlarged retroperitoneal lymph nodes. Mesenteric:  No enlarged mesenteric lymph nodes. Pelvic: No enlarged pelvic lymph nodes. Ureters: Normal in course and caliber. No calcifications. Bladder: Partially decompressed. Reproductive organs: Uterus surgically absent. Dystrophic calcification, left adnexa, unchanged from March, 2013. Abdominal Wall: Remote abdominal wall defect repair. Edematous change within abdominal pelvic soft tissues diffusely, stable. Bones:  No bone lesions. No degenerative changes. No post operative changes. Cirrhosis. Diffuse ascites, unchanged. Diffuse edematous change, abdominal and pelvic walls, stable. Small right kidney. Left kidney lower limits of normal. Hypoplastic pancreas. Remote hemorrhoid surgery. Cholecystectomy. Hysterectomy. Xr Hip Right 7/25/2020    Moderate degenerative changes with joint space loss in the bilateral hips. No acute fracture identified. Vascular calcifications. Impression:  1. No acute fracture identified. 2. Moderate bilateral osteoarthritis of the hips. 3. Vascular calcifications. Ct Head   7/23/2020: Extra-axial spaces:  Normal. Intracranial hemorrhage:  None. Ventricular system: Ventricles mildly enlarged. Sulci mildly prominent. Basal Cisterns:  Normal. Cerebral Parenchyma: Physiologic basal ganglia calcification, unchanged. Midline Shift:  None. Cerebellum:  Normal. Paranasal sinuses and mastoid air cells:  Normal. Visualized Orbits: Remote bilateral ocular surgery. Impression: No acute findings. Mild cerebral atrophy. Ct Chest  : 7/23/2020    Right lung shows mild dependent atelectatic change posterior right upper lobe, posterior right middle lobe, and posterior right lower lobe. No nodules or masses. No pleural effusion. Left lung shows dependent subsegmental atelectatic change, left upper lobe.  Small peripheral focus of groundglass opacity lateral lower 2 mm and 4 mm. DEGENERATIVE CHANGES. NO ACUTE FRACTURE. Xr Chest   7/23/2020  NO EVIDENCE OF ACUTE THORACIC TRAUMA OR ACTIVE CARDIOPULMONARY DISEASE. Us Guided  : 7/23/2020  SUCCESSFUL ULTRASOUND-GUIDED PARACENTESIS APPROXIMATELY 6.5 L OF ASCITES. Us Retroperitoneal   7/23/2020    Renal sonography was performed. The study is compromised due to patient body habitus and sonographic window. Kidneys by ultrasound are symmetric borderline small in size. Right kidney measures 9.3 x 5.3 x 4.3 cm left kidney measures 9.2 x 4.7 x  4.8 cm. There are a few punctate echogenic foci in the central sinus echocomplex raising the question of specular reflectors. Bilateral renal echotexture is otherwise unremarkable. No large cortical cysts, solid masses or shadowing calcifications. No ultrasound signs of hydronephrosis. Note is made of upper abdominal ascites     SYMMETRIC KIDNEYS BORDERLINE SMALL IN SIZE. NO ULTRASOUND SIGNS OF HYDRONEPHROSIS. UPPER ABDOMINAL ASCITES       Previous extensive, complex labs, notes and diagnostics reviewed and analyzed. ALLERGIES:    Allergies as of 07/25/2020 - Review Complete 07/25/2020   Allergen Reaction Noted    Adhesive tape  11/22/2004    Amoxicillin-pot clavulanate  03/07/2013    Bactrim [sulfamethoxazole-trimethoprim]  03/07/2013    Clarithromycin  06/23/2015    Other  08/30/2004    Sulfa antibiotics  08/30/2004      (please also verify by checking MAR)     Yesterday I evaluated this patient for periodic reassessment of medical and functional status. The patient was discussed in detail at the treatment team meeting focusing on current medical issues, progress in therapies, social issues, psychological issues, barriers to progress and strategies to address these barriers, and discharge planning. See the hand written addendum to rehab progress note.   The patient continues to be high risk for future disability and their medical and rehabilitation prognosis of its potential toxicity especially in light of liver failure. Per family's insistence she will be trialed on low-dose Lyrica however I am hesitant to allow this as she has ascites from liver failure and confusion already. 4. Skin healing and breakdown risk:  continue pressure relief program.  Daily skin exams and reports from nursing. 5. Severe fatigue due to nutritional and hydration deficiency: Add vitamin B12 vitamin D and CoQ10 continue to monitor I&Os, calorie counts prn, dietary consult prn. Lactulose for high ammonia levels. 6. Acute episodic insomnia with situational adjustment disorder:  prn Ambien, monitor for day time sedation. 7. Falls risk elevated:  patient to use call light to get nursing assistance to get up, bed and chair alarm. 8. Elevated DVT risk: progressive activities in PT, continue prophylaxis DAWN hose, elevation and no thinners due to liver failure. 9. Complex discharge planning: I will progress toward and prepare for toward discharge plan on Saturday discharge 8/8/2020 home 24-hour support versus long-term care with help from her daughters who live locally. She is status post weekly team meeting every Monday to assess progress towards goals, discuss and address social, psychological and medical comorbidities and to address difficulties they may be having progressing in therapy. Patient and family education is in progress. The patient is to follow-up with their family physician after discharge. Complex Active General Medical Issues that complicate care Assess & Plan:    1. Whole body pain as well as cervical thoracic and low back pain due to fibromyalgia,   Lumbago without sciatica,   Rotator cuff (capsule) deficits, Shoulder impingement-use lowest effective dose of opiate medication to include Roxicodone and Ultram, titrate Desyrel, add Lidoderm BenGay heat massage-titrate Topamax and avoid use of Tylenol due to liver disease as well as Lyrica due to intolerance  2. Depression and anxiety-emotional support given daily add recreational therapy and rehabilitation psychology when available and support group when available  3. LVH (left ventricular hypertrophy), coronary artery disease-vital signs to shift dose and titrate cardiac locations consult hospitalist for backup medical  4. Parkinson's disease -consult neurology-who have not addressed this issue avoid sedatives such as Lyrica which she does not tolerate well titrate Sinemet. I will consult pain management regarding the use of Lyrica. 5.   Thrombocytopenia with severe anemia and recent blood transfusion-recheck CBC add vitamin B12 vitamin D  6. Vitamin D deficiency-add daily vitamin D and recheck as an outpatient  7. Secondary esophageal varices without bleeding-monitor stools for blood avoid strong blood thinners if possible  8. Morbidly obese but with  Moderate malnutrition -add high-protein low-carb supplementation if possible however because of her her recent liver disease she may not be able to process proteins very well. 9.   Stasis dermatitis-protect skin add ET mix to the lower extremities add co-Q10  10. Severe progressive and recurrent ascites of the liver-eliminate toxic medications recheck as an outpatient monitor ammonia level-reconsult interventional radiology for repeat paracentesis. 11. Hepatic encephalopathy with Elavil elevated ammonia level-titrate lactulose recheck ammonia-consult GI to discuss this with the family. Pharmacy is now aware and adjusting medications. 12. Severe hypothyroidism-consult endocrinology titrate Synthroid  13. Polypharmacy in the elderly-consult pain management agree with avoiding use of Lyrica-medications adjusted as well patient and family made aware of these concerns.   Patient is restarted on Lyrica lower dose and we are monitoring her cognition closely       Dominga Salazar D.O., PM&R     Attending    [facility]

## 2020-08-07 NOTE — PLAN OF CARE
Problem: Falls - Risk of:  Goal: Will remain free from falls  Description: Will remain free from falls  8/7/2020 0208 by Estefani Feliz RN  Outcome: Ongoing     Problem: Falls - Risk of:  Goal: Absence of physical injury  Description: Absence of physical injury  8/7/2020 0208 by Estefani Feliz RN  Outcome: Ongoing     Problem: Skin Integrity:  Goal: Will show no infection signs and symptoms  Description: Will show no infection signs and symptoms  8/7/2020 0208 by Estefani Feliz RN  Outcome: Ongoing     Problem: Skin Integrity:  Goal: Absence of new skin breakdown  Description: Absence of new skin breakdown  8/7/2020 0208 by Estefani Feliz RN  Outcome: Ongoing

## 2020-08-07 NOTE — PROGRESS NOTES
Occupational Therapy  Facility/Department: [facility]  Daily Treatment Note  NAME: [place]  : 1935  MRN: [id number]    Date of Service: 2020    Discharge Recommendations:  Continue to assess pending progress       Assessment   Assessment: Pt requires no verbal cues today to sequence ADL tasks. Pt continues to benefit from OT services to maximize independence and safety during ADLs and IADLs. REQUIRES OT FOLLOW UP: Yes  Activity Tolerance  Activity Tolerance: Patient Tolerated treatment well  Safety Devices  Safety Devices in place: Yes  Type of devices: All fall risk precautions in place         Patient Diagnosis(es): There were no encounter diagnoses. has a past medical history of Asthma, Fibromyalgia, Hemorrhoid, Hyperlipidemia, Hypertension, Parkinson's disease ([facility] [address] and Type II or unspecified type diabetes mellitus without mention of complication, not stated as uncontrolled. has a past surgical history that includes Hysterectomy; Cholecystectomy; laminectomy; eye surgery; joint replacement (Right); hernia repair (2002); hernia repair; Hemorrhoid surgery; Upper gastrointestinal endoscopy (N/A, 2020); Paracentesis (Left, 2020); Paracentesis (Left, 06/10/2020); Paracentesis (Left, 2020); Paracentesis (Left, 2020); and Paracentesis (Left, 2020). Restrictions  Restrictions/Precautions  Restrictions/Precautions: (No orders from podiatry in chart for wb status to right foot)  Required Braces or Orthoses?: No  Subjective   General  Chart Reviewed: Yes  Patient assessed for rehabilitation services?: Yes  Response to previous treatment: Patient with no complaints from previous session  Family / Caregiver Present: Yes(daughter)  Referring Practitioner: Dr Vanessa Lewis  Diagnosis: Imp Mob and ADLs 2° Exac of Parkinsons  Subjective  Subjective: Nice to meet you.   Pain Assessment  Pain Assessment: 0-10  Pain Level: 0  Pre Treatment Pain Screening  Pain at present: 0  Scale Used: Numeric Score  Intervention List: Patient able to continue with treatment  Vital Signs  Patient Currently in Pain: No   Orientation     Objective    Pt. completed tub transfer at below status. Tub Transfers  Tub - Transfer From: (rollator)  Tub - Transfer Type: To and From  Tub - Transfer To: Shower seat with back  Tub - Technique: Ambulating  Tub Transfers: Contact guard    Pt. was issued a strengthening HEP. Pt. noted an old injury to her R shoulder and constant pain in her L shoulder. Pt. was encouraged to complete the shoulder exercises without a weight to the best to her ability. Therapist recommended completing any exercises without shoulder movement, to use a 1 lb free weight. Pt. completed 1 set x 5 reps to allow patient to have a good understanding. Plan   Plan  Times per week: 5-7 times per day  Plan weeks: 1.5 weeks  Current Treatment Recommendations: Strengthening, Balance Training, Functional Mobility Training, Endurance Training, Safety Education & Training, Patient/Caregiver Education & Training, Self-Care / ADL, Equipment Evaluation, Education, & procurement  Plan Comment: Continue OT per POC    Goals  Patient Goals   Patient goals :  \"To get stronger\"       Therapy Time   Individual Concurrent Group Co-treatment   Time In 1400         Time Out 1430         Minutes 30              ADL training: 10 minutes  Therapeutic activities: 20 minutes      SOCORRO Baumann Electronically signed by SOCORRO Baumann on 8/7/2020 at 2:52 PM

## 2020-08-07 NOTE — DISCHARGE INSTR - COC
ENDOSCOPY N/A 2/21/2020    EGD WITH BANDING performed by Javier Padilla MD at [facility]       Immunization History:   Immunization History   Administered Date(s) Administered    Influenza A ([id number]),all Formulations 08/24/2016    Influenza Vaccine, unspecified formulation 10/16/2012, 11/08/2018    Influenza, High Dose (Fluzone 65 yrs and older) 11/15/2013, 10/14/2014, 09/15/2016, 10/09/2017    Influenza, Caron Drier, IM, (6 mo and older Fluzone, Flulaval, Fluarix and 3 yrs and older Afluria) 11/08/2018    Influenza, Triv, inactivated, subunit, adjuvanted, IM (Fluad 65 yrs and older) 01/29/2020    Pneumococcal Conjugate 13-valent ([id number]) 02/04/2016    Tdap (Boostrix, Adacel) 12/06/2012       Active Problems:  Patient Active Problem List   Diagnosis Code    DM2 (diabetes mellitus, type 2) ([facility] [address] E11.9   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    Carotid stenosis I65.29    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    Chronic frontal sinusitis J32.1    Controlled type 2 diabetes mellitus without complication, without long-term current use of insulin (HCC) E11.9    Depression F32.9    DISH (diffuse idiopathic skeletal hyperostosis) M48.10    Fitting and adjustment of orthopedic device Z46.89    Closed fracture of part of upper end of humerus S42.209A    Fx phalanx, foot-closed S92.919A    History of trabeculectomy Z98.890    Hyperlipidemia E78.5    Hypertension I10    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    Joint replaced by other means Z96.698    Lens replaced by other means Z96.1    Liver cirrhosis (HCC) K74.60    Lumbago without sciatica M54.5    Lumbar spondylosis M47.816    LVH (left ventricular hypertrophy) I51.7    Generalized muscle ache M79.10    Osteoarthritis of shoulder M19.019    Osteoarthritis of multiple joints M15.9    Parkinson's disease ([facility] [address] G20    POAG (primary open-angle glaucoma) H40.1190    Diabetic polyneuropathy ([facility] [address] E11.42    Pseudogout of hand M11.249    Rotator cuff (capsule) sprain S43.429A    S/P left unicompartmental knee replacement Z96.652    Knee joint replacement by other means Z96.659    Sclerosis of the skin L98.9    Seborrheic keratosis L82.1    Secondary osteoarthritis of multiple sites M15.3    Shoulder impingement M75.40    Sprain and strain of unspecified site of shoulder and upper arm [id number]    Synovitis of hand M65.9    Thrombocytopenia (HCC) D69.6    Vitamin D deficiency E55.9    Chest pain R07.9    RANDOLPH (dyspnea on exertion) R06.09    Bilateral carotid artery stenosis I65.23    Iron deficiency anemia due to chronic blood loss D50.0    Hemorrhoids K64.9    Secondary esophageal varices without bleeding (HCC) I85.10    Cough variant asthma J45.991    Morbidly obese (HCC) E66.01    Decompensated hepatic cirrhosis (HCC) K72.90    ENMA (acute kidney injury) ([facility] [address] N17.9    Pancytopenia (HCC) D61.818    Fall W19. XXXA    Weakness generalized R53.1    Malnutrition ([facility] [address] E46    Cirrhosis of liver with ascites ([facility] [address] K74.60, R18.8    Ataxic gait R26.0    History of myocardial infarction I25.2    Rectal prolapse K62.3    Stasis dermatitis I87.2    Gait abnormality R26.9    Moderate malnutrition (HCC) E44.0    Impaired mobility Z74.09    Abnormality of gait and mobility dt exacerbation of Parkinson's disease status post ascites and liver failure acute rehab admission 7/25/2020 R26.9    Glaucoma H40.9    Diabetic gastropathy (HCC) E11.69, K31.9    [place] (hard of hearing) H91.90    Acute renal failure (ARF) (HCC) N17.9    History of lumbar laminectomy Z98.890    Obesity (BMI 30-39. 9) E66.9    Portal hypertension (HCC) K76.6    Hypothyroidism E03.9    Muscle spasm of both lower legs M62.838    Muscle rigidity R29.898       Isolation/Infection:   Isolation          No Isolation Patient Infection Status     None to display          Nurse Assessment:  Last Vital Signs: /69   Pulse 76   Temp 98 °F (36.7 °C) (Oral)   Resp 16   Ht 5' 1\" (1.549 m)   Wt 145 lb 15.1 oz (66.2 kg)   SpO2 93%   BMI 27.58 kg/m²     Last documented pain score (0-10 scale): Pain Level: 8  Last Weight:   Wt Readings from Last 1 Encounters:   08/03/20 145 lb 15.1 oz (66.2 kg)     Mental Status:  oriented and alert    IV Access:  - None    Nursing Mobility/ADLs:  Walking   Independent  Transfer  Independent  Bathing  Independent  Dressing  Independent  Toileting  Independent  Feeding  Independent  Med Admin  Independent  Med Delivery   whole    Wound Care Documentation and Therapy:  Wound 07/23/20 Sacrum (Active)   Wound Deep tissue/Injury 07/28/20 1142   Dressing Status Clean;Dry; Intact 08/06/20 2302   Dressing Changed Changed/New 08/05/20 2045   Dressing/Treatment Foam 08/06/20 2302   Wound Length (cm) 0.8 cm 07/23/20 0311   Wound Width (cm) 0.5 cm 07/23/20 0311   Wound Surface Area (cm^2) 0.4 cm^2 07/23/20 0311   Wound Assessment Clean;Dry; Intact 08/06/20 2302   Drainage Amount None 08/06/20 2302   Odor None 08/06/20 2302   Sheri-wound Assessment Clean;Dry; Intact 08/06/20 2302   Number of days: 15        Elimination:  Continence:   · Bowel: Yes  · Bladder: Yes  Urinary Catheter: None   Colostomy/Ileostomy/Ileal Conduit: No       Date of Last BM: 8/7/2020  No intake or output data in the 24 hours ending 08/07/20 1156  No intake/output data recorded. Safety Concerns: At Risk for Falls    Impairments/Disabilities:      Vision and Hearing    Nutrition Therapy:  Current Nutrition Therapy:   - Oral Diet:  Low Sodium (2gm)    Routes of Feeding: Oral  Liquids:  Thin Liquids  Daily Fluid Restriction: no  Last Modified Barium Swallow with Video (Video Swallowing Test): not done    Treatments at the Time of Hospital Discharge:   Respiratory Treatments: n/a  Oxygen Therapy:  is not on home oxygen therapy. Ventilator:    - No ventilator support    Rehab Therapies: Physical Therapy, Occupational Therapy and Speech/Language Therapy  Weight Bearing Status/Restrictions: No weight bearing restirctions  Other Medical Equipment (for information only, NOT a DME order):  wheelchair and walker  Other Treatments: n/a    Patient's personal belongings (please select all that are sent with patient):  Dentures lower    RN SIGNATURE:  Electronically signed by Zaid Meraz RN on 8/8/20 at 2:00 PM EDT    CASE MANAGEMENT/SOCIAL WORK SECTION    Inpatient Status Date: Patient was admitted to Acute Inpatient Rehab on 7/25/20    Readmission Risk Assessment Score:  Readmission Risk              Risk of Unplanned Readmission:        14           Discharging to Facility/ Agency   · Name: [facility]   · Address:  · Phone:[phone]  · Fax:    Dialysis Facility (if applicable)   · Name:  · Address:  · Dialysis Schedule:  · Phone:  · Fax:    / signature: Electronically signed by Tulio Beck RN on 8/7/20 at 11:56 AM EDT    PHYSICIAN SECTION    Prognosis: Good    Condition at Discharge: Stable    Rehab Potential (if transferring to Rehab): Good    Recommended Labs or Other Treatments After Discharge:     Physician Certification: I certify the above information and transfer of Tammi Guadalupe  is necessary for the continuing treatment of the diagnosis listed and that she requires Home Care .     Update Admission H&P: No change in H&P    PHYSICIAN SIGNATURE: Dr Brendon Sadler DO   Electronically signed by Tulio Beck RN on 8/7/20 at 11:57 AM EDT

## 2020-08-07 NOTE — PROGRESS NOTES
(Patient does her own cooking. 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 Patient plays the piano but is waiting for her piano to get tuned. Pateint reports that she was \"quite good\"  IADL Comments: Patient has 3 daughters and one recently came to the area from out of town in order to help patient  Additional Comments: Reports recent fall- \"my legs gave out\"    Current Functional Status:  ADL  Feeding: Independent  Grooming: Modified independent   UE Bathing: Modified independent   LE Bathing: Modified independent   UE Dressing: Modified independent   LE Dressing: Modified independent , Increased time to complete  Toileting: Modified independent   Toilet Transfers  Toilet - Technique: Ambulating  Equipment Used: Grab bars  Toilet Transfer: Modified independent  Shower Transfers  Shower Transfers: Not tested      Orientation Status:  Orientation  Overall Orientation Status: Within Functional Limits    Cognition Status:  Cognition  Overall Cognitive Status: Exceptions  Arousal/Alertness: Delayed responses to stimuli  Following Commands: Follows one step commands with repetition, Follows multistep commands with repitition  Attention Span: Appears intact  Memory: Decreased short term memory  Safety Judgement: Decreased awareness of need for safety  Problem Solving: Assistance required to implement solutions, Assistance required to generate solutions  Insights: Fully aware of deficits  Initiation: Requires cues for some  Sequencing: Requires cues for some  Cognition Comment: Comprehension: Sup., Expression: Sup., Social Interaction: MI., Problem Solving: Sup., Memory: Sup.     Perception Status:  Perception  Overall Perceptual Status: WFL    Sensation Status:  Sensation  Overall Sensation Status: WFL    Vision and Hearing Status:  Vision  Vision: Within Functional Limits  Vision Exceptions: Wears glasses at all times  Hearing  Hearing: Exceptions to [facility]  Hearing Exceptions: Hard of hearing/hearing concerns, Bilateral hearing aid     UE Function Status:    ROM:   LUE AROM (degrees)  LUE AROM : WFL  Left Hand AROM (degrees)  Left Hand AROM: WFL  RUE AROM (degrees)  RUE AROM : WFL  Right Hand AROM (degrees)  Right Hand AROM: WFL    Strength:  LUE Strength  LUE Strength Comment: overall 3/5  RUE Strength  RUE Strength Comment: overall 3/5    Coordination, Tone, Quality of Movement:    Tone RUE  RUE Tone: Normotonic  Tone LUE  LUE Tone: Normotonic  Coordination  Movements Are Fluid And Coordinated: No  Coordination and Movement description: Fine motor impairments, Decreased speed, Decreased accuracy, Right UE, Left UE    D/C Recommendations:  Home with family support  Equipment Recommendations:   reacher, sock aid    OT Follow Up:  OT D/C RECOMMENDATIONS  REQUIRES OT FOLLOW UP: Yes    Home Exercise Program Provided: Yes  If yes, type of HEP: UE strengthening     Electronically signed by:    Bernhard Councilman, MOT, OTR/L  8/7/2020, 12:29 PM

## 2020-08-07 NOTE — CARE COORDINATION
Met with patient and daughter regarding discharge 8/8/20. [place] of choice provided, requesting [facility] RN/PT/Aide, no OT per family/team. Orders placed and intake notified. No further questions or concerns.  Electronically signed by [facility], RN on 8/7/20 at 12:02 PM EDT

## 2020-08-07 NOTE — PROGRESS NOTES
[facility]  Facility/Department: [facility]  Speech Language Pathology   Treatment Note          Jesusita Mandujano  1935  [id number]        Rehab Dx/Hx: Impaired mobility [Z74.09]  Abnormality of gait and mobility [R26.9]    Precautions: falls    Medical Dx: Impaired mobility [Z74.09]  Abnormality of gait and mobility [R26.9]  Speech Dx: Cognitive Linguistic Impairment     Date: 8/7/2020    Subjective:  Alert, Cooperative and Pleasant        Interventions used this date:  Cognitive Skill Development    Objective/Assessment:  Patient progressing towards goals:  Goal 1: Pt will complete abstract verbal reasoning tasks (similarities/differences, divergent naming) with 80% acc given cues as needed in order to promote effective communication of wants and needs with caregivers upon discharge. Pt stated a similarity and difference between two objects with 33% accuracy, increasing to 66% accuracy with mod cues. Goal 2: Pt will be educated on 3 memory strategies and will state their use in functional activities with 80% acc given cues as needed in order to promote safety with the completion of ADLs upon discharge. Educated pt on association memory strategy. Pt used strategy for memory task and had good carry over. Goal 3: Pt will complete mid-level problem solving task related to ADLs with 80% acc given cues as needed in order to promote safety with the completion of ADLs upon discharge. Pt gave solutions to problems that could occur at home with 100% accuracy. Goal 4: Pt will complete delayed recall task with 80% acc given cues as needed in order to promote safety with the completion of ADLs upon discharge. Pt recalled 4 words with use of memory strategy 5 minutes later, then 10 minutes later with 100% accuracy. Pt demonstrated improved response time and engagement compared to last time worked with this SLP.       Treatment/Activity Tolerance:  Patient tolerated treatment well    Plan:  Discharge    Pain Assessment:  Initial Assessment:  Patient c/o: 8/10 left hand pain  Patient reported an acceptable level for treatment. Re-assessment:  Patient c/o: 8/10 left hand pain  RN informed. Patient/Caregiver Education:  Patient educated on session and progression towards goals. Patient stated verbal understanding of directions. Safety Devices: All fall risk precautions in place      [id number] ([facility]):    SWALLOWING  Rating:  n/a    SPOKEN LANGUAGE COMPREHENSION  Ratin    SPOKEN LANGUAGE EXPRESSION  Ratin    MOTOR SPEECH  Rating:  n/a    PROBLEM SOLVING  Ratin    MEMORY  Ratin          Therapy Time  SLP Individual Minutes  Time In: 1132  Time Out: 30  Minutes: 25              Signature: Electronically signed by Matias Woods.  WANDER Jaimes on 2020 at 10:09 AM

## 2020-08-08 NOTE — CONSULTS
 Osteoarthritis of multiple joints    Parkinson's disease ([address]    POAG (primary open-angle glaucoma)    Diabetic polyneuropathy (HCC)    Pseudogout of hand    Rotator cuff (capsule) sprain    S/P left unicompartmental knee replacement    Knee joint replacement by other means    Sclerosis of the skin    Seborrheic keratosis    Secondary osteoarthritis of multiple sites    Shoulder impingement    Sprain and strain of unspecified site of shoulder and upper arm    Synovitis of hand    Thrombocytopenia (HCC)    Vitamin D deficiency    Chest pain    RANDOLPH (dyspnea on exertion)    Bilateral carotid artery stenosis    Iron deficiency anemia due to chronic blood loss    Hemorrhoids    Secondary esophageal varices without bleeding (HCC)    Cough variant asthma    Morbidly obese (HCC)    Decompensated hepatic cirrhosis ([address]    ENMA (acute kidney injury) ([address]    Pancytopenia ([address]    Fall    Weakness generalized    Malnutrition ([address]    Cirrhosis of liver with ascites ([address]    Ataxic gait    History of myocardial infarction    Rectal prolapse    Stasis dermatitis    Gait abnormality    Moderate malnutrition (HCC)    Impaired mobility    Abnormality of gait and mobility dt exacerbation of Parkinson's disease status post ascites and liver failure acute rehab admission 7/25/2020    Glaucoma    Diabetic gastropathy (HCC)    [place] (hard of hearing)    Acute renal failure (ARF) (HCC)    History of lumbar laminectomy    Obesity (BMI 30-39. 9)    Portal hypertension (HCC)    Hypothyroidism    Muscle spasm of both lower legs    Muscle rigidity       Past Surgical History:   Procedure Laterality Date    CHOLECYSTECTOMY      EYE SURGERY      HEMORRHOID SURGERY      HERNIA REPAIR  2/20/2002    ventral hernia    HERNIA REPAIR      LIH repair    HYSTERECTOMY      JOINT REPLACEMENT Right     partial left, full right    LAMINECTOMY      PARACENTESIS Left 03/12/2020    5010cc ascites removed by Dr Az Rosado [phone]    PARACENTESIS Left 06/10/2020    3,850 cc removed per Dr Katie Meredith Left 07/08/2020    total 5450 ml removed per Dr Katie Meredith Left 07/23/2020    Dr Jami Rivera 7156 off    PARACENTESIS Left 08/05/2020    2880 ml removed by Dr. Tyler Hirsch 2/21/2020    EGD WITH BANDING performed by Ekta Jenkins MD at [place] [address] Marital status:      Spouse name: None    Number of children: None    Years of education: 15    Highest education level: High school graduate   Occupational History    Occupation: Homemaker   Social Needs    Financial resource strain: Somewhat hard    Food insecurity     Worry: Never true     Inability: Never true    Transportation needs     Medical: Yes     Non-medical: Yes   Tobacco Use    Smoking status: Never Smoker    Smokeless tobacco: Never Used   Substance and Sexual Activity    Alcohol use: No    Drug use: No    Sexual activity: Not Currently     Partners: Male   Lifestyle    Physical activity     Days per week: 0 days     Minutes per session: 0 min    Stress: Only a little   Relationships    Social connections     Talks on phone: More than three times a week     Gets together: More than three times a week     Attends [employer] service: 1 to 4 times per year     Active member of club or organization: No     Attends meetings of clubs or organizations: Never     Relationship status:      Intimate partner violence     Fear of current or ex partner: No     Emotionally abused: No     Physically abused: No     Forced sexual activity: No   Other Topics Concern    None   Social History Narrative     Lives With: Alone in [address] she is a     She has a daughter named -----and [place] [facility] [place] that is quite helpful and comes to see her almost daily    Type of Home: Apartment  at [address] [facility] in [place] 5 mg  5 mg Oral Q4H PRN Karen Scullin, DO   5 mg at 08/06/20 0758    traMADol (ULTRAM) tablet 25 mg  25 mg Oral Q6H PRN Karen Scullin, DO   25 mg at 08/07/20 1122    ondansetron (ZOFRAN) tablet 4 mg  4 mg Oral Q8H PRN Rosaleen Amour, DO        fleet rectal enema 1 enema  1 enema Rectal Daily PRN Karen Scullin, DO        albuterol (PROVENTIL) nebulizer solution 2.5 mg  2.5 mg Nebulization Q6H PRN Allyssa Burr MD        traZODone (DESYREL) tablet 150 mg  150 mg Oral Nightly Allyssa Burr MD   150 mg at 08/07/20 2115    ondansetron (ZOFRAN) injection 4 mg  4 mg Intravenous Q6H PRN Allyssa Burr MD        budesonide-formoterol (SYMBICORT) 160-4.5 MCG/ACT inhaler 2 puff  2 puff Inhalation BID Allyssa Burr MD   2 puff at 07/31/20 0501    carbidopa-levodopa (SINEMET)  MG per tablet 1 tablet  1 tablet Oral TID Allyssa Burr MD   1 tablet at 08/08/20 1335       ALLERGIES: Adhesive tape; Amoxicillin-pot clavulanate; Bactrim [sulfamethoxazole-trimethoprim]; Clarithromycin; Other; and Sulfa antibiotics    Review of Systems   Constitutional: Negative. Negative for chills and fever. HENT: Negative. Eyes: Negative. Respiratory: Negative for shortness of breath and wheezing. Cardiovascular: Negative for chest pain, palpitations and leg swelling. Gastrointestinal: Negative. Negative for abdominal pain, nausea and vomiting. Endocrine: Negative. Genitourinary: Negative. Musculoskeletal: Negative. Skin: Negative. Negative for rash. Allergic/Immunologic: Negative. Neurological: Negative for dizziness, weakness and headaches. Hematological: Negative. Psychiatric/Behavioral: Negative. VITALS:  Blood pressure (!) 92/52, pulse 77, temperature 98 °F (36.7 °C), temperature source Oral, resp. rate 16, height 5' 1\" (1.549 m), weight 145 lb 15.1 oz (66.2 kg), SpO2 98 %, not currently breastfeeding. Body mass index is 27.58 kg/m².     Physical Exam   Constitutional: She is oriented to person, place, and time. She appears well-developed and well-nourished. HENT:   Head: Normocephalic and atraumatic. Eyes: Pupils are equal, round, and reactive to light. Neck: Normal range of motion. Neck supple. No JVD present. No tracheal deviation present. No thyromegaly present. Cardiovascular: Normal rate, regular rhythm, normal heart sounds and intact distal pulses. PMI is not displaced. Exam reveals no gallop, no S3, no distant heart sounds and no friction rub. No murmur heard. Pulmonary/Chest: No respiratory distress. She has no wheezes. She has no rales. She exhibits no tenderness. Abdominal: Soft. Bowel sounds are normal. She exhibits no distension and no mass. There is no abdominal tenderness. There is no rebound and no guarding. Musculoskeletal:         General: No edema. Neurological: She is alert and oriented to person, place, and time. No cranial nerve deficit. Skin: Skin is warm and dry. No rash noted. She is not diaphoretic. No erythema. No pallor. Psychiatric: She has a normal mood and affect.  Her behavior is normal. Judgment and thought content normal.       LABS:  Recent Results (from the past 24 hour(s))   CBC Auto Differential    Collection Time: 08/07/20  3:38 PM   Result Value Ref Range    WBC 5.5 4.8 - 10.8 K/uL    RBC 2.72 (L) 4.20 - 5.40 M/uL    Hemoglobin 8.7 (L) 12.0 - 16.0 g/dL    Hematocrit 27.4 (L) 37.0 - 47.0 %    .6 (H) 82.0 - 100.0 fL    MCH 32.1 (H) 27.0 - 31.3 pg    MCHC 31.9 (L) 33.0 - 37.0 %    RDW 18.5 (H) 11.5 - 14.5 %    Platelets 018 (L) 047 - 400 K/uL    Neutrophils % 75.3 %    Lymphocytes % 15.0 %    Monocytes % 6.4 %    Eosinophils % 2.7 %    Basophils % 0.6 %    Neutrophils Absolute 4.2 1.4 - 6.5 K/uL    Lymphocytes Absolute 0.8 (L) 1.0 - 4.8 K/uL    Monocytes Absolute 0.4 0.2 - 0.8 K/uL    Eosinophils Absolute 0.2 0.0 - 0.7 K/uL    Basophils Absolute 0.0 0.0 - 0.2 K/uL   Basic Metabolic Panel w/ Reflex to MG    Collection Time:

## 2020-08-08 NOTE — PROGRESS NOTES
Discharge instructions explained to daughter and patient. Daughter did not agree with some medication orders and stated she will continue what the patient was doing at home and follow up with her doctor. Offered to put a call out to the doctor and they refused. Daughter stated the patient has follow up appointments scheduled for next week. All belongings packed up. Awaiting transport.  Electronically signed by Nate Chase RN on 8/8/20 at 5:31 PM EDT

## 2020-08-08 NOTE — PROGRESS NOTES
Hospitalist Consult/Progress Note  8/8/2020 2:57 PM    Assessment and Plan:   1. Gait instability and Decreased Functional Status secondary to Parkinson's: Patient is completed PT OT program.  To be discharged home today. Continue Sinemet,  Assessing if needs DME at home. SW on board    2. Decompensated liver cirrhosis with esophageal varices and ascites: Patient to follow-up with hepatology/gastroenterology. May need to be seen outpatient for repeat paracentesis. Continue diuretic regimen by GI     3. Pancytopenia:  due to advanced liver cirrhosis, patient educated on bleeding precautions. Daily aspirin on hold until follow-up with GI     4. Discharge planning: SW on board. Patient to be discharged today. All consultants agree with discharge. 5. High Risk Readmission Screening Tool Score Noted.      Additionally, the following hospital problems were addressed:  Principal Problem:    Abnormality of gait and mobility dt exacerbation of Parkinson's disease status post ascites and liver failure acute rehab admission 7/25/2020  Active Problems:    DM2 (diabetes mellitus, type 2) (HCC)    Fibromyalgia    Depression    DISH (diffuse idiopathic skeletal hyperostosis)    Hyperlipidemia    Hypertension    Lumbago without sciatica    Lumbar spondylosis    LVH (left ventricular hypertrophy)    Generalized muscle ache    Osteoarthritis of multiple joints    Parkinson's disease (HCC)    Diabetic polyneuropathy (HCC)    Rotator cuff (capsule) sprain    Shoulder impingement    Thrombocytopenia (HCC)    Vitamin D deficiency    Bilateral carotid artery stenosis    Secondary esophageal varices without bleeding (HCC)    Cough variant asthma    Morbidly obese (HCC)    Decompensated hepatic cirrhosis (HCC)    Weakness generalized    Stasis dermatitis    Gait abnormality    Moderate malnutrition (HCC)    Impaired mobility    Glaucoma    Diabetic gastropathy ([address]    [place] (hard of hearing)    Acute renal failure (ARF) [facility])    History of lumbar laminectomy    Obesity (BMI 30-39. 9)    Portal hypertension (HCC)    Hypothyroidism    Muscle spasm of both lower legs    Muscle rigidity  Resolved Problems:    * No resolved hospital problems. *      ** Total time spent reviewing medical records, evaluating patient, speaking with RN's and consultants where I was focused exclusively on this patient: 35 minutes. This time is excluding time spent performing procedures or significant events occurring earlier or later in the day requiring my attention and focus. Subjective:   Admit Date: 7/25/2020  PCP: Magdy Connors MD    No acute events overnight. Afebrile  Vitals reviewed. No new complaints. Pt denies chest pain, SOB, N/V, fevers or chills. Objective:     Vitals:    08/07/20 1343 08/07/20 1931 08/08/20 0758 08/08/20 1000   BP:  109/70 104/67 (!) 92/52   Pulse: 79 79 80 77   Resp:  16 16    Temp: 97.7 °F (36.5 °C) 98 °F (36.7 °C) 98 °F (36.7 °C)    TempSrc:  Oral Oral    SpO2: 99% 99% 98%    Weight:       Height:         General appearance: No acute distress, A + O x 3. No conversational dyspnea noted. Dentition intact. Answers questions appropriately  Lungs: CTAB Diminished  no exp wheezes, No rales No retractions; No use of accessory muscles  Heart:  S1, S2 normal, RRR, no MRG appreciated  Abdomen: (+) BS, soft, non-tender; non distended no guarding or rigidity. Extremities:  no cyanosis, no edema bilat lower exts, no calf tenderness bilaterally.  Dry skin noted       Medications:      furosemide  40 mg Oral Daily    spironolactone  50 mg Oral Daily    lactulose  20 g Oral Daily    nystatin, stomahesive in petrolatum   Topical 3 times per day    thiamine  100 mg Oral BID    pregabalin  25 mg Oral BID    pantoprazole  20 mg Oral QAM AC    levothyroxine  25 mcg Oral Daily    folic acid  5 mg Oral Daily    traZODone  150 mg Oral Nightly    budesonide-formoterol  2 puff Inhalation BID    carbidopa-levodopa  1 tablet Oral TID       LABS Reviewed    IMAGING Reviewed    Simon Degroot CNP  Rounding Hospitalist

## 2020-08-08 NOTE — PROGRESS NOTES
PROGRESS NOTE -PAIN MANAGEMENT  [facility] rehabilitation unit    SERVICE DATE:  8/8/2020   SERVICE TIME:  10:40 AM    CHIEFCOMPLAINT: Generalized pain and weakness    SUBJECTIVE:  Ms. Beryl Chi is a 82 y.o. female who presentedfor physical therapy and rehabilitation after patient initially came to the hospital due to balance disorders, difficulty walking and some concern about her cognitive dysfunction and participation in daily living activities. Patient has comorbidity with Parkinson's disease also multiple joint arthritis and fibromyalgia, she has also liver disease. Patient sees pain management outpatient who has her on Lyrica 75 mg 3 times daily,\" and upon admission the drug could be contributing to her cognitive delay I able to reduce that to 25 g twice daily she seem to be tolerating well and helping her fibromyalgia, I discussed the plan of care initially was rehabitation physician Dr. Sunil Romano as well as the family team member Андрей Lam and they both agreed about titration plan. Throughout the process and care, patient responded well to the 25 mg twice daily, she has stated that helped her fibromyalgia and muscle pain also has not caused her any significant cognitive delay. Patient states  pain is well controlled with current treatment also been receiving Ultram for pain which seem to be helping her pain. Pending cardiology previously concerned about hypotension which seem to be patient has chronic history of hypertension.     PAIN  ASSESSMENT:    intermittent    aching    pain is perceived as moderate (4-6 pain scale)      MEDICATIONS:    Current Facility-Administered Medications   Medication Dose Route Frequency Provider Last Rate Last Dose    furosemide (LASIX) tablet 40 mg  40 mg Oral Daily SHARI Cabrera CNP        spironolactone (ALDACTONE) tablet 50 mg  50 mg Oral Daily SHARI Cabrera CNP        lactulose (CHRONULAC) 10 GM/15ML solution 20 g  20 g Oral Daily Karen Scullin, DO   20 g at 08/07/20 1835    nystatin, stomahesive in petrolatum (ET MIX)   Topical 3 times per day Karen Scullin, DO        vitamin B-1 (THIAMINE) tablet 100 mg  100 mg Oral BID Dianne Ruano MD   100 mg at 08/08/20 0956    pregabalin (LYRICA) capsule 25 mg  25 mg Oral BID Sylvie Sevilla MD   25 mg at 08/08/20 0956    metaxalone (SKELAXIN) tablet 800 mg  800 mg Oral TID PRN Sylvie Sevilla MD        pantoprazole (PROTONIX) tablet 20 mg  20 mg Oral QAM AC Karen Scullin, DO   20 mg at 08/08/20 2883    levothyroxine (SYNTHROID) tablet 25 mcg  25 mcg Oral Daily Luis Sanford MD   25 mcg at 08/08/20 8877    lidocaine 4 % external patch 3 patch  3 patch Transdermal Daily PRN Karen Scullin, DO   3 patch at 07/30/20 1611    analgesic ointment ointment   Topical TID PRN Karen Scullin, DO        folic acid (FOLVITE) tablet 5 mg  5 mg Oral Daily Dianne Ruano MD   5 mg at 08/08/20 0955    oxyCODONE (ROXICODONE) immediate release tablet 5 mg  5 mg Oral Q4H PRN Karen Scullin, DO   5 mg at 08/06/20 0758    traMADol (ULTRAM) tablet 25 mg  25 mg Oral Q6H PRN Karen Scullin, DO   25 mg at 08/07/20 1122    ondansetron (ZOFRAN) tablet 4 mg  4 mg Oral Q8H PRN Terrance Escalona DO        fleet rectal enema 1 enema  1 enema Rectal Daily PRN Karen Scullin, DO        albuterol (PROVENTIL) nebulizer solution 2.5 mg  2.5 mg Nebulization Q6H PRN Bob Spurling, MD        traZODone (DESYREL) tablet 150 mg  150 mg Oral Nightly Bob Spurling, MD   150 mg at 08/07/20 2115    ondansetron (ZOFRAN) injection 4 mg  4 mg Intravenous Q6H PRN Bob Spurling, MD        budesonide-formoterol (SYMBICORT) 160-4.5 MCG/ACT inhaler 2 puff  2 puff Inhalation BID Bob Spurling, MD   2 puff at 07/31/20 0501    carbidopa-levodopa (SINEMET)  MG per tablet 1 tablet  1 tablet Oral TID Bob Spurling, MD   1 tablet at 08/08/20 0956         ALLERGIES:  Adhesive tape; Amoxicillin-pot clavulanate;  Bactrim [sulfamethoxazole-trimethoprim]; Clarithromycin; Other; and Sulfa antibiotics    Review of Systems   Constitutional: Positive for activity change, appetite change and fatigue. Negative for chills, diaphoresis, fever and unexpected weight change. HENT: Negative. Eyes: Negative. Respiratory: Negative. Cardiovascular: Positive for leg swelling. Negative for chest pain and palpitations. Gastrointestinal: Negative. Endocrine: Negative. Genitourinary: Negative. Musculoskeletal: Positive for arthralgias, back pain, gait problem, joint swelling, myalgias and neck stiffness. Negative for neck pain. Skin: Negative. Allergic/Immunologic: Negative. Neurological: Positive for weakness, light-headedness and numbness. Negative for dizziness, tremors, seizures, syncope, facial asymmetry, speech difficulty and headaches. Hematological: Negative for adenopathy. Bruises/bleeds easily. Psychiatric/Behavioral: Positive for decreased concentration (Age-related). Negative for agitation, behavioral problems, confusion, dysphoric mood, hallucinations, self-injury, sleep disturbance and suicidal ideas. The patient is not nervous/anxious and is not hyperactive. OBJECTIVE  PHYSICAL EXAM:  BP (!) 92/52   Pulse 77   Temp 98 °F (36.7 °C) (Oral)   Resp 16   Ht 5' 1\" (1.549 m)   Wt 145 lb 15.1 oz (66.2 kg)   SpO2 98%   BMI 27.58 kg/m²   Body mass index is 27.58 kg/m². CONSTITUTIONAL:  awake, alert, cooperative, no apparent distress, and appears stated age  ENT:  normocepalic, without obvious abnormality, atraumatic  NECK: Some mild paraspinal cervical achiness, no spinous process tenderness, supple, symmetrical, trachea midline, skin normal and no stridor  BACK: Generalized paraspinal thoracolumbar tenderness which seem to be mild paraspinal no spinous process tenderness, no pelvic tenderness.   LUNGS:  no increased work of breathing  CARDIOVASCULAR:  regular rate and rhythm  ABDOMEN: Soft and nondistended  MUSCULOSKELETAL: Generally limited range of motion of several joints which seem to be at her baseline, no signs of joint swelling or infection or fracture, gait is normal with walker and assisted with cane, she participated in therapy and has better appropriate balance. NEUROLOGIC: Neurologically stable, motor sensory to be symmetrical upper lower extremity, monitor for deficits  SKIN:  no bruising or bleeding    DATA: tests reviewed for today's visit:    All labs and imaging results reviewed. Lab Results   Component Value Date    WBC 5.5 08/07/2020    HGB 8.7 08/07/2020    HCT 27.4 08/07/2020    .6 08/07/2020     08/07/2020     08/08/2020    K 3.7 08/08/2020    CL 99 08/08/2020    CO2 24 08/08/2020    BUN 13 08/08/2020    CREATININE 0.87 08/08/2020    CALCIUM 8.2 08/08/2020    ALKPHOS 53 07/25/2020    ALT 6 07/25/2020    AST 28 07/25/2020    BILITOT 0.7 07/25/2020    BILIDIR 0.4 01/29/2020    LABALBU 2.9 07/25/2020    LABALBU 4.5 03/30/2012   LABS  Recent Labs     08/07/20  1538   WBC 5.5   RBC 2.72*   HGB 8.7*   HCT 27.4*   .6*   MCH 32.1*   MCHC 31.9*   RDW 18.5*   *       Recent Labs     08/07/20  1538 08/08/20  0559   * 134*   K 4.0 3.7   CL 94* 99   CO2 26 24   BUN 14 13   CREATININE 1.02* 0.87   GLUCOSE 151* 97   CALCIUM 8.2* 8.2*       No results for input(s): MG in the last 72 hours. No results for input(s): Ivy Passe, LABBENZ, CANSU, COCAIMETSCRU, OPIATESCREENURINE, OXYCODONEUR, DSCOMMENT in the last 72 hours. Invalid input(s): PHENCYCLIDINESCREENURINE. LABMETH. PROPOX     Mri Lumbar Spine Wo Contrast    Result Date: 7/27/2020  EXAMINATION: MRI lumbar spine without contrast CLINICAL HISTORY: Recent fall. Bilateral leg weakness. FINDINGS: Unenhanced scans obtained. T1 and T2-weighted sagittal and axial sequences were acquired. A STIR sagittal sequence are included. Today's study is compared with recent CT dated July 23, 2020.  Vertebral bodies are intact and normal in height. Degenerative endplate changes are seen at L4-L5 particularly involving the superior endplate of L5. There is disc desiccation from L1 through L4. There is disc space narrowing at L3-L4 and L4-L5. Conus is  normal in caliber and signal and terminates at T12-L1. T12-L1: Unremarkable. L1-L2: Small focal disc protrusion centrally and to the left of the midline slightly effaces the thecal sac without narrowing the central canal or compressing transiting or exiting nerve roots. Foramina patent. L2-L3: No disc herniation. No central canal or foraminal stenosis. L3-L4: Generalized bulging of the annulus effaces the thecal sac anteriorly without compromising the transiting or exiting nerve roots. No significant central canal or foraminal stenosis. L4-L5: Generalized bulging of the annulus anteriorly and proliferative changes in the facet joints noted more prominent right side than left. There is narrowing of the right L4 nerve root foramen. No significant central canal stenosis and no definite disc herniation. L5-S1: No disc herniation or central canal or foraminal stenosis. Postlaminectomy changes on the right side noted. Prevertebral soft tissues unremarkable     DIFFUSE DEGENERATIVE DISEASE. NO COMPRESSION FRACTURE OR INFILTRATIVE BONY PROCESS. L4 NERVE ROOT FORAMINAL STENOSIS ON THE RIGHT. NO EVIDENCE OF DISC HERNIATION OR CENTRAL CANAL STENOSIS. Ir Us Guided Paracentesis    1. Status post technically successful ultrasound-guided paracentesis. Eliseo Hwang is a Female of 82 years age, referred for Ultrasound Guided Paracentesis. PROCEDURE: Survey of the abdomen showed large amount of ascites fluid. After obtaining informed consent, the patient was positioned supine on the sonography table. Using ultrasound, the skin over the left hemiabdomen was locally anesthetized with 1% lidocaine.   Following that, a ResponseTekeh needle was advanced into the fluid pocket Hyperlipidemia [E78.5] 04/17/2013    Fibromyalgia [M79.7]     DM2 (diabetes mellitus, type 2) ([facility] [E11.9]     Depression [F32.9] 08/28/2012    Hypertension [I10] 08/28/2012    Thrombocytopenia ([facility] [D69.6] 04/24/2012    LVH (left ventricular hypertrophy) [I51.7] 04/05/2012    Parkinson's disease ([facility] [G20] 02/18/2012    Rotator cuff (capsule) sprain [S43.429A] 06/15/2010    Shoulder impingement [M75.40] 05/14/2010    Diabetic polyneuropathy ([facility] [E11.42] 12/26/2007    Generalized muscle ache [M79.10] 12/26/2007      82year-old female who has chronic neurological disorder with Parkinson's disease, she is on chronic Percocet medication, also she is on chronic pain due to fibromyalgia and chronic diabetic joint disease, she has been managed with Lyrica under care of pain management Dr. Jonh Watson at [facility] pain management. During her admission it was initial concern about her cognitive delay, we were able to reduce the Lyrica and discontinued for couple days and restarted back at 25 mg twice daily seem to be helping her fibromyalgia muscular pain as well as not contributing to any cognitive delay. I have discussed the care with her daughter Kelli Salinas and recommended to continue on the low-dose, also provided short course of Ultram to manage her pain, she is also has been comfortable on low-dose muscle relaxant which has helped the myalgia especially before and after therapy. \    Awaiting and pending cardiology clearance due to her hypotension would seem to be chronic in nature  Prior to discharge planning. I was asked to come and evaluate the patient and address medication changes for discharge.       RECOMMENDATION:  SEE ORDERS    Overall seem to be comfortable on the current plan of care, no side effects, I will be discharging the patient on Lyrica 25 mg twice daily will be given a prescription for 2 weeks, 20 tablets will be provided discussed plan of care with caregiver morning, also

## 2020-08-08 NOTE — PROGRESS NOTES
Awaiting cardiology consult r/t low blood pressure. Message sent via perfect serve. BP noted 92/52 this morning.  Lasix and Aldactone held until seen by MD. Electronically signed by Trina Myles RN on 8/8/20 at 1:24 PM EDT

## 2020-08-08 NOTE — PROGRESS NOTES
Subjective: The patient complains of severe  acute on chronic confusion rigidity ascites malaise partially relieved by recent paracentesis, PT, OT, speech and language pathology, rest and exacerbated by reaccumulation of ascites. I am concerned about patients drop of H&H and abdominal pain. According to recent nursing note, \"patient assessment completed earlier this shift. The patient is alert and oriented, but forgetful at times. She denies any pain. No distress is noted the patient has an occasional stress incontinence managed by her own poise pads. She ambulates with a walker to the bathroom with assist. She denies constipation as her last BM was 8/7/2020. \"    [address] Course: The patient was admitted to the Rehabilitation Unit to address ADL and mobility deficits. The patient was enrolled in acute PT, OT program.  Weekly team meetings were held to assess functional progress toward their goals. The patient's medical issues were addressed. The patient progressed in the rehab program and is now ready for discharge. Refer to FIM scores summary report for detailed functional status. Greater than 35 minutes was spent on coordinating patients discharge including follow-up care, medications and patient/family education. Extra time needed because of her need for Ultram and her OxyIR for active chronic back pain as well as acute abdominal pain patient's daughter was supervised and they will always try to use lowest effective dose. ROS x10: The patient also complains of severely impaired mobility and activities of daily living. Otherwise no new problems with vision, hearing, nose, mouth, throat, dermal, cardiovascular, GI, , pulmonary, musculoskeletal, psychiatric or neurological. See Rehab H&P on Rehab chart dated .        Vital signs:  BP (!) 92/52   Pulse 77   Temp 98 °F (36.7 °C) (Oral)   Resp 16   Ht 5' 1\" (1.549 m)   Wt 145 lb 15.1 oz (66.2 kg)   SpO2 98%   BMI past 24 hour(s))   CBC Auto Differential    Collection Time: 08/07/20  3:38 PM   Result Value Ref Range    WBC 5.5 4.8 - 10.8 K/uL    RBC 2.72 (L) 4.20 - 5.40 M/uL    Hemoglobin 8.7 (L) 12.0 - 16.0 g/dL    Hematocrit 27.4 (L) 37.0 - 47.0 %    .6 (H) 82.0 - 100.0 fL    MCH 32.1 (H) 27.0 - 31.3 pg    MCHC 31.9 (L) 33.0 - 37.0 %    RDW 18.5 (H) 11.5 - 14.5 %    Platelets 718 (L) 059 - 400 K/uL    Neutrophils % 75.3 %    Lymphocytes % 15.0 %    Monocytes % 6.4 %    Eosinophils % 2.7 %    Basophils % 0.6 %    Neutrophils Absolute 4.2 1.4 - 6.5 K/uL    Lymphocytes Absolute 0.8 (L) 1.0 - 4.8 K/uL    Monocytes Absolute 0.4 0.2 - 0.8 K/uL    Eosinophils Absolute 0.2 0.0 - 0.7 K/uL    Basophils Absolute 0.0 0.0 - 0.2 K/uL   Basic Metabolic Panel w/ Reflex to MG    Collection Time: 08/07/20  3:38 PM   Result Value Ref Range    Sodium 129 (L) 135 - 144 mEq/L    Potassium reflex Magnesium 4.0 3.4 - 4.9 mEq/L    Chloride 94 (L) 95 - 107 mEq/L    CO2 26 20 - 31 mEq/L    Anion Gap 9 9 - 15 mEq/L    Glucose 151 (H) 70 - 99 mg/dL    BUN 14 8 - 23 mg/dL    CREATININE 1.02 (H) 0.50 - 0.90 mg/dL    GFR Non-African American 51.7 (L) >60    GFR  >60.0 >60    Calcium 8.2 (L) 8.5 - 9.9 mg/dL   Basic Metabolic Panel w/ Reflex to MG    Collection Time: 08/08/20  5:59 AM   Result Value Ref Range    Sodium 134 (L) 135 - 144 mEq/L    Potassium reflex Magnesium 3.7 3.4 - 4.9 mEq/L    Chloride 99 95 - 107 mEq/L    CO2 24 20 - 31 mEq/L    Anion Gap 11 9 - 15 mEq/L    Glucose 97 70 - 99 mg/dL    BUN 13 8 - 23 mg/dL    CREATININE 0.87 0.50 - 0.90 mg/dL    GFR Non-African American >60.0 >60    GFR  >60.0 >60    Calcium 8.2 (L) 8.5 - 9.9 mg/dL       Ct Abdomen Pelvis  7/23/2020    Liver: Small in size, and nodular contour, unchanged. Bile Ducts:  Normal in caliber. Gallbladder:  Surgically absent. Pancreas:  Hypoplastic, without masses, cysts, ductal dilatation or calcification.  Spleen:  Normal in size without masses or calcifications. No splenules. Kidneys:  Right and left kidneys measure 8.0 cm, and 9.1 cm, respectively. 1 mm calcification mid pole left kidney, unchanged from 2013. No right renal calculi. Adrenals:  Normal. Small bowel:  Normal in caliber. Appendix:  Not visualized. Colon:  Normal in caliber. Surgical clips at level of rectum. Peritoneum:  Diffuse free fluid within the abdomen and pelvis, unchanged. No free air. Vessels: Aorta normal in course and caliber. Lymph nodes:  Retroperitoneal:  No enlarged retroperitoneal lymph nodes. Mesenteric:  No enlarged mesenteric lymph nodes. Pelvic: No enlarged pelvic lymph nodes. Ureters: Normal in course and caliber. No calcifications. Bladder: Partially decompressed. Reproductive organs: Uterus surgically absent. Dystrophic calcification, left adnexa, unchanged from March, 2013. Abdominal Wall: Remote abdominal wall defect repair. Edematous change within abdominal pelvic soft tissues diffusely, stable. Bones:  No bone lesions. No degenerative changes. No post operative changes. Cirrhosis. Diffuse ascites, unchanged. Diffuse edematous change, abdominal and pelvic walls, stable. Small right kidney. Left kidney lower limits of normal. Hypoplastic pancreas. Remote hemorrhoid surgery. Cholecystectomy. Hysterectomy. Xr Hip Right 7/25/2020    Moderate degenerative changes with joint space loss in the bilateral hips. No acute fracture identified. Vascular calcifications. Impression:  1. No acute fracture identified. 2. Moderate bilateral osteoarthritis of the hips. 3. Vascular calcifications. Ct Head   7/23/2020: Extra-axial spaces:  Normal. Intracranial hemorrhage:  None. Ventricular system: Ventricles mildly enlarged. Sulci mildly prominent. Basal Cisterns:  Normal. Cerebral Parenchyma: Physiologic basal ganglia calcification, unchanged. Midline Shift:  None.  Cerebellum:  Normal. Paranasal sinuses and mastoid air cells:  Normal. Visualized data sheets in hopes of eliminating polypharmacy and weaning to the lowest effective dose of pain medications and eliminating the concomitant use of benzodiazepines. I see no medications of concern. I see no habits of combining sedatives and narcotics. Complex Physical Medicine & Rehab Issues Assess & Plan:   1. Severe abnormality of gait and mobility and impaired self-care and ADL's secondary to progressive Parkinson's disease with liver failure and ascites. Functional and medical status have improved status post acute rehab at [facility] [place].  2. Bowel constipation due to ascites and Bladder dysfunction:  frequent toileting, ambulate to bathroom with assistance, check post void residuals. Check for C.difficile x1 if >2 loose stools in 24 hours, continue bowel & bladder program.  Monitor bowel and bladder function. Lactinex 2 PO every AC. MOM prn, Brown Bomb prn, Glycerin suppository prn, enema prn. Add scheduled lactulose  3. Severe low back and abdominal pain due to ascites as well as rigid muscle Parkinson's related pain as well as generalized OA pain: reassess pain every shift and prior to and after each therapy session, give prn Tylenol and Roxicodone and Ultram as needed, modalities prn in therapy, Lidoderm, K-pad prn. Tylenol is limited due to liver failure therefore no Tylenol consider Duragesic patch-she was taken off Lyrica because of its potential toxicity especially in light of liver failure. Per family's insistence she will be trialed on low-dose Lyrica however I am hesitant to allow this as she has ascites from liver failure and confusion already. 4. Skin healing and breakdown risk:  continue pressure relief program.  Daily skin exams and reports from nursing. 5. Severe fatigue due to nutritional and hydration deficiency: Add vitamin B12 vitamin D and CoQ10 continue to monitor I&Os, calorie counts prn, dietary consult prn. Lactulose for high ammonia levels.   6. Acute episodic insomnia with situational adjustment disorder:  prn Ambien, monitor for day time sedation. 7. Falls risk elevated:  patient to use call light to get nursing assistance to get up, bed and chair alarm. 8. Elevated DVT risk: progressive activities in PT, continue prophylaxis DAWN hose, elevation and no thinners due to liver failure. 9. Complex discharge planning:  discharge plan on Saturday discharge 8/8/2020 home 24-hour support versus long-term care with help from her daughters who live locally. She is status post weekly team meeting every Monday to assess progress towards goals, discuss and address social, psychological and medical comorbidities and to address difficulties they may be having progressing in therapy. Patient and family education is in progress. The patient is to follow-up with their family physician after discharge. Complex Active General Medical Issues that complicated care Assess & Plan:    1. Whole body pain as well as cervical thoracic and low back pain due to fibromyalgia,   Lumbago without sciatica,   Rotator cuff (capsule) deficits, Shoulder impingement-use lowest effective dose of opiate medication to include Roxicodone and Ultram, titrate Desyrel, add Lidoderm BenGay heat massage-titrate Topamax and avoid use of Tylenol due to liver disease as well as Lyrica due to intolerance  2. Depression and anxiety-emotional support given daily add recreational therapy and rehabilitation psychology when available and support group when available  3. LVH (left ventricular hypertrophy), coronary artery disease-vital signs to shift dose and titrate cardiac locations consult hospitalist for backup medical  4. Parkinson's disease -consult neurology-who have not addressed this issue avoid sedatives such as Lyrica which she does not tolerate well titrate Sinemet. I will consult pain management regarding the use of Lyrica.   5.   Thrombocytopenia with severe anemia and recent blood transfusion-recheck CBC add vitamin B12 vitamin D  6. Vitamin D deficiency-add daily vitamin D and recheck as an outpatient  7. Secondary esophageal varices without bleeding-monitor stools for blood avoid strong blood thinners if possible  8. Morbidly obese but with  Moderate malnutrition -add high-protein low-carb supplementation if possible however because of her her recent liver disease she may not be able to process proteins very well. 9.   Stasis dermatitis-protect skin add ET mix to the lower extremities add co-Q10  10. Severe progressive and recurrent ascites of the liver-eliminate toxic medications recheck as an outpatient monitor ammonia level-reconsult interventional radiology for repeat paracentesis. 11. Hepatic encephalopathy with Elavil elevated ammonia level-titrate lactulose recheck ammonia-consult GI to discuss this with the family. Pharmacy is now aware and adjusting medications. 12. Severe hypothyroidism-consult endocrinology titrate Synthroid  13. Polypharmacy in the elderly-consult pain management agree with avoiding use of Lyrica-medications adjusted as well patient and family made aware of these concerns.   Patient is restarted on Lyrica lower dose and we are monitoring her cognition closely       Roberto Guevara D.O., PM&R     Attending    [address]

## 2020-08-08 NOTE — FLOWSHEET NOTE
Patient assessment completed earlier this shift. The patient is alert and oriented, but forgetful at times. She denies any pain. No distress is noted the patient has an occasional stress incontinence managed by her own poise pads. She ambulates with a walker to the bathroom with assist. She denies constipation as her last BM was 8/7/2020.

## 2020-08-09 NOTE — DISCHARGE SUMMARY
27.58 kg/m²   I/O:   PO/Intake:  fair PO intake, no problems observed or reported. Bowel/Bladder:  continent, no problems noted. General:  Patient is well developed, adequately nourished, non-obese and     well kempt. HEENT:    PERRLA, hearing intact to loud voice, external inspection of ear     and nose benign. Inspection of lips, tongue and gums benign  Musculoskeletal: No significant change in strength or tone. All joints stable. Inspection and palpation of digits and nails show no clubbing,       cyanosis or inflammatory conditions. Neuro/Psychiatric: Affect: flat but pleasant. Alert and oriented to  Self . No significant change in deep tendon reflexes or     sensation  Lungs:  Diminished CTA-B. Respiration effort is normal at rest.     Heart:   S1 = S2, RRR. No loud murmurs. Abdomen:  Firm with tenderness due to ascites, non-tender, no enlargement of liver or spleen. Extremities:  No significant lower extremity edema or tenderness. Skin:   Intact to general survey, no visualized or palpated problems. Rehabilitation:  Physical therapy: FIMS:  Bed Mobility: Scooting: Supervision    Transfers: Sit to Stand: Modified independent  Stand to sit: Modified independent  Bed to Chair: Modified independent, Ambulation 1  Surface: level tile, carpet, uneven  Device: Rollator  Assistance: Supervision  Quality of Gait: slow reciprocal gait pattern, decreased step length with decreased heel strike  Gait Deviations: Slow Vivian  Distance: 150ft  Comments: Indep household distances 50ft; Supervision 150ft. Pt indep transfers to sit on rollator.,      FIMS:  ,  ,      Occupational therapy: FIMS:   ,  , Assessment: Pt requires no verbal cues today to sequence ADL tasks. Pt continues to benefit from OT services to maximize independence and safety during ADLs and IADLs.     Speech therapy: FIMS:        Lab/X-ray studies reviewed, analyzed and discussed with patient and staff:   No results found for this or any previous visit (from the past 24 hour(s)). Ct Abdomen Pelvis  7/23/2020    Liver: Small in size, and nodular contour, unchanged. Bile Ducts:  Normal in caliber. Gallbladder:  Surgically absent. Pancreas:  Hypoplastic, without masses, cysts, ductal dilatation or calcification. Spleen:  Normal in size without masses or calcifications. No splenules. Kidneys:  Right and left kidneys measure 8.0 cm, and 9.1 cm, respectively. 1 mm calcification mid pole left kidney, unchanged from 2013. No right renal calculi. Adrenals:  Normal. Small bowel:  Normal in caliber. Appendix:  Not visualized. Colon:  Normal in caliber. Surgical clips at level of rectum. Peritoneum:  Diffuse free fluid within the abdomen and pelvis, unchanged. No free air. Vessels: Aorta normal in course and caliber. Lymph nodes:  Retroperitoneal:  No enlarged retroperitoneal lymph nodes. Mesenteric:  No enlarged mesenteric lymph nodes. Pelvic: No enlarged pelvic lymph nodes. Ureters: Normal in course and caliber. No calcifications. Bladder: Partially decompressed. Reproductive organs: Uterus surgically absent. Dystrophic calcification, left adnexa, unchanged from March, 2013. Abdominal Wall: Remote abdominal wall defect repair. Edematous change within abdominal pelvic soft tissues diffusely, stable. Bones:  No bone lesions. No degenerative changes. No post operative changes. Cirrhosis. Diffuse ascites, unchanged. Diffuse edematous change, abdominal and pelvic walls, stable. Small right kidney. Left kidney lower limits of normal. Hypoplastic pancreas. Remote hemorrhoid surgery. Cholecystectomy. Hysterectomy. Xr Hip Right 7/25/2020    Moderate degenerative changes with joint space loss in the bilateral hips. No acute fracture identified. Vascular calcifications. Impression:  1. No acute fracture identified. 2. Moderate bilateral osteoarthritis of the hips. 3. Vascular calcifications.          Ct Head   7/23/2020: Extra-axial spaces: Ct Lumbar Spine 7/24/2020   : No acute fractures identified. There is T11 superior endplate Schmorl's node with dystrophic calcification, unchanged from prior imaging study. There is lumbar spondylosis with multilevel intervertebral disc space narrowing, marginal osteophytosis, and facet hypertrophy. There is L4-5 degenerative disc vacuum phenomenon. There is L3-4 and L4-5 disc bulging which does not appear to impinge on the exiting nerve roots. There is mild L4-5 central canal stenosis. There is disc bulging and  degenerative disc vacuum phenomenon at L1-2. The SI joints are symmetrically maintained. There is iliac sclerosis of the SI joints. Ascites is noted within the field-of-view. There are 2 left renal midpole nonobstructing stones, measuring 2 mm and 4 mm. DEGENERATIVE CHANGES. NO ACUTE FRACTURE. Xr Chest   7/23/2020  NO EVIDENCE OF ACUTE THORACIC TRAUMA OR ACTIVE CARDIOPULMONARY DISEASE. Us Guided  : 7/23/2020  SUCCESSFUL ULTRASOUND-GUIDED PARACENTESIS APPROXIMATELY 6.5 L OF ASCITES. Us Retroperitoneal   7/23/2020    Renal sonography was performed. The study is compromised due to patient body habitus and sonographic window. Kidneys by ultrasound are symmetric borderline small in size. Right kidney measures 9.3 x 5.3 x 4.3 cm left kidney measures 9.2 x 4.7 x  4.8 cm. There are a few punctate echogenic foci in the central sinus echocomplex raising the question of specular reflectors. Bilateral renal echotexture is otherwise unremarkable. No large cortical cysts, solid masses or shadowing calcifications. No ultrasound signs of hydronephrosis. Note is made of upper abdominal ascites     SYMMETRIC KIDNEYS BORDERLINE SMALL IN SIZE. NO ULTRASOUND SIGNS OF HYDRONEPHROSIS. UPPER ABDOMINAL ASCITES       Previous extensive, complex labs, notes and diagnostics reviewed and analyzed.      ALLERGIES:    Allergies as of 07/25/2020 - Review Complete 07/25/2020   Allergen Reaction Noted    Adhesive tape  11/22/2004    Amoxicillin-pot clavulanate  03/07/2013    Bactrim [sulfamethoxazole-trimethoprim]  03/07/2013    Clarithromycin  06/23/2015    Other  08/30/2004    Sulfa antibiotics  08/30/2004      (please also verify by checking MAR)       I reviewed her [facility] prescription monitoring service data sheets in hopes of eliminating polypharmacy and weaning to the lowest effective dose of pain medications and eliminating the concomitant use of benzodiazepines. I see no medications of concern. I see no habits of combining sedatives and narcotics. Complex Physical Medicine & Rehab Issues Assess & Plan:   1. Severe abnormality of gait and mobility and impaired self-care and ADL's secondary to progressive Parkinson's disease with liver failure and ascites. Functional and medical status have improved status post acute rehab at [facility].  2. Bowel constipation due to ascites and Bladder dysfunction:  frequent toileting, ambulate to bathroom with assistance, check post void residuals. Check for C.difficile x1 if >2 loose stools in 24 hours, continue bowel & bladder program.  Monitor bowel and bladder function. Lactinex 2 PO every AC. MOM prn, Brown Bomb prn, Glycerin suppository prn, enema prn. Add scheduled lactulose  3. Severe low back and abdominal pain due to ascites as well as rigid muscle Parkinson's related pain as well as generalized OA pain: reassess pain every shift and prior to and after each therapy session, give prn Tylenol and Roxicodone and Ultram as needed, modalities prn in therapy, Lidoderm, K-pad prn. Tylenol is limited due to liver failure therefore no Tylenol consider Duragesic patch-she was taken off Lyrica because of its potential toxicity especially in light of liver failure. Per family's insistence she will be trialed on low-dose Lyrica however I am hesitant to allow this as she has ascites from liver failure and confusion already.   4. Skin healing and breakdown risk:  continue pressure relief program.  Daily skin exams and reports from nursing. 5. Severe fatigue due to nutritional and hydration deficiency: Add vitamin B12 vitamin D and CoQ10 continue to monitor I&Os, calorie counts prn, dietary consult prn. Lactulose for high ammonia levels. 6. Acute episodic insomnia with situational adjustment disorder:  prn Ambien, monitor for day time sedation. 7. Falls risk elevated:  patient to use call light to get nursing assistance to get up, bed and chair alarm. 8. Elevated DVT risk: progressive activities in PT, continue prophylaxis DAWN hose, elevation and no thinners due to liver failure. 9. Complex discharge planning:  discharge plan on Saturday discharge 8/8/2020 home 24-hour support versus long-term care with help from her daughters who live locally. She is status post weekly team meeting every Monday to assess progress towards goals, discuss and address social, psychological and medical comorbidities and to address difficulties they may be having progressing in therapy. Patient and family education is in progress. The patient is to follow-up with their family physician after discharge. Complex Active General Medical Issues that complicated care Assess & Plan:    1. Whole body pain as well as cervical thoracic and low back pain due to fibromyalgia,   Lumbago without sciatica,   Rotator cuff (capsule) deficits, Shoulder impingement-use lowest effective dose of opiate medication to include Roxicodone and Ultram, titrate Desyrel, add Lidoderm BenGay heat massage-titrate Topamax and avoid use of Tylenol due to liver disease as well as Lyrica due to intolerance  2. Depression and anxiety-emotional support given daily add recreational therapy and rehabilitation psychology when available and support group when available  3.    LVH (left ventricular hypertrophy), coronary artery disease-vital signs to shift dose and titrate cardiac locations consult hospitalist for

## 2020-08-10 NOTE — TELEPHONE ENCOUNTER
requesting medication refill.  Please approve or deny this request.    Rx requested:  Requested Prescriptions     Pending Prescriptions Disp Refills    spironolactone (ALDACTONE) 50 MG tablet [Pharmacy Med Name: SPIRONOLACTONE 50MG TABLETS] 90 tablet      Sig: TAKE 1 TABLET BY MOUTH DAILY         Last Office Visit:   Visit date not found      Next Visit Date:  Future Appointments   Date Time Provider Arsh Bourne   8/11/2020  3:00 PM Fidencio Du MD [facility]

## 2020-08-10 NOTE — TELEPHONE ENCOUNTER
Call received for [facility] answering service. Ijeoma Perez RN, is currently at the home of [facility]. Patient was most recently enrolled in [place] [id number]. Upon assessment today, patient has abnormal vital signs with BP of 73/40, altered mental status and generalized weakness. Per daughter, her mom is unable to stand for a shower; she is having weakness walking to the bathroom. I advised ED for evaluation. They verbalized understanding and will take her there for evaluation.

## 2020-08-10 NOTE — PROGRESS NOTES
Rollator  Assistance: Supervision  Quality of Gait: slow reciprocal gait pattern, decreased step length with decreased heel strike  Gait Deviations: Slow Vivian  Distance: 150ft  Comments: Indep household distances 50ft; Supervision 150ft. Pt indep transfers to sit on rollator. Stairs/Curb  Stairs?: No      Outcomes Measures:  Timed Up and Go: 22.2       Pt/ family education/training: Pt is indep in HEP    Assessment: Pt has made good gains during her stay. She has met goals at this time      LTG established:  Long term goal 1: Pt to be indep with bed mobility-met  Long term goal 2: Pt to be indep with transfers-met  Long term goal 3: pt to ambulate >150ft with rollator and indep-met  Long term goal 4: 4 steps with handrail and indep to demonstrate improved LE strength and functional endurance-pt declines. Reports she has an elevator and does not do stairs.     Discharge Plan:  D/c to home with follow up PT recomended      Electronically signed by Douglas Wen PT on 8/10/2020 at 4:37 PM

## 2020-08-11 NOTE — PROGRESS NOTES
Gastroenterology Clinic Follow up Visit    Yomaira Corbett  [id number]  Chief Complaint   Patient presents with    Follow-up     F/u after hospital. Cirrhosis pt. HPI and A/P at last visit summarized below: This was a telephone encounter. I spoke to patient's daughter, patient has difficulty in hearing so she was not able to make a telephone conversation. She was recently admitted to [facility] after she had a fall and she was in the rehab floor. Patient has a history of cirrhosis complicated by ascites and hepatic encephalopathy.,  She was on lactulose and also was on Aldactone 50 mg and Lasix 40 mg daily, patient's daughter is monitoring her and they check her weight and blood pressure, no history of any GI bleeding. Duration of phone call was 15 minutes    Review of Systems   Constitutional: Negative. HENT: Negative. Eyes: Negative. Respiratory: Negative. Cardiovascular: Negative. Gastrointestinal: Negative. Negative for abdominal distention, abdominal pain, anal bleeding, blood in stool, constipation, diarrhea, nausea, rectal pain and vomiting. History of ascites   Endocrine: Negative. Genitourinary: Negative. Musculoskeletal: Negative. Skin: Negative. Allergic/Immunologic: Negative for food allergies. Neurological: Negative. Hematological: Negative. Psychiatric/Behavioral: Negative. Past medical history, past surgical history, medication list, social and familyhistory reviewed    not currently breastfeeding.     Physical Exam    Laboratory, Pathology, Radiology reviewed in detail with relevantimportant investigations summarized below:    Recent Labs     08/07/20  1538 08/04/20  0546 07/25/20  1000  07/24/20  0509 07/23/20  1219   WBC 5.5  --   --   --  4.6* 4.5*   HGB 8.7* 7.9* 8.4*   < > 6.9* 7.3*   HCT 27.4* 24.2* 25.8*   < > 22.0* 24.4*   .6*  --   --   --  102.9* 105.4*   *  --   --   --  85* 90*    < > = values in this changes. No post operative changes. Cirrhosis. Diffuse ascites, unchanged. Diffuse edematous change, abdominal and pelvic walls, stable. Small right kidney. Left kidney lower limits of normal. Hypoplastic pancreas. Remote hemorrhoid surgery. Cholecystectomy. Hysterectomy. All CT scans at this facility use dose modulation, iterative reconstruction, and/or weight based dosing when appropriate to reduce radiation dose to as low as reasonably achievable. Xr Hip Right (2-3 Views)    Result Date: 2020  Patient MRN: [id number] : 1935 Age:  82 years Gender: Female Order Date: 2020 12:00 PM. Exam: XR HIP RIGHT (2-3 VIEWS) Number of Views: 2 Indication:  Fall at home one week ago with persistent right hip pain since that time Comparison: 2011 radiographs. CT abdomen and pelvis 2020. Findings: Moderate degenerative changes with joint space loss in the bilateral hips. No acute fracture identified. Vascular calcifications. Impression:  1. No acute fracture identified. 2. Moderate bilateral osteoarthritis of the hips. 3. Vascular calcifications. Xr Foot Left (min 3 Views)    Result Date: 2020  EXAMINATION: XR FOOT LEFT (MIN 3 VIEWS)  CLINICAL HISTORY:  Possible hallux fracture COMPARISON: By  by all  FINDINGS: Three views of the left foot are submitted. Diffuse generalized osteopenia No acute fractures. No dislocations. A calcaneal enthesophyte and calcaneal spur is noted                                                                                   NO ACUTE FRACTURES    Ct Head Wo Contrast    Result Date: 2020  CT Brain Contrast medium:  Not utilized. History:  Fell from standing. Denies anticoagulation/loss of consciousness. Comparison:  CT brain, 2014 Findings: Extra-axial spaces:  Normal. Intracranial hemorrhage:  None. Ventricular system: Ventricles mildly enlarged. Sulci mildly prominent.  Basal Cisterns:  Normal. Cerebral Parenchyma: Physiologic basal ganglia calcification, unchanged. Midline Shift:  None. Cerebellum:  Normal. Paranasal sinuses and mastoid air cells:  Normal. Visualized Orbits: Remote bilateral ocular surgery. Impression: No acute findings. Mild cerebral atrophy. All CT scans at this facility use dose modulation, iterative reconstruction, and/or weight based dosing when appropriate to reduce radiation dose to as low as reasonably achievable. Ct Chest Wo Contrast    Result Date: 7/23/2020  CT of the Chest without intravenous contrast medium History:  Mechanical fall from standing. Technical Factors: CT imaging of the chest was obtained and formatted as 5 mm contiguous axial images from the thoracic inlet through the adrenal glands. Sagittal and coronal reconstruction obtained during postprocessing. Intravenous contrast medium:  None. Comparison:  Chest radiograph, July 22, 2020. High-resolution CT chest, December 30, 2019. Findings: Right lung shows mild dependent atelectatic change posterior right upper lobe, posterior right middle lobe, and posterior right lower lobe. No nodules or masses. No pleural effusion. Left lung shows dependent subsegmental atelectatic change, left upper lobe. Small peripheral focus of groundglass opacity lateral lower lingula. Dependent subsegmental atelectatic change, small left pleural effusion. No nodules or masses. No hilar, mediastinal, or axillary lymph node enlargement. Cardiac size normal. No pericardial effusion. Calcified mitral annulus. Thoracic aorta normal in course and caliber. Bridging anterior osteophytes mid to lower thoracic spine. No osteoblastic, and no osteolytic lesions. No fracture. Bilateral dependent atelectatic change. Small left effusion. All CT scans at this facility use dose modulation, iterative reconstruction, and/or weight based dosing when appropriate to reduce radiation dose to as low as reasonably achievable.     Ct Cervical Spine Wo Contrast    Result Date: 7/23/2020  EXAMINATION: CT CERVICAL SPINE WO CONTRAST   DATE AND TIME:7/22/2020 10:45 PM CLINICAL HISTORY:ACUTE POSTERIOR NECK PAIN  TRAUMA  COMPARISON: NONE TECHNIQUE:Helical scanning was performed from the skull base through the remainder of the cervical spine without intravenous contrast. Sagittal and coronal reformats were obtained. The lack of contrast limits CT sensitivity of the soft tissues. All CT scans at this facility use dose modulation, iterative reconstruction, and/or weight based dosing when appropriate to reduce radiation dose to as low as reasonably achievable. FINDINGS   Fracture:No acute fracture. Alignment:  Normal cervical lordosis. No subluxation. No canal stenosis. Dense atlas:Dens atlas relationship is normal. Soft tissues:Airway patent. No soft tissue mass or adenopathy. Other findings: There is mild  cervical spondylosis with multiple level disc osteophyte changes. NO ACUTE CERVICAL SPINE ABNORMALITY. Ct Thoracic Spine Wo Contrast    Result Date: 7/23/2020  EXAMINATION: CT THORACIC SPINE WO CONTRAST DATE AND TIME:7/22/2020 10:45 PM CLINICAL HISTORY: SEVERE POSTERIOR THORACIC SPINE PAIN. FALL  COMPARISON: NONE TECHNIQUE:  Axial noncontrast images were performed through the thoracic spine with coronal and sagittal reconstructions. All CT scans at this facility use dose modulation, iterative reconstruction, and/or weight based dosing when appropriate to reduce radiation dose to as low as reasonably achievable. FINDINGS  Alignment/fracture: Normal alignment without subluxation. No evidence of fracture. Perivertebral soft tissues are normal.  Disc spaces: There are multiple levels of disc space narrowing and end plate changes consistent with degenerative change. NEGATIVE CT OF THE THORACIC SPINE. Ct Lumbar Spine Wo Contrast    Result Date: 7/24/2020  EXAMINATION: CT LUMBAR SPINE WO CONTRAST CLINICAL HISTORY: Fall from standing. Back pain.  COMPARISON: CT abdomen pelvis 3/10/2020 TECHNIQUE: Spiral scans with no contrast. Multiplanar 2-D reconstructions. All CT scans at this facility use dose modulation, iterative reconstruction, and/or weight based dosing when appropriate to reduce radiation dose to as low as reasonably achievable. FINDINGS: No acute fractures identified. There is T11 superior endplate Schmorl's node with dystrophic calcification, unchanged from prior imaging study. There is lumbar spondylosis with multilevel intervertebral disc space narrowing, marginal osteophytosis, and facet hypertrophy. There is L4-5 degenerative disc vacuum phenomenon. There is L3-4 and L4-5 disc bulging which does not appear to impinge on the exiting nerve roots. There is mild L4-5 central canal stenosis. There is disc bulging and  degenerative disc vacuum phenomenon at L1-2. The SI joints are symmetrically maintained. There is iliac sclerosis of the SI joints. Ascites is noted within the field-of-view. There are 2 left renal midpole nonobstructing stones, measuring 2 mm and 4 mm. DEGENERATIVE CHANGES. NO ACUTE FRACTURE. Mri Lumbar Spine Wo Contrast    Result Date: 7/27/2020  EXAMINATION: MRI lumbar spine without contrast CLINICAL HISTORY: Recent fall. Bilateral leg weakness. FINDINGS: Unenhanced scans obtained. T1 and T2-weighted sagittal and axial sequences were acquired. A STIR sagittal sequence are included. Today's study is compared with recent CT dated July 23, 2020. Vertebral bodies are intact and normal in height. Degenerative endplate changes are seen at L4-L5 particularly involving the superior endplate of L5. There is disc desiccation from L1 through L4. There is disc space narrowing at L3-L4 and L4-L5. Conus is  normal in caliber and signal and terminates at T12-L1. T12-L1: Unremarkable. L1-L2: Small focal disc protrusion centrally and to the left of the midline slightly effaces the thecal sac without narrowing the central canal or compressing transiting or exiting nerve roots.  Rivka Kerns patent. L2-L3: No disc herniation. No central canal or foraminal stenosis. L3-L4: Generalized bulging of the annulus effaces the thecal sac anteriorly without compromising the transiting or exiting nerve roots. No significant central canal or foraminal stenosis. L4-L5: Generalized bulging of the annulus anteriorly and proliferative changes in the facet joints noted more prominent right side than left. There is narrowing of the right L4 nerve root foramen. No significant central canal stenosis and no definite disc herniation. L5-S1: No disc herniation or central canal or foraminal stenosis. Postlaminectomy changes on the right side noted. Prevertebral soft tissues unremarkable     DIFFUSE DEGENERATIVE DISEASE. NO COMPRESSION FRACTURE OR INFILTRATIVE BONY PROCESS. L4 NERVE ROOT FORAMINAL STENOSIS ON THE RIGHT. NO EVIDENCE OF DISC HERNIATION OR CENTRAL CANAL STENOSIS. Xr Chest Portable    Result Date: 7/23/2020  XR CHEST PORTABLE : 7/22/2020 CLINICAL HISTORY: Pain after fall . COMPARISON: High-resolution chest CT 12/30/2019 and two-view chest 11/18/2019. TECHNIQUE: An upright portable AP radiograph of the chest was obtained. FINDINGS: A shallow inspiration is present without significant infiltrate identified. There is no cardiomegaly, significant pleural effusion, vascular congestion, pneumothorax, or displaced fractures identified. NO EVIDENCE OF ACUTE THORACIC TRAUMA OR ACTIVE CARDIOPULMONARY DISEASE. Ir Us Guided Paracentesis    1. Status post technically successful ultrasound-guided paracentesis. Evelyn Diggs is a Female of 82 years age, referred for Ultrasound Guided Paracentesis. PROCEDURE: Survey of the abdomen showed large amount of ascites fluid. After obtaining informed consent, the patient was positioned supine on the sonography table. Using ultrasound, the skin over the left hemiabdomen was locally anesthetized with 1% lidocaine.   Following that, a RoboteX needle was advanced into the fluid pocket using ultrasound visualization. 2880cc, of clear yellow fluid were aspirated and sent for cytology, and pathology. The needle was removed, and hemostasis was obtained with pressure. A Band-Aid was placed. Post procedure images did not demonstrate hemorrhage at the target site. The patient tolerated the procedure well. The patient left the department in good condition. A radiology nurse was in presence monitoring vital signs, assisting throughout the procedure. Ir Us Guided Paracentesis    1. Status post technically successful ultrasound-guided paracentesis. Kiera Milian is a Female of 82 years age, referred for Ultrasound Guided Paracentesis. PROCEDURE: Survey of the abdomen showed large amount of ascites fluid. After obtaining informed consent, the patient was positioned supine on the sonography table. Using ultrasound, the skin over the left hemiabdomen was locally anesthetized with 1% lidocaine. Following that, a Yueh needle was advanced into the fluid pocket using ultrasound visualization. 5900cc, of clear yellow fluid were aspirated and sent for cytology, and pathology. The needle was removed, and hemostasis was obtained with pressure. A Band-Aid was placed. Post procedure images did not demonstrate hemorrhage at the target site. The patient tolerated the procedure well. The patient left the department in good condition. A radiology nurse was in presence monitoring vital signs, assisting throughout the procedure. Us Guided Paracentesis    Result Date: 7/23/2020  US GUIDED PARACENTESIS : 7/23/2020 CLINICAL HISTORY:  therapeutic paracentesis . COMPARISON: Chest, abdomen and pelvis CTs from earlier 7/23/2020 PROCEDURE: Informed consent was obtained. Limited transabdominal ultrasound was performed and marking done in the usual fashion.  Sterile technique and local lidocaine anesthesia was used to place a 5 [employer] June Yueh catheter within the abdominal fluid collection. Approximately 6.5 L of low viscosity pale yellow-colored fluid was aspirated without evidence of immediate complication. A sample was sent for analysis as requested. The patient was monitored in the radiology holding area for approximately hold minutes without event. She was returned to the medical floor in stable condition with the usual instructions. SUCCESSFUL ULTRASOUND-GUIDED PARACENTESIS APPROXIMATELY 6.5 L OF ASCITES. Us Retroperitoneal Limited    Result Date: 7/23/2020  EXAMINATION: US RETROPERITONEAL LIMITED CLINICAL HISTORY: 82year-old with renal insufficiency and extensive abdominal ascites COMPARISONS: Unenhanced abdominal CT 7/23/2020 FINDINGS: Renal sonography was performed. The study is compromised due to patient body habitus and sonographic window. Kidneys by ultrasound are symmetric borderline small in size. Right kidney measures 9.3 x 5.3 x 4.3 cm left kidney measures 9.2 x 4.7 x  4.8 cm. There are a few punctate echogenic foci in the central sinus echocomplex raising the question of specular reflectors. Bilateral renal echotexture is otherwise unremarkable. No large cortical cysts, solid masses or shadowing calcifications. No ultrasound signs of hydronephrosis. Note is made of upper abdominal ascites     SYMMETRIC KIDNEYS BORDERLINE SMALL IN SIZE. NO ULTRASOUND SIGNS OF HYDRONEPHROSIS. UPPER ABDOMINAL ASCITES      Endoscopic investigations:     Assessment and Plan:  Unique Boone 82 y.o. female for follow up. Cirrhosis complicated by ascites and hepatic encephalopathy. Advised to continue lactulose and current dose of diuretic with close monitoring of her blood pressure and weight, and to follow low-sodium diet, check BMP,, return after 8 weeks. Diagnosis Orders   1. Cirrhosis of liver with ascites, unspecified hepatic cirrhosis type [facility])  Basic Metabolic Panel       No follow-ups on file.     Rafaela Andres MD   StaffGastroenterologist  [facility] [id number]    Please note this report has been partially produced using speech recognitionsoftware  and may cause contain errors related to that system including grammar, punctuation and spelling as well as words andphrases that may seem inappropriate. If there are questions or concerns please feel free to contact me to clarify.

## 2020-08-17 NOTE — PROGRESS NOTES
Relationships    Social connections     Talks on phone: More than three times a week     Gets together: More than three times a week     Attends Christianity service: 1 to 4 times per year     Active member of club or organization: No     Attends meetings of clubs or organizations: Never     Relationship status:     Intimate partner violence     Fear of current or ex partner: No     Emotionally abused: No     Physically abused: No     Forced sexual activity: No   Other Topics Concern    Not on file   Social History Narrative     Lives With: Alone in [address] she is a     She has a daughter named -----and who lives in [facility] and that is quite helpful and comes to see her almost daily    Type of Home: Apartment  at [address] in [place] (7th floor)    Home Layout: One level    Home Access: Elevator, Level entry  (Level entry, elevator to 7th floor)    Bathroom Shower/Tub: Tub/Shower unit    Home Equipment: 4 wheeled walker    ADL Assistance: Independent    Homemaking Assistance: [address]: Independent(Rollator)    Transfer Assistance: Independent    Active : No    Patient's  Info: daughter    IADL Comments: Daughter helps with driving and groceries PRN    Additional Comments: Reports recent fall- \"my legs gave out\"         Current Outpatient Medications   Medication Sig Dispense Refill    tiZANidine (ZANAFLEX) 4 MG tablet TK 1 T PO BID      topiramate (TOPAMAX) 25 MG tablet Take 50 mg by mouth 2 times daily      lovastatin (MEVACOR) 10 MG tablet Take 10 mg by mouth nightly      pregabalin (LYRICA) 75 MG capsule Take 75 mg by mouth 3 times daily.       sertraline (ZOLOFT) 50 MG tablet Take 50 mg by mouth daily      omeprazole (PRILOSEC) 10 MG delayed release capsule Take 40 mg by mouth daily      Cholecalciferol (VITAMIN D3) 125 MCG (5000 UT) TABS Take by mouth      traMADol (ULTRAM) 50 MG tablet Take 50 mg by mouth every 6 hours as needed for Pain.  spironolactone (ALDACTONE) 50 MG tablet TAKE 1 TABLET BY MOUTH DAILY 30 tablet 0    traZODone (DESYREL) 150 MG tablet Take 1 tablet by mouth nightly 30 tablet 1    carbidopa-levodopa (SINEMET)  MG per tablet Take 1 tablet by mouth 3 times daily 90 tablet 3    vitamin B-1 100 MG tablet Take 1 tablet by mouth 2 times daily 30 tablet 3    budesonide-formoterol (SYMBICORT) 160-4.5 MCG/ACT AERO Inhale 2 puffs into the lungs 2 times daily 1 Inhaler 3    furosemide (LASIX) 40 MG tablet Take 1 tablet by mouth daily 60 tablet 3    lactulose (CHRONULAC) 10 GM/15ML solution Take 30 mLs by mouth daily 30 mL 1    levothyroxine (SYNTHROID) 25 MCG tablet Take 1 tablet by mouth Daily 30 tablet 3    Misc. Devices [facility]) MISC Dx: parkinson's,high risk fall 1 each 0    albuterol sulfate HFA (PROAIR HFA) 108 (90 Base) MCG/ACT inhaler Inhale 2 puffs into the lungs every 6 hours as needed for Wheezing      sucralfate (CARAFATE) 1 GM tablet TAKE 1 TABLET BY MOUTH FOUR TIMES DAILY 360 tablet 1    ACCU-CHEK SOFTCLIX LANCETS MISC Checks 1 time daily. NIDDM--ICD-10 E11.9 100 each 3    blood glucose test strips (ACCU-CHEK ACTIVE STRIPS) strip Checks 1 time daily. NIDDM--ICD-10 E11.9 100 each 3    lactulose (CHRONULAC) 10 GM/15ML solution Take 15 mLs by mouth 3 times daily 551 mL 3    folic acid (FOLVITE) 1 MG tablet Take 5 tablets by mouth daily 30 tablet 3    ibuprofen (ADVIL;MOTRIN) 200 MG tablet Take 200 mg by mouth every 6 hours as needed for Pain (Headache)      clotrimazole-betamethasone (LOTRISONE) 1-0.05 % cream Use two times daily to affected area for 14 days then stop 30 g 0    furosemide (LASIX) 20 MG tablet Take 1 tablet by mouth daily for 5 days 5 tablet 0    Cinnamon 500 MG TABS Take by mouth 3 times daily      latanoprost (XALATAN) 0.005 % ophthalmic solution Use 1 Drop in both eyes daily at bedtime. No current facility-administered medications for this visit.       Family History   Problem Relation Age of Onset    High Blood Pressure Neg Hx      Past Medical History:   Diagnosis Date    Asthma     Fibromyalgia     Hemorrhoid     Hyperlipidemia     Hypertension     Parkinson's disease ([facility] [address]     Type II or unspecified type diabetes mellitus without mention of complication, not stated as uncontrolled    Physical Exam:  Height and weight: WEIGHTWeight: 149 lb (67.6 kg), HEIGHTHeight: 5' 1\" (154.9 cm)  Cardiopulmonary exam: See Progress Note  Musculoskeletal exam: See Progress Note  Neurological Exam: (Gait, Balance, Coordination): See Progress Note      Symptoms that limit ambulation are parkinson    Diagnosis that are responsible for these symptoms:  See visit diagnosis list.    Medication or other treatment for these symptoms: See visit medication list .    Progression of ambulation difficulty over time have been worsened    Other diagnoses that may relate to ambulatory problems include arthritis of spine     The patient can not walk  20  feet without stopping. Pace of ambulation is slow and unsteady even while using  Rolling Walker    History of falls. Frequency: occasionally   Circumstances leading to falls: weakness bilateral and loss of balance   Agnesadelaide Seaman is isn't sufficient due to: decreased balance and decreased upper body strength    Ambulatory assistance currently used is  Rolling Walker    Patient's ability to independently perform bathing, toileting, personal cares, ambulating, grooming and hygiene  (MRADLs) has worsened and now requires use of a wheelchair. Patient is  able to stand up from a seated position without assistance.     The patient has a mobility limitation that significantly impairs his/her ability to participate in one or more mobility-related activities of daily living (MRADLs)  in the home eating, bathing, toileting and personal cares    Home setting is conducive to accommodate use of transport chair  and use of a transport chair will Number of Occurrences:   1     Standing Expiration Date:   8/17/2021    External Referral to Neurology     Referral Priority:   Routine     Referral Type:   Eval and Treat     Referral Reason:   Specialty Services Required     Requested Specialty:   Acute Care     Number of Visits Requested:   1    [place] DISCHARGE MEDS RECONCILED W/ CURRENT OUTPATIENT MED LIST     continue current meds   F/u with specialities as already planned   Family not sure who if following thyroid-will need tsh level in another month since starting medication   F/u here in 2 months

## 2020-08-17 NOTE — TELEPHONE ENCOUNTER
THE PATIENT DAUGHTER WOULD LIKE TO KNOW WHAT TO DO EVERY TIME HER MOTHER TAKES THE LACTULOSE SHE HAS DIARRHEA AND SHE DOES NOT WANT TO EAT. PLEASE ADVISE. THE DAUGHTER HAS ANOTHER APPT TODAY AT 1:00 TODAY.

## 2020-08-17 NOTE — TELEPHONE ENCOUNTER
PATIENT IS AWARE THAT PROCEDURE WITH DR. Timothy Shin IS 8/19/20 AT 1:00 PM -- SHE WILL ARRIVE WITH A  FROM HOME AT 58:68 PM -- SHE WILL NOT BE NPO.       MAYLIN WAS ADVISED - VOICED UNDERSTANDING

## 2020-08-19 NOTE — TELEPHONE ENCOUNTER
From: [place]  To: Sanjuana Keating MD  Sent: 8/18/2020 3:56 PM EDT  Subject: Visit Shiela polanco sent papers over to be filled out for moms wheelchair however a transport chair is what she needs so they need a new script sent over for a transport chair.  Also it needs to say in the office notes patient is to weak to propel herself thank you

## 2020-08-19 NOTE — PROGRESS NOTES
1445 Pt now in Ct holding room for albumin infusion. Iv Albumin infusing without difficulty, infusion almost completed. Pt denies any pain. Band-Aid clean, dry, intact. Site soft. VSS.    1455 IV albumin done. 1500 D/C instructions given to pt. Pt verbalized understanding of given info. 12 Pt wheeled to the car. Pt walked few steps with assistance. Pt d/c home with daughter.

## 2020-08-19 NOTE — SEDATION DOCUMENTATION
NO SEDATION    1310 pt take to US via w/c, assisted onto cart. History, allergies and medications reviewed. U/S tech obtained images prior to start of procedure using U/S. Pt  VSS. 1315  Procedure explained, consent signed. [address] completed. Using U/S, Dr. Radha Collins marked, prepped LLQ with Chloraprep and draped with sterile drape and towels. 1352  Site numbed with lidocaine. [id number] Centesis 5F catheter placed using Ultrasound guidance. Fluid appears slightly turbid pale yellow. Catheter tubing connected to [employer] machine to remove fluid. 1427 IV started and Albumin admin begun. 1434 Pt tolerated well. Total 5650 ml removed. Catheter removed, pressure held and bandaid applied. Verbal and tactile reassurance given throughout.
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
## 2020-08-23 NOTE — FLOWSHEET NOTE
Pt  Resting in bed this am Blood pressure a little low  61/28 HR 59 rechecked and 65/37 HR 58. Held Topamax 50mg. RN aware new order received for Albumin . Electronically signed by Art Raphael LPN on 7/57/5567 at 74:68 AM    Reis cath inserted as per order and 100cc dark yellow urine return. Tolerated well 16fr with  10cc. Florencia Shine Electronically signed by Art Raphael LPN on 2/10/5482 at 97:91 AM    Pt visiting with grand daughter and eating dinner. Pt is very alert. Pt states she feels a lot better then this am.  Reis draining clear yellow urine and urine sent to lab for tests as per order blood pressure up to 133/.49 . Electronically signed by Art Raphael LPN on 1/56/3978 at 5:41 PM     Granddaderrek states that her mother said to ask if she could have a swallow eval done before she leaves the hospital when I asked her why she said because she choked on her pills the other day. I said I would pass it on but she has been eating very well and has not had any choking problems her. Pt ate 25% of pasta and meat sauce for dinner and has summer squash,drinking without incident. Electronically signed by Art Raphael LPN on 0/85/3593 at 2:80 PM    New  IV started in left wrist #20 flushed and patent tolerated well. Old one was leaking on her. Electronically signed by Art Raphael LPN on 7/45/8269 at 2:97 PM

## 2020-08-23 NOTE — PROGRESS NOTES
Patient was seen by my colleague earlier this morning. I agree with assessment and plan. Patient was seen and examined. Patient will be given 2 boluses of IV albumin today. Her blood pressure remains low but she does not have lightheadedness or dizziness. Will consult IR for paracentesis due to abdominal distention and some discomfort. Nephrology is also following. Decompensated liver cirrhosis  ENMA  Hypotension-no signs of infection, this is due to decompensated liver cirrhosis and poor p.o. intake  Elevated troponin due to above-no chest pain. No signs of ACS. Monitor on telemetry.       Kathryn Mejia, [facility]

## 2020-08-23 NOTE — ED NOTES
Patient straight cathed for urine sample. Urine was dark dannielle and cloudy. 200mls in bag. Well tolerated by patient. Urine sent to lab via tube system.       Carlitos Ellis RN  08/23/20 3200

## 2020-08-23 NOTE — H&P
Hospitalist Group   History and Physical      CHIEF COMPLAINT: Generalized weakness and fatigue    History of Present Illness:  82 y.o. female with a history of fibromyalgia, hyperlipidemia, hypertension, diabetes, liver cirrhosis with ascites and frequent paracentesis presents with generalized weakness and fatigue. Patient had paracentesis done 4 days ago. She has been gradually getting more fatigued and tired since. Patient currently denies any symptoms other than weakness. Patient currently denies cough, shortness of breath, chest pain, abdominal pain, nausea, vomiting, diarrhea, bowel movement changes, urinary symptoms. She is alert and oriented and she is agreeable that she would like comfort route. She would like to discuss it with palliative care. ED physician discussed her poor prognosis with the family and they needed to discuss it among themselves and discuss with palliative care before changing CODE STATUS. REVIEW OF SYSTEMS:  no fevers, chills, cp, sob, n/v, ha, vision/hearing changes, wt changes, hot/cold flashes, other open skin lesions, diarrhea, constipation, dysuria/hematuria unless noted in HPI. Complete ROS performed with the patient and is otherwise negative.       PMH:  Past Medical History:   Diagnosis Date    Asthma     Fibromyalgia     Hemorrhoid     Hyperlipidemia     Hypertension     Parkinson's disease ([facility] [address]     Type II or unspecified type diabetes mellitus without mention of complication, not stated as uncontrolled        Surgical History:  Past Surgical History:   Procedure Laterality Date    CHOLECYSTECTOMY      EYE SURGERY      HEMORRHOID SURGERY      HERNIA REPAIR  2/20/2002    ventral hernia    HERNIA REPAIR      LIH repair    HYSTERECTOMY      JOINT REPLACEMENT Right     partial left, full right    LAMINECTOMY      PARACENTESIS Left 03/12/2020    5010cc ascites removed by Dr Yohana Angeles [phone]    PARACENTESIS Left 06/10/2020    3,850 cc removed per Dr Belkis Gee Left 07/08/2020    total 5450 ml removed per Dr Belkis Gee Left 07/23/2020    Dr Gavin Daniel 2160 off    PARACENTESIS Left 08/05/2020    2880 ml removed by Dr. Belkis Gee Left 08/19/2020    5650ml removed by Dr Connor Varner 2/21/2020    EGD WITH BANDING performed by Sydney Hinton MD at [facility]       Medications Prior to Admission:    Prior to Admission medications    Medication Sig Start Date End Date Taking? Authorizing Provider   Misc. Devices (TRANSPORT CHAIR) [facility] Patient is high risk fall and weakness. 8/20/20   Loulou Cruz MD   [facility]. Devices (TRANSPORT CHAIR) MISC 1 Device by Does not apply route three times daily 8/19/20   Loulou Cruz MD   tiZANidine (ZANAFLEX) 4 MG tablet TK 1 T PO BID 8/11/20   Historical Provider, MD   topiramate (TOPAMAX) 25 MG tablet Take 50 mg by mouth 2 times daily    Historical Provider, MD   lovastatin (MEVACOR) 10 MG tablet Take 10 mg by mouth nightly    Historical Provider, MD   pregabalin (LYRICA) 75 MG capsule Take 75 mg by mouth 3 times daily. Historical Provider, MD   sertraline (ZOLOFT) 50 MG tablet Take 50 mg by mouth daily    Historical Provider, MD   omeprazole (PRILOSEC) 10 MG delayed release capsule Take 40 mg by mouth daily    Historical Provider, MD   Cholecalciferol (VITAMIN D3) 125 MCG (5000 UT) TABS Take by mouth    Historical Provider, MD   traMADol (ULTRAM) 50 MG tablet Take 50 mg by mouth every 6 hours as needed for Pain.     Historical Provider, MD   lactulose (CHRONULAC) 10 GM/15ML solution Take 15 mLs by mouth 3 times daily 8/11/20   Sydney Hinton MD   spironolactone (ALDACTONE) 50 MG tablet TAKE 1 TABLET BY MOUTH DAILY 8/10/20   King Tobin DO   traZODone (DESYREL) 150 MG tablet Take 1 tablet by mouth nightly 8/8/20   Karen Esqueda,    carbidopa-levodopa (SINEMET)  MG per tablet Take 1 tablet by mouth 3 times daily 8/8/20 Karen Esqueda,    folic acid (FOLVITE) 1 MG tablet Take 5 tablets by mouth daily 8/9/20   Mary Grace Jain, DO   vitamin B-1 100 MG tablet Take 1 tablet by mouth 2 times daily 8/8/20   Mary Grace Jain, DO   budesonide-formoterol (SYMBICORT) 160-4.5 MCG/ACT AERO Inhale 2 puffs into the lungs 2 times daily 8/8/20   Ollie Halsted, APRN - CNP   furosemide (LASIX) 40 MG tablet Take 1 tablet by mouth daily 8/9/20   Tesfaye Halsted, APRN - CNP   lactulose [facility]) 10 GM/15ML solution Take 30 mLs by mouth daily 8/8/20 9/7/20  Ollie Halsted, APRN - CNP   levothyroxine (SYNTHROID) 25 MCG tablet Take 1 tablet by mouth Daily 8/9/20   Ollie Halsted, APRN - CNP   Misc. Devices [facility]) MISC Dx: parkinson's,high risk fall 8/6/20   Ishmael Keys MD   ibuprofen (ADVIL;MOTRIN) 200 MG tablet Take 200 mg by mouth every 6 hours as needed for Pain (Headache)    Historical Provider, MD   albuterol sulfate HFA (PROAIR HFA) 108 (90 Base) MCG/ACT inhaler Inhale 2 puffs into the lungs every 6 hours as needed for Wheezing    Historical Provider, MD   clotrimazole-betamethasone (LOTRISONE) 1-0.05 % cream Use two times daily to affected area for 14 days then stop 3/10/20   Ishmael Keys MD   furosemide (LASIX) 20 MG tablet Take 1 tablet by mouth daily for 5 days 3/6/20 3/18/20  Ishmael Keys MD   sucralfate (CARAFATE) 1 GM tablet TAKE 1 TABLET BY MOUTH FOUR TIMES DAILY 2/23/20   Ira Mathias MD   ACCU-CHEK JUAN [place]IEGO [facility] LANCETS MISC Checks 1 time daily. NIDDM--ICD-10 E11.9 11/6/19   Ishmael Keys MD   blood glucose test strips (ACCU-CHEK ACTIVE STRIPS) strip Checks 1 time daily. NIDDM--ICD-10 E11.9 6/24/19   Ishmael Keys MD   Cinnamon 500 MG TABS Take by mouth 3 times daily    Historical Provider, MD   latanoprost (XALATAN) 0.005 % ophthalmic solution Use 1 Drop in both eyes daily at bedtime. 9/28/17   Historical Provider, MD       Allergies:    Adhesive tape; Amoxicillin-pot clavulanate;  Bactrim [sulfamethoxazole-trimethoprim]; Clarithromycin; Other; and Sulfa antibiotics    Social History:    reports that she has never smoked. She has never used smokeless tobacco. She reports that she does not drink alcohol or use drugs. Family History:       Problem Relation Age of Onset    High Blood Pressure Neg Hx        PHYSICAL EXAM:  Vitals:  BP (!) 83/46   Pulse 59   Temp 97.7 °F (36.5 °C) (Oral)   Resp 14   Ht 5' 1\" (1.549 m)   Wt 162 lb 12.8 oz (73.8 kg)   SpO2 98%   BMI 30.76 kg/m²   General Appearance: alert and oriented to person, place and time, well developed and well- nourished, in no acute distress  Skin: warm and dry, no rash or erythema  Head: normocephalic and atraumatic  Eyes: pupils equal, round, and reactive to light, extraocular eye movements intact, conjunctivae normal  ENT: tympanic membrane, external ear and ear canal normal bilaterally, nose without deformity, nasal mucosa and turbinates normal without polyps  Neck: supple and non-tender without mass, no thyromegaly or thyroid nodules, no cervical lymphadenopathy  Pulmonary/Chest: clear to auscultation bilaterally- no wheezes, rales or rhonchi, normal air movement, no respiratory distress  Cardiovascular: normal rate, regular rhythm, normal S1 and S2, faint systolic murmurs, +1 pitting edema in the lower extremities  Abdomen: soft, non-tender, distended, normal bowel sounds, no masses or organomegaly  Extremities: no cyanosis, clubbing  Musculoskeletal: normal range of motion, no joint swelling, deformity or tenderness  Neurologic:  no cranial nerve deficit,   speech normal      LABS:  Recent Labs     08/23/20  0330   *   K 4.4   CL 94*   CO2 24   BUN 32*   CREATININE 1.70*   GLUCOSE 138*   CALCIUM 8.1*       Recent Labs     08/23/20  0330   WBC 7.7   RBC 2.64*   HGB 8.4*   HCT 25.1*   MCV 95.3   MCH 31.9*   MCHC 33.5   RDW 16.4*   PLT 85*       No results for input(s): POCGLU in the last 72 hours.     CBC with Differential: Lab Results   Component Value Date    WBC 7.7 08/23/2020    RBC 2.64 08/23/2020    RBC 4.01 04/06/2012    HGB 8.4 08/23/2020    HCT 25.1 08/23/2020    PLT 85 08/23/2020    MCV 95.3 08/23/2020    MCH 31.9 08/23/2020    MCHC 33.5 08/23/2020    RDW 16.4 08/23/2020    LYMPHOPCT 13.3 08/23/2020    MONOPCT 5.8 08/23/2020    EOSPCT 0.2 04/02/2012    BASOPCT 0.3 08/23/2020    MONOSABS 0.4 08/23/2020    LYMPHSABS 1.0 08/23/2020    EOSABS 0.3 08/23/2020    BASOSABS 0.0 08/23/2020     CMP:    Lab Results   Component Value Date     08/23/2020    K 4.4 08/23/2020    K 3.7 08/08/2020    CL 94 08/23/2020    CO2 24 08/23/2020    BUN 32 08/23/2020    CREATININE 1.70 08/23/2020    GFRAA 34.7 08/23/2020    LABGLOM 28.7 08/23/2020    GLUCOSE 138 08/23/2020    GLUCOSE 248 04/06/2012    PROT 5.1 08/23/2020    LABALBU 2.6 08/23/2020    LABALBU 4.5 03/30/2012    CALCIUM 8.1 08/23/2020    BILITOT 0.5 08/23/2020    ALKPHOS 71 08/23/2020    AST 19 08/23/2020    ALT <5 08/23/2020       Radiology: No results found. ASSESSMENT/ PLAN[de-identified]      Active Problems:    ENMA (acute kidney injury) ([facility] [address]  Resolved Problems:    * No resolved hospital problems. *        1. ENMA   Creatinine 1.73-normal baseline   Most likely due to hypoperfusion due to hypotension   Patient received multiple boluses in the ED-we will monitor renal function   Will start patient on Midodrin which will improve blood pressure and subsequently can improve renal function. Will hold diuretics for now  2. Hypotension   Systolic blood pressure on presentation was in the 60s   No lactic acidosis noted   Patient was complaining of generalized weakness and fatigue   Improved after IV hydration   However, due to her cirrhosis and ascites patient is going to end up requiring paracentesis again which will worsen her hypotension. She might benefit from Midodrin-we will start patient on it   Will monitor blood pressure closely  3.  Hyponatremia   Sodium 123   Most likely due to medication and cirrhosis   Patient received normal saline boluses in the ED to improve her blood pressure   However, patient is hypervolemic- we will restrict fluids unless blood pressure continues to decrease. Will monitor sodium closely   If needed, will consult nephrology unless patient decides on changing CODE STATUS to DNR CC  4. Liver cirrhosis   Due to medication   Ascites history with frequent paracentesis-last was 4 days ago   Follows up with GI   Supposed to be on Aldactone and Lasix   Patient is also on lactulose   Given severe hypotension-we will hold medication for now   Palliative consult to discuss CODE STATUS and goals of care. Patient would like comfort care measures. 5. Elevated troponin   Troponin 0.04-mildly more elevated than previous   Likely due to renal failure-no chest pain   We will cycle  6. Thrombocytopenia   Platelet 85   Due to cirrhosis   Monitor closely   Hold anticoagulation  7. Diabetes   Insulin sliding scale    Code Status: Full  DVT prophylaxis: PCD's         Electronically signed by Taryn Correa MD on 8/23/2020 at 6:07 AM      NOTE: This report was transcribed using voice recognition software. Every effort was made to ensure accuracy; however, inadvertent computerized transcription errors may be present.

## 2020-08-23 NOTE — CONSULTS
[address]                      [address], [address] [facility]                                  CONSULTATION    PATIENT NAME: Gama Vickers                 :        1935  MED REC NO:   [id number]                            ROOM:       A678  ACCOUNT NO:   [de-identified]                           ADMIT DATE: 2020  PROVIDER:     Deborah Patton DO    CONSULT DATE:  2020    RENAL CONSULT    HISTORY OF PRESENT ILLNESS:  An 82-year-old  female admitted to  the hospital with low blood pressure and weakness. The patient does  have a history of cryptogenic cirrhosis. She has been receiving  paracentesis over the past 8-9 months due to ascites. Family does not  know the reason for the cirrhosis, but related to medication that she  has used in the past.  On arrival to the hospital, the patient's blood  pressure systolic was in the mid 60X to 70. The patient has been having  increasing swelling of her abdomen. Three to four days ago, she had 5 L  of fluid removed from her abdomen. The patient has had no fever or  chills. The patient is somewhat groggy. She answers questions  appropriately, but slowly. She denies any chills, chest pain, shortness  of breath, abdominal pain, or leg pain. There has been no nausea or  vomiting, but she has had poor oral intake of foods. The patient is  being seen at the request of the hospitalist due to change in renal  status. Six months ago, the patient had normal renal function. On  admission to the hospital, she has a sodium of 123, serum creatinine of  1.7, and GFR 29 mL per minute. Ammonia level was not performed. Her  albumin level was 2.6. Daughter is present in the room. Discussion as  to what the patient would want is being discussed with family members as  far as palliative care - hospice. PAST MEDICAL HISTORY:  1. Cirrhosis, cryptogenic. 2.  Diabetes mellitus type 2.  3.  Fibromyalgia.   4. DJD.  5.  History of pseudogout. 6.  Hypothyroidism. 7.  Hard of hearing. PAST SURGICAL HISTORY:  Multiple paracenteses, cholecystectomy,  hysterectomy, laminectomy, and lumbar hernia repair. No history of  liver biopsy. FAMILY HISTORY:  Cannot be performed. HABITS:  No smoking. No alcohol. No nonsteroidals. MEDICATIONS:  At the time of her admission to the hospital; Xalatan  ophthalmic, Carafate, Lasix, Motrin, Synthroid, albuterol, Symbicort,  Sinemet, Aldactone, and Ultram.    ALLERGIES TO MEDICATIONS:  Would be ADHESIVE TAPE, AMOXICILLIN, BACTRIM,  BIAXIN, and SULFA. REVIEW OF SYSTEMS:  Cannot be performed. PHYSICAL EXAMINATION:  VITAL SIGNS:  The patient is 5 feet 1 inch, 162 pounds. Blood pressure  80/40 and heart rate 70. HEENT:  Normocephalic. No scalp tenderness. Pupils reactive to light. Sclerae are clear. Throat shows dry mucous membranes. CARDIOVASCULAR:  Heart is regular 1/6 systolic murmur. ABDOMEN:  Distended. No pain on palpation. Bowel sounds are decreased. EXTREMITIES:  Show trace edema. Pulses are present, but decreased. No  cyanosis. IMPRESSION:  1. ENMA secondary to hypotension, possibly from recent fluid removal,  diuretic therapy, cannot exclude underlying sepsis. 2.  Cryptogenic cirrhosis. 3.  Ascites. 4.  COPD. 5.  Hypothyroidism. 6.  Osteoporosis. 7.  Portal hypotension. PLAN:  At this time, consider using Vaprisol for sodium in addition to  giving albumin for blood pressure improvement in a bolus of saline. Ammonia level to be drawn stat. Follow labs daily. Family to decide on  code status and palliative care. No signs at this time for hepatorenal  failure.         Joyce Pantoja DO    D: 08/23/2020 11:11:16       T: 08/23/2020 11:15:16     [id number]  Job#: [id number]     Doc#: [id number]    CC:

## 2020-08-23 NOTE — ED PROVIDER NOTES
PARACENTESIS Left 03/12/2020    5010cc ascites removed by Dr Rylan Lomax [phone]    PARACENTESIS Left 06/10/2020    3,850 cc removed per Dr Kyle Cartwright Left 07/08/2020    total 5450 ml removed per Dr Kyle Cartwright Left 07/23/2020    Dr Joao Sharp 0101 off    PARACENTESIS Left 08/05/2020    2880 ml removed by Dr. Kyle Cartwright Left 08/19/2020    5650ml removed by Dr Russella Brunner 2/21/2020    EGD WITH BANDING performed by Natasha Antonio MD at [facility]       Previous Medications    ACCU-CHEK SOFTCLIX LANCETS MISC    Checks 1 time daily. NIDDM--ICD-10 E11.9    ALBUTEROL SULFATE HFA (PROAIR HFA) 108 (90 BASE) MCG/ACT INHALER    Inhale 2 puffs into the lungs every 6 hours as needed for Wheezing    BLOOD GLUCOSE TEST STRIPS (ACCU-CHEK ACTIVE STRIPS) STRIP    Checks 1 time daily. NIDDM--ICD-10 E11.9    BUDESONIDE-FORMOTEROL (SYMBICORT) 160-4.5 MCG/ACT AERO    Inhale 2 puffs into the lungs 2 times daily    CARBIDOPA-LEVODOPA (SINEMET)  MG PER TABLET    Take 1 tablet by mouth 3 times daily    CHOLECALCIFEROL (VITAMIN D3) 125 MCG (5000 UT) TABS    Take by mouth    CINNAMON 500 MG TABS    Take by mouth 3 times daily    CLOTRIMAZOLE-BETAMETHASONE (LOTRISONE) 1-0.05 % CREAM    Use two times daily to affected area for 14 days then stop    FOLIC ACID (FOLVITE) 1 MG TABLET    Take 5 tablets by mouth daily    FUROSEMIDE (LASIX) 20 MG TABLET    Take 1 tablet by mouth daily for 5 days    FUROSEMIDE (LASIX) 40 MG TABLET    Take 1 tablet by mouth daily    IBUPROFEN (ADVIL;MOTRIN) 200 MG TABLET    Take 200 mg by mouth every 6 hours as needed for Pain (Headache)    LACTULOSE (CHRONULAC) 10 GM/15ML SOLUTION    Take 30 mLs by mouth daily    LACTULOSE (CHRONULAC) 10 GM/15ML SOLUTION    Take 15 mLs by mouth 3 times daily    LATANOPROST (XALATAN) 0.005 % OPHTHALMIC SOLUTION    Use 1 Drop in both eyes daily at bedtime. LEVOTHYROXINE (SYNTHROID) 25 MCG TABLET    Take 1 tablet by mouth Daily    LOVASTATIN (MEVACOR) 10 MG TABLET    Take 10 mg by mouth nightly    MISC. DEVICES (TRANSPORT CHAIR) MIS    1 Device by Does not apply route three times daily    MISC. DEVICES (TRANSPORT CHAIR) MISC    Patient is high risk fall and weakness. MISC. DEVICES (WHEELCHAIR) MISC    Dx: parkinson's,high risk fall    OMEPRAZOLE (PRILOSEC) 10 MG DELAYED RELEASE CAPSULE    Take 40 mg by mouth daily    PREGABALIN (LYRICA) 75 MG CAPSULE    Take 75 mg by mouth 3 times daily. SERTRALINE (ZOLOFT) 50 MG TABLET    Take 50 mg by mouth daily    SPIRONOLACTONE (ALDACTONE) 50 MG TABLET    TAKE 1 TABLET BY MOUTH DAILY    SUCRALFATE (CARAFATE) 1 GM TABLET    TAKE 1 TABLET BY MOUTH FOUR TIMES DAILY    TIZANIDINE (ZANAFLEX) 4 MG TABLET    TK 1 T PO BID    TOPIRAMATE (TOPAMAX) 25 MG TABLET    Take 50 mg by mouth 2 times daily    TRAMADOL (ULTRAM) 50 MG TABLET    Take 50 mg by mouth every 6 hours as needed for Pain. TRAZODONE (DESYREL) 150 MG TABLET    Take 1 tablet by mouth nightly    VITAMIN B-1 100 MG TABLET    Take 1 tablet by mouth 2 times daily       ALLERGIES     Adhesive tape; Amoxicillin-pot clavulanate; Bactrim [sulfamethoxazole-trimethoprim]; Clarithromycin; Other; and Sulfa antibiotics    FAMILY HISTORY       Family History   Problem Relation Age of Onset    High Blood Pressure Neg Hx           SOCIAL HISTORY       Social History     Socioeconomic History    Marital status:       Spouse name: None    Number of children: None    Years of education: 15    Highest education level: High school graduate   Occupational History    Occupation: Homemaker   Social Needs    Financial resource strain: Somewhat hard    Food insecurity     Worry: Never true     Inability: Never true    Transportation needs     Medical: Yes     Non-medical: Yes   Tobacco Use    Smoking status: Never Smoker    Smokeless tobacco: Never Used   Substance and Sexual Activity    Alcohol use: No    Drug use: No    Sexual activity: Not Currently     Partners: Male   Lifestyle    Physical activity     Days per week: 0 days     Minutes per session: 0 min    Stress: Only a little   Relationships    Social connections     Talks on phone: More than three times a week     Gets together: More than three times a week     Attends Shinto service: 1 to 4 times per year     Active member of club or organization: No     Attends meetings of clubs or organizations: Never     Relationship status:     Intimate partner violence     Fear of current or ex partner: No     Emotionally abused: No     Physically abused: No     Forced sexual activity: No   Other Topics Concern    None   Social History Narrative     Lives With: Alone in [address] she is a     She has a daughter named -----and who lives in [facility] and that is quite helpful and comes to see her almost daily    Type of Home: Apartment  [address] in [place] (7th floor)    Home Layout: One level    Home Access: Elevator, Level entry  (Level entry, elevator to 7th floor)    Bathroom Shower/Tub: Tub/Shower unit    Home Equipment: 4 wheeled walker    ADL Assistance: Independent    Homemaking Assistance: Independent    Ambulation Assistance: Independent(Rollator)    Transfer Assistance: Independent    Active : No    Patient's  Info: daughter    IADL Comments: Daughter helps with driving and groceries PRN    Additional Comments: Reports recent fall- \"my legs gave out\"             PHYSICAL EXAM        ED Triage Vitals [08/23/20 0319]   BP Temp Temp Source Pulse Resp SpO2 Height Weight   (!) 73/48 98.4 °F (36.9 °C) Oral 66 18 98 % 5' 1\" (1.549 m) 140 lb (63.5 kg)       Physical Exam  Vitals signs and nursing note reviewed. Constitutional:       Appearance: She is well-developed. HENT:      Head: Normocephalic.       Right Ear: External ear normal.      Left Ear: External ear normal.   Eyes: Conjunctiva/sclera: Conjunctivae normal.      Pupils: Pupils are equal, round, and reactive to light. Neck:      Musculoskeletal: Normal range of motion and neck supple. Cardiovascular:      Rate and Rhythm: Normal rate and regular rhythm. Heart sounds: Normal heart sounds. Pulmonary:      Effort: Pulmonary effort is normal.      Breath sounds: Normal breath sounds. Abdominal:      General: Bowel sounds are normal. There is no distension. Palpations: Abdomen is soft. Tenderness: There is no abdominal tenderness. Musculoskeletal: Normal range of motion. Skin:     General: Skin is warm and dry. Neurological:      Mental Status: She is alert and oriented to person, place, and time.    Psychiatric:         Mood and Affect: Mood normal.           LABS:  Labs Reviewed   COMPREHENSIVE METABOLIC PANEL - Abnormal; Notable for the following components:       Result Value    Sodium 123 (*)     Chloride 94 (*)     Anion Gap 5 (*)     Glucose 138 (*)     BUN 32 (*)     CREATININE 1.70 (*)     GFR Non- 28.7 (*)     GFR  34.7 (*)     Calcium 8.1 (*)     Total Protein 5.1 (*)     Alb 2.6 (*)     All other components within normal limits   CBC WITH AUTO DIFFERENTIAL - Abnormal; Notable for the following components:    RBC 2.64 (*)     Hemoglobin 8.4 (*)     Hematocrit 25.1 (*)     MCH 31.9 (*)     RDW 16.4 (*)     Platelets 85 (*)     All other components within normal limits   TROPONIN - Abnormal; Notable for the following components:    Troponin 0.040 (*)     All other components within normal limits    Narrative:     CALL  [facility] tel. [phone],  Troponin results called to and read back by chichi chicas, 08/23/2020 04:25, by  Marquez Lees - Abnormal; Notable for the following components:    Color, UA DARK YELLOW (*)     Ketones, Urine TRACE (*)     All other components within normal limits   TSH WITHOUT REFLEX - Abnormal; Notable for the following components: TSH 4.770 (*)     All other components within normal limits    Narrative:     Jeniffer Blue  [facility] tel. [phone],  Troponin results called to and read back by chichi chicas, 08/23/2020 04:25, by  [facility]   APTT - Abnormal; Notable for the following components:    aPTT 38.5 (*)     All other components within normal limits   PROTIME-INR - Abnormal; Notable for the following components:    Protime 17.8 (*)     All other components within normal limits   POCT ARTERIAL - Abnormal; Notable for the following components:    Calcium, Ion 1.08 (*)     pCO2, Arterial 30 (*)     pO2, Arterial 105 (*)     HCO3, Arterial 19.9 (*)     Base Excess, Arterial -4 (*)     O2 Sat, Arterial 98 (*)     TCO2, Arterial 21 (*)     All other components within normal limits   CULTURE, BLOOD 1   CULTURE, BLOOD 2   URINE DRUG SCREEN   ETHANOL   LACTIC ACID, PLASMA   LIPASE   MAGNESIUM         MDM:   Vitals:    Vitals:    08/23/20 0403 08/23/20 0409 08/23/20 0416 08/23/20 0428   BP:  (!) 80/42 (!) 84/49 (!) 97/34   Pulse:  60 60 59   Resp: 14 18 18 18   Temp:       TempSrc:       SpO2: 99% 98% 99% 99%   Weight:       Height:           81 yo female presents to the ED with weakness. Pt's bp noted to be 60s. Pt is afebrile in the ED. Pt given 1  L NS in the ED. EKG shows NSR with HR 64, normal axis, normal intervals, no ST changes. ABG unremarkable. Labs remarkable for Na 123, BUN 32, Cr 1.7, glucose 138, Hb 8.4, troponin 0.040. UA negative. CXR negative. Pt reassessed and feels better. Pt's bp 90s. I spoke to the pt and family at length. Pt given 2nd L NS in the ED. Given weakness, hypotension, hyponatremia, ENMA, case discussed with Dr. Uli Alonzo and pt admitted to medicine in stable condition. CRITICAL CARE TIME   Total CriticalCare time was 0 minutes, excluding separately reportable procedures.   There was a high probability of clinically significant/life threatening deterioration in the patient's condition which required my urgent

## 2020-08-23 NOTE — FLOWSHEET NOTE
Pt arrived to floor, assessment complete, vitals taken. Pt states that her daughter went over her home medications in the ED and that they were put into the computer. The pt denies pain currently. Call light within reach, bed alarm active.

## 2020-08-23 NOTE — CARE COORDINATION
[facility] Case Management Initial Discharge Assessment    Met with Family- DTR MAYLIN to discuss discharge plan. PCP: Timothy Flores MD                                Date of Last Visit: RECENT    If no PCP, list provided? N/A    Discharge Planning    Living Arrangements: at home dependent on family care    Who do you live with? Jorge Mora    Who helps you with your care:  family    If lives at home:     Do you have any barriers navigating in your home? no    Patient can perform ADL? No    Current Services (outpatient and in home) :  [address] [facility] RN/PT)    Dialysis: No    Is transportation available to get to your appointments? Yes    DME Equipment:  yes - ROLATOR, TRANSFER CHAIR AND SHOWER CHAIR     Respiratory equipment: None    Respiratory provider:  no     Pharmacy:  yes [facility] [address]    Consult with Medication Assistance Program?  No      Patient agreeable to [facility]? Yes, [facility]    Patient agreeable to SNF/Rehab? No    Other discharge needs identified? Palliative Care and Hospice- DTR MAYLIN(SADIA) WILL BE DISCUSSING WITH FAMILY AND PT    [place] of choice list provided with basic dialogue that supports the patient's individualized plan of care/goals and shares the quality data associated with the providers. Yes    Does Patient Have a High-Risk for Readmission Diagnosis (CHF, PN, MI, COPD)? No      The plan for Transition of Care is related to the following treatment goals:PARACENTESIS    Initial Discharge Plan? (Note: please see concurrent daily documentation for any updates after initial note). FROM HOME WITH DTR [facility]. DTR MAYLIN IS POA AND OTHER DAUGHTER IS MELISSA OUT OF STATE. PT IS DEPENDENT ON FAMILY CARE AT HOME WHICH HAS BECOME WORSENED. THEY ARE UNABLE TO LIFT HER INTO SHOWER WHEN THEY COULD BEFORE. MD'S HAVE TALKED TO MAYLIN ABOUT PROGNOSIS AND DISCUSSING WITH PATIENT WHAT HER WISHES WOULD BE. MAYLIN STATES DTR MELISSA IS COMING HERE ON Tuesday.  SHE WISHES THAT THEY CAN ALL BE PRESENT WITH THEIR MOM TO DISCUSS WHAT HER WISHES ARE. WILL SPEAK WITH MANAGEMENT IN AM ABOUT HAVING A FAMILY MEETING. PT IS CURRENT WITH [facility] [place] [facility]. CM DISCUSSED WITH MAYLIN OPTIONS OF PALL CARE AND HOSPICE AS WELL AS THE DIFFERENCE IN THE PROGRAMS. DTR WILL F/U WITH DECISION AFTER SPEAKING WITH FAMILY. ULTIMATELY WOULD LIKE TO TAKE HER HOME.      The Patient and/or patient representative: Anna Marie Berg was provided with choice of any post-acute providers for care and equipment and agrees with discharge plan  Yes  MODERATE RISK FOR READMIT    Electronically signed by Marlon Bello, RN on 8/23/2020 at 1:59 PM

## 2020-08-23 NOTE — ED TRIAGE NOTES
Patient arrived from home via [address] [place] [address],[address] with c/o Lethargy and hypotension. Patient lives with her daughter who called EMS because patient had been sleeping all day and was hard to wake. States patients BP has been 70's/40's throughout the day. Patient is A/Ox4 on arrival. Patient does appear to be tired. States she feels \"more tired then normal.\" Patient has hx. Of end stage liver failure. States patient had between 5-6 Liters of fluids removed on Wednesday. Patient was also recently admitted to [facility] following a fall. Patient denies any other problems or concerns. RR equal and unlabored. RR and SPO2 WNL. HR regular and WNL.

## 2020-08-24 NOTE — CARE COORDINATION
SPOKE WITH DAUGHTER MAYLIN AND LET HER KNOW MANAGEMENT [address] [facility]. SHE WILL CALL [facility] WITH TIME [address] [facility]. OTHER DAUGHTER MELISSA IS COMING IN TOMORROW FROM [place].

## 2020-08-24 NOTE — FLOWSHEET NOTE
Pt back from paracentisis/drain. LLQ drain dressing intact. Vitals are stable. Pt denies any pain. Dr Jeffry Courtney was at bedside. Albumin started as ordered. Lunch tray warmed up and patient eating. Will monitor pt.  Electronically signed by Joycelyn Wray RN on 8/24/2020 at 2:46 PM

## 2020-08-24 NOTE — PROGRESS NOTES
Physician Progress Note    8/24/2020   3:29 PM    Name:  Izzy Lopez  MRN:    [id number]      Day: 1     Admit Date: 8/23/2020  3:14 AM  PCP: Diana Murray MD    Code Status:  Full Code    Subjective:     She reports some left-sided abdominal discomfort where her drain is located. She otherwise denies complaints.     Current Facility-Administered Medications   Medication Dose Route Frequency Provider Last Rate Last Dose    carbidopa-levodopa (SINEMET)  MG per tablet 2 tablet  2 tablet Oral TID Everlena Mattes, DO   2 tablet at 08/24/20 1508    pregabalin (LYRICA) capsule 75 mg  75 mg Oral BID Everlena Mattes, DO   75 mg at 08/24/20 1431    albumin human 25 % IV solution 25 g  25 g Intravenous Once Derick Shea  mL/hr at 08/24/20 1431 25 g at 08/24/20 1431    sodium chloride flush 0.9 % injection 10 mL  10 mL Intravenous 2 times per day Doron Chowdary MD   10 mL at 08/24/20 1030    sodium chloride flush 0.9 % injection 10 mL  10 mL Intravenous PRN Doron Chowdary MD        acetaminophen (TYLENOL) tablet 650 mg  650 mg Oral Q6H PRN Doron Chowdary MD        Or   [address] [place] [address] acetaminophen (TYLENOL) suppository 650 mg  650 mg Rectal Q6H PRN Doron Chowdary MD        polyethylene glycol (GLYCOLAX) packet 17 g  17 g Oral Daily PRN Doron Chowdary MD        promethazine (PHENERGAN) tablet 12.5 mg  12.5 mg Oral Q6H PRN Doron Chowdary MD        Or    ondansetron [facility]) injection 4 mg  4 mg Intravenous Q6H PRN Doron Chowdary MD   4 mg at 08/23/20 2042    insulin lispro (HUMALOG) injection vial 0-12 Units  0-12 Units Subcutaneous TID WC Doron Chowdary MD        insulin lispro (HUMALOG) injection vial 0-6 Units  0-6 Units Subcutaneous Nightly Doron Chowdary MD        glucose (GLUTOSE) 40 % oral gel 15 g  15 g Oral PRN Doron Chowdary MD        dextrose 50 % IV solution  12.5 g Intravenous PRN Doron Chowdary MD        glucagon (rDNA) injection 1 mg  1 mg Intramuscular PRN Doron Chowdary MD       [address] [place] [address] dextrose 5 % solution  100 mL/hr Intravenous PRN Megan Milligan MD        midodrine (PROAMATINE) tablet 5 mg  5 mg Oral TID WC Megan Milligan MD   5 mg at 20 1030    lactulose (CHRONULAC) 10 GM/15ML solution 10 g  10 g Oral TID Megan Milligan MD   10 g at 20 1431    levothyroxine (SYNTHROID) tablet 25 mcg  25 mcg Oral Daily Megan Milligan MD   25 mcg at 20 0519    sertraline (ZOLOFT) tablet 50 mg  50 mg Oral Daily Megan Milligan MD   50 mg at 20 1030    sucralfate (CARAFATE) tablet 1 g  1 g Oral 4 times per day Megan Milligan MD   1 g at 20 0519    topiramate (TOPAMAX) tablet 50 mg  50 mg Oral BID Megan Milligan MD   50 mg at 20 1030    DOPamine (INTROPIN) 400 mg in dextrose 5 % 250 mL infusion  5 mcg/kg/min Intravenous Continuous Marisol Ferrer DO 13.8 mL/hr at 20 0635 5 mcg/kg/min at 20 1695       Physical Examination:      Vitals:  /63   Pulse 73   Temp 98.2 °F (36.8 °C)   Resp 16   Ht 5' 1\" (1.549 m)   Wt 150 lb 8 oz (68.3 kg)   SpO2 98%   BMI 28.44 kg/m²   Temp (24hrs), Av °F (36.7 °C), Min:97.9 °F (36.6 °C), Max:98.2 °F (36.8 °C)      General appearance: Encephalopathic. Slow to answer questions. Oriented to person and place but not time. Significant asterixis on exam.  Lungs: clear to auscultation bilaterally, normal effort  Heart: regular rate and rhythm, no murmur  Abdomen: Ascitic fluid drain present on the left side.   Soft, nontender, nondistended, bowel sounds present, no masses  Extremities: no edema, redness, tenderness in the calves  Skin: no gross lesions, rashes    Data:     Labs:  Recent Labs     20  0330 20  1150 20  1449   WBC 7.7  --  10.3   HGB 8.4* 7.3* 8.6*   PLT 85*  --  120*     Recent Labs     20  0021 20  1449   * 125*   K 4.9 4.7   CL 94* 96   CO2 19* 20   BUN 37* 37*   CREATININE 1.61* 1.56*   GLUCOSE 150* 139*     Recent Labs     20  0330   AST 19   ALT <5   BILITOT 0. 5   ALKPHOS 71       Assessment and Plan:        82-year-old female with history of hypertension, type 2 diabetes, liver cirrhosis suspect be due to [place] complicated by portal hypertension requiring frequent paracenteses presented on 8/23 with worsening weakness and fatigue. 1.  Decompensated hepatic cirrhosis complicated by ENMA. No obvious infection-negative blood cultures and urine not suggestive of infection.  -On midodrine, albumin. Nephrology consulted and they have added dopamine. Will consider addition of octreotide.  -Continue lactulose titrated to 3 BM per day  -S/p ascitic fluid drain by IR on 8/24  -Renally dose Lyrica  -Goals of care discussion with family. Previously, this patient had made clear to me that she would not want resuscitation. Currently she is listed as a full code however she is encephalopathic and therefore cannot make decisions for self. Family will come in later and discuss possible transition to comfort care. 2.  Hepatic encephalopathy: Lactulose/rifaximin    3. Hyponatremia secondary to liver cirrhosis: Nephrology has ordered vaptan    4.   Parkinson's disease: Continue home Sinemet    Elevated troponin due to supply/demand mismatch in setting of decreased renal clearance  Depression  Thrombocytopenia due to cirrhosis    Diet: DIET CARB CONTROL;  Ppx: SCDs  Full Code    Dispo: pending goals of care discussion      Electronically signed by Amna Richmond DO on 8/24/2020 at 3:29 PM

## 2020-08-24 NOTE — PLAN OF CARE
Notified by RN of increasing lethargy. On my assessment, patient is able to move all extremities but is too tired to speak. Significant asterixis on exam.     RN notes that patient has not had any BMs. I feel this is most likely worsening hepatic encephalopathy. RN instructed to continue to give rectal lactulose. Will also order CT head to r/o obvious bleed or CVA and ABG to r/o hypercapnia. Advanced care planning performed with family. No shocks or CPR. [facility] for intubation with hope that it would be temporary. [facility] for resuscitative medications. Another daughter will be in town tomorrow to further discuss goals of care- if patient continues to decompensate they will strongly consider comfort care. 30min advanced care planning.      Bhargav Arrington,

## 2020-08-24 NOTE — FLOWSHEET NOTE
1735- Sugar 187 and BP 90/33. Unable to give anything PO due to lethargy. Dr Melvin Covarrubias notfied and he will be down to see patient. 56- Dr Aki Perez at bedside, evaluated patient, and spoke with daughter. He wants to wait on CT until after lactulose given and patient starts to move bowels. NS bolus up as ordered. Transport cancelled for now. 1815- Lactulose given rectally. Pt had small amount of liquid stool out. Patient repositioned in bed. Daughter at bedside. Will notify doctor and CT.      Electronically signed by Micki Jennings RN on 8/24/2020 at 6:33 PM

## 2020-08-24 NOTE — SEDATION DOCUMENTATION
NO SEDATION     1118- Pt arrived to xray room 6. Procedure explained. Consent verified. VSS. Support offered to pt.      18- Dr. Kortney Theodore here speaking with pt and assessing abdomen. 1202- Time out performed. Abdomen site cleansed generously with chloroprep and draped area in a sterile fashion. 1209- 2% lidocaine given at site, see eMar.     1211- All equipment used from the [address] [place] [address] [employer]) kit. 18 g access needle placed with US guidance, J tipped guidewire 0.038 by 70 cm placed through needle. 1212-Access needle removed and 10-12 Fr dilators used. 1215- 16.5 Fr valved introducer placed over guidewire. 1216- Tunnel created for Aspira 15.5 Fr drain and catheter tunneled to insertion site. (LOT [id number], Exp 2022-07-04)     1217-Wire removed and catheter inserted into sheath. Sheath removed. Adapter from kit connected to catheter and attached to [facility]. Abdominal fluid appears slightly turbid     1221- 4.0 Vicryl suture used to close insertion site per Dr Kortney Theodore. 1244 After 2 liters of fluid removed vitals remain stable. Patient resting quietly for short intervals with her eyes partially closed. Respirations even and unlabored. Skin pale warm and dry. No distress noted. 1257 Vitals remain stable after 3300 ml of fluid removed. Patient resting quietly. 1308 Vitals remain stable after 4300 ml of fluid removed. 1318 Vitals stable after 5300 ml of fluid removed. No distress noted. 1330 Drain stopped,  total of  6100 ml removed. 1332 Aspira tubing coiled on abdomen, drain sponge, guaze and Tegaderm dressing applied. No bleeding noted. 1336 Orders received for albumin 25 Gm.     1346 Hygiene provided and patient awaiting transport back to room via cart. Report called to [facility] 1 WT RN. Family to have a meeting tomorrow to see if patient is going home or to hospice center.   Informed [facility] that the paper work for when to drain and how often is being sent to [facility] [employer], CNP to be filled out today and that she should receive it by tomorrow to complete the plan of care.

## 2020-08-24 NOTE — CONSULTS
Palliative Care Consult Note  Patient: Marty Ewing  Gender: female  YOB: 1935  Unit/Bed: N736/D014-85  CodeStatus: Full Code  Inpatient Treatment Team: Treatment Team: Attending Provider: Tatyana Tidwell DO; Consulting Physician: Garrison Austin DO; Consulting Physician: Eloisa Rosenberg MD; Patient Care Tech: Paramjit Alfaro; Registered Nurse: Joycelyn Wray RN; : Rolando Davies RN; : KARLY Robertson; Utilization Reviewer: Cheryl Rees RN; Tech: Verlean Square; Registered Nurse: Saji Roche RN  Admit Date:  8/23/2020    Chief Complaint: abdominal discomfort    History of Presenting Illness:      Marty Ewing is a 82 y.o. female on hospital day 1 with a history of ENMA. PMH is significant for recurrent ascites secondary to liver cirrhosis, diabetes, hypertension, hyperlipidemia, and fibromyalgia. Patient seen and examined at bedside for goals of care discussion, code status discussion, and family support. She presented to the ER with complaints of generalized weakness and fatigue. She is scheduled to have Aspira drain placed later today. Patient observed laying in bed. Her daughter was at her bedside. She tells me that she is doing okay today. She tells me that she is having some abdominal discomfort secondary to her ascites. She has been having intermittent nausea. She has not been taking her PRN medication for her nausea. Patient encouraged to do so. Her abdomen is distended. She tells me that she refused her lactulose yesterday due to her nausea. Her daughter is requesting to have her lactulose discontinued and have her started on Xifaxan. Advised her that this is a discussion for her to have with her GI doctor. Patient's daughter, Duncan Rosen who is at bedside states that she doesn't want to discuss code status today.  She tells me that she spoke with Case Management and would like to have an in-person family meeting with her other two sisters to discuss code status and goals of care for her mother. Review of Systems:       Review of Systems   Constitutional: Negative for activity change, appetite change, fatigue and unexpected weight change. HENT: Negative. Respiratory: Negative for shortness of breath and wheezing. Cardiovascular: Negative for leg swelling. Gastrointestinal: Positive for abdominal distention. Negative for constipation, diarrhea and nausea. Skin: Negative for pallor. Neurological: Negative for dizziness and weakness. Psychiatric/Behavioral: Negative for confusion. Physical Examination:       BP (!) 113/54   Pulse 77   Temp 97.9 °F (36.6 °C) (Oral)   Resp 16   Ht 5' 1\" (1.549 m)   Wt 150 lb 8 oz (68.3 kg)   SpO2 99%   BMI 28.44 kg/m²    Physical Exam  Constitutional:       General: She is awake. She is not in acute distress. Appearance: She is overweight. She is ill-appearing. Interventions: Nasal cannula in place. HENT:      Head: Normocephalic and atraumatic. Right Ear: External ear normal.      Left Ear: External ear normal.      Nose: No congestion or rhinorrhea. Mouth/Throat:      Mouth: Mucous membranes are moist.   Eyes:      General: Scleral icterus present. Neck:      Musculoskeletal: Normal range of motion and neck supple. Cardiovascular:      Rate and Rhythm: Normal rate. Pulmonary:      Effort: No respiratory distress. Breath sounds: No wheezing. Abdominal:      General: There is distension. Musculoskeletal:         General: No swelling or tenderness. Skin:     General: Skin is warm and dry. Capillary Refill: Capillary refill takes less than 2 seconds. Coloration: Skin is jaundiced. Neurological:      Mental Status: She is alert. Mental status is at baseline. Motor: Weakness present. Gait: Gait abnormal.   Psychiatric:         Behavior: Behavior is cooperative. Thought Content:  Thought content normal.         Allergies: Allergies   Allergen Reactions    Adhesive Tape     Amoxicillin-Pot Clavulanate     Bactrim [Sulfamethoxazole-Trimethoprim]     Clarithromycin      Other reaction(s):  Other: See Comments  tachycardia    Other     Sulfa Antibiotics        Medications:      Current Facility-Administered Medications   Medication Dose Route Frequency Provider Last Rate Last Dose    conivaptan (VAPRISOL) 20 mg in dextrose 5% bolus  20 mg Intravenous Once Carisa Man, DO        carbidopa-levodopa (SINEMET)  MG per tablet 2 tablet  2 tablet Oral TID Deliciachemo Jennings, DO        sodium chloride flush 0.9 % injection 10 mL  10 mL Intravenous 2 times per day Kimberly Gama MD   10 mL at 08/23/20 2045    sodium chloride flush 0.9 % injection 10 mL  10 mL Intravenous PRN Kimberly Gama MD        acetaminophen (TYLENOL) tablet 650 mg  650 mg Oral Q6H PRN Kimberly Gama MD        Or   [facility] acetaminophen (TYLENOL) suppository 650 mg  650 mg Rectal Q6H PRN Kimberly Gama MD        polyethylene glycol (GLYCOLAX) packet 17 g  17 g Oral Daily PRN Kimberly Gama MD        promethazine (PHENERGAN) tablet 12.5 mg  12.5 mg Oral Q6H PRN Kimberly Gama MD        Or    ondansetron [facility]) injection 4 mg  4 mg Intravenous Q6H PRN Kimberly Gama MD   4 mg at 08/23/20 2042    insulin lispro (HUMALOG) injection vial 0-12 Units  0-12 Units Subcutaneous TID  Kimberly Gama MD        insulin lispro (HUMALOG) injection vial 0-6 Units  0-6 Units Subcutaneous Nightly Kimberly Gama MD        glucose (GLUTOSE) 40 % oral gel 15 g  15 g Oral PRN Kimberly Gama MD        dextrose 50 % IV solution  12.5 g Intravenous PRN Kimberly Gama MD        glucagon (rDNA) injection 1 mg  1 mg Intramuscular PRN Kimberly Gama MD        dextrose 5 % solution  100 mL/hr Intravenous PRN Kimberly Gama MD        midodrine (PROAMATINE) tablet 5 mg  5 mg Oral TID  Kimberly Gama MD   5 mg at 08/23/20 1758    lactulose (CHRONULAC) 10 GM/15ML solution 10 g 10 g Oral TID Flakita Dias MD   10 g at 08/23/20 1224    levothyroxine (SYNTHROID) tablet 25 mcg  25 mcg Oral Daily Flakita Dias MD   25 mcg at 08/24/20 0519    sertraline (ZOLOFT) tablet 50 mg  50 mg Oral Daily Flakita Dias MD   50 mg at 08/23/20 0816    sucralfate (CARAFATE) tablet 1 g  1 g Oral 4 times per day Flakita Dias MD   1 g at 08/24/20 0519    topiramate (TOPAMAX) tablet 50 mg  50 mg Oral BID Flakita Dias MD        DOPamine (INTROPIN) 400 mg in dextrose 5 % 250 mL infusion  5 mcg/kg/min Intravenous Continuous Gricelda Max DO 13.8 mL/hr at 08/24/20 0635 5 mcg/kg/min at 08/24/20 1723       History: PMHx:  Past Medical History:   Diagnosis Date    Asthma     Fibromyalgia     Hemorrhoid     Hyperlipidemia     Hypertension     Parkinson's disease ([facility] [address]     Type II or unspecified type diabetes mellitus without mention of complication, not stated as uncontrolled        PSHx:  Past Surgical History:   Procedure Laterality Date    CHOLECYSTECTOMY      EYE SURGERY      HEMORRHOID SURGERY      HERNIA REPAIR  2/20/2002    ventral hernia    HERNIA REPAIR      LIH repair    HYSTERECTOMY      JOINT REPLACEMENT Right     partial left, full right    LAMINECTOMY      PARACENTESIS Left 03/12/2020    5010cc ascites removed by Dr Griselda Meeks [phone]    PARACENTESIS Left 06/10/2020    3,850 cc removed per Dr Pacheco Kerns Left 07/08/2020    total 5450 ml removed per Dr Pacheco Kerns Left 07/23/2020    Dr Pepito Gregg 6993 off    PARACENTESIS Left 08/05/2020    2880 ml removed by Dr. Pacheco Kerns Left 08/19/2020    5650ml removed by Dr Griselda Meeks [address] [facility] N/A 2/21/2020    EGD WITH BANDING performed by Jaimie Burkett MD at [place] [address] Hx:  Social History     Socioeconomic History    Marital status:       Spouse name: None    Number of children: None    Years of education: 15    Highest education level: High school graduate   Occupational History    Occupation: Homemaker   Social Needs    Financial resource strain: Somewhat hard    Food insecurity     Worry: Never true     Inability: Never true    Transportation needs     Medical: Yes     Non-medical: Yes   Tobacco Use    Smoking status: Never Smoker    Smokeless tobacco: Never Used   Substance and Sexual Activity    Alcohol use: No    Drug use: No    Sexual activity: Not Currently     Partners: Male   Lifestyle    Physical activity     Days per week: 0 days     Minutes per session: 0 min    Stress: Only a little   Relationships    Social connections     Talks on phone: More than three times a week     Gets together: More than three times a week     Attends Yazidism service: 1 to 4 times per year     Active member of club or organization: No     Attends meetings of clubs or organizations: Never     Relationship status:      Intimate partner violence     Fear of current or ex partner: No     Emotionally abused: No     Physically abused: No     Forced sexual activity: No   Other Topics Concern    None   Social History Narrative     Lives With: Alone in [address] [place] [address] she is a     She has a daughter named -----and who lives in [facility] and that is quite helpful and comes to see her almost daily    Type of Home: Apartment  at [address] [facility] in [place] (7th floor)    Home Layout: One level    Home Access: Elevator, Level entry  (Level entry, elevator to 7th floor)    Bathroom Shower/Tub: Tub/Shower unit    Home Equipment: 4 wheeled walker    ADL Assistance: Independent    Homemaking Assistance: Independent    Ambulation Assistance: Independent(Rollator)    Transfer Assistance: Independent    Active : No    Patient's  Info: daughter    IADL Comments: Daughter helps with driving and groceries PRN    Additional Comments: Reports recent fall- \"my legs gave out\"           Family Hx:  Family History   Problem Relation Age of Onset    High Blood Pressure Neg Hx        LABS: Reviewed     CBC:  Lab Results   Component Value Date    WBC 7.7 08/23/2020    RBC 2.64 08/23/2020    RBC 4.01 04/06/2012    HGB 7.3 08/23/2020    HCT 23.0 08/23/2020    MCV 95.3 08/23/2020    MCH 31.9 08/23/2020    MCHC 33.5 08/23/2020    RDW 16.4 08/23/2020    PLT 85 08/23/2020    MPV 10.9 02/07/2013     CBC with Differential:   Lab Results   Component Value Date    WBC 7.7 08/23/2020    RBC 2.64 08/23/2020    RBC 4.01 04/06/2012    HGB 7.3 08/23/2020    HCT 23.0 08/23/2020    PLT 85 08/23/2020    MCV 95.3 08/23/2020    MCH 31.9 08/23/2020    MCHC 33.5 08/23/2020    RDW 16.4 08/23/2020    LYMPHOPCT 13.3 08/23/2020    MONOPCT 5.8 08/23/2020    EOSPCT 0.2 04/02/2012    BASOPCT 0.3 08/23/2020    MONOSABS 0.4 08/23/2020    LYMPHSABS 1.0 08/23/2020    EOSABS 0.3 08/23/2020    BASOSABS 0.0 08/23/2020     CMP:    Lab Results   Component Value Date     08/24/2020    K 4.9 08/24/2020    K 3.7 08/08/2020    CL 94 08/24/2020    CO2 19 08/24/2020    BUN 37 08/24/2020    CREATININE 1.61 08/24/2020    GFRAA 36.9 08/24/2020    LABGLOM 30.5 08/24/2020    GLUCOSE 150 08/24/2020    GLUCOSE 248 04/06/2012    PROT 5.1 08/23/2020    LABALBU 2.6 08/23/2020    LABALBU 4.5 03/30/2012    CALCIUM 8.1 08/24/2020    BILITOT 0.5 08/23/2020    ALKPHOS 71 08/23/2020    AST 19 08/23/2020    ALT <5 08/23/2020     BMP:    Lab Results   Component Value Date     08/24/2020    K 4.9 08/24/2020    K 3.7 08/08/2020    CL 94 08/24/2020    CO2 19 08/24/2020    BUN 37 08/24/2020    LABALBU 2.6 08/23/2020    LABALBU 4.5 03/30/2012    CREATININE 1.61 08/24/2020    CALCIUM 8.1 08/24/2020    GFRAA 36.9 08/24/2020    LABGLOM 30.5 08/24/2020    GLUCOSE 150 08/24/2020    GLUCOSE 248 04/06/2012     TSH:   Lab Results   Component Value Date    TSH 4.770 08/23/2020     Vitamin B12 and Folate: No components found for: FOLIC,  Z80  Urinalysis:   Lab Results   Component Value Date    NITRU Negative 08/23/2020    WBCUA 6-10 01/29/2020    BACTERIA Negative 01/29/2020    RBCUA 0-2 01/29/2020    BLOODU Negative 08/23/2020    SPECGRAV 1.017 08/23/2020    GLUCOSEU Negative 08/23/2020    GLUCOSEU NEG 04/01/2012           FUNCTIONAL ADL´S:     Independent: [X] Eating, [X] Dressing, [   ] Transferring, [   ] Shahid Rodolfo, [   ] Bathing, [X] Continence  Dependent   : [  ] Eating, [   ] Dressing, [   ] Transferring, [   ] Shahid [place], [   ] Ann Plank, [   ] Continence  W. assistant : [  ] Eating, [   ] Dressing, [X] Transferring, [X] Toileting, [X] Bathing, [   ] Continence    Radiology: Reviewed      Xr Chest Portable    Result Date: 8/23/2020  EXAMINATION: CHEST PORTABLE VIEW  CLINICAL HISTORY: Short of breath COMPARISONS: July 22, 2020  FINDINGS: Single  views of the chest is submitted. The cardiac silhouette is of normal size configuration. . Pulmonary vascular attenuated. Right sided trachea. Atelectasis, patchy infiltrate right lower lobe. Some blunting of the right costophrenic angle suggesting trace right pleural effusion. No Pneumothoraces. ATELECTASIS, PATCHY INFILTRATE RIGHT LOWER LOBE. TRACE RIGHT PLEURAL EFFUSION     Assessment and plan:    1. Ascites and hypotension secondary to decompensated liver cirrhosis  -Scheduled to have Aspira drain placed today  -Currently on Dopamine and midodrine  -Ammonia is 75    2. ENMA with electrolytes abnormality  -Sodium 126  -BUN 38  -Adequate fluid intake    -Palliative Care Encounter  - [place] is 82years old with multiple comorbidities which includes recurrent ascites secondary to liver cirrhosis, diabetes, hypertension, hyperlipidemia, and fibromyalgia.  Considering her comorbidities, she is appropriate for Palliative Care Services.  -Patient's daughter wants to have a family meeting tomorrow with her two sister's and the care team to discuss goals of care and code status.  -Palliative care will continue to follow patient.    -Advance Care Planning  Discussed goals of care with patient. Explained in extensive detail nuances between full code, DNR CCA and DNR CC. Patient has made the decision to be FULL CODE      -Goals of Care Discussion:  Disease process and goals of treatment were discussed in basic terms. [place]'s goal is to optimize available comfort care measures to decrease hospitalization and prevent ER visits, manage symptomology with future Palliative Care Services at this time. We discussed the palliative care philosophy in light of those goals. We discussed all care options contingent on treatment response and QOL. Much active listening, presence, and emotional support were given.    -Final goals of care discussion will be made tomorrow with patient and family        Electronically signed by SHARI Roger CNP on 8/24/2020 at 9:57 AM

## 2020-08-24 NOTE — SEDATION DOCUMENTATION
Dr. Tammi Thorne on the phone with patient's Ashley Shutter, regarding placement of a peritoneal drain. Patient's POA, Belkis, agreeable to drain being placed. Telephone consent obtained.

## 2020-08-24 NOTE — FLOWSHEET NOTE
Pt refused lactulose due to nausea and \"just not feeling good\". Pt medicated with PRN zofran. Pt turned multiple times during shift. Pt's BP frequently checked through out shift. Pt's call light within reach and bed alarm active.

## 2020-08-25 NOTE — CARE COORDINATION
The LSW called and talked to the patient's daughter, J Luis Gillette. J Luis Gillette states they were considering [facility], but that is a hospice in [facility]. J Luis Gillette states the family would like to meet with [place] [address] in the morning. The LSW called the referral to Olive Rivera at [place] [address]. Electronically signed by KARLY Gaona on 8/25/20 at 12:27 PM EDT    Per Olive Rivera, [place] [address], hospice will meet with the patient's daughters 8/26/2020 at 9 am.  The LSW talked to Layla Way,  on 1W, and she has approved the patient's daughters coming in for the hospice meeting.  Electronically signed by KARLY Gaona on 8/25/20 at 12:48 PM EDT

## 2020-08-25 NOTE — PROGRESS NOTES
DR Asif Emerson CALLED TO REQUEST NEPHROLOGY BE REMOVED FROM TREATMENT TEAM Electronically signed by Aura lAeman on 8/25/2020 at 4:19 PM

## 2020-08-25 NOTE — PROGRESS NOTES
Nephrology Progress Note    Assessment:  ENMA intravascular cirrhosis   DM type-2      Plan: hospice to intervene per daughter    Patient Active Problem List:     DM2 (diabetes mellitus, type 2) (HCC)     Fibromyalgia     Abdominal pain     Bursitis, hip     Bilateral hand pain     Actinic keratosis     Chondrocalcinosis of hand     Carotid stenosis     Chondrodermatitis nodularis chronica helicis     Chronic frontal sinusitis     Controlled type 2 diabetes mellitus without complication, without long-term current use of insulin (HCC)     Depression     DISH (diffuse idiopathic skeletal hyperostosis)     Fitting and adjustment of orthopedic device     Closed fracture of part of upper end of humerus     Fx phalanx, foot-closed     History of trabeculectomy     Hyperlipidemia     Hypertension     Intracranial arachnoid cyst     Joint replaced by other means     Lens replaced by other means     Liver cirrhosis ([address]     Lumbago without sciatica     Lumbar spondylosis     LVH (left ventricular hypertrophy)     Generalized muscle ache     Osteoarthritis of shoulder     Osteoarthritis of multiple joints     Parkinson's disease (HCC)     POAG (primary open-angle glaucoma)     Diabetic polyneuropathy (HCC)     Pseudogout of hand     Rotator cuff (capsule) sprain     S/P left unicompartmental knee replacement     Knee joint replacement by other means     Sclerosis of the skin     Seborrheic keratosis     Secondary osteoarthritis of multiple sites     Shoulder impingement     Sprain and strain of unspecified site of shoulder and upper arm     Synovitis of hand     Thrombocytopenia (HCC)     Vitamin D deficiency     Chest pain     RANDOLPH (dyspnea on exertion)     Bilateral carotid artery stenosis     Iron deficiency anemia due to chronic blood loss     Hemorrhoids     Secondary esophageal varices without bleeding (HCC)     Cough variant asthma     Morbidly obese (HCC)     Decompensated hepatic cirrhosis ([address]     ENMA (acute kidney injury) ([facility] [address]     Pancytopenia ([facility] [address]     Weakness generalized     Malnutrition ([facility] [address]     Cirrhosis of liver with ascites (HCC)     Ataxic gait     History of myocardial infarction     Rectal prolapse     Stasis dermatitis     Gait abnormality     Moderate malnutrition (HCC)     Impaired mobility     Abnormality of gait and mobility dt exacerbation of Parkinson's disease status post ascites and liver failure acute rehab admission 7/25/2020     Glaucoma     Diabetic gastropathy (HCC)     [place] (hard of hearing)     Acute renal failure (ARF) (HCC)     History of lumbar laminectomy     Obesity (BMI 30-39. 9)     Portal hypertension (HCC)     Hypothyroidism     Muscle spasm of both lower legs     Muscle rigidity     Hypotension     Hyponatremia      Subjective:  Admit Date: 8/23/2020    Interval History: weak  No pains    Medications:  Scheduled Meds:   carbidopa-levodopa  2 tablet Oral TID    [Held by provider] pregabalin  75 mg Oral BID    pantoprazole  40 mg Oral QAM AC    rifaximin  550 mg Oral BID    lactulose  30 g Oral TID    lactulose  30 g Rectal TID    sodium chloride flush  10 mL Intravenous 2 times per day    insulin lispro  0-12 Units Subcutaneous TID WC    insulin lispro  0-6 Units Subcutaneous Nightly    midodrine  5 mg Oral TID WC    lactulose  10 g Oral TID    levothyroxine  25 mcg Oral Daily    sertraline  50 mg Oral Daily    topiramate  50 mg Oral BID     Continuous Infusions:   dextrose      DOPamine 5 mcg/kg/min (08/25/20 0118)       CBC:   Recent Labs     08/24/20  1449 08/24/20 2023 08/25/20  0602   WBC 10.3  --  9.4   HGB 8.6* 7.5* 8.7*   *  --  122*     CMP:    Recent Labs     08/24/20  1449 08/24/20 2023 08/24/20 2033 08/25/20  0602   *  125*  --  128* 129*   K 4.6  4.7  --  4.3 4.6   CL 96  96  --  99 100   CO2 20 20  --  19* 17*   BUN 38*  37*  --  35* 32*   CREATININE 1.57*  1.56* 1.5* 1.40* 1.16*   GLUCOSE 140*  139*  --  168* 137*   CALCIUM 8.2*  8.2* --  8.2* 8.6   LABGLOM 31.4*  31.7* 33* 35.9* 44.6*     Troponin:   Recent Labs     08/23/20  1150   TROPONINI 0.021*     BNP: No results for input(s): BNP in the last 72 hours. INR:   Recent Labs     08/23/20  0330   INR 1.5     Lipids:   Recent Labs     08/23/20  0330   LIPASE 60     Liver:   Recent Labs     08/25/20  0602   AST 20   ALT <5   ALKPHOS 66   PROT 5.8*   LABALBU 3.2*   BILITOT 0.7     Iron:  No results for input(s): IRONS, FERRITIN in the last 72 hours. Invalid input(s): LABIRONS  Urinalysis: No results for input(s): UA in the last 72 hours.     Objective:  Vitals: BP (!) 104/47   Pulse 80   Temp 99.3 °F (37.4 °C) (Oral)   Resp 16   Ht 5' 1\" (1.549 m)   Wt 141 lb 9.6 oz (64.2 kg)   SpO2 98%   BMI 26.76 kg/m²    Wt Readings from Last 3 Encounters:   08/25/20 141 lb 9.6 oz (64.2 kg)   08/17/20 149 lb (67.6 kg)   08/03/20 145 lb 15.1 oz (66.2 kg)      24HR INTAKE/OUTPUT:      Intake/Output Summary (Last 24 hours) at 8/25/2020 1045  Last data filed at 8/25/2020 0448  Gross per 24 hour   Intake 310 ml   Output 1250 ml   Net -940 ml       General: alert, in no apparent distress  HEENT: normocephalic, atraumatic, anicteric  Neck: supple, no mass  Lungs: non-labored respirations, clear to auscultation bilaterally  Heart: regular rate and rhythm, no murmurs or rubs  Abdomen: soft, non-tender, non-distended  Ext: no cyanosis, no peripheral edema  Neuro: alert and oriented, no gross abnormalities  Psych: normal mood and affect  Skin: no rash      Electronically signed by Adore Jonas MD

## 2020-08-25 NOTE — FLOWSHEET NOTE
HS assessment completed. Pt arousable. Vitals stable on RA.  2120-Rectal lactulose given. Approx 2200-Pt to CT. Falls precautions in place. Team to continue to monitor closely.  Electronically signed by Harsh Medrano RN on 8/25/2020 at 4:54 AM

## 2020-08-25 NOTE — DISCHARGE INSTR - COC
Continuity of Care Form    Patient Name: Raffy Mejia   :  1935  MRN:  [id number]    Admit date:  2020  Discharge date:  2020    Code Status Order: DNR-CCA   Advance Directives:   Advance Care Flowsheet Documentation     Date/Time Healthcare Directive Type of Healthcare Directive Copy in [address] Agent's Name Healthcare Agent's Phone Number    20 1200  Yes, patient has an advance directive for healthcare treatment  Durable power of  for health care;Living will  Yes, copy in chart copies in [address] [place] [address]  [phone]    20 0607  Yes, patient has an advance directive for healthcare treatment  Durable power of  for health care  No, copy requested from medical records  --  --  --          Admitting Physician: Jocelyn Chavez MD  PCP: Masood Hanley MD    Discharging Nurse:   [address] Unit/Room#: O257/S094-55  Discharging Unit Phone Number: [phone]    Emergency Contact:   Extended Emergency Contact Information  Primary Emergency Contact: Jazmine Machado [address] Phone: [phone]  Mobile Phone: [phone]  Relation: Child  Secondary Emergency Contact: Ted Rohan [address] Phone: [phone]  Mobile Phone: [phone]  Relation: Child    Past Surgical History:  Past Surgical History:   Procedure Laterality Date    CHOLECYSTECTOMY      CT RETROPERITONEAL [facility] DRAIN Left 2020    Dr German Velarde Aspira 15.5 [place] [employer] 6100 ml off.      EYE SURGERY      HEMORRHOID SURGERY      HERNIA REPAIR  2002    ventral hernia    HERNIA REPAIR      LIH repair    HYSTERECTOMY      IR PERC CATH PLEURAL DRAIN W/IMAG  2020    IR PERC CATH PLEURAL DRAIN W/IMAG 2020 [facility] SPECIAL PROCEDURE    JOINT REPLACEMENT Right     partial left, full right    LAMINECTOMY      PARACENTESIS Left 2020    5010cc ascites removed by Dr Ja Hayes [phone]    PARACENTESIS Left 06/10/2020    3,850 cc removed per Dr Floyd Frances Left 07/08/2020    total 5450 ml removed per Dr Floyd Frances Left 07/23/2020    Dr Nancy Berrios 3213 off    PARACENTESIS Left 08/05/2020    2880 ml removed by Dr. Floyd Frances Left 08/19/2020    5650ml removed by Dr Ja Hayes [phone]   Jodi Poag N/A 2/21/2020    EGD WITH BANDING performed by Christofer Sanz MD at [facility]       Immunization History:   Immunization History   Administered Date(s) Administered    Influenza A ([id number]),all Formulations 08/24/2016    Influenza Vaccine, unspecified formulation 10/16/2012, 11/08/2018    Influenza, High Dose (Fluzone 65 yrs and older) 11/15/2013, 10/14/2014, 09/15/2016, 10/09/2017    Influenza, Janusz Majestic, IM, (6 mo and older Fluzone, Flulaval, Fluarix and 3 yrs and older Afluria) 11/08/2018    Influenza, Triv, inactivated, subunit, adjuvanted, IM (Fluad 65 yrs and older) 01/29/2020    Pneumococcal Conjugate 13-valent (Jenny Heaps) 02/04/2016    Tdap (Boostrix, Adacel) 12/06/2012       Active Problems:  Patient Active Problem List   Diagnosis Code    DM2 (diabetes mellitus, type 2) ([facility] [address] E11.9   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    Carotid stenosis I65.29    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    Chronic frontal sinusitis J32.1    Controlled type 2 diabetes mellitus without complication, without long-term current use of insulin (HCC) E11.9    Depression F32.9    DISH (diffuse idiopathic skeletal hyperostosis) M48.10    Fitting and adjustment of orthopedic device Z46.89    Closed fracture of part of upper end of humerus S42.209A    Fx phalanx, foot-closed S92.919A    History of trabeculectomy Z98.890    Hyperlipidemia E78.5    Hypertension I10    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429A    S/P left unicompartmental knee replacement Z96.652    Knee joint replacement by other means Z96.659    Sclerosis of the skin L98.9    Seborrheic keratosis L82.1    Secondary osteoarthritis of multiple sites M15.3    Shoulder impingement M75.40    Sprain and strain of unspecified site of shoulder and upper arm [id number]    Synovitis of hand M65.9    Thrombocytopenia (HCC) D69.6    Vitamin D deficiency E55.9    Chest pain R07.9    RANDOLPH (dyspnea on exertion) R06.09    Bilateral carotid artery stenosis I65.23    Iron deficiency anemia due to chronic blood loss D50.0    Hemorrhoids K64.9    Secondary esophageal varices without bleeding (HCC) I85.10    Cough variant asthma J45.991    Morbidly obese (HCC) E66.01    Decompensated hepatic cirrhosis (HCC) K72.90    ENMA (acute kidney injury) (HCC) N17.9    Pancytopenia (HCC) D61.818    Weakness generalized R53.1    Malnutrition (HCC) E46    Cirrhosis of liver with ascites (HCC) K74.60, R18.8    Ataxic gait R26.0    History of myocardial infarction I25.2    Rectal prolapse K62.3    Stasis dermatitis I87.2    Gait abnormality R26.9    Moderate malnutrition (HCC) E44.0    Impaired mobility Z74.09    Abnormality of gait and mobility dt exacerbation of Parkinson's disease status post ascites and liver failure acute rehab admission 7/25/2020 R26.9    Glaucoma H40.9    Diabetic gastropathy ([facility] [address]

## 2020-08-25 NOTE — PROGRESS NOTES
Nephrology Progress Note  Note for 8/24/20 daughter in room  Assessment:  ENMA related to Cirrhosis intravasular issue  Cirrhosis cryptogenic  DM type-2        Plan: family to make deciosion about palliatice care vs aggressive treatment leaning to mothers wishes   Start Harlen Abhijit 550mg bid to prevent skin breakdown    Patient Active Problem List:     DM2 (diabetes mellitus, type 2) ([address]     Fibromyalgia     Abdominal pain     Bursitis, hip     Bilateral hand pain     Actinic keratosis     Chondrocalcinosis of hand     Carotid stenosis     Chondrodermatitis nodularis chronica helicis     Chronic frontal sinusitis     Controlled type 2 diabetes mellitus without complication, without long-term current use of insulin (HCC)     Depression     DISH (diffuse idiopathic skeletal hyperostosis)     Fitting and adjustment of orthopedic device     Closed fracture of part of upper end of humerus     Fx phalanx, foot-closed     History of trabeculectomy     Hyperlipidemia     Hypertension     Intracranial arachnoid cyst     Joint replaced by other means     Lens replaced by other means     Liver cirrhosis ([address]     Lumbago without sciatica     Lumbar spondylosis     LVH (left ventricular hypertrophy)     Generalized muscle ache     Osteoarthritis of shoulder     Osteoarthritis of multiple joints     Parkinson's disease (HCC)     POAG (primary open-angle glaucoma)     Diabetic polyneuropathy (HCC)     Pseudogout of hand     Rotator cuff (capsule) sprain     S/P left unicompartmental knee replacement     Knee joint replacement by other means     Sclerosis of the skin     Seborrheic keratosis     Secondary osteoarthritis of multiple sites     Shoulder impingement     Sprain and strain of unspecified site of shoulder and upper arm     Synovitis of hand     Thrombocytopenia (HCC)     Vitamin D deficiency     Chest pain     RANDOLPH (dyspnea on exertion)     Bilateral carotid artery stenosis     Iron deficiency anemia due to chronic blood loss     Hemorrhoids     Secondary esophageal varices without bleeding (HCC)     Cough variant asthma     Morbidly obese (HCC)     Decompensated hepatic cirrhosis (HCC)     ENMA (acute kidney injury) ([facility] [address]     Pancytopenia (HCC)     Weakness generalized     Malnutrition ([facility] [address]     Cirrhosis of liver with ascites (HCC)     Ataxic gait     History of myocardial infarction     Rectal prolapse     Stasis dermatitis     Gait abnormality     Moderate malnutrition (HCC)     Impaired mobility     Abnormality of gait and mobility dt exacerbation of Parkinson's disease status post ascites and liver failure acute rehab admission 7/25/2020     Glaucoma     Diabetic gastropathy (HCC)     [place] (hard of hearing)     Acute renal failure (ARF) (HCC)     History of lumbar laminectomy     Obesity (BMI 30-39. 9)     Portal hypertension (HCC)     Hypothyroidism     Muscle spasm of both lower legs     Muscle rigidity     Hypotension     Hyponatremia      Subjective:  Admit Date: 8/23/2020    Interval History: weak no N&V afebrile    Medications:  Scheduled Meds:   carbidopa-levodopa  2 tablet Oral TID    [Held by provider] pregabalin  75 mg Oral BID    pantoprazole  40 mg Oral QAM AC    rifaximin  550 mg Oral BID    lactulose  30 g Oral TID    lactulose  30 g Rectal TID    sodium chloride flush  10 mL Intravenous 2 times per day    insulin lispro  0-12 Units Subcutaneous TID WC    insulin lispro  0-6 Units Subcutaneous Nightly    midodrine  5 mg Oral TID WC    lactulose  10 g Oral TID    levothyroxine  25 mcg Oral Daily    sertraline  50 mg Oral Daily    topiramate  50 mg Oral BID     Continuous Infusions:   dextrose      DOPamine 5 mcg/kg/min (08/25/20 0118)       CBC:   Recent Labs     08/24/20  1449 08/24/20 2023 08/25/20  0602   WBC 10.3  --  9.4   HGB 8.6* 7.5* 8.7*   *  --  122*     CMP:    Recent Labs     08/24/20  1449 08/24/20 2023 08/24/20 2033 08/25/20  0602   *  125*  --  128* 129*   K 4.6  4.7 --  4.3 4.6   CL 96  96  --  99 100   CO2 20  20  --  19* 17*   BUN 38*  37*  --  35* 32*   CREATININE 1.57*  1.56* 1.5* 1.40* 1.16*   GLUCOSE 140*  139*  --  168* 137*   CALCIUM 8.2*  8.2*  --  8.2* 8.6   LABGLOM 31.4*  31.7* 33* 35.9* 44.6*     Troponin:   Recent Labs     08/23/20  1150   TROPONINI 0.021*     BNP: No results for input(s): BNP in the last 72 hours. INR:   Recent Labs     08/23/20  0330   INR 1.5     Lipids:   Recent Labs     08/23/20  0330   LIPASE 60     Liver:   Recent Labs     08/25/20  0602   AST 20   ALT <5   ALKPHOS 66   PROT 5.8*   LABALBU 3.2*   BILITOT 0.7     Iron:  No results for input(s): IRONS, FERRITIN in the last 72 hours. Invalid input(s): LABIRONS  Urinalysis: No results for input(s): UA in the last 72 hours.     Objective:  Vitals: BP (!) 108/47   Pulse 72   Temp 97.9 °F (36.6 °C) (Oral)   Resp 16   Ht 5' 1\" (1.549 m)   Wt 141 lb 9.6 oz (64.2 kg)   SpO2 98%   BMI 26.76 kg/m²    Wt Readings from Last 3 Encounters:   08/25/20 141 lb 9.6 oz (64.2 kg)   08/17/20 149 lb (67.6 kg)   08/03/20 145 lb 15.1 oz (66.2 kg)      24HR INTAKE/OUTPUT:      Intake/Output Summary (Last 24 hours) at 8/25/2020 0813  Last data filed at 8/25/2020 0448  Gross per 24 hour   Intake 480 ml   Output 1250 ml   Net -770 ml       General: alert, in no apparent distress  HEENT: normocephalic, atraumatic, anicteric  Neck: supple, no mass  Lungs: non-labored respirations, clear to auscultation bilaterally  Heart: regular rate and rhythm, no murmurs or rubs  Abdomen: soft, non-tender, non-distended  Ext: no cyanosis, no peripheral edema  Neuro: alert and oriented, no gross abnormalities  Psych: normal mood and affect  Skin: no rash      Electronically signed by Matthew Jung MD

## 2020-08-25 NOTE — PROGRESS NOTES
Physician Progress Note    8/25/2020   1:56 PM    Name:  Izzy Lopez  MRN:    [id number]      Day: 2     Admit Date: 8/23/2020  3:14 AM  PCP: Diana Murray MD    Code Status:  DNR-CCA    Subjective:     Patient denies complaints. She thinks she is at home.     Current Facility-Administered Medications   Medication Dose Route Frequency Provider Last Rate Last Dose    cefTRIAXone (ROCEPHIN) 1 g IVPB in 50 mL D5W minibag  1 g Intravenous Q24H Everlena Mattes,  mL/hr at 08/25/20 1338 1 g at 08/25/20 1338    carbidopa-levodopa (SINEMET)  MG per tablet 2 tablet  2 tablet Oral TID Everlena Mattes, DO   2 tablet at 08/25/20 0953    [Held by provider] pregabalin (LYRICA) capsule 75 mg  75 mg Oral BID Everlena Mattes, DO   75 mg at 08/24/20 2133    pantoprazole (PROTONIX) tablet 40 mg  40 mg Oral QAM AC Verónica Hidalgo Bescak, DO   40 mg at 08/25/20 2750    rifaximin (XIFAXAN) tablet 550 mg  550 mg Oral BID Verónica Hidalgo Bescak, DO   550 mg at 08/25/20 2516    lactulose (CHRONULAC) 10 GM/15ML solution 30 g  30 g Oral TID Everlena Mattes, DO   30 g at 08/25/20 0954    lactulose (CHRONULAC) 10 GM/15ML solution 30 g  30 g Rectal TID Everlena Mattes, DO   30 g at 08/24/20 2126    sodium chloride flush 0.9 % injection 10 mL  10 mL Intravenous 2 times per day Doron Chowdary MD   10 mL at 08/25/20 0955    sodium chloride flush 0.9 % injection 10 mL  10 mL Intravenous PRN Doron Chowdary MD        acetaminophen (TYLENOL) tablet 650 mg  650 mg Oral Q6H PRN MD Anil Gordillo acetaminophen (TYLENOL) suppository 650 mg  650 mg Rectal Q6H PRN Doron Chowdary MD        polyethylene glycol (GLYCOLAX) packet 17 g  17 g Oral Daily PRN Doron Chowdary MD        promethazine (PHENERGAN) tablet 12.5 mg  12.5 mg Oral Q6H PRN Doron Chowdary MD        Or    ondansetron [facility]) injection 4 mg  4 mg Intravenous Q6H PRN Doron Chowdary MD   4 mg at 08/23/20 2042    insulin lispro (HUMALOG) injection vial 0-12 Units 0-12 Units Subcutaneous TID  Mamie Horta MD        insulin lispro (HUMALOG) injection vial 0-6 Units  0-6 Units Subcutaneous Nightly Mamie Horta MD   Stopped at 20 2146    glucose (GLUTOSE) 40 % oral gel 15 g  15 g Oral PRN Mamie Horta MD        dextrose 50 % IV solution  12.5 g Intravenous PRN Mamie Horta MD        glucagon (rDNA) injection 1 mg  1 mg Intramuscular PRN Mamie Horta MD        dextrose 5 % solution  100 mL/hr Intravenous PRN Mamie Horta MD        midodrine (PROAMATINE) tablet 5 mg  5 mg Oral TID  Mamie Horta MD   5 mg at 20 1344    lactulose (CHRONULAC) 10 GM/15ML solution 10 g  10 g Oral TID Mamie Horta MD   10 g at 20 0955    levothyroxine (SYNTHROID) tablet 25 mcg  25 mcg Oral Daily Mamie Horta MD   25 mcg at 20 7238    sertraline (ZOLOFT) tablet 50 mg  50 mg Oral Daily Mamie Horta MD   50 mg at 20 0953    topiramate (TOPAMAX) tablet 50 mg  50 mg Oral BID Mamie Horta MD   50 mg at 20 8007    DOPamine (INTROPIN) 400 mg in dextrose 5 % 250 mL infusion  5 mcg/kg/min Intravenous Continuous [place] Picking, DO 13.8 mL/hr at 20 0118 5 mcg/kg/min at 20 0118       Physical Examination:      Vitals:  BP (!) 104/47   Pulse 74   Temp 99.3 °F (37.4 °C) (Oral)   Resp 16   Ht 5' 1\" (1.549 m)   Wt 141 lb 9.6 oz (64.2 kg)   SpO2 98%   BMI 26.76 kg/m²   Temp (24hrs), Av.6 °F (37 °C), Min:97.9 °F (36.6 °C), Max:99.3 °F (37.4 °C)      General appearance: Encephalopathic. Slow to answer questions. Oriented to person but not place or time. Still with significant asterixis on exam.  Lungs: clear to auscultation bilaterally, normal effort  Heart: regular rate and rhythm, no murmur  Abdomen: Ascitic fluid drain present on the left side. Mild TTP on the left.   Otherwise soft, nontender, nondistended, bowel sounds present, no masses  Extremities: no edema, redness, tenderness in the calves  Skin: no gross lesions, harish    Data:     Labs:  Recent Labs     08/24/20  1449 08/24/20 2023 08/25/20  0602   WBC 10.3  --  9.4   HGB 8.6* 7.5* 8.7*   *  --  122*     Recent Labs     08/24/20 2033 08/25/20  0602   * 129*   K 4.3 4.6   CL 99 100   CO2 19* 17*   BUN 35* 32*   CREATININE 1.40* 1.16*   GLUCOSE 168* 137*     Recent Labs     08/24/20  1449 08/25/20  0602   AST 19 20   ALT <5 <5   BILITOT 0.7 0.7   ALKPHOS 65 66       Assessment and Plan:        82-year-old female with history of hypertension, type 2 diabetes, liver cirrhosis suspect be due to [facility] complicated by portal hypertension requiring frequent paracenteses presented on 8/23 with worsening weakness and fatigue. 1.  Decompensated hepatic cirrhosis complicated by ENMA. ENMA improving. No obvious infection-negative blood cultures and urine not suggestive of infection.  -On midodrine. s/p albumin. On dopamine per nephrology  -Continue lactulose titrated to 3 BM per day  -S/p ascitic fluid drain by IR on 8/24  -No ascitic fluid studies done on paracentesis. Will treat with empiric ceftriaxone  -30 minutes advance care planning performed this morning. Will make patient DNR CCA. Continue current treatment but no more escalation of care. Family to meet with hospice in the morning    2. Hepatic encephalopathy: Lactulose/rifaximin    3. Hyponatremia secondary to liver cirrhosis: s/p vaptan    4.   Parkinson's disease: Continue home Sinemet    Elevated troponin due to supply/demand mismatch in setting of decreased renal clearance  Depression  Thrombocytopenia due to cirrhosis    Diet: DIET CARB CONTROL; Low Sodium (2 GM)  Ppx: SCDs  DNR-CCA    Dispo: pending goals of care discussion      Electronically signed by Kristina Evangelista DO on 8/25/2020 at 1:56 PM

## 2020-08-25 NOTE — PROGRESS NOTES
Palliative Care Progress Note  Patient: Luz Maria Newton  Gender: female  YOB: 1935  Unit/Bed: Q265/I890-53  CodeStatus: DNR-CCA  Inpatient Treatment Team: Treatment Team: Attending Provider: Rebel Castellano DO; Consulting Physician: Ike Liz MD; Utilization Reviewer: Ernesto Peralta, RN; Registered Nurse: Stacey Pelayo, RN; : KARLY Le; : Angy Encinas RN; Patient Care Tech: Dolores Redding  Admit Date:  8/23/2020    Chief Complaint: abdominal discomfort    History of Presenting Illness:      Luz Maria Newton is a 82 y.o. female on hospital day 2 with a history of ENMA. PMH is significant for recurrent ascites secondary to liver cirrhosis, diabetes, hypertension, hyperlipidemia, and fibromyalgia. Patient seen and examined at bedside for goals of care discussion, code status discussion, and family support. Patient observed laying in bed. Her daughter was at her bedside. Patient has had a significant decline since yesterday. She is lethargic. She is oriented to person only. She believes that she is at home. She does complain of tenderness to her abdomen. She had Aspira drain placed yesterday. Patient's daughter, Abilio Bueno who is at bedside states that the family is open to a Hospice consultation. She understands that Palliative Care and Hospice will not provide service simultaneously. Patient's daughter given freedom of choice of hospice company. She will discuss with her family and notify case management. They have a family member who works for a [employer]. she believes that they will chose the hospice service where their family member is currently working. Review of Systems:       Review of Systems   Constitutional: Positive for fatigue. Negative for activity change, appetite change and unexpected weight change. HENT: Negative. Eyes: Negative. Respiratory: Negative for shortness of breath and wheezing.     Cardiovascular: Negative for leg swelling. Gastrointestinal: Positive for abdominal distention. Negative for constipation, diarrhea and nausea. Endocrine: Negative. Genitourinary: Negative. Musculoskeletal: Positive for gait problem. Skin: Negative for pallor. Neurological: Positive for weakness. Negative for dizziness. Psychiatric/Behavioral: Positive for confusion. Physical Examination:       BP (!) 104/47   Pulse 80   Temp 99.3 °F (37.4 °C) (Oral)   Resp 16   Ht 5' 1\" (1.549 m)   Wt 141 lb 9.6 oz (64.2 kg)   SpO2 98%   BMI 26.76 kg/m²    Physical Exam  Constitutional:       General: She is awake. She is not in acute distress. Appearance: She is overweight. She is ill-appearing. Interventions: Nasal cannula in place. HENT:      Head: Normocephalic and atraumatic. Right Ear: External ear normal.      Left Ear: External ear normal.      Nose: No congestion or rhinorrhea. Mouth/Throat:      Mouth: Mucous membranes are moist.   Eyes:      General: Scleral icterus present. Neck:      Musculoskeletal: Normal range of motion and neck supple. Cardiovascular:      Rate and Rhythm: Normal rate. Pulmonary:      Effort: No respiratory distress. Breath sounds: No wheezing. Abdominal:      General: There is distension. Musculoskeletal:         General: No swelling or tenderness. Skin:     General: Skin is warm and dry. Capillary Refill: Capillary refill takes less than 2 seconds. Coloration: Skin is jaundiced. Neurological:      Mental Status: She is alert. Mental status is at baseline. Motor: Weakness present. Gait: Gait abnormal.   Psychiatric:         Behavior: Behavior is cooperative. Thought Content: Thought content normal.         Allergies: Allergies   Allergen Reactions    Adhesive Tape     Amoxicillin-Pot Clavulanate     Bactrim [Sulfamethoxazole-Trimethoprim]     Clarithromycin      Other reaction(s):  Other: See Comments  tachycardia    Other     Sulfa Antibiotics        Medications:      Current Facility-Administered Medications   Medication Dose Route Frequency Provider Last Rate Last Dose    cefTRIAXone (ROCEPHIN) 1 g IVPB in 50 mL D5W minibag  1 g Intravenous Q24H Merl Gottron, DO        carbidopa-levodopa (SINEMET)  MG per tablet 2 tablet  2 tablet Oral TID Merl Gottron, DO   2 tablet at 08/25/20 0953    [Held by provider] pregabalin (LYRICA) capsule 75 mg  75 mg Oral BID Merl Gottron, DO   75 mg at 08/24/20 2133    pantoprazole (PROTONIX) tablet 40 mg  40 mg Oral QAM AC Mercedes Hopping Bescak, DO   40 mg at 08/25/20 4051    rifaximin (XIFAXAN) tablet 550 mg  550 mg Oral BID Mercedes Hopping Bescak, DO   550 mg at 08/25/20 0952    lactulose (CHRONULAC) 10 GM/15ML solution 30 g  30 g Oral TID Merl Gottron, DO   30 g at 08/25/20 0954    lactulose (CHRONULAC) 10 GM/15ML solution 30 g  30 g Rectal TID Merl Gottron, DO   30 g at 08/24/20 2126    sodium chloride flush 0.9 % injection 10 mL  10 mL Intravenous 2 times per day Megan Milligan MD   10 mL at 08/25/20 0955    sodium chloride flush 0.9 % injection 10 mL  10 mL Intravenous PRN Megan Milligan MD        acetaminophen (TYLENOL) tablet 650 mg  650 mg Oral Q6H PRN Megan Milligan MD        Or   Atha Van acetaminophen (TYLENOL) suppository 650 mg  650 mg Rectal Q6H PRN Megan Milligan MD        polyethylene glycol (GLYCOLAX) packet 17 g  17 g Oral Daily PRN Megan Milligan MD        promethazine (PHENERGAN) tablet 12.5 mg  12.5 mg Oral Q6H PRN Megan Milligan MD        Or    ondansetron [facility]) injection 4 mg  4 mg Intravenous Q6H PRN Megan Milligan MD   4 mg at 08/23/20 2042    insulin lispro (HUMALOG) injection vial 0-12 Units  0-12 Units Subcutaneous TID  Megan Milligan MD        insulin lispro (HUMALOG) injection vial 0-6 Units  0-6 Units Subcutaneous Nightly Megan Mililgan MD   Stopped at 08/24/20 2369    glucose (GLUTOSE) 40 % oral gel 15 g  15 g Oral PRN Megan Milligan MD  dextrose 50 % IV solution  12.5 g Intravenous PRN German Toribio MD        glucagon (rDNA) injection 1 mg  1 mg Intramuscular PRN German Toribio MD        dextrose 5 % solution  100 mL/hr Intravenous PRN German Toribio MD        midodrine (PROAMATINE) tablet 5 mg  5 mg Oral TID WC German Toribio MD   5 mg at 08/25/20 0954    lactulose (CHRONULAC) 10 GM/15ML solution 10 g  10 g Oral TID German Toribio MD   10 g at 08/25/20 0955    levothyroxine (SYNTHROID) tablet 25 mcg  25 mcg Oral Daily German Toribio MD   25 mcg at 08/25/20 4428    sertraline (ZOLOFT) tablet 50 mg  50 mg Oral Daily German Toribio MD   50 mg at 08/25/20 0953    topiramate (TOPAMAX) tablet 50 mg  50 mg Oral BID German Toribio MD   50 mg at 08/25/20 0952    DOPamine (INTROPIN) 400 mg in dextrose 5 % 250 mL infusion  5 mcg/kg/min Intravenous Continuous Nay Nine, DO 13.8 mL/hr at 08/25/20 0118 5 mcg/kg/min at 08/25/20 0118       History: PMHx:  Past Medical History:   Diagnosis Date    Asthma     Fibromyalgia     Hemorrhoid     Hyperlipidemia     Hypertension     Parkinson's disease ([facility] [address]     Type II or unspecified type diabetes mellitus without mention of complication, not stated as uncontrolled        PSHx:  Past Surgical History:   Procedure Laterality Date    CHOLECYSTECTOMY      CT RETROPERITONEAL PERC DRAIN Left 08/24/2020    Dr Kulkarni Read Aspira 15.5 Amharic LLQ 6100 ml off.      EYE SURGERY      HEMORRHOID SURGERY      HERNIA REPAIR  2/20/2002    ventral hernia    HERNIA REPAIR      LIH repair    HYSTERECTOMY      IR PERC CATH PLEURAL DRAIN W/IMAG  8/24/2020    IR PERC CATH PLEURAL DRAIN W/IMAG 8/24/2020 [facility] SPECIAL PROCEDURE    JOINT REPLACEMENT Right     partial left, full right    LAMINECTOMY      PARACENTESIS Left 03/12/2020    5010cc [facility] [phone]    PARACENTESIS Left 06/10/2020    3,850 cc removed per Dr Leon Arita Left 07/08/2020    total 5450 ml removed per Dr Micki Bernstein Left 07/23/2020    Dr Carlie Britt 4683 off    PARACENTESIS Left 08/05/2020    2880 ml removed by Dr. Micki Bernstein Left 08/19/2020    5650ml removed by Dr Evelina Nichols [phone]   Shamar Carrilloronn N/A 2/21/2020    [facility] WITH B[facility] Hx:  Social History     Socioeconomic History    Marital status:      Spouse name: None    Number of children: None    Years of education: 15    Highest education level: High school graduate   Occupational History    Occupation: Homemaker   Social Needs    Financial resource strain: Somewhat hard    Food insecurity     Worry: Never true     Inability: Never true    Transportation needs     Medical: Yes     Non-medical: Yes   Tobacco Use    Smoking status: Never Smoker    Smokeless tobacco: Never Used   Substance and Sexual Activity    Alcohol use: No    Drug use: No    Sexual activity: Not Currently     Partners: Male   Lifestyle    Physical activity     Days per week: 0 days     Minutes per session: 0 min    Stress: Only a little   Relationships    Social connections     Talks on phone: More than three times a week     Gets together: More than three times a week     Attends Congregational service: 1 to 4 times per year     Active member of club or organization: No     Attends meetings of clubs or organizations: Never     Relationship status:      Intimate partner violence     Fear of current or ex partner: No     Emotionally abused: No     Physically abused: No     Forced sexual activity: No   Other Topics Concern    None   Social History Narrative     Lives With: Alone in [address] she is a     She has a daughter named -----and who lives in [facility] and that is quite helpful and comes to see her almost daily    Type of Home: Apartment  at [address] in [place] (7th floor)    Home Layout: One level    Home Access: Elevator, Level entry  (Level entry, elevator to 7th floor)    Bathroom Shower/Tub: Tub/Shower unit    Home Equipment: 4 wheeled walker    ADL Assistance: [address]: Independent    Ambulation Assistance: Independent(Rollator)    Transfer Assistance: Independent    Active : No    Patient's  Info: daughter    IADL Comments: Daughter helps with driving and groceries PRN    Additional Comments: Reports recent fall- \"my legs gave out\"           Family Hx:  Family History   Problem Relation Age of Onset    High Blood Pressure Neg Hx        LABS: Reviewed     CBC:  Lab Results   Component Value Date    WBC 9.4 08/25/2020    RBC 2.79 08/25/2020    RBC 4.01 04/06/2012    HGB 8.7 08/25/2020    HCT 27.2 08/25/2020    MCV 97.4 08/25/2020    MCH 31.3 08/25/2020    MCHC 32.1 08/25/2020    RDW 17.2 08/25/2020     08/25/2020    MPV 10.9 02/07/2013     CBC with Differential:   Lab Results   Component Value Date    WBC 9.4 08/25/2020    RBC 2.79 08/25/2020    RBC 4.01 04/06/2012    HGB 8.7 08/25/2020    HCT 27.2 08/25/2020     08/25/2020    MCV 97.4 08/25/2020    MCH 31.3 08/25/2020    MCHC 32.1 08/25/2020    RDW 17.2 08/25/2020    LYMPHOPCT 14.9 08/25/2020    MONOPCT 5.1 08/25/2020    EOSPCT 0.2 04/02/2012    BASOPCT 0.7 08/25/2020    MONOSABS 0.5 08/25/2020    LYMPHSABS 1.4 08/25/2020    EOSABS 0.4 08/25/2020    BASOSABS 0.1 08/25/2020     CMP:    Lab Results   Component Value Date     08/25/2020    K 4.6 08/25/2020    K 3.7 08/08/2020     08/25/2020    CO2 17 08/25/2020    BUN 32 08/25/2020    CREATININE 1.16 08/25/2020    GFRAA 53.9 08/25/2020    LABGLOM 44.6 08/25/2020    GLUCOSE 137 08/25/2020    GLUCOSE 248 04/06/2012    PROT 5.8 08/25/2020    LABALBU 3.2 08/25/2020    LABALBU 4.5 03/30/2012    CALCIUM 8.6 08/25/2020    BILITOT 0.7 08/25/2020    ALKPHOS 66 08/25/2020    AST 20 08/25/2020    ALT <5 08/25/2020     BMP:    Lab Results   Component Value Date     08/25/2020 electrolytes abnormality  -Sodium 129  -BUN 32  -Adequate fluid intake    3. Altered mentation in the setting of worsening hepatic encephalopathy  -Patient is oriented to person only  -Family requesting hospice consultation    -Chastity Galvan is 82years old with multiple comorbidities which includes recurrent ascites secondary to liver cirrhosis, diabetes, hypertension, hyperlipidemia, and fibromyalgia. Considering her comorbidities, she is appropriate for Palliative Care Services.  -Patient's condition has continued to decline. Hospice consultation placed. Family to decide which hospice company they would like to meet with.    -Advance Care Planning  Discussed goals of care with patient. Explained in extensive detail nuances between full code, DNR CCA and DNR CC. Patient has made the decision to be DNR CCA      -Goals of Care Discussion:  Disease process and goals of treatment were discussed in basic terms. [place]'s goal is to optimize available comfort care measures to decrease hospitalization and prevent ER visits, manage symptomology with future Palliative Care Services at this time. We discussed the palliative care philosophy in light of those goals. We discussed all care options contingent on treatment response and QOL. Much active listening, presence, and emotional support were given.    -Final goals of care discussion will be made tomorrow with patient and family      Electronically signed by SHARI Urbina CNP on 8/25/2020 at 11:31 AM

## 2020-08-26 PROBLEM — K76.82 HEPATIC ENCEPHALOPATHY (HCC): Status: ACTIVE | Noted: 2020-01-01

## 2020-08-26 NOTE — PROGRESS NOTES
Letter faxed to [facility]  To [phone].   Vascular and Interventional Radiology   Toan Theodore M.D. [facility]      Chief Complaint:   Chief Complaint   Patient presents with    Hypotension        Subjective: Cody Salcedo is a 82 y.o. female post tunneled peritoneal drain placement. No complications. Sleeping comfortably in bed. Objective:   BP (!) 116/55   Pulse 81   Temp 97.7 °F (36.5 °C) (Oral)   Resp 18   Ht 5' 1\" (1.549 m)   Wt 135 lb (61.2 kg)   SpO2 99%   BMI 25.51 kg/m²     Physical:   not in acute distress    Normocephalic, without obvious abnormality, atraumatic    Neck supple. No adenopathy. Thyroid symmetric, normal size, and without nodularity    normal rate, regular rhythm and no murmurs    chest clear, no wheezing, rales, normal symmetric air entry    Left abdominal drain is under bandaging. No bleeding or leakage. Non tender, NBS. Lab:  Recent Labs     08/26/20  0628   WBC 11.2*   RBC 2.97*   HGB 9.5*   HCT 28.5*   MCV 95.8   MCH 32.0*   MCHC 33.4   RDW 16.4*          No results for input(s): INR, PROTIME in the last 72 hours. Recent Labs     08/24/20  1449 08/24/20  2023 08/24/20  2033 08/25/20  0602 08/26/20  0628   CREATININE 1.57*  1.56* 1.5* 1.40* 1.16* 1.15*       Assessment: Cody Salcedo is a 82 y.o. female post tunneled peritoneal drain placement without any events. Plan: Will follow and assess drain function. Toan Theodore M.D. [facility]  8/26/2020,  3:16 PM

## 2020-08-26 NOTE — CARE COORDINATION
Per [facility], the patient will discharge home at 4 pm with hospice services.  Electronically signed by KARLY Cadet on 8/26/20 at 2:57 PM EDT

## 2020-08-26 NOTE — FLOWSHEET NOTE
Pts pleurex drained for approx 2000ml of serosang colored fluid. Pt tolerated well. Pt for discharge today home with hospice.

## 2020-08-26 NOTE — PROGRESS NOTES
Spiritual Care Services     Summary of Visit:  Nurse contacted  because patient is going on hospice and wishes to be anointed.  reached out a  to ry to arrange anointing. Spiritual Assessment/Intervention/Outcomes:    Encounter Summary  Services provided to[de-identified] Patient  Referral/Consult From[de-identified] Nurse  Support System: Children, Family members  Continue Visiting: Yes  Complexity of Encounter: Moderate  Length of Encounter: 15 minutes  Advance Care Planning: Yes  Routine  Type: Initial  Assessment: Approachable        Sacraments  Sacrament of Sick-Anointing: Patient requested anointing     Advance Directives (For Healthcare)  Healthcare Directive: Yes, patient has an advance directive for healthcare treatment  Type of Healthcare Directive: Durable power of  for health care, Living will  Copy in Chart: Yes, copy in chart(copies in Epic Media Tab)  Chart Copy Status [de-identified] Current  Date Reviewed and Current[de-identified] 08/24/20  Healthcare Agent Appointed: Dwain Wu [facility] Agent's Phone Number: [phone]  If you are unable to speak for yourself, does your Healthcare Agent or Legal Spokesperson know your healthcare wishes?: Yes           Values / Beliefs  Do you have any ethnic, cultural, sacramental, or spiritual Yazdanism needs you would like us to be aware of while you are in the hospital?: No    Care Plan:        [id number] [address]   Electronically signed by Justina Gay on 8/26/20 at 10:40 AM EDT     To reach a  for emotional and spiritual support, place an [facility] consult request.   If a  is needed immediately, dial 0 and ask to page the on-call .

## 2020-08-26 NOTE — DISCHARGE SUMMARY
[facility] Medicine Discharge Summary    Raffy Mejia  :  1935  MRN:  [id number]    Admit date:  2020  Discharge date:  2020    Admitting Physician: Jocelyn Chavez MD  Primary Care Physician:  Masood Hanley MD    Discharge Diagnoses: Active Problems:    ENMA (acute kidney injury) ([address]    Cirrhosis of liver with ascites (HCC)    Hypotension    Hyponatremia    Hepatic encephalopathy (HCC)  Resolved Problems:    * No resolved hospital problems. *    Chief Complaint   Patient presents with    Hypotension       Condition: unchanged   Activity: no strenuous activity   Diet: no restrictions  Disposition: home with hospice  Functional Status: wheelchair bound    Significant Findings:     Labs Renal Latest Ref Rng & Units 2020   BUN 8 - 23 mg/dL 30(H) 32(H) 35(H) 38(H) 37(H)   Cr 0.50 - 0.90 mg/dL 1.15(H) 1.16(H) 1.40(H) 1.57(H) 1.56(H)   K 3.4 - 4.9 mEq/L 4.1 4.6 4.3 4.6 4.7   Na 135 - 144 mEq/L 132(L) 129(L) 128(L) 126(L) 125(L)         Hospital Course:   28-year-old female with history of hypertension, type 2 diabetes, liver cirrhosis suspect be due to [facility] complicated by portal hypertension requiring frequent paracenteses presented on  with worsening weakness and fatigue. She was found to have decompensated hepatic cirrhosis complicated by ENMA, hepatic encephalopathy, and hypervolemic hyponatremia. She was treated with Midrin, albumin, and dopamine per recommendation of nephrology. Her kidney function improved. Unfortunately, she remained encephalopathic and family ultimately decided to transition her to comfort care. On  she was discharged home with hospice. During her stay, she did have ascitic fluid drain placed by IR for comfort.        Exam on Discharge:   BP (!) 116/55   Pulse 81   Temp 97.7 °F (36.5 °C) (Oral)   Resp 18   Ht 5' 1\" (1.549 m)   Wt 135 lb (61.2 kg)   SpO2 99%   BMI 25.51 kg/m² General appearance: Encephalopathic. Slow to answer questions. Oriented to person but not place or time. Still with significant asterixis on exam.  Lungs: clear to auscultation bilaterally, normal effort  Heart: regular rate and rhythm, no murmur  Abdomen: Ascitic fluid drain present on the left side. Mild TTP on the left. Otherwise soft, nontender, nondistended, bowel sounds present, no masses  Extremities: no edema, redness, tenderness in the calves  Skin: no gross lesions, rashes    Discharge Medication List:   Gurjit Claudine, [address] [place] [address] Medication Instructions [facility]:[id number]    Printed on:08/26/20 1931   Medication Information                      albuterol sulfate HFA (PROAIR HFA) 108 (90 Base) MCG/ACT inhaler  Inhale 2 puffs into the lungs every 6 hours as needed for Wheezing             budesonide-formoterol (SYMBICORT) 160-4.5 MCG/ACT AERO  Inhale 2 puffs into the lungs 2 times daily             carbidopa-levodopa (SINEMET)  MG per tablet  Take 1 tablet by mouth 3 times daily             latanoprost (XALATAN) 0.005 % ophthalmic solution  Use 1 Drop in both eyes daily at bedtime. Misc. Devices (TRANSPORT CHAIR) MISC  1 Device by Does not apply route three times daily             Misc. Devices (TRANSPORT CHAIR) MISC  Patient is high risk fall and weakness. Misc. Devices [facility]) MISC  Dx: parkinson's,high risk fall             omeprazole (PRILOSEC) 10 MG delayed release capsule  Take 40 mg by mouth daily             sertraline (ZOLOFT) 50 MG tablet  Take 50 mg by mouth daily             sucralfate (CARAFATE) 1 GM tablet  TAKE 1 TABLET BY MOUTH FOUR TIMES DAILY             tiZANidine (ZANAFLEX) 4 MG tablet  TK 1 T PO BID             topiramate (TOPAMAX) 25 MG tablet  Take 50 mg by mouth 2 times daily             traMADol (ULTRAM) 50 MG tablet  Take 50 mg by mouth every 6 hours as needed for Pain.              traZODone (DESYREL) 150 MG tablet  Take 1 tablet by mouth nightly                 DC time 37 minutes    Signed:  Mc Cordero  8/26/2020, 3:18 PM

## 2020-08-26 NOTE — PROGRESS NOTES
[facility] referral complete, Patient is appropriate for [place] [address] services at patient's home (per families request). Consents and DNR-CC signed by Betty. Trevor Kenai William called for transport patient home by cot at 400pm today 8/26/020.

## 2020-08-26 NOTE — PROGRESS NOTES
Palliative Care Progress Note  Patient: Rosalina Verduzco  Gender: female  YOB: 1935  Unit/Bed: L142/R237-66  CodeStatus: DNR-CCA  Inpatient Treatment Team: Treatment Team: Attending Provider: Tika Iqbal DO; Consulting Physician: Nichelle Marie MD; Utilization Reviewer: Kuldeep Gamboa RN; Registered Nurse: Monica Peguero RN; : Khalif Monahan RN; : Kaylee Ravi, [employer]; Patient Care Tech: Anali Fuchs  Admit Date:  8/23/2020    Chief Complaint: abdominal discomfort    History of Presenting Illness:      Rosalina Verduzco is a 82 y.o. female on hospital day 3 with a history of ENMA. PMH is significant for recurrent ascites secondary to liver cirrhosis, diabetes, hypertension, hyperlipidemia, and fibromyalgia. Patient seen and examined at bedside for goals of care discussion, code status discussion, and family support. Patient observed laying in bed with her eyes closed. Her two daughters are at her bedside. She is lethargic. She is oriented to person only. Her speech is barely audible. Hospice met with patient and her family earlier this morning. She is scheduled to be discharged to home with [facility] once she is appropriate for discharge. Family understands that Palliative Care will now sign off. Review of Systems:       Review of Systems   Constitutional: Positive for fatigue. Negative for activity change, appetite change and unexpected weight change. HENT: Negative. Eyes: Negative. Respiratory: Negative for shortness of breath and wheezing. Cardiovascular: Negative for leg swelling. Gastrointestinal: Positive for abdominal distention. Negative for constipation, diarrhea and nausea. Endocrine: Negative. Genitourinary: Negative. Musculoskeletal: Positive for gait problem. Skin: Negative for pallor. Neurological: Positive for weakness. Negative for dizziness.    Psychiatric/Behavioral: Positive for Oral TID Rebel Learn, DO   2 tablet at 08/25/20 2228    [Held by provider] pregabalin (LYRICA) capsule 75 mg  75 mg Oral BID Rebel Learn, DO   75 mg at 08/24/20 2133    pantoprazole (PROTONIX) tablet 40 mg  40 mg Oral QAM AC Suanne Form Bescak, DO   40 mg at 08/25/20 7263    rifaximin (XIFAXAN) tablet 550 mg  550 mg Oral BID Ghanshyam Cutter, DO   550 mg at 08/25/20 2228    lactulose (CHRONULAC) 10 GM/15ML solution 30 g  30 g Oral TID Rebel Castellano, DO   30 g at 08/25/20 2228    lactulose (CHRONULAC) 10 GM/15ML solution 30 g  30 g Rectal TID Rebel Castellano, DO   30 g at 08/24/20 2126    sodium chloride flush 0.9 % injection 10 mL  10 mL Intravenous 2 times per day Arminda Mcneal MD   10 mL at 08/25/20 0955    sodium chloride flush 0.9 % injection 10 mL  10 mL Intravenous PRN Arminda Mcneal MD        acetaminophen (TYLENOL) tablet 650 mg  650 mg Oral Q6H PRN Arminda Mcneal MD        Or   Chris Remedies acetaminophen (TYLENOL) suppository 650 mg  650 mg Rectal Q6H PRN Arminda Mcneal MD        polyethylene glycol (GLYCOLAX) packet 17 g  17 g Oral Daily PRN Arminda Mcneal MD        promethazine (PHENERGAN) tablet 12.5 mg  12.5 mg Oral Q6H PRN Arminda Mcneal MD        Or    [facility] injection 4 mg  4 mg Intravenous Q6H PRN Arminda Mcneal MD   4 mg at 08/23/20 2042    insulin lispro (HUMALOG) injection vial 0-12 Units  0-12 Units Subcutaneous TID WC Arminda Mcneal MD        insulin lispro (HUMALOG) injection vial 0-6 Units  0-6 Units Subcutaneous Nightly Arminda Mcneal MD   Stopped at 08/24/20 2146    glucose (GLUTOSE) 40 % oral gel 15 g  15 g Oral PRN Arminda Mcneal MD        dextrose 50 % IV solution  12.5 g Intravenous PRN Arminda Mcneal MD        glucagon (rDNA) injection 1 mg  1 mg Intramuscular PRN Arminda Mcneal MD        dextrose 5 % solution  100 mL/hr Intravenous PRN Arminda Mcneal MD        midodrine (PROAMATINE) tablet 5 mg  5 mg Oral TID  Arminda Mcneal MD   5 mg at 08/25/20 1808    levothyroxine (SYNTHROID) tablet 25 mcg  25 mcg Oral Daily Lindsey Patterson MD   25 mcg at 08/25/20 0802    sertraline (ZOLOFT) tablet 50 mg  50 mg Oral Daily Lindsey Patterson MD   50 mg at 08/25/20 0953    topiramate (TOPAMAX) tablet 50 mg  50 mg Oral BID Lindsey Patterson MD   50 mg at 08/25/20 2229    DOPamine (INTROPIN) 400 mg in dextrose 5 % 250 mL infusion  5 mcg/kg/min Intravenous Continuous Adrian Nilo DO 13.8 mL/hr at 08/25/20 2229 5 mcg/kg/min at 08/25/20 2229       History: PMHx:  Past Medical History:   Diagnosis Date    Asthma     Fibromyalgia     Hemorrhoid     Hyperlipidemia     Hypertension     Parkinson's disease ([facility] [address]     Type II or unspecified type diabetes mellitus without mention of complication, not stated as uncontrolled        PSHx:  Past Surgical History:   Procedure Laterality Date    CHOLECYSTECTOMY      CT RETROPERITONEAL PERC DRAIN Left 08/24/2020    Dr Marixa Arguelles Aspira 15.5 Sinhala LLQ 6100 ml off.  EYE SURGERY      HEMORRHOID SURGERY      HERNIA REPAIR  2/20/2002    ventral hernia    HERNIA REPAIR      LIH repair    HYSTERECTOMY      IR PERC CATH PLEURAL DRAIN W/IMAG  8/24/2020    IR PERC CATH PLEURAL DRAIN W/IMAG 8/24/2020 [facility] PROCEDURE    JOINT REPLACEMENT Right     partial left, full right    LAMINECTOMY      PARACENTESIS Left 03/12/2020    5010cc ascites removed by Dr Rylan Lomax [phone]    PARACENTESIS Left 06/10/2020    3,850 cc removed per Dr Kyle Cartwright Left 07/08/2020    total 5450 ml removed per Dr Kyle Cartwright Left 07/23/2020    Dr Joao Sharp 1656 off    PARACENTESIS Left 08/05/2020    2880 ml removed by Dr. Kyle Cartwright Left 08/19/2020    5650ml removed by Dr Rylan Lomax [phone]   [address] N/A 2/21/2020    EGD WITH BANDING performed by Natasha Antonio MD at [place] [address] Hx:  Social History     Socioeconomic History    Marital status:       Spouse name: None    Number of children: None    Years of education: 15    Highest education level: High school graduate   Occupational History    Occupation: Homemaker   Social Needs    Financial resource strain: Somewhat hard    Food insecurity     Worry: Never true     Inability: Never true    Transportation needs     Medical: Yes     Non-medical: Yes   Tobacco Use    Smoking status: Never Smoker    Smokeless tobacco: Never Used   Substance and Sexual Activity    Alcohol use: No    Drug use: No    Sexual activity: Not Currently     Partners: Male   Lifestyle    Physical activity     Days per week: 0 days     Minutes per session: 0 min    Stress: Only a little   Relationships    Social connections     Talks on phone: More than three times a week     Gets together: More than three times a week     Attends Sikhism service: 1 to 4 times per year     Active member of club or organization: No     Attends meetings of clubs or organizations: Never     Relationship status:      Intimate partner violence     Fear of current or ex partner: No     Emotionally abused: No     Physically abused: No     Forced sexual activity: No   Other Topics Concern    None   Social History Narrative     Lives With: Alone in [address] [place] [address] she is a     She has a daughter named -----and who lives in [facility] and that is quite helpful and comes to see her almost daily    Type of Home: Apartment  at [address] in [place] (7th floor)    Home Layout: One level    Home Access: Elevator, Level entry  (Level entry, elevator to 7th floor)    Bathroom Shower/Tub: Tub/Shower unit    Home Equipment: 4 wheeled walker    ADL Assistance: Independent    Homemaking Assistance: Independent    Ambulation Assistance: Independent(Rollator)    Transfer Assistance: Independent    Active : No    Patient's  Info: daughter    IADL Comments: Daughter helps with driving and groceries PRN    Additional Comments: Reports recent fall- \"my legs gave out\"           Family Hx:  Family History   Problem Relation Age of Onset    High Blood Pressure Neg Hx        LABS: Reviewed     CBC:  Lab Results   Component Value Date    WBC 11.2 08/26/2020    RBC 2.97 08/26/2020    RBC 4.01 04/06/2012    HGB 9.5 08/26/2020    HCT 28.5 08/26/2020    MCV 95.8 08/26/2020    MCH 32.0 08/26/2020    MCHC 33.4 08/26/2020    RDW 16.4 08/26/2020     08/26/2020    MPV 10.9 02/07/2013     CBC with Differential:   Lab Results   Component Value Date    WBC 11.2 08/26/2020    RBC 2.97 08/26/2020    RBC 4.01 04/06/2012    HGB 9.5 08/26/2020    HCT 28.5 08/26/2020     08/26/2020    MCV 95.8 08/26/2020    MCH 32.0 08/26/2020    MCHC 33.4 08/26/2020    RDW 16.4 08/26/2020    LYMPHOPCT 16.6 08/26/2020    MONOPCT 5.7 08/26/2020    EOSPCT 0.2 04/02/2012    BASOPCT 0.7 08/26/2020    MONOSABS 0.6 08/26/2020    LYMPHSABS 1.9 08/26/2020    EOSABS 0.2 08/26/2020    BASOSABS 0.1 08/26/2020     CMP:    Lab Results   Component Value Date     08/26/2020    K 4.1 08/26/2020    K 3.7 08/08/2020     08/26/2020    CO2 15 08/26/2020    BUN 30 08/26/2020    CREATININE 1.15 08/26/2020    GFRAA 54.5 08/26/2020    LABGLOM 45.0 08/26/2020    GLUCOSE 144 08/26/2020    GLUCOSE 248 04/06/2012    PROT 5.8 08/26/2020    LABALBU 3.0 08/26/2020    LABALBU 4.5 03/30/2012    CALCIUM 8.8 08/26/2020    BILITOT 0.7 08/26/2020    ALKPHOS 64 08/26/2020    AST 20 08/26/2020    ALT <5 08/26/2020     BMP:    Lab Results   Component Value Date     08/26/2020    K 4.1 08/26/2020    K 3.7 08/08/2020     08/26/2020    CO2 15 08/26/2020    BUN 30 08/26/2020    LABALBU 3.0 08/26/2020    LABALBU 4.5 03/30/2012    CREATININE 1.15 08/26/2020    CALCIUM 8.8 08/26/2020    GFRAA 54.5 08/26/2020    LABGLOM 45.0 08/26/2020    GLUCOSE 144 08/26/2020    GLUCOSE 248 04/06/2012     TSH:   Lab Results   Component Value Date    TSH 4.770 08/23/2020     Vitamin B12 and Folate: No components found for: Considering her comorbidities, she is appropriate for Palliative Care Services.  -Patient's condition has continued to decline. Hospice consultation placed. Family to decide which hospice company they would like to meet with.  -Palliative Care will sign off. Patient will be discharged to home with Hospice service. -Advance Care Planning  Discussed goals of care with patient. Explained in extensive detail nuances between full code, DNR CCA and DNR CC. Patient has made the decision to be DNR CCA      -Goals of Care Discussion:  Disease process and goals of treatment were discussed in basic terms. [place]'s goal is to optimize available comfort care measures to decrease hospitalization and prevent ER visits, manage symptomology with future Palliative Care Services at this time. We discussed the palliative care philosophy in light of those goals. We discussed all care options contingent on treatment response and QOL. Much active listening, presence, and emotional support were given. -Patient will be discharged to home with [facility].   Electronically signed by SHARI Sanders CNP on 8/26/2020 at 10:52 AM

## 2020-08-26 NOTE — PROGRESS NOTES
Nutrition Assessment     Nutrition Assessment:  Nutrition assessment deferred at this time, noted pt to discharge home with hospice today at 4:00.     Electronically signed by Nathanael Herrera RD, LD on 8/26/20 at 1:06 PM EDT

## 2020-09-01 NOTE — TELEPHONE ENCOUNTER
From: Izzy Lopez  To: Diana Murray MD  Sent: 8/31/2020 3:59 PM EDT  Subject: Non-Urgent Medical Question    Dr Anthony Breath I just wanted to let you know mom passed today at 89.

## 2021-05-06 NOTE — PROGRESS NOTES
Plan:     1. Complete routine fasting labs + Glycohemoglobin.   2. Complete XR knee due to chronic pain of both knees.   3. Refilled Potassium chloride (KLOR-CON) 10 MEQ ER for Hypertension.  4. Administered Shingrix in office today. Schedule a nurse visit in 2 months for second administration.     Encouraged patient to make lifestyle and diet modifications for weight loss. Exercise regularly, at least 30 minutes daily.      Continue present management of Hypertension and monitor blood pressure at home. Adhere to a low sodium diet.     Patient will be notified with test results.     Follow up in 4-6 months.     Don't forget: you may access your results and other communications on [employer] Patient [email or web address]     [facility] AT [place] Respiratory Therapy Evaluation   Current Order:  Q6PRN ALBUTEROL      Home Regimen: PRN      Ordering Physician: DR Berry Stokes  Re-evaluation Date:  NA     Diagnosis: ENMA      Patient Status: Stable    The following MDI Criteria must be met in order to convert aerosol to MDI with spacer. If unable to meet, MDI will be converted to aerosol:  []  Patient able to demonstrate the ability to use MDI effectively  []  Patient alert and cooperative  []  Patient able to take deep breath with 5-10 second hold  []  Medication(s) available in this delivery method   []  Peak flow greater than or equal to 200 ml/min            Current Order Substituted To  (same drug, same frequency)   Aerosol to MDI [] Albuterol Sulfate 0.083% unit dose by aerosol Albuterol Sulfate MDI 2 puffs by inhalation with spacer    [] Levalbuterol 1.25 mg unit dose by aerosol Levalbuterol MDI 2 puffs by inhalation with spacer    [] Levalbuterol 0.63 mg unit dose by aerosol Levalbuterol MDI 2 puffs by inhalation with spacer    [] Ipratropium Bromide 0.02% unit dose by aerosol Ipratropium Bromide MDI 2 puffs by inhalation with spacer    [] Duoneb (Ipratropium + Albuterol) unit dose by aerosol Ipratropium MDI + Albuterol MDI 2 puffs by inhalation w/spacer   MDI to Aerosol [] Albuterol Sulfate MDI Albuterol Sulfate 0.083% unit dose by aerosol    [] Levalbuterol MDI 2 puffs by inhalation Levalbuterol 1.25 mg unit dose by aerosol    [] Ipratropium Bromide MDI by inhalation Ipratropium Bromide 0.02% unit dose by aerosol    [] Combivent (Ipratropium + Albuterol) MDI by inhalation Duoneb (Ipratropium + Albuterol) unit dose by aerosol   Treatment Assessment [Frequency/Schedule]:  Change frequency to: ______________NO CHANGES_____________per Protocol, P&T, MEC      Points 0 1 2 3 4   Pulmonary Status  Non-Smoker  [x]   Smoking history   < 20 pack years  []   Smoking history  ?  20 pack years  []   Pulmonary Disorder  (acute or chronic)  []   Severe or Chronic w/ Exacerbation  []     Surgical Status No [x]   Surgeries     General []   Surgery Lower []   Abdominal Thoracic or []   Upper Abdominal Thoracic with  PulmonaryDisorder  []     Chest X-ray Clear/Not  Ordered     [x]  Chronic Changes  Results Pending  []  Infiltrates, atelectasis, pleural effusion, or edema  []  Infiltrates in more than one lobe []  Infiltrate + Atelectasis, &/or pleural effusion  []    Respiratory Pattern Regular,  RR = 12-20 []  Increased,  RR = 21-25 []  RANDOLPH, irregular,  or RR = 26-30 []  Decreased FEV1  or RR = 31-35 []  Severe SOB, use  of accessory muscles, or RR ? 35  []    Mental Status Alert, oriented,  Cooperative [x]  Confused but Follows commands []  Lethargic or unable to follow commands []  Obtunded  []  Comatose  []    Breath Sounds Clear to  auscultation  [x]  Decreased unilaterally or  in bases only []  Decreased  bilaterally  []  Crackles or intermittent wheezes []  Wheezes []    Cough Strong, Spontan., & nonproductive [x]  Strong,  spontaneous, &  productive []  Weak,  Nonproductive []  Weak, productive or  with wheezes []  No spontaneous  cough or may require suctioning []    Level of Activity Ambulatory []  Ambulatory w/ Assist  [x]  Non-ambulatory []  Paraplegic []  Quadriplegic []    Total    Score:___1____     Triage Score:____5____      Tri       Triage:     1. (>20) Freq: Q3    2. (16-20) Freq: Q4   3. (11-15) Freq: QID & Albuterol Q2 PRN    4. (6-10) Freq: TID & Albuterol Q2 PRN    5. (0-5) Freq Q4prn

## 2023-09-06 VITALS
DIASTOLIC BLOOD PRESSURE: 55 MMHG | WEIGHT: 176.15 LBS | SYSTOLIC BLOOD PRESSURE: 87 MMHG | OXYGEN SATURATION: 96 % | HEIGHT: 60 IN | HEART RATE: 74 BPM | TEMPERATURE: 96.1 F | RESPIRATION RATE: 16 BRPM | BODY MASS INDEX: 34.58 KG/M2

## 2023-09-07 VITALS
TEMPERATURE: 96.8 F | HEIGHT: 60 IN | HEART RATE: 73 BPM | RESPIRATION RATE: 18 BRPM | SYSTOLIC BLOOD PRESSURE: 114 MMHG | OXYGEN SATURATION: 96 % | WEIGHT: 173.94 LBS | DIASTOLIC BLOOD PRESSURE: 69 MMHG | BODY MASS INDEX: 34.15 KG/M2

## 2023-09-07 VITALS
RESPIRATION RATE: 20 BRPM | DIASTOLIC BLOOD PRESSURE: 56 MMHG | BODY MASS INDEX: 36.36 KG/M2 | TEMPERATURE: 97.9 F | HEIGHT: 60 IN | OXYGEN SATURATION: 95 % | WEIGHT: 185.19 LBS | SYSTOLIC BLOOD PRESSURE: 128 MMHG | HEART RATE: 80 BPM

## 2023-09-07 VITALS
TEMPERATURE: 97.7 F | RESPIRATION RATE: 16 BRPM | BODY MASS INDEX: 31.42 KG/M2 | SYSTOLIC BLOOD PRESSURE: 105 MMHG | HEIGHT: 60 IN | WEIGHT: 160.05 LBS | HEART RATE: 59 BPM | DIASTOLIC BLOOD PRESSURE: 63 MMHG

## 2023-09-08 VITALS
HEART RATE: 78 BPM | WEIGHT: 182.98 LBS | HEIGHT: 60 IN | DIASTOLIC BLOOD PRESSURE: 52 MMHG | OXYGEN SATURATION: 98 % | BODY MASS INDEX: 35.92 KG/M2 | TEMPERATURE: 97 F | RESPIRATION RATE: 16 BRPM | SYSTOLIC BLOOD PRESSURE: 109 MMHG

## (undated) DEVICE — TUBING, SUCTION, 1/4" X 10', STRAIGHT: Brand: MEDLINE

## (undated) DEVICE — 4-PORT MANIFOLD: Brand: NEPTUNE 2

## (undated) DEVICE — CONMED SCOPE SAVER BITE BLOCK, 20X27 MM: Brand: SCOPE SAVER

## (undated) DEVICE — ADAPTER FLSH PMP FLD MGMT GI IRRIG OFP 2 DISPOSABLE

## (undated) DEVICE — LIGATOR ENDOSCP DIA8.6-11.5MM MULT DISP SPDBND LIGATOR SUP

## (undated) DEVICE — BRUSH ENDO CLN L90.5IN SHTH DIA1.7MM BRIST DIA5-7MM 2-6MM

## (undated) DEVICE — GLOVE SURG SZ 85 STD WHT LTX SYN POLYMER BEAD REINF ANTI RL

## (undated) DEVICE — ENDO CARRY-ON PROCEDURE KIT: Brand: ENDO CARRY-ON PROCEDURE KIT

## (undated) DEVICE — TUBE SET 96 MM 64 MM H2O PERISTALTIC STD AUX CHANNEL

## (undated) DEVICE — Device: Brand: ENDO SMARTCAP